# Patient Record
Sex: FEMALE | Race: WHITE | Employment: OTHER | URBAN - METROPOLITAN AREA
[De-identification: names, ages, dates, MRNs, and addresses within clinical notes are randomized per-mention and may not be internally consistent; named-entity substitution may affect disease eponyms.]

---

## 2018-01-31 ENCOUNTER — HOSPITAL ENCOUNTER (INPATIENT)
Age: 68
LOS: 2 days | Discharge: HOME | DRG: 603 | End: 2018-02-02
Attending: EMERGENCY MEDICINE | Admitting: INTERNAL MEDICINE
Payer: MEDICARE

## 2018-01-31 DIAGNOSIS — R03.0 ELEVATED BLOOD PRESSURE READING: ICD-10-CM
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
## 2018-01-31 PROBLEM — L03.90 CELLULITIS: Status: ACTIVE | Noted: 2018-01-31

## 2018-01-31 LAB
ABSOLUTE EOS #: 0 K/UL (ref 0–0.4)
ABSOLUTE IMMATURE GRANULOCYTE: ABNORMAL K/UL (ref 0–0.3)
ABSOLUTE LYMPH #: 1.01 K/UL (ref 1–4.8)
ABSOLUTE MONO #: 0.84 K/UL (ref 0.2–0.8)
ANION GAP SERPL CALCULATED.3IONS-SCNC: 9 MMOL/L (ref 9–17)
BASOPHILS # BLD: 0 %
BASOPHILS ABSOLUTE: 0 K/UL (ref 0–0.2)
BUN BLDV-MCNC: 14 MG/DL (ref 8–23)
BUN/CREAT BLD: 20 (ref 9–20)
CALCIUM SERPL-MCNC: 8.6 MG/DL (ref 8.6–10.4)
CHLORIDE BLD-SCNC: 101 MMOL/L (ref 98–107)
CO2: 23 MMOL/L (ref 20–31)
CREAT SERPL-MCNC: 0.71 MG/DL (ref 0.5–0.9)
DIFFERENTIAL TYPE: ABNORMAL
EOSINOPHILS RELATIVE PERCENT: 0 % (ref 1–4)
GFR AFRICAN AMERICAN: >60 ML/MIN
GFR NON-AFRICAN AMERICAN: >60 ML/MIN
GFR SERPL CREATININE-BSD FRML MDRD: ABNORMAL ML/MIN/{1.73_M2}
GFR SERPL CREATININE-BSD FRML MDRD: ABNORMAL ML/MIN/{1.73_M2}
GLUCOSE BLD-MCNC: 100 MG/DL (ref 70–99)
HCT VFR BLD CALC: 35.8 % (ref 36–46)
HEMOGLOBIN: 12 G/DL (ref 12–16)
IMMATURE GRANULOCYTES: ABNORMAL %
LACTIC ACID: 1.8 MMOL/L (ref 0.5–2.2)
LYMPHOCYTES # BLD: 6 % (ref 24–44)
MCH RBC QN AUTO: 31.8 PG (ref 26–34)
MCHC RBC AUTO-ENTMCNC: 33.5 G/DL (ref 31–37)
MCV RBC AUTO: 95 FL (ref 80–100)
MONOCYTES # BLD: 5 % (ref 1–7)
NRBC AUTOMATED: ABNORMAL PER 100 WBC
PDW BLD-RTO: 13.8 % (ref 11.5–14.5)
PLATELET # BLD: 77 K/UL (ref 130–400)
PLATELET ESTIMATE: ABNORMAL
PMV BLD AUTO: ABNORMAL FL (ref 6–12)
POTASSIUM SERPL-SCNC: 4.3 MMOL/L (ref 3.7–5.3)
RBC # BLD: 3.77 M/UL (ref 4–5.2)
RBC # BLD: ABNORMAL 10*6/UL
SEG NEUTROPHILS: 89 % (ref 36–66)
SEGMENTED NEUTROPHILS ABSOLUTE COUNT: 14.95 K/UL (ref 1.8–7.7)
SODIUM BLD-SCNC: 133 MMOL/L (ref 135–144)
WBC # BLD: 16.8 K/UL (ref 3.5–11)
WBC # BLD: ABNORMAL 10*3/UL

## 2018-01-31 PROCEDURE — 83605 ASSAY OF LACTIC ACID: CPT

## 2018-01-31 PROCEDURE — 1200000000 HC SEMI PRIVATE

## 2018-01-31 PROCEDURE — 85025 COMPLETE CBC W/AUTO DIFF WBC: CPT

## 2018-01-31 PROCEDURE — 6360000002 HC RX W HCPCS: Performed by: INTERNAL MEDICINE

## 2018-01-31 PROCEDURE — 2580000003 HC RX 258: Performed by: INTERNAL MEDICINE

## 2018-01-31 PROCEDURE — 96365 THER/PROPH/DIAG IV INF INIT: CPT

## 2018-01-31 PROCEDURE — 6370000000 HC RX 637 (ALT 250 FOR IP): Performed by: INTERNAL MEDICINE

## 2018-01-31 PROCEDURE — 6360000002 HC RX W HCPCS: Performed by: PHYSICIAN ASSISTANT

## 2018-01-31 PROCEDURE — 87040 BLOOD CULTURE FOR BACTERIA: CPT

## 2018-01-31 PROCEDURE — 80048 BASIC METABOLIC PNL TOTAL CA: CPT

## 2018-01-31 PROCEDURE — 2580000003 HC RX 258: Performed by: PHYSICIAN ASSISTANT

## 2018-01-31 PROCEDURE — 96375 TX/PRO/DX INJ NEW DRUG ADDON: CPT

## 2018-01-31 PROCEDURE — 99284 EMERGENCY DEPT VISIT MOD MDM: CPT

## 2018-01-31 RX ORDER — 0.9 % SODIUM CHLORIDE 0.9 %
1000 INTRAVENOUS SOLUTION INTRAVENOUS ONCE
Status: COMPLETED | OUTPATIENT
Start: 2018-01-31 | End: 2018-01-31

## 2018-01-31 RX ORDER — POTASSIUM CHLORIDE 7.45 MG/ML
10 INJECTION INTRAVENOUS PRN
Status: DISCONTINUED | OUTPATIENT
Start: 2018-01-31 | End: 2018-02-02 | Stop reason: HOSPADM

## 2018-01-31 RX ORDER — MAGNESIUM SULFATE 1 G/100ML
1 INJECTION INTRAVENOUS PRN
Status: DISCONTINUED | OUTPATIENT
Start: 2018-01-31 | End: 2018-02-02 | Stop reason: HOSPADM

## 2018-01-31 RX ORDER — NICOTINE 21 MG/24HR
1 PATCH, TRANSDERMAL 24 HOURS TRANSDERMAL DAILY PRN
Status: DISCONTINUED | OUTPATIENT
Start: 2018-01-31 | End: 2018-02-02 | Stop reason: HOSPADM

## 2018-01-31 RX ORDER — ACETAMINOPHEN 325 MG/1
650 TABLET ORAL EVERY 4 HOURS PRN
Status: DISCONTINUED | OUTPATIENT
Start: 2018-01-31 | End: 2018-02-02 | Stop reason: HOSPADM

## 2018-01-31 RX ORDER — POTASSIUM CHLORIDE 20MEQ/15ML
40 LIQUID (ML) ORAL PRN
Status: DISCONTINUED | OUTPATIENT
Start: 2018-01-31 | End: 2018-02-02 | Stop reason: HOSPADM

## 2018-01-31 RX ORDER — SODIUM CHLORIDE 0.9 % (FLUSH) 0.9 %
10 SYRINGE (ML) INJECTION PRN
Status: DISCONTINUED | OUTPATIENT
Start: 2018-01-31 | End: 2018-02-02 | Stop reason: HOSPADM

## 2018-01-31 RX ORDER — ONDANSETRON 2 MG/ML
4 INJECTION INTRAMUSCULAR; INTRAVENOUS EVERY 6 HOURS PRN
Status: DISCONTINUED | OUTPATIENT
Start: 2018-01-31 | End: 2018-02-01

## 2018-01-31 RX ORDER — HYDROCODONE BITARTRATE AND ACETAMINOPHEN 5; 325 MG/1; MG/1
1 TABLET ORAL EVERY 4 HOURS PRN
Status: DISCONTINUED | OUTPATIENT
Start: 2018-01-31 | End: 2018-02-02 | Stop reason: HOSPADM

## 2018-01-31 RX ORDER — KETOROLAC TROMETHAMINE 30 MG/ML
30 INJECTION, SOLUTION INTRAMUSCULAR; INTRAVENOUS ONCE
Status: COMPLETED | OUTPATIENT
Start: 2018-01-31 | End: 2018-01-31

## 2018-01-31 RX ORDER — POTASSIUM CHLORIDE 20 MEQ/1
40 TABLET, EXTENDED RELEASE ORAL PRN
Status: DISCONTINUED | OUTPATIENT
Start: 2018-01-31 | End: 2018-02-02 | Stop reason: HOSPADM

## 2018-01-31 RX ORDER — BISACODYL 10 MG
10 SUPPOSITORY, RECTAL RECTAL DAILY PRN
Status: DISCONTINUED | OUTPATIENT
Start: 2018-01-31 | End: 2018-02-02 | Stop reason: HOSPADM

## 2018-01-31 RX ORDER — SODIUM CHLORIDE 9 MG/ML
INJECTION, SOLUTION INTRAVENOUS CONTINUOUS
Status: DISCONTINUED | OUTPATIENT
Start: 2018-01-31 | End: 2018-02-02

## 2018-01-31 RX ORDER — MORPHINE SULFATE 2 MG/ML
2 INJECTION, SOLUTION INTRAMUSCULAR; INTRAVENOUS
Status: DISCONTINUED | OUTPATIENT
Start: 2018-01-31 | End: 2018-02-02 | Stop reason: HOSPADM

## 2018-01-31 RX ORDER — SODIUM CHLORIDE 0.9 % (FLUSH) 0.9 %
10 SYRINGE (ML) INJECTION EVERY 12 HOURS SCHEDULED
Status: DISCONTINUED | OUTPATIENT
Start: 2018-01-31 | End: 2018-02-02 | Stop reason: HOSPADM

## 2018-01-31 RX ORDER — HYDROCODONE BITARTRATE AND ACETAMINOPHEN 5; 325 MG/1; MG/1
2 TABLET ORAL EVERY 4 HOURS PRN
Status: DISCONTINUED | OUTPATIENT
Start: 2018-01-31 | End: 2018-02-02 | Stop reason: HOSPADM

## 2018-01-31 RX ORDER — MORPHINE SULFATE 4 MG/ML
4 INJECTION, SOLUTION INTRAMUSCULAR; INTRAVENOUS
Status: DISCONTINUED | OUTPATIENT
Start: 2018-01-31 | End: 2018-02-02 | Stop reason: HOSPADM

## 2018-01-31 RX ADMIN — SODIUM CHLORIDE: 9 INJECTION, SOLUTION INTRAVENOUS at 20:16

## 2018-01-31 RX ADMIN — SODIUM CHLORIDE 3 G: 9 INJECTION, SOLUTION INTRAVENOUS at 21:10

## 2018-01-31 RX ADMIN — SODIUM CHLORIDE 1000 ML: 9 INJECTION, SOLUTION INTRAVENOUS at 15:20

## 2018-01-31 RX ADMIN — ONDANSETRON 4 MG: 2 INJECTION INTRAMUSCULAR; INTRAVENOUS at 20:18

## 2018-01-31 RX ADMIN — SODIUM CHLORIDE 3 G: 9 INJECTION, SOLUTION INTRAVENOUS at 15:21

## 2018-01-31 RX ADMIN — ACETAMINOPHEN 650 MG: 325 TABLET ORAL at 23:04

## 2018-01-31 RX ADMIN — KETOROLAC TROMETHAMINE 30 MG: 30 INJECTION, SOLUTION INTRAMUSCULAR at 15:21

## 2018-01-31 RX ADMIN — MORPHINE SULFATE 4 MG: 4 INJECTION INTRAVENOUS at 20:19

## 2018-01-31 ASSESSMENT — PAIN SCALES - GENERAL
PAINLEVEL_OUTOF10: 7
PAINLEVEL_OUTOF10: 5
PAINLEVEL_OUTOF10: 3
PAINLEVEL_OUTOF10: 0
PAINLEVEL_OUTOF10: 5
PAINLEVEL_OUTOF10: 4
PAINLEVEL_OUTOF10: 3

## 2018-01-31 ASSESSMENT — PAIN DESCRIPTION - ORIENTATION: ORIENTATION: RIGHT

## 2018-01-31 ASSESSMENT — PAIN DESCRIPTION - LOCATION: LOCATION: ARM;HAND

## 2018-01-31 ASSESSMENT — PAIN DESCRIPTION - DESCRIPTORS: DESCRIPTORS: BURNING;TENDER

## 2018-01-31 ASSESSMENT — PAIN DESCRIPTION - FREQUENCY: FREQUENCY: INTERMITTENT

## 2018-01-31 NOTE — ED PROVIDER NOTES
[address] [place] [address] ED  eMERGENCY dEPARTMENT eNCOUnter      Pt Name: Dustin Santos  MRN: [id number]  [place] 1950  Date of evaluation: 1/31/2018  Provider: Catherine Matias Dr       Chief Complaint   Patient presents with    Animal Bite     right hand onset last fri    Swelling     right hand, redness         HISTORY OF PRESENT ILLNESS  (Location/Symptom, Timing/Onset, Context/Setting, Quality, Duration, Modifying Factors, Severity.)   Dustin Santos is a 78 y.o. female who presents to the emergency department with Bite to the right hand that occurred on Friday. Patient states she takes in a straight cath. This cat was stray. She has since taken the cat to the vet in front of that everything is okay from a health perspective from the cut. Since being bitten she reports increased pain and redness to the hand that is streaking up the forearm above the elbow. Pain described as mild, constant, sore. Worse with palpation and certain movements. No definite alleviating factors. Nursing Notes were reviewed. ALLERGIES     Review of patient's allergies indicates no known allergies. CURRENT MEDICATIONS       Previous Medications    No medications on file       PAST MEDICAL HISTORY         Diagnosis Date    Back pain     Fibromyalgia     Hepatitis     Liver disease     stage 4       SURGICAL HISTORY           Procedure Laterality Date    BREAST SURGERY      benign lumpectomy    CERVICAL DISC SURGERY      CHOLECYSTECTOMY      HYSTERECTOMY           FAMILY HISTORY     History reviewed. No pertinent family history. No family status information on file. SOCIAL HISTORY      reports that she has been smoking Cigarettes. She has a 50.00 pack-year smoking history. She has never used smokeless tobacco. She reports that she uses drugs, including Cocaine. She reports that she does not drink alcohol.     REVIEW OF SYSTEMS    (2-9 systems for level 4, 10 or more for level 5) Review of Systems    Except as noted above the remainder of the review of systems was reviewed and negative. PHYSICAL EXAM    (up to 7 for level 4, 8 or more for level 5)     ED Triage Vitals [01/31/18 1414]   BP Temp Temp Source Pulse Resp SpO2 Height Weight   (!) 171/74 97.5 °F (36.4 °C) Oral 89 18 95 % 5' 3\" (1.6 m) 157 lb 6.4 oz (71.4 kg)     Physical Exam   Constitutional: She is oriented to person, place, and time. She appears well-developed. HENT:   Head: Normocephalic and atraumatic. Neck: Normal range of motion. Neck supple. Cardiovascular: Normal rate and regular rhythm. Pulmonary/Chest: Effort normal and breath sounds normal.   Abdominal: Soft. Musculoskeletal: Normal range of motion. Arms:  Neurological: She is alert and oriented to person, place, and time. Skin: Skin is warm. No rash noted. Psychiatric: She has a normal mood and affect.  Her behavior is normal.               DIAGNOSTIC RESULTS     EKG: All EKG's are interpreted by the Emergency Department Physician who either signs or Co-signs this chart in the absence of a cardiologist.        RADIOLOGY:   Non-plain film images such as CT, Ultrasound and MRI are read by the radiologist. Plain radiographic images are visualized and preliminarily interpreted by the emergency physician with the below findings:        Interpretation per the Radiologist below, if available at the time of this note:          ED BEDSIDE ULTRASOUND:   Performed by ED Physician - none    LABS:  Labs Reviewed   BASIC METABOLIC PANEL - Abnormal; Notable for the following:        Result Value    Glucose 100 (*)     Sodium 133 (*)     All other components within normal limits   CBC WITH AUTO DIFFERENTIAL - Abnormal; Notable for the following:     WBC 16.8 (*)     RBC 3.77 (*)     Hematocrit 35.8 (*)     Platelets 77 (*)     Seg Neutrophils 89 (*)     Lymphocytes 6 (*)     Eosinophils % 0 (*)     Segs Absolute 14.95 (*)     Absolute Mono # 0.84 (*)     All

## 2018-01-31 NOTE — ED NOTES
Pt was bit on her Rt arm several days ago by a stray cat. Pt now has swelling and pain with redness to the arm. Pt arm is warm but she still has full movent.       Sandhya Gomez  01/31/18 5254

## 2018-02-01 PROBLEM — S41.151A: Status: ACTIVE | Noted: 2018-02-01

## 2018-02-01 PROBLEM — L03.113 CELLULITIS OF RIGHT ARM: Status: ACTIVE | Noted: 2018-02-01

## 2018-02-01 PROBLEM — K74.60 CL (CIRRHOSIS OF LIVER) (HCC): Status: ACTIVE | Noted: 2018-02-01
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
## 2018-02-01 PROBLEM — B18.2 CHRONIC HEPATITIS C WITHOUT HEPATIC COMA (HCC): Status: ACTIVE | Noted: 2018-02-01

## 2018-02-01 LAB
ANION GAP SERPL CALCULATED.3IONS-SCNC: 10 MMOL/L (ref 9–17)
BUN BLDV-MCNC: 11 MG/DL (ref 8–23)
BUN/CREAT BLD: 15 (ref 9–20)
CALCIUM SERPL-MCNC: 7.8 MG/DL (ref 8.6–10.4)
CHLORIDE BLD-SCNC: 105 MMOL/L (ref 98–107)
CO2: 22 MMOL/L (ref 20–31)
CREAT SERPL-MCNC: 0.71 MG/DL (ref 0.5–0.9)
GFR AFRICAN AMERICAN: >60 ML/MIN
GFR NON-AFRICAN AMERICAN: >60 ML/MIN
GFR SERPL CREATININE-BSD FRML MDRD: ABNORMAL ML/MIN/{1.73_M2}
GFR SERPL CREATININE-BSD FRML MDRD: ABNORMAL ML/MIN/{1.73_M2}
GLUCOSE BLD-MCNC: 101 MG/DL (ref 70–99)
HCT VFR BLD CALC: 34.9 % (ref 36–46)
HEMOGLOBIN: 12 G/DL (ref 12–16)
MCH RBC QN AUTO: 32.4 PG (ref 26–34)
MCHC RBC AUTO-ENTMCNC: 34.2 G/DL (ref 31–37)
MCV RBC AUTO: 94.6 FL (ref 80–100)
NRBC AUTOMATED: ABNORMAL PER 100 WBC
PDW BLD-RTO: 13.4 % (ref 11.5–14.5)
PLATELET # BLD: 37 K/UL (ref 130–400)
PMV BLD AUTO: ABNORMAL FL (ref 6–12)
POTASSIUM SERPL-SCNC: 3.6 MMOL/L (ref 3.7–5.3)
RBC # BLD: 3.69 M/UL (ref 4–5.2)
SODIUM BLD-SCNC: 137 MMOL/L (ref 135–144)
WBC # BLD: 14.4 K/UL (ref 3.5–11)

## 2018-02-01 PROCEDURE — 6360000002 HC RX W HCPCS: Performed by: INTERNAL MEDICINE

## 2018-02-01 PROCEDURE — 6370000000 HC RX 637 (ALT 250 FOR IP): Performed by: INTERNAL MEDICINE

## 2018-02-01 PROCEDURE — 2580000003 HC RX 258: Performed by: INTERNAL MEDICINE

## 2018-02-01 PROCEDURE — G0008 ADMIN INFLUENZA VIRUS VAC: HCPCS | Performed by: INTERNAL MEDICINE

## 2018-02-01 PROCEDURE — 80048 BASIC METABOLIC PNL TOTAL CA: CPT

## 2018-02-01 PROCEDURE — 36415 COLL VENOUS BLD VENIPUNCTURE: CPT

## 2018-02-01 PROCEDURE — 85027 COMPLETE CBC AUTOMATED: CPT

## 2018-02-01 PROCEDURE — 90686 IIV4 VACC NO PRSV 0.5 ML IM: CPT | Performed by: INTERNAL MEDICINE

## 2018-02-01 PROCEDURE — 99222 1ST HOSP IP/OBS MODERATE 55: CPT | Performed by: INTERNAL MEDICINE

## 2018-02-01 PROCEDURE — 1200000000 HC SEMI PRIVATE

## 2018-02-01 RX ORDER — ONDANSETRON 2 MG/ML
4 INJECTION INTRAMUSCULAR; INTRAVENOUS EVERY 4 HOURS PRN
Status: DISCONTINUED | OUTPATIENT
Start: 2018-02-01 | End: 2018-02-02 | Stop reason: HOSPADM

## 2018-02-01 RX ORDER — PROMETHAZINE HYDROCHLORIDE 25 MG/ML
6.25 INJECTION, SOLUTION INTRAMUSCULAR; INTRAVENOUS EVERY 4 HOURS PRN
Status: DISCONTINUED | OUTPATIENT
Start: 2018-02-01 | End: 2018-02-02 | Stop reason: HOSPADM

## 2018-02-01 RX ADMIN — MORPHINE SULFATE 4 MG: 4 INJECTION INTRAVENOUS at 01:00

## 2018-02-01 RX ADMIN — SODIUM CHLORIDE 3 G: 9 INJECTION, SOLUTION INTRAVENOUS at 21:00

## 2018-02-01 RX ADMIN — Medication 10 ML: at 09:00

## 2018-02-01 RX ADMIN — MORPHINE SULFATE 2 MG: 2 INJECTION, SOLUTION INTRAMUSCULAR; INTRAVENOUS at 08:59

## 2018-02-01 RX ADMIN — SODIUM CHLORIDE 3 G: 9 INJECTION, SOLUTION INTRAVENOUS at 03:27

## 2018-02-01 RX ADMIN — INFLUENZA A VIRUS A/SINGAPORE/GP1908/2015 IVR-180A (H1N1) ANTIGEN (PROPIOLACTONE INACTIVATED), INFLUENZA A VIRUS A/HONG KONG/4801/2014 X-263B (H3N2) ANTIGEN (PROPIOLACTONE INACTIVATED), INFLUENZA B VIRUS B/BRISBANE/46/2015 ANTIGEN (PROPIOLACTONE INACTIVATED), AND INFLUENZA B VIRUS B/PHUKET/3073/2013 BVR-1B ANTIGEN (PROPIOLACTONE INACTIVATED) 0.5 ML: 15; 15; 15; 15 INJECTION, SUSPENSION INTRAMUSCULAR at 16:17

## 2018-02-01 RX ADMIN — PROMETHAZINE HYDROCHLORIDE 6.25 MG: 25 INJECTION INTRAMUSCULAR; INTRAVENOUS at 16:16

## 2018-02-01 RX ADMIN — SODIUM CHLORIDE 3 G: 9 INJECTION, SOLUTION INTRAVENOUS at 09:13

## 2018-02-01 RX ADMIN — ONDANSETRON 4 MG: 2 INJECTION INTRAMUSCULAR; INTRAVENOUS at 08:59

## 2018-02-01 RX ADMIN — SODIUM CHLORIDE 3 G: 9 INJECTION, SOLUTION INTRAVENOUS at 16:15

## 2018-02-01 RX ADMIN — HYDROCODONE BITARTRATE AND ACETAMINOPHEN 1 TABLET: 5; 325 TABLET ORAL at 17:25

## 2018-02-01 ASSESSMENT — PAIN SCALES - GENERAL
PAINLEVEL_OUTOF10: 7
PAINLEVEL_OUTOF10: 2
PAINLEVEL_OUTOF10: 7
PAINLEVEL_OUTOF10: 4

## 2018-02-01 NOTE — PLAN OF CARE
Problem: SAFETY  Goal: Free from accidental physical injury  Outcome: Ongoing    Intervention: ASSESS FOR FALL RISK  See lili fall risk assessment. Intervention: KEEP BED IN LOW POSITION  Bed locked and in low position. Intervention: KEEP BED WHEELS LOCKED  Bed wheels locked. Intervention: KEEP CALL LIGHT WITHIN REACH  Call light and personal belongings with in reach. Intervention: ENSURE ID BAND IS CORRECT AND IN PLACE  Id band checked and compared with patient name and date of birth with each med administration.   Intervention: INSTRUCT ABOUT SAFETY DEVICES  Patient instructed on use of call light, non-skid foot wear, and bed rails

## 2018-02-02 VITALS
SYSTOLIC BLOOD PRESSURE: 129 MMHG | RESPIRATION RATE: 16 BRPM | HEIGHT: 63 IN | OXYGEN SATURATION: 96 % | WEIGHT: 158.1 LBS | TEMPERATURE: 98.2 F | BODY MASS INDEX: 28.01 KG/M2 | DIASTOLIC BLOOD PRESSURE: 58 MMHG | HEART RATE: 79 BPM

## 2018-02-02 PROBLEM — D69.6 THROMBOCYTOPENIA (HCC): Status: ACTIVE | Noted: 2018-02-02

## 2018-02-02 LAB
ABSOLUTE EOS #: 0.2 K/UL (ref 0–0.4)
ABSOLUTE IMMATURE GRANULOCYTE: ABNORMAL K/UL (ref 0–0.3)
ABSOLUTE LYMPH #: 1.7 K/UL (ref 1–4.8)
ABSOLUTE MONO #: 0.8 K/UL (ref 0.2–0.8)
BASOPHILS # BLD: 0 % (ref 0–2)
BASOPHILS ABSOLUTE: 0 K/UL (ref 0–0.2)
DIFFERENTIAL TYPE: ABNORMAL
EOSINOPHILS RELATIVE PERCENT: 2 % (ref 1–4)
HCT VFR BLD CALC: 32.4 % (ref 36–46)
HEMOGLOBIN: 10.9 G/DL (ref 12–16)
IMMATURE GRANULOCYTES: ABNORMAL %
LYMPHOCYTES # BLD: 18 % (ref 24–44)
MCH RBC QN AUTO: 31.8 PG (ref 26–34)
MCHC RBC AUTO-ENTMCNC: 33.5 G/DL (ref 31–37)
MCV RBC AUTO: 94.9 FL (ref 80–100)
MONOCYTES # BLD: 8 % (ref 1–7)
NRBC AUTOMATED: ABNORMAL PER 100 WBC
PDW BLD-RTO: 14.1 % (ref 11.5–14.5)
PLATELET # BLD: 41 K/UL (ref 130–400)
PLATELET ESTIMATE: ABNORMAL
PMV BLD AUTO: 7.6 FL (ref 6–12)
RBC # BLD: 3.42 M/UL (ref 4–5.2)
RBC # BLD: ABNORMAL 10*6/UL
SEG NEUTROPHILS: 72 % (ref 36–66)
SEGMENTED NEUTROPHILS ABSOLUTE COUNT: 7.1 K/UL (ref 1.8–7.7)
WBC # BLD: 9.9 K/UL (ref 3.5–11)
WBC # BLD: ABNORMAL 10*3/UL

## 2018-02-02 PROCEDURE — 6360000002 HC RX W HCPCS: Performed by: INTERNAL MEDICINE

## 2018-02-02 PROCEDURE — 99232 SBSQ HOSP IP/OBS MODERATE 35: CPT | Performed by: INTERNAL MEDICINE

## 2018-02-02 PROCEDURE — 6370000000 HC RX 637 (ALT 250 FOR IP): Performed by: INTERNAL MEDICINE

## 2018-02-02 PROCEDURE — 2580000003 HC RX 258: Performed by: INTERNAL MEDICINE

## 2018-02-02 PROCEDURE — 36415 COLL VENOUS BLD VENIPUNCTURE: CPT

## 2018-02-02 PROCEDURE — 85025 COMPLETE CBC W/AUTO DIFF WBC: CPT

## 2018-02-02 RX ORDER — ACETAMINOPHEN 325 MG/1
650 TABLET ORAL EVERY 4 HOURS PRN
Qty: 120 TABLET | Refills: 3 | COMMUNITY
Start: 2018-02-02 | End: 2019-03-14 | Stop reason: ALTCHOICE

## 2018-02-02 RX ORDER — AMOXICILLIN AND CLAVULANATE POTASSIUM 875; 125 MG/1; MG/1
1 TABLET, FILM COATED ORAL 2 TIMES DAILY
Qty: 14 TABLET | Refills: 0 | Status: SHIPPED | OUTPATIENT
Start: 2018-02-02 | End: 2018-02-09

## 2018-02-02 RX ADMIN — SODIUM CHLORIDE 3 G: 9 INJECTION, SOLUTION INTRAVENOUS at 07:41

## 2018-02-02 RX ADMIN — SODIUM CHLORIDE 3 G: 9 INJECTION, SOLUTION INTRAVENOUS at 03:51

## 2018-02-02 RX ADMIN — HYDROCODONE BITARTRATE AND ACETAMINOPHEN 1 TABLET: 5; 325 TABLET ORAL at 07:41

## 2018-02-02 RX ADMIN — HYDROCODONE BITARTRATE AND ACETAMINOPHEN 2 TABLET: 5; 325 TABLET ORAL at 00:51

## 2018-02-02 ASSESSMENT — PAIN DESCRIPTION - PAIN TYPE
TYPE: ACUTE PAIN

## 2018-02-02 ASSESSMENT — PAIN DESCRIPTION - LOCATION
LOCATION: HAND

## 2018-02-02 ASSESSMENT — PAIN SCALES - GENERAL
PAINLEVEL_OUTOF10: 3
PAINLEVEL_OUTOF10: 8
PAINLEVEL_OUTOF10: 3
PAINLEVEL_OUTOF10: 1

## 2018-02-02 NOTE — DISCHARGE SUMMARY
[facility]    Discharge Summary     Patient ID: Jigna Brizuela  :  1950   MRN: [id number]     ACCOUNT:  [de-identified]   Patient's PCP: No primary care provider on file. Admit Date: 2018   Discharge Date: 2018     Length of Stay: 2  Code Status:  Full Code  Admitting Physician: Roly Barahona, DO  Discharge Physician: Roly Barahona, DO     Active Discharge Diagnoses:     Primary Problem  Cellulitis of right arm      [place] Problems    Diagnosis Date Noted    Cellulitis of right arm [L03.113] 2018    Cat bite of right upper arm [S41.151A, W55.01XA] 2018    CL (cirrhosis of liver) ([facility] [K74.60] 2018    Chronic [facility] C without hepatic coma [facility] [B18.2] 2018       Admission Condition:  fair     Discharged Condition: good    Hospital Stay:     Hospital Course:  Jigna Brizuela is a 78 y.o. female who was admitted for the management of   Cellulitis of right arm , presented to ER with Animal Bite (right hand onset last fri) and Swelling (right hand, redness)    Pt had been bit by cat and developed cellulitis. Placed on unasyn, with good improvement.   Switched to po augmentin and sent home      Significant therapeutic interventions: iv abx    Significant Diagnostic Studies:   Labs / Micro:  CBC:   Lab Results   Component Value Date    WBC 9.9 2018    RBC 3.42 2018    HGB 10.9 2018    HCT 32.4 2018    MCV 94.9 2018    MCH 31.8 2018    MCHC 33.5 2018    RDW 14.1 2018    PLT 41 2018     CMP:    Lab Results   Component Value Date    GLUCOSE 101 2018     2018    K 3.6 2018     2018    CO2 22 2018    BUN 11 2018    CREATININE 0.71 2018    ANIONGAP 10 2018    ALKPHOS 75 2013    ALT 64 2013    AST 73 2013    BILITOT 0.40 2013    LABALBU 4.2 2013 this patient's care.

## 2018-02-02 NOTE — PROGRESS NOTES
All patient belongings collected. IV and telemetry removed. All discharge paperwork given and explained to patient. PCP list given to patient to follow up with. See discharge event for further details.

## 2018-02-06 LAB
CULTURE: NORMAL
Lab: NORMAL
Lab: NORMAL
SPECIMEN DESCRIPTION: NORMAL
STATUS: NORMAL
STATUS: NORMAL

## 2019-01-01 ENCOUNTER — HOSPITAL ENCOUNTER (OUTPATIENT)
Dept: INFUSION THERAPY | Facility: MEDICAL CENTER | Age: 69
Discharge: HOME | End: 2019-12-16
Payer: MEDICARE

## 2019-01-01 ENCOUNTER — OFFICE VISIT (OUTPATIENT)
Dept: ONCOLOGY | Age: 69
End: 2019-01-01
Payer: MEDICARE

## 2019-01-01 ENCOUNTER — HOSPITAL ENCOUNTER (OUTPATIENT)
Facility: MEDICAL CENTER | Age: 69
End: 2019-01-01
Payer: MEDICARE

## 2019-01-01 ENCOUNTER — TELEPHONE (OUTPATIENT)
Dept: ONCOLOGY | Age: 69
End: 2019-01-01

## 2019-01-01 ENCOUNTER — HOSPITAL ENCOUNTER (OUTPATIENT)
Dept: INFUSION THERAPY | Facility: MEDICAL CENTER | Age: 69
Discharge: HOME | End: 2019-12-02
Payer: MEDICARE

## 2019-01-01 ENCOUNTER — HOSPITAL ENCOUNTER (OUTPATIENT)
Dept: INFUSION THERAPY | Facility: MEDICAL CENTER | Age: 69
Discharge: HOME | End: 2019-11-18
Payer: MEDICARE

## 2019-01-01 ENCOUNTER — OFFICE VISIT (OUTPATIENT)
Dept: PRIMARY CARE CLINIC | Age: 69
End: 2019-01-01
Payer: MEDICARE

## 2019-01-01 ENCOUNTER — HOSPITAL ENCOUNTER (OUTPATIENT)
Dept: INFUSION THERAPY | Facility: MEDICAL CENTER | Age: 69
Discharge: HOME | End: 2019-10-21
Payer: MEDICARE

## 2019-01-01 ENCOUNTER — TELEPHONE (OUTPATIENT)
Dept: RADIATION ONCOLOGY | Facility: MEDICAL CENTER | Age: 69
End: 2019-01-01

## 2019-01-01 ENCOUNTER — HOSPITAL ENCOUNTER (OUTPATIENT)
Dept: INFUSION THERAPY | Facility: MEDICAL CENTER | Age: 69
End: 2019-01-01
Payer: MEDICARE

## 2019-01-01 ENCOUNTER — TELEPHONE (OUTPATIENT)
Dept: PRIMARY CARE CLINIC | Age: 69
End: 2019-01-01

## 2019-01-01 ENCOUNTER — HOSPITAL ENCOUNTER (OUTPATIENT)
Dept: INFUSION THERAPY | Facility: MEDICAL CENTER | Age: 69
Discharge: HOME | End: 2019-11-04
Payer: MEDICARE

## 2019-01-01 ENCOUNTER — HOSPITAL ENCOUNTER (OUTPATIENT)
Dept: MRI IMAGING | Age: 69
Discharge: HOME | End: 2019-12-05
Payer: MEDICARE

## 2019-01-01 ENCOUNTER — OFFICE VISIT (OUTPATIENT)
Dept: PULMONOLOGY | Age: 69
End: 2019-01-01
Payer: MEDICARE

## 2019-01-01 VITALS
SYSTOLIC BLOOD PRESSURE: 134 MMHG | HEART RATE: 98 BPM | WEIGHT: 124.6 LBS | TEMPERATURE: 97.7 F | RESPIRATION RATE: 20 BRPM | DIASTOLIC BLOOD PRESSURE: 71 MMHG | BODY MASS INDEX: 22.07 KG/M2

## 2019-01-01 VITALS
WEIGHT: 124.6 LBS | BODY MASS INDEX: 22.07 KG/M2 | SYSTOLIC BLOOD PRESSURE: 134 MMHG | TEMPERATURE: 97.7 F | HEART RATE: 98 BPM | RESPIRATION RATE: 20 BRPM | DIASTOLIC BLOOD PRESSURE: 71 MMHG

## 2019-01-01 VITALS
SYSTOLIC BLOOD PRESSURE: 138 MMHG | TEMPERATURE: 98.2 F | RESPIRATION RATE: 20 BRPM | HEART RATE: 96 BPM | DIASTOLIC BLOOD PRESSURE: 50 MMHG

## 2019-01-01 VITALS
HEART RATE: 98 BPM | HEIGHT: 63 IN | RESPIRATION RATE: 16 BRPM | WEIGHT: 118.6 LBS | SYSTOLIC BLOOD PRESSURE: 115 MMHG | BODY MASS INDEX: 21.02 KG/M2 | DIASTOLIC BLOOD PRESSURE: 70 MMHG | OXYGEN SATURATION: 88 %

## 2019-01-01 VITALS
RESPIRATION RATE: 18 BRPM | BODY MASS INDEX: 21.31 KG/M2 | WEIGHT: 120.3 LBS | HEART RATE: 79 BPM | SYSTOLIC BLOOD PRESSURE: 113 MMHG | DIASTOLIC BLOOD PRESSURE: 64 MMHG

## 2019-01-01 VITALS
BODY MASS INDEX: 21.86 KG/M2 | TEMPERATURE: 98.5 F | DIASTOLIC BLOOD PRESSURE: 66 MMHG | WEIGHT: 123.4 LBS | HEART RATE: 66 BPM | SYSTOLIC BLOOD PRESSURE: 118 MMHG

## 2019-01-01 VITALS
WEIGHT: 126 LBS | RESPIRATION RATE: 20 BRPM | TEMPERATURE: 98.2 F | SYSTOLIC BLOOD PRESSURE: 138 MMHG | BODY MASS INDEX: 22.32 KG/M2 | DIASTOLIC BLOOD PRESSURE: 50 MMHG | HEART RATE: 96 BPM

## 2019-01-01 VITALS
WEIGHT: 126 LBS | SYSTOLIC BLOOD PRESSURE: 110 MMHG | HEART RATE: 111 BPM | TEMPERATURE: 98.2 F | BODY MASS INDEX: 22.32 KG/M2 | DIASTOLIC BLOOD PRESSURE: 63 MMHG | RESPIRATION RATE: 18 BRPM

## 2019-01-01 VITALS
SYSTOLIC BLOOD PRESSURE: 140 MMHG | DIASTOLIC BLOOD PRESSURE: 86 MMHG | BODY MASS INDEX: 22.15 KG/M2 | WEIGHT: 125 LBS | HEART RATE: 108 BPM | HEIGHT: 63 IN | TEMPERATURE: 98.8 F

## 2019-01-01 VITALS
DIASTOLIC BLOOD PRESSURE: 64 MMHG | TEMPERATURE: 97.7 F | SYSTOLIC BLOOD PRESSURE: 113 MMHG | RESPIRATION RATE: 18 BRPM | HEART RATE: 79 BPM

## 2019-01-01 DIAGNOSIS — Z23 NEEDS FLU SHOT: ICD-10-CM

## 2019-01-01 DIAGNOSIS — R53.83 FATIGUE, UNSPECIFIED TYPE: ICD-10-CM

## 2019-01-01 DIAGNOSIS — F41.9 ANXIETY: Primary | ICD-10-CM

## 2019-01-01 DIAGNOSIS — C34.11 MALIGNANT NEOPLASM OF UPPER LOBE OF RIGHT LUNG (HCC): Primary | ICD-10-CM

## 2019-01-01 DIAGNOSIS — G47.00 INSOMNIA, UNSPECIFIED TYPE: ICD-10-CM

## 2019-01-01 DIAGNOSIS — C34.11 MALIGNANT NEOPLASM OF UPPER LOBE OF RIGHT LUNG (HCC): ICD-10-CM

## 2019-01-01 DIAGNOSIS — B18.2 CHRONIC HEPATITIS C WITHOUT HEPATIC COMA (HCC): ICD-10-CM

## 2019-01-01 DIAGNOSIS — Z76.0 MEDICATION REFILL: ICD-10-CM

## 2019-01-01 DIAGNOSIS — R73.9 HYPERGLYCEMIA: ICD-10-CM

## 2019-01-01 DIAGNOSIS — C34.01 MALIGNANT NEOPLASM OF HILUS OF RIGHT LUNG (HCC): ICD-10-CM

## 2019-01-01 DIAGNOSIS — J96.11 CHRONIC RESPIRATORY FAILURE WITH HYPOXIA (HCC): ICD-10-CM

## 2019-01-01 DIAGNOSIS — J44.9 CHRONIC OBSTRUCTIVE PULMONARY DISEASE, UNSPECIFIED COPD TYPE (HCC): Primary | ICD-10-CM

## 2019-01-01 DIAGNOSIS — Z00.00 ROUTINE GENERAL MEDICAL EXAMINATION AT A HEALTH CARE FACILITY: Primary | ICD-10-CM

## 2019-01-01 DIAGNOSIS — J90 PLEURAL EFFUSION ON RIGHT: ICD-10-CM

## 2019-01-01 DIAGNOSIS — F17.200 TOBACCO DEPENDENCE: ICD-10-CM

## 2019-01-01 DIAGNOSIS — J44.9 CHRONIC OBSTRUCTIVE PULMONARY DISEASE, UNSPECIFIED COPD TYPE (HCC): ICD-10-CM

## 2019-01-01 LAB
ABSOLUTE EOS #: 0.13 K/UL (ref 0–0.44)
ABSOLUTE EOS #: 0.14 K/UL (ref 0–0.44)
ABSOLUTE EOS #: 0.18 K/UL (ref 0–0.4)
ABSOLUTE EOS #: 0.18 K/UL (ref 0–0.44)
ABSOLUTE EOS #: 0.31 K/UL (ref 0–0.44)
ABSOLUTE IMMATURE GRANULOCYTE: 0 K/UL (ref 0–0.3)
ABSOLUTE IMMATURE GRANULOCYTE: 0 K/UL (ref 0–0.3)
ABSOLUTE IMMATURE GRANULOCYTE: 0.04 K/UL (ref 0–0.3)
ABSOLUTE IMMATURE GRANULOCYTE: 0.04 K/UL (ref 0–0.3)
ABSOLUTE IMMATURE GRANULOCYTE: 0.05 K/UL (ref 0–0.3)
ABSOLUTE LYMPH #: 0.58 K/UL (ref 1.1–3.7)
ABSOLUTE LYMPH #: 0.63 K/UL (ref 1–4.8)
ABSOLUTE LYMPH #: 0.68 K/UL (ref 1.1–3.7)
ABSOLUTE LYMPH #: 0.72 K/UL (ref 1.1–3.7)
ABSOLUTE LYMPH #: 0.75 K/UL (ref 1.1–3.7)
ABSOLUTE MONO #: 0.47 K/UL (ref 0.1–1.2)
ABSOLUTE MONO #: 0.5 K/UL (ref 0.2–0.8)
ABSOLUTE MONO #: 0.53 K/UL (ref 0.1–1.2)
ABSOLUTE MONO #: 0.63 K/UL (ref 0.1–1.2)
ABSOLUTE MONO #: 0.65 K/UL (ref 0.1–1.2)
ALBUMIN SERPL-MCNC: 3.1 G/DL (ref 3.5–5.2)
ALBUMIN SERPL-MCNC: 3.2 G/DL (ref 3.5–5.2)
ALBUMIN SERPL-MCNC: 3.2 G/DL (ref 3.5–5.2)
ALBUMIN SERPL-MCNC: 3.3 G/DL (ref 3.5–5.2)
ALBUMIN SERPL-MCNC: 3.5 G/DL (ref 3.5–5.2)
ALBUMIN/GLOBULIN RATIO: ABNORMAL (ref 1–2.5)
ALP BLD-CCNC: 120 U/L (ref 35–104)
ALP BLD-CCNC: 121 U/L (ref 35–104)
ALP BLD-CCNC: 122 U/L (ref 35–104)
ALP BLD-CCNC: 136 U/L (ref 35–104)
ALP BLD-CCNC: 150 U/L (ref 35–104)
ALT SERPL-CCNC: 18 U/L (ref 5–33)
ALT SERPL-CCNC: 30 U/L (ref 5–33)
ALT SERPL-CCNC: 34 U/L (ref 5–33)
ALT SERPL-CCNC: 41 U/L (ref 5–33)
ALT SERPL-CCNC: 49 U/L (ref 5–33)
AMYLASE: 106 U/L (ref 28–100)
AMYLASE: 137 U/L (ref 28–100)
AMYLASE: 42 U/L (ref 28–100)
AMYLASE: 66 U/L (ref 28–100)
ANION GAP SERPL CALCULATED.3IONS-SCNC: 10 MMOL/L (ref 9–17)
ANION GAP SERPL CALCULATED.3IONS-SCNC: 10 MMOL/L (ref 9–17)
ANION GAP SERPL CALCULATED.3IONS-SCNC: 11 MMOL/L (ref 9–17)
ANION GAP SERPL CALCULATED.3IONS-SCNC: 11 MMOL/L (ref 9–17)
ANION GAP SERPL CALCULATED.3IONS-SCNC: 9 MMOL/L (ref 9–17)
AST SERPL-CCNC: 34 U/L
AST SERPL-CCNC: 55 U/L
AST SERPL-CCNC: 57 U/L
AST SERPL-CCNC: 87 U/L
AST SERPL-CCNC: 93 U/L
BASOPHILS # BLD: 1 %
BASOPHILS # BLD: 1 % (ref 0–2)
BASOPHILS ABSOLUTE: 0.05 K/UL (ref 0–0.2)
BASOPHILS ABSOLUTE: 0.06 K/UL (ref 0–0.2)
BILIRUB SERPL-MCNC: 0.48 MG/DL (ref 0.3–1.2)
BILIRUB SERPL-MCNC: 0.56 MG/DL (ref 0.3–1.2)
BILIRUB SERPL-MCNC: 0.63 MG/DL (ref 0.3–1.2)
BILIRUB SERPL-MCNC: 0.82 MG/DL (ref 0.3–1.2)
BILIRUB SERPL-MCNC: 1.01 MG/DL (ref 0.3–1.2)
BUN BLDV-MCNC: 12 MG/DL (ref 8–23)
BUN BLDV-MCNC: 12 MG/DL (ref 8–23)
BUN BLDV-MCNC: 16 MG/DL (ref 8–23)
BUN BLDV-MCNC: 16 MG/DL (ref 8–23)
BUN BLDV-MCNC: 9 MG/DL (ref 8–23)
BUN BLDV-MCNC: 9 MG/DL (ref 8–23)
BUN/CREAT BLD: 17 (ref 9–20)
BUN/CREAT BLD: 18 (ref 9–20)
BUN/CREAT BLD: 21 (ref 9–20)
BUN/CREAT BLD: 22 (ref 9–20)
BUN/CREAT BLD: 25 (ref 9–20)
CALCIUM SERPL-MCNC: 8.6 MG/DL (ref 8.6–10.4)
CALCIUM SERPL-MCNC: 8.6 MG/DL (ref 8.6–10.4)
CALCIUM SERPL-MCNC: 8.8 MG/DL (ref 8.6–10.4)
CALCIUM SERPL-MCNC: 8.9 MG/DL (ref 8.6–10.4)
CALCIUM SERPL-MCNC: 8.9 MG/DL (ref 8.6–10.4)
CHLORIDE BLD-SCNC: 100 MMOL/L (ref 98–107)
CHLORIDE BLD-SCNC: 103 MMOL/L (ref 98–107)
CHLORIDE BLD-SCNC: 103 MMOL/L (ref 98–107)
CO2: 22 MMOL/L (ref 20–31)
CO2: 23 MMOL/L (ref 20–31)
CO2: 23 MMOL/L (ref 20–31)
CO2: 24 MMOL/L (ref 20–31)
CO2: 26 MMOL/L (ref 20–31)
CORTISOL COLLECTION INFO: NORMAL
CORTISOL: 4.1 UG/DL (ref 2.7–18.4)
CORTISOL: 4.5 UG/DL (ref 2.7–18.4)
CORTISOL: 4.7 UG/DL (ref 2.7–18.4)
CORTISOL: 7.9 UG/DL (ref 2.7–18.4)
CORTISOL: 8.3 UG/DL (ref 2.7–18.4)
CREAT SERPL-MCNC: 0.51 MG/DL (ref 0.5–0.9)
CREAT SERPL-MCNC: 0.54 MG/DL (ref 0.5–0.9)
CREAT SERPL-MCNC: 0.57 MG/DL (ref 0.5–0.9)
CREAT SERPL-MCNC: 0.57 MG/DL (ref 0.5–0.9)
CREAT SERPL-MCNC: 0.64 MG/DL (ref 0.5–0.9)
CREAT SERPL-MCNC: 0.74 MG/DL (ref 0.5–0.9)
DIFFERENTIAL TYPE: ABNORMAL
EOSINOPHILS RELATIVE PERCENT: 2 % (ref 1–4)
EOSINOPHILS RELATIVE PERCENT: 3 % (ref 1–4)
EOSINOPHILS RELATIVE PERCENT: 3 % (ref 1–4)
EOSINOPHILS RELATIVE PERCENT: 4 % (ref 1–4)
EOSINOPHILS RELATIVE PERCENT: 6 % (ref 1–4)
ESTIMATED AVERAGE GLUCOSE: 103 MG/DL
ESTIMATED AVERAGE GLUCOSE: 114 MG/DL
GFR AFRICAN AMERICAN: >60 ML/MIN
GFR NON-AFRICAN AMERICAN: >60 ML/MIN
GFR SERPL CREATININE-BSD FRML MDRD: ABNORMAL ML/MIN/{1.73_M2}
GFR SERPL CREATININE-BSD FRML MDRD: NORMAL ML/MIN/{1.73_M2}
GFR SERPL CREATININE-BSD FRML MDRD: NORMAL ML/MIN/{1.73_M2}
GLUCOSE BLD-MCNC: 112 MG/DL (ref 70–99)
GLUCOSE BLD-MCNC: 162 MG/DL (ref 70–99)
GLUCOSE BLD-MCNC: 203 MG/DL (ref 70–99)
GLUCOSE BLD-MCNC: 219 MG/DL (ref 70–99)
GLUCOSE BLD-MCNC: 95 MG/DL (ref 70–99)
HBA1C MFR BLD: 5.2 % (ref 4–6)
HBA1C MFR BLD: 5.6 % (ref 4–6)
HCT VFR BLD CALC: 34.4 % (ref 36.3–47.1)
HCT VFR BLD CALC: 35.5 % (ref 36.3–47.1)
HCT VFR BLD CALC: 35.9 % (ref 36.3–47.1)
HCT VFR BLD CALC: 36.3 % (ref 36.3–47.1)
HCT VFR BLD CALC: 37.2 % (ref 36.3–47.1)
HEMOGLOBIN: 11.1 G/DL (ref 11.9–15.1)
HEMOGLOBIN: 11.2 G/DL (ref 11.9–15.1)
HEMOGLOBIN: 11.3 G/DL (ref 11.9–15.1)
HEMOGLOBIN: 11.4 G/DL (ref 11.9–15.1)
HEMOGLOBIN: 12 G/DL (ref 11.9–15.1)
IMMATURE GRANULOCYTES: 0 %
IMMATURE GRANULOCYTES: 0 %
IMMATURE GRANULOCYTES: 1 %
LIPASE: 16 U/L (ref 13–60)
LIPASE: 17 U/L (ref 13–60)
LIPASE: 18 U/L (ref 13–60)
LIPASE: 19 U/L (ref 13–60)
LIPASE: 21 U/L (ref 13–60)
LYMPHOCYTES # BLD: 12 % (ref 24–43)
LYMPHOCYTES # BLD: 13 % (ref 24–43)
LYMPHOCYTES # BLD: 14 % (ref 24–44)
MCH RBC QN AUTO: 28 PG (ref 25.2–33.5)
MCH RBC QN AUTO: 28.1 PG (ref 25.2–33.5)
MCH RBC QN AUTO: 28.2 PG (ref 25.2–33.5)
MCH RBC QN AUTO: 28.2 PG (ref 25.2–33.5)
MCH RBC QN AUTO: 28.4 PG (ref 25.2–33.5)
MCHC RBC AUTO-ENTMCNC: 31.1 G/DL (ref 28.4–34.8)
MCHC RBC AUTO-ENTMCNC: 31.2 G/DL (ref 28.4–34.8)
MCHC RBC AUTO-ENTMCNC: 32.1 G/DL (ref 28.4–34.8)
MCHC RBC AUTO-ENTMCNC: 32.3 G/DL (ref 28.4–34.8)
MCHC RBC AUTO-ENTMCNC: 32.3 G/DL (ref 28.4–34.8)
MCV RBC AUTO: 87.1 FL (ref 82.6–102.9)
MCV RBC AUTO: 87.9 FL (ref 82.6–102.9)
MCV RBC AUTO: 88 FL (ref 82.6–102.9)
MCV RBC AUTO: 89.9 FL (ref 82.6–102.9)
MCV RBC AUTO: 90.4 FL (ref 82.6–102.9)
MONOCYTES # BLD: 10 % (ref 3–12)
MONOCYTES # BLD: 11 % (ref 1–7)
MONOCYTES # BLD: 11 % (ref 3–12)
MONOCYTES # BLD: 11 % (ref 3–12)
MONOCYTES # BLD: 9 % (ref 3–12)
NRBC AUTOMATED: 0 PER 100 WBC
PDW BLD-RTO: 14.3 % (ref 11.8–14.4)
PDW BLD-RTO: 14.3 % (ref 11.8–14.4)
PDW BLD-RTO: 14.6 % (ref 11.8–14.4)
PDW BLD-RTO: 14.6 % (ref 11.8–14.4)
PDW BLD-RTO: 15.3 % (ref 11.8–14.4)
PLATELET # BLD: 66 K/UL (ref 138–453)
PLATELET # BLD: 74 K/UL (ref 138–453)
PLATELET # BLD: 79 K/UL (ref 138–453)
PLATELET # BLD: 80 K/UL (ref 138–453)
PLATELET # BLD: 82 K/UL (ref 138–453)
PLATELET ESTIMATE: ABNORMAL
PMV BLD AUTO: 8.9 FL (ref 8.1–13.5)
PMV BLD AUTO: 9.1 FL (ref 8.1–13.5)
PMV BLD AUTO: 9.4 FL (ref 8.1–13.5)
PMV BLD AUTO: 9.4 FL (ref 8.1–13.5)
PMV BLD AUTO: 9.6 FL (ref 8.1–13.5)
POTASSIUM SERPL-SCNC: 3.8 MMOL/L (ref 3.7–5.3)
POTASSIUM SERPL-SCNC: 4 MMOL/L (ref 3.7–5.3)
POTASSIUM SERPL-SCNC: 4 MMOL/L (ref 3.7–5.3)
POTASSIUM SERPL-SCNC: 4.5 MMOL/L (ref 3.7–5.3)
POTASSIUM SERPL-SCNC: 4.5 MMOL/L (ref 3.7–5.3)
RBC # BLD: 3.91 M/UL (ref 3.95–5.11)
RBC # BLD: 3.97 M/UL (ref 3.95–5.11)
RBC # BLD: 4.04 M/UL (ref 3.95–5.11)
RBC # BLD: 4.04 M/UL (ref 3.95–5.11)
RBC # BLD: 4.27 M/UL (ref 3.95–5.11)
RBC # BLD: ABNORMAL 10*6/UL
SEG NEUTROPHILS: 70 % (ref 36–66)
SEG NEUTROPHILS: 71 % (ref 36–65)
SEG NEUTROPHILS: 72 % (ref 36–65)
SEG NEUTROPHILS: 73 % (ref 36–65)
SEG NEUTROPHILS: 75 % (ref 36–65)
SEGMENTED NEUTROPHILS ABSOLUTE COUNT: 3.14 K/UL (ref 1.8–7.7)
SEGMENTED NEUTROPHILS ABSOLUTE COUNT: 3.45 K/UL (ref 1.5–8.1)
SEGMENTED NEUTROPHILS ABSOLUTE COUNT: 3.69 K/UL (ref 1.5–8.1)
SEGMENTED NEUTROPHILS ABSOLUTE COUNT: 4.3 K/UL (ref 1.5–8.1)
SEGMENTED NEUTROPHILS ABSOLUTE COUNT: 4.85 K/UL (ref 1.5–8.1)
SODIUM BLD-SCNC: 133 MMOL/L (ref 135–144)
SODIUM BLD-SCNC: 133 MMOL/L (ref 135–144)
SODIUM BLD-SCNC: 135 MMOL/L (ref 135–144)
SODIUM BLD-SCNC: 136 MMOL/L (ref 135–144)
SODIUM BLD-SCNC: 138 MMOL/L (ref 135–144)
TOTAL PROTEIN: 7.5 G/DL (ref 6.4–8.3)
TOTAL PROTEIN: 7.5 G/DL (ref 6.4–8.3)
TOTAL PROTEIN: 7.6 G/DL (ref 6.4–8.3)
TOTAL PROTEIN: 8 G/DL (ref 6.4–8.3)
TOTAL PROTEIN: 8 G/DL (ref 6.4–8.3)
TSH SERPL DL<=0.05 MIU/L-ACNC: 1.47 MIU/L (ref 0.3–5)
TSH SERPL DL<=0.05 MIU/L-ACNC: 1.67 MIU/L (ref 0.3–5)
TSH SERPL DL<=0.05 MIU/L-ACNC: 2.48 MIU/L (ref 0.3–5)
TSH SERPL DL<=0.05 MIU/L-ACNC: 3.47 MIU/L (ref 0.3–5)
TSH SERPL DL<=0.05 MIU/L-ACNC: 3.89 MIU/L (ref 0.3–5)
WBC # BLD: 4.5 K/UL (ref 3.5–11.3)
WBC # BLD: 4.8 K/UL (ref 3.5–11.3)
WBC # BLD: 5.2 K/UL (ref 3.5–11.3)
WBC # BLD: 5.9 K/UL (ref 3.5–11.3)
WBC # BLD: 6.5 K/UL (ref 3.5–11.3)
WBC # BLD: ABNORMAL 10*3/UL

## 2019-01-01 PROCEDURE — 84443 ASSAY THYROID STIM HORMONE: CPT

## 2019-01-01 PROCEDURE — 99214 OFFICE O/P EST MOD 30 MIN: CPT | Performed by: INTERNAL MEDICINE

## 2019-01-01 PROCEDURE — G8484 FLU IMMUNIZE NO ADMIN: HCPCS | Performed by: INTERNAL MEDICINE

## 2019-01-01 PROCEDURE — 83690 ASSAY OF LIPASE: CPT

## 2019-01-01 PROCEDURE — 36591 DRAW BLOOD OFF VENOUS DEVICE: CPT

## 2019-01-01 PROCEDURE — 85025 COMPLETE CBC W/AUTO DIFF WBC: CPT

## 2019-01-01 PROCEDURE — 80053 COMPREHEN METABOLIC PANEL: CPT

## 2019-01-01 PROCEDURE — G0438 PPPS, INITIAL VISIT: HCPCS | Performed by: PHYSICIAN ASSISTANT

## 2019-01-01 PROCEDURE — G8427 DOCREV CUR MEDS BY ELIG CLIN: HCPCS | Performed by: INTERNAL MEDICINE

## 2019-01-01 PROCEDURE — 3017F COLORECTAL CA SCREEN DOC REV: CPT | Performed by: INTERNAL MEDICINE

## 2019-01-01 PROCEDURE — G8400 PT W/DXA NO RESULTS DOC: HCPCS | Performed by: INTERNAL MEDICINE

## 2019-01-01 PROCEDURE — 82150 ASSAY OF AMYLASE: CPT

## 2019-01-01 PROCEDURE — 1090F PRES/ABSN URINE INCON ASSESS: CPT | Performed by: INTERNAL MEDICINE

## 2019-01-01 PROCEDURE — 83036 HEMOGLOBIN GLYCOSYLATED A1C: CPT

## 2019-01-01 PROCEDURE — 1123F ACP DISCUSS/DSCN MKR DOCD: CPT | Performed by: INTERNAL MEDICINE

## 2019-01-01 PROCEDURE — 6360000002 HC RX W HCPCS: Performed by: INTERNAL MEDICINE

## 2019-01-01 PROCEDURE — 6360000004 HC RX CONTRAST MEDICATION: Performed by: INTERNAL MEDICINE

## 2019-01-01 PROCEDURE — 36415 COLL VENOUS BLD VENIPUNCTURE: CPT

## 2019-01-01 PROCEDURE — 4004F PT TOBACCO SCREEN RCVD TLK: CPT | Performed by: INTERNAL MEDICINE

## 2019-01-01 PROCEDURE — 4040F PNEUMOC VAC/ADMIN/RCVD: CPT | Performed by: INTERNAL MEDICINE

## 2019-01-01 PROCEDURE — 99211 OFF/OP EST MAY X REQ PHY/QHP: CPT

## 2019-01-01 PROCEDURE — G8420 CALC BMI NORM PARAMETERS: HCPCS | Performed by: INTERNAL MEDICINE

## 2019-01-01 PROCEDURE — 99215 OFFICE O/P EST HI 40 MIN: CPT | Performed by: INTERNAL MEDICINE

## 2019-01-01 PROCEDURE — 70553 MRI BRAIN STEM W/O & W/DYE: CPT

## 2019-01-01 PROCEDURE — 82533 TOTAL CORTISOL: CPT

## 2019-01-01 PROCEDURE — 99212 OFFICE O/P EST SF 10 MIN: CPT

## 2019-01-01 PROCEDURE — 2580000003 HC RX 258: Performed by: INTERNAL MEDICINE

## 2019-01-01 PROCEDURE — G0008 ADMIN INFLUENZA VIRUS VAC: HCPCS | Performed by: PHYSICIAN ASSISTANT

## 2019-01-01 PROCEDURE — 3017F COLORECTAL CA SCREEN DOC REV: CPT | Performed by: PHYSICIAN ASSISTANT

## 2019-01-01 PROCEDURE — G8428 CUR MEDS NOT DOCUMENT: HCPCS | Performed by: INTERNAL MEDICINE

## 2019-01-01 PROCEDURE — 82565 ASSAY OF CREATININE: CPT

## 2019-01-01 PROCEDURE — 96413 CHEMO IV INFUSION 1 HR: CPT

## 2019-01-01 PROCEDURE — G8926 SPIRO NO PERF OR DOC: HCPCS | Performed by: INTERNAL MEDICINE

## 2019-01-01 PROCEDURE — G8482 FLU IMMUNIZE ORDER/ADMIN: HCPCS | Performed by: INTERNAL MEDICINE

## 2019-01-01 PROCEDURE — 1123F ACP DISCUSS/DSCN MKR DOCD: CPT | Performed by: PHYSICIAN ASSISTANT

## 2019-01-01 PROCEDURE — 4040F PNEUMOC VAC/ADMIN/RCVD: CPT | Performed by: PHYSICIAN ASSISTANT

## 2019-01-01 PROCEDURE — G8482 FLU IMMUNIZE ORDER/ADMIN: HCPCS | Performed by: PHYSICIAN ASSISTANT

## 2019-01-01 PROCEDURE — A9579 GAD-BASE MR CONTRAST NOS,1ML: HCPCS | Performed by: INTERNAL MEDICINE

## 2019-01-01 PROCEDURE — 3023F SPIROM DOC REV: CPT | Performed by: INTERNAL MEDICINE

## 2019-01-01 PROCEDURE — 90653 IIV ADJUVANT VACCINE IM: CPT | Performed by: PHYSICIAN ASSISTANT

## 2019-01-01 PROCEDURE — 84520 ASSAY OF UREA NITROGEN: CPT

## 2019-01-01 RX ORDER — 0.9 % SODIUM CHLORIDE 0.9 %
10 VIAL (ML) INJECTION ONCE
Status: CANCELLED | OUTPATIENT
Start: 2019-01-01

## 2019-01-01 RX ORDER — HEPARIN SODIUM (PORCINE) LOCK FLUSH IV SOLN 100 UNIT/ML 100 UNIT/ML
500 SOLUTION INTRAVENOUS PRN
Status: CANCELLED | OUTPATIENT
Start: 2019-01-01

## 2019-01-01 RX ORDER — SODIUM CHLORIDE 0.9 % (FLUSH) 0.9 %
10 SYRINGE (ML) INJECTION PRN
Status: DISCONTINUED | OUTPATIENT
Start: 2019-01-01 | End: 2019-01-01 | Stop reason: HOSPADM

## 2019-01-01 RX ORDER — EPINEPHRINE 1 MG/ML
0.3 INJECTION, SOLUTION, CONCENTRATE INTRAVENOUS PRN
Status: CANCELLED | OUTPATIENT
Start: 2019-01-01

## 2019-01-01 RX ORDER — DIPHENHYDRAMINE HYDROCHLORIDE 50 MG/ML
50 INJECTION INTRAMUSCULAR; INTRAVENOUS ONCE
Status: CANCELLED | OUTPATIENT
Start: 2020-01-01

## 2019-01-01 RX ORDER — SODIUM CHLORIDE 0.9 % (FLUSH) 0.9 %
5 SYRINGE (ML) INJECTION PRN
Status: CANCELLED | OUTPATIENT
Start: 2019-01-01

## 2019-01-01 RX ORDER — 0.9 % SODIUM CHLORIDE 0.9 %
10 VIAL (ML) INJECTION ONCE
Status: CANCELLED | OUTPATIENT
Start: 2020-01-01

## 2019-01-01 RX ORDER — TRAZODONE HYDROCHLORIDE 300 MG/1
300 TABLET ORAL NIGHTLY
Qty: 30 TABLET | Refills: 0 | Status: SHIPPED | OUTPATIENT
Start: 2019-01-01 | End: 2020-01-01 | Stop reason: SDUPTHER

## 2019-01-01 RX ORDER — SODIUM CHLORIDE 0.9 % (FLUSH) 0.9 %
10 SYRINGE (ML) INJECTION PRN
Status: CANCELLED | OUTPATIENT
Start: 2019-01-01

## 2019-01-01 RX ORDER — SODIUM CHLORIDE 9 MG/ML
INJECTION, SOLUTION INTRAVENOUS CONTINUOUS
Status: CANCELLED | OUTPATIENT
Start: 2019-01-01

## 2019-01-01 RX ORDER — SODIUM CHLORIDE 0.9 % (FLUSH) 0.9 %
5 SYRINGE (ML) INJECTION PRN
Status: CANCELLED | OUTPATIENT
Start: 2020-01-01

## 2019-01-01 RX ORDER — SODIUM CHLORIDE 9 MG/ML
20 INJECTION, SOLUTION INTRAVENOUS ONCE
Status: DISCONTINUED | OUTPATIENT
Start: 2019-01-01 | End: 2019-01-01 | Stop reason: HOSPADM

## 2019-01-01 RX ORDER — OXYCODONE HYDROCHLORIDE 5 MG/1
10 TABLET ORAL EVERY 6 HOURS PRN
Qty: 90 TABLET | Refills: 0 | Status: SHIPPED | OUTPATIENT
Start: 2019-01-01 | End: 2019-01-01 | Stop reason: SDUPTHER

## 2019-01-01 RX ORDER — METHYLPREDNISOLONE SODIUM SUCCINATE 125 MG/2ML
125 INJECTION, POWDER, LYOPHILIZED, FOR SOLUTION INTRAMUSCULAR; INTRAVENOUS ONCE
Status: CANCELLED | OUTPATIENT
Start: 2019-01-01

## 2019-01-01 RX ORDER — SODIUM CHLORIDE 0.9 % (FLUSH) 0.9 %
10 SYRINGE (ML) INJECTION PRN
Status: CANCELLED | OUTPATIENT
Start: 2020-01-01

## 2019-01-01 RX ORDER — DIPHENHYDRAMINE HYDROCHLORIDE 50 MG/ML
50 INJECTION INTRAMUSCULAR; INTRAVENOUS ONCE
Status: CANCELLED | OUTPATIENT
Start: 2019-01-01

## 2019-01-01 RX ORDER — METHYLPREDNISOLONE SODIUM SUCCINATE 125 MG/2ML
125 INJECTION, POWDER, LYOPHILIZED, FOR SOLUTION INTRAMUSCULAR; INTRAVENOUS ONCE
Status: CANCELLED | OUTPATIENT
Start: 2020-01-01

## 2019-01-01 RX ORDER — SODIUM CHLORIDE 9 MG/ML
INJECTION, SOLUTION INTRAVENOUS CONTINUOUS
Status: CANCELLED | OUTPATIENT
Start: 2020-01-01

## 2019-01-01 RX ORDER — SODIUM CHLORIDE 9 MG/ML
20 INJECTION, SOLUTION INTRAVENOUS ONCE
Status: COMPLETED | OUTPATIENT
Start: 2019-01-01 | End: 2019-01-01

## 2019-01-01 RX ORDER — SODIUM CHLORIDE 9 MG/ML
20 INJECTION, SOLUTION INTRAVENOUS ONCE
Status: CANCELLED | OUTPATIENT
Start: 2020-01-01

## 2019-01-01 RX ORDER — HEPARIN SODIUM (PORCINE) LOCK FLUSH IV SOLN 100 UNIT/ML 100 UNIT/ML
500 SOLUTION INTRAVENOUS PRN
Status: DISCONTINUED | OUTPATIENT
Start: 2019-01-01 | End: 2019-01-01 | Stop reason: HOSPADM

## 2019-01-01 RX ORDER — EPINEPHRINE 1 MG/ML
0.3 INJECTION, SOLUTION, CONCENTRATE INTRAVENOUS PRN
Status: CANCELLED | OUTPATIENT
Start: 2020-01-01

## 2019-01-01 RX ORDER — HYDROXYZINE 50 MG/1
50 TABLET, FILM COATED ORAL 2 TIMES DAILY
Qty: 60 TABLET | Refills: 3 | Status: SHIPPED | OUTPATIENT
Start: 2019-01-01 | End: 2020-01-01 | Stop reason: SDUPTHER

## 2019-01-01 RX ORDER — OXYCODONE HYDROCHLORIDE 5 MG/1
10 TABLET ORAL EVERY 6 HOURS PRN
Qty: 90 TABLET | Refills: 0 | Status: SHIPPED | OUTPATIENT
Start: 2019-01-01 | End: 2020-01-01 | Stop reason: SDUPTHER

## 2019-01-01 RX ORDER — HEPARIN SODIUM (PORCINE) LOCK FLUSH IV SOLN 100 UNIT/ML 100 UNIT/ML
500 SOLUTION INTRAVENOUS PRN
Status: CANCELLED | OUTPATIENT
Start: 2020-01-01

## 2019-01-01 RX ORDER — SODIUM CHLORIDE 9 MG/ML
20 INJECTION, SOLUTION INTRAVENOUS ONCE
Status: CANCELLED | OUTPATIENT
Start: 2019-01-01

## 2019-01-01 RX ORDER — VARENICLINE TARTRATE 1 MG/1
1 TABLET, FILM COATED ORAL 2 TIMES DAILY
Qty: 60 TABLET | Refills: 3 | Status: SHIPPED | OUTPATIENT
Start: 2019-01-01 | End: 2020-01-01 | Stop reason: ALTCHOICE

## 2019-01-01 RX ORDER — NICOTINE 21 MG/24HR
1 PATCH, TRANSDERMAL 24 HOURS TRANSDERMAL DAILY
Qty: 42 PATCH | Refills: 0 | Status: SHIPPED | OUTPATIENT
Start: 2019-01-01 | End: 2020-01-01 | Stop reason: ALTCHOICE

## 2019-01-01 RX ORDER — CITALOPRAM 10 MG/1
10 TABLET ORAL NIGHTLY
Qty: 30 TABLET | Refills: 0 | Status: SHIPPED | OUTPATIENT
Start: 2019-01-01 | End: 2020-01-01 | Stop reason: SDUPTHER

## 2019-01-01 RX ADMIN — SODIUM CHLORIDE 620 MG: 9 INJECTION, SOLUTION INTRAVENOUS at 13:01

## 2019-01-01 RX ADMIN — HEPARIN 500 UNITS: 100 SYRINGE at 14:31

## 2019-01-01 RX ADMIN — Medication 10 ML: at 14:55

## 2019-01-01 RX ADMIN — HEPARIN 500 UNITS: 100 SYRINGE at 16:39

## 2019-01-01 RX ADMIN — Medication 10 ML: at 11:20

## 2019-01-01 RX ADMIN — HEPARIN 500 UNITS: 100 SYRINGE at 14:48

## 2019-01-01 RX ADMIN — SODIUM CHLORIDE 620 MG: 9 INJECTION, SOLUTION INTRAVENOUS at 12:37

## 2019-01-01 RX ADMIN — SODIUM CHLORIDE 500 MG: 9 INJECTION, SOLUTION INTRAVENOUS at 15:27

## 2019-01-01 RX ADMIN — Medication 10 ML: at 14:48

## 2019-01-01 RX ADMIN — Medication 10 ML: at 16:39

## 2019-01-01 RX ADMIN — Medication 500 UNITS: at 14:55

## 2019-01-01 RX ADMIN — SODIUM CHLORIDE 620 MG: 9 INJECTION, SOLUTION INTRAVENOUS at 13:36

## 2019-01-01 RX ADMIN — GADOTERIDOL 12 ML: 279.3 INJECTION, SOLUTION INTRAVENOUS at 18:19

## 2019-01-01 RX ADMIN — Medication 10 ML: at 18:19

## 2019-01-01 RX ADMIN — Medication 10 ML: at 13:30

## 2019-01-01 RX ADMIN — SODIUM CHLORIDE 620 MG: 9 INJECTION, SOLUTION INTRAVENOUS at 13:47

## 2019-01-01 RX ADMIN — SODIUM CHLORIDE 20 ML/HR: 9 INJECTION, SOLUTION INTRAVENOUS at 12:00

## 2019-01-01 RX ADMIN — SODIUM CHLORIDE 20 ML/HR: 9 INJECTION, SOLUTION INTRAVENOUS at 13:30

## 2019-01-01 RX ADMIN — SODIUM CHLORIDE 20 ML/HR: 9 INJECTION, SOLUTION INTRAVENOUS at 12:39

## 2019-01-01 RX ADMIN — Medication 20 ML: at 14:48

## 2019-01-01 RX ADMIN — Medication 10 ML: at 14:31

## 2019-01-01 RX ADMIN — SODIUM CHLORIDE 20 ML/HR: 9 INJECTION, SOLUTION INTRAVENOUS at 13:00

## 2019-01-01 ASSESSMENT — PAIN DESCRIPTION - PAIN TYPE
TYPE: CHRONIC PAIN
TYPE: CHRONIC PAIN

## 2019-01-01 ASSESSMENT — PAIN DESCRIPTION - DESCRIPTORS: DESCRIPTORS: SORE

## 2019-01-01 ASSESSMENT — PAIN SCALES - GENERAL
PAINLEVEL_OUTOF10: 5
PAINLEVEL_OUTOF10: 8

## 2019-01-01 ASSESSMENT — LIFESTYLE VARIABLES: HOW OFTEN DO YOU HAVE A DRINK CONTAINING ALCOHOL: 0

## 2019-01-01 ASSESSMENT — PATIENT HEALTH QUESTIONNAIRE - PHQ9: SUM OF ALL RESPONSES TO PHQ QUESTIONS 1-9: 12

## 2019-01-01 ASSESSMENT — PAIN DESCRIPTION - FREQUENCY: FREQUENCY: CONTINUOUS

## 2019-01-01 ASSESSMENT — PAIN DESCRIPTION - LOCATION
LOCATION: BACK;SHOULDER
LOCATION: CHEST

## 2019-01-01 ASSESSMENT — PAIN DESCRIPTION - ORIENTATION
ORIENTATION: RIGHT
ORIENTATION: RIGHT;LEFT

## 2019-01-01 ASSESSMENT — PAIN DESCRIPTION - ONSET: ONSET: ON-GOING

## 2019-03-14 ENCOUNTER — HOSPITAL ENCOUNTER (OUTPATIENT)
Dept: GENERAL RADIOLOGY | Age: 69
Discharge: HOME | End: 2019-03-16
Payer: MEDICARE

## 2019-03-14 ENCOUNTER — HOSPITAL ENCOUNTER (OUTPATIENT)
Age: 69
Discharge: HOME | End: 2019-03-16
Payer: MEDICARE

## 2019-03-14 ENCOUNTER — OFFICE VISIT (OUTPATIENT)
Dept: PRIMARY CARE CLINIC | Age: 69
End: 2019-03-14
Payer: MEDICARE

## 2019-03-14 VITALS
DIASTOLIC BLOOD PRESSURE: 78 MMHG | HEIGHT: 63 IN | OXYGEN SATURATION: 94 % | TEMPERATURE: 97.2 F | BODY MASS INDEX: 22.15 KG/M2 | WEIGHT: 125 LBS | HEART RATE: 77 BPM | RESPIRATION RATE: 22 BRPM | SYSTOLIC BLOOD PRESSURE: 142 MMHG

## 2019-03-14 DIAGNOSIS — J44.9 CHRONIC OBSTRUCTIVE PULMONARY DISEASE, UNSPECIFIED COPD TYPE (HCC): Primary | ICD-10-CM

## 2019-03-14 DIAGNOSIS — J40 BRONCHITIS: ICD-10-CM

## 2019-03-14 DIAGNOSIS — J44.9 CHRONIC OBSTRUCTIVE PULMONARY DISEASE, UNSPECIFIED COPD TYPE (HCC): ICD-10-CM

## 2019-03-14 DIAGNOSIS — R91.8 MASS OF UPPER LOBE OF RIGHT LUNG: Primary | ICD-10-CM

## 2019-03-14 PROCEDURE — 1101F PT FALLS ASSESS-DOCD LE1/YR: CPT | Performed by: INTERNAL MEDICINE

## 2019-03-14 PROCEDURE — G8420 CALC BMI NORM PARAMETERS: HCPCS | Performed by: INTERNAL MEDICINE

## 2019-03-14 PROCEDURE — 71046 X-RAY EXAM CHEST 2 VIEWS: CPT

## 2019-03-14 PROCEDURE — 1123F ACP DISCUSS/DSCN MKR DOCD: CPT | Performed by: INTERNAL MEDICINE

## 2019-03-14 PROCEDURE — 4040F PNEUMOC VAC/ADMIN/RCVD: CPT | Performed by: INTERNAL MEDICINE

## 2019-03-14 PROCEDURE — 3017F COLORECTAL CA SCREEN DOC REV: CPT | Performed by: INTERNAL MEDICINE

## 2019-03-14 PROCEDURE — G8926 SPIRO NO PERF OR DOC: HCPCS | Performed by: INTERNAL MEDICINE

## 2019-03-14 PROCEDURE — 4004F PT TOBACCO SCREEN RCVD TLK: CPT | Performed by: INTERNAL MEDICINE

## 2019-03-14 PROCEDURE — 3023F SPIROM DOC REV: CPT | Performed by: INTERNAL MEDICINE

## 2019-03-14 PROCEDURE — G8400 PT W/DXA NO RESULTS DOC: HCPCS | Performed by: INTERNAL MEDICINE

## 2019-03-14 PROCEDURE — 99203 OFFICE O/P NEW LOW 30 MIN: CPT | Performed by: INTERNAL MEDICINE

## 2019-03-14 PROCEDURE — G8484 FLU IMMUNIZE NO ADMIN: HCPCS | Performed by: INTERNAL MEDICINE

## 2019-03-14 PROCEDURE — G8427 DOCREV CUR MEDS BY ELIG CLIN: HCPCS | Performed by: INTERNAL MEDICINE

## 2019-03-14 PROCEDURE — 1090F PRES/ABSN URINE INCON ASSESS: CPT | Performed by: INTERNAL MEDICINE

## 2019-03-14 RX ORDER — METHYLPREDNISOLONE 4 MG/1
TABLET ORAL
Qty: 1 KIT | Refills: 0 | Status: SHIPPED | OUTPATIENT
Start: 2019-03-14 | End: 2019-03-20

## 2019-03-14 RX ORDER — AZITHROMYCIN 250 MG/1
TABLET, FILM COATED ORAL
Qty: 1 PACKET | Refills: 0 | Status: SHIPPED | OUTPATIENT
Start: 2019-03-14 | End: 2019-03-21 | Stop reason: ALTCHOICE

## 2019-03-14 RX ORDER — ALBUTEROL SULFATE 90 UG/1
2 AEROSOL, METERED RESPIRATORY (INHALATION) EVERY 6 HOURS PRN
Qty: 1 INHALER | Refills: 3 | Status: SHIPPED | OUTPATIENT
Start: 2019-03-14 | End: 2019-05-02 | Stop reason: SDUPTHER

## 2019-03-14 ASSESSMENT — ENCOUNTER SYMPTOMS
COUGH: 1
ALLERGIC/IMMUNOLOGIC NEGATIVE: 1
WHEEZING: 1
SHORTNESS OF BREATH: 1
SINUS PAIN: 1
EYES NEGATIVE: 1
GASTROINTESTINAL NEGATIVE: 1

## 2019-03-14 ASSESSMENT — PATIENT HEALTH QUESTIONNAIRE - PHQ9
2. FEELING DOWN, DEPRESSED OR HOPELESS: 1
SUM OF ALL RESPONSES TO PHQ9 QUESTIONS 1 & 2: 1
1. LITTLE INTEREST OR PLEASURE IN DOING THINGS: 0
SUM OF ALL RESPONSES TO PHQ QUESTIONS 1-9: 1
SUM OF ALL RESPONSES TO PHQ QUESTIONS 1-9: 1

## 2019-03-18 ENCOUNTER — HOSPITAL ENCOUNTER (OUTPATIENT)
Age: 69
Discharge: HOME | End: 2019-03-18
Payer: MEDICARE

## 2019-03-18 ENCOUNTER — HOSPITAL ENCOUNTER (OUTPATIENT)
Dept: CT IMAGING | Age: 69
Discharge: HOME | End: 2019-03-20
Payer: MEDICARE

## 2019-03-18 DIAGNOSIS — R91.8 MASS OF UPPER LOBE OF RIGHT LUNG: Primary | ICD-10-CM

## 2019-03-18 DIAGNOSIS — R91.8 MASS OF UPPER LOBE OF RIGHT LUNG: ICD-10-CM

## 2019-03-18 LAB
ANION GAP SERPL CALCULATED.3IONS-SCNC: 10 MMOL/L (ref 9–17)
BUN BLDV-MCNC: 12 MG/DL (ref 8–23)
BUN/CREAT BLD: ABNORMAL (ref 9–20)
CALCIUM SERPL-MCNC: 8.7 MG/DL (ref 8.6–10.4)
CHLORIDE BLD-SCNC: 104 MMOL/L (ref 98–107)
CO2: 24 MMOL/L (ref 20–31)
CREAT SERPL-MCNC: 0.54 MG/DL (ref 0.5–0.9)
GFR AFRICAN AMERICAN: >60 ML/MIN
GFR NON-AFRICAN AMERICAN: >60 ML/MIN
GFR SERPL CREATININE-BSD FRML MDRD: ABNORMAL ML/MIN/{1.73_M2}
GFR SERPL CREATININE-BSD FRML MDRD: ABNORMAL ML/MIN/{1.73_M2}
GLUCOSE BLD-MCNC: 135 MG/DL (ref 70–99)
POTASSIUM SERPL-SCNC: 3.8 MMOL/L (ref 3.7–5.3)
SODIUM BLD-SCNC: 138 MMOL/L (ref 135–144)

## 2019-03-18 PROCEDURE — 71260 CT THORAX DX C+: CPT

## 2019-03-18 PROCEDURE — 80048 BASIC METABOLIC PNL TOTAL CA: CPT

## 2019-03-18 PROCEDURE — 6360000004 HC RX CONTRAST MEDICATION: Performed by: NURSE PRACTITIONER

## 2019-03-18 RX ADMIN — IOVERSOL 75 ML: 741 INJECTION INTRA-ARTERIAL; INTRAVENOUS at 09:08

## 2019-03-21 ENCOUNTER — OFFICE VISIT (OUTPATIENT)
Dept: PRIMARY CARE CLINIC | Age: 69
End: 2019-03-21
Payer: MEDICARE

## 2019-03-21 VITALS
HEIGHT: 63 IN | TEMPERATURE: 97.2 F | HEART RATE: 79 BPM | OXYGEN SATURATION: 96 % | BODY MASS INDEX: 22.11 KG/M2 | DIASTOLIC BLOOD PRESSURE: 72 MMHG | RESPIRATION RATE: 22 BRPM | SYSTOLIC BLOOD PRESSURE: 125 MMHG | WEIGHT: 124.8 LBS

## 2019-03-21 DIAGNOSIS — Z23 NEED FOR PROPHYLACTIC VACCINATION AGAINST STREPTOCOCCUS PNEUMONIAE (PNEUMOCOCCUS): ICD-10-CM

## 2019-03-21 DIAGNOSIS — F41.9 ANXIETY: ICD-10-CM

## 2019-03-21 DIAGNOSIS — Z23 NEED FOR INFLUENZA VACCINATION: ICD-10-CM

## 2019-03-21 DIAGNOSIS — Z87.19 HISTORY OF LIVER DISEASE: ICD-10-CM

## 2019-03-21 DIAGNOSIS — Z13.220 SCREENING FOR HYPERLIPIDEMIA: ICD-10-CM

## 2019-03-21 DIAGNOSIS — R91.8 MASS OF RIGHT LUNG: Primary | ICD-10-CM

## 2019-03-21 DIAGNOSIS — J44.9 CHRONIC OBSTRUCTIVE PULMONARY DISEASE, UNSPECIFIED COPD TYPE (HCC): ICD-10-CM

## 2019-03-21 DIAGNOSIS — Z76.89 ENCOUNTER TO ESTABLISH CARE: ICD-10-CM

## 2019-03-21 DIAGNOSIS — F14.10 COCAINE ABUSE (HCC): ICD-10-CM

## 2019-03-21 PROCEDURE — G8926 SPIRO NO PERF OR DOC: HCPCS | Performed by: PHYSICIAN ASSISTANT

## 2019-03-21 PROCEDURE — G8420 CALC BMI NORM PARAMETERS: HCPCS | Performed by: PHYSICIAN ASSISTANT

## 2019-03-21 PROCEDURE — 90732 PPSV23 VACC 2 YRS+ SUBQ/IM: CPT | Performed by: PHYSICIAN ASSISTANT

## 2019-03-21 PROCEDURE — G8482 FLU IMMUNIZE ORDER/ADMIN: HCPCS | Performed by: PHYSICIAN ASSISTANT

## 2019-03-21 PROCEDURE — G8400 PT W/DXA NO RESULTS DOC: HCPCS | Performed by: PHYSICIAN ASSISTANT

## 2019-03-21 PROCEDURE — 90688 IIV4 VACCINE SPLT 0.5 ML IM: CPT | Performed by: PHYSICIAN ASSISTANT

## 2019-03-21 PROCEDURE — G0008 ADMIN INFLUENZA VIRUS VAC: HCPCS | Performed by: PHYSICIAN ASSISTANT

## 2019-03-21 PROCEDURE — 4040F PNEUMOC VAC/ADMIN/RCVD: CPT | Performed by: PHYSICIAN ASSISTANT

## 2019-03-21 PROCEDURE — 99214 OFFICE O/P EST MOD 30 MIN: CPT | Performed by: PHYSICIAN ASSISTANT

## 2019-03-21 PROCEDURE — 4004F PT TOBACCO SCREEN RCVD TLK: CPT | Performed by: PHYSICIAN ASSISTANT

## 2019-03-21 PROCEDURE — G8427 DOCREV CUR MEDS BY ELIG CLIN: HCPCS | Performed by: PHYSICIAN ASSISTANT

## 2019-03-21 PROCEDURE — G0009 ADMIN PNEUMOCOCCAL VACCINE: HCPCS | Performed by: PHYSICIAN ASSISTANT

## 2019-03-21 PROCEDURE — 1123F ACP DISCUSS/DSCN MKR DOCD: CPT | Performed by: PHYSICIAN ASSISTANT

## 2019-03-21 PROCEDURE — 3017F COLORECTAL CA SCREEN DOC REV: CPT | Performed by: PHYSICIAN ASSISTANT

## 2019-03-21 PROCEDURE — 3023F SPIROM DOC REV: CPT | Performed by: PHYSICIAN ASSISTANT

## 2019-03-21 PROCEDURE — 1090F PRES/ABSN URINE INCON ASSESS: CPT | Performed by: PHYSICIAN ASSISTANT

## 2019-03-21 RX ORDER — BUDESONIDE AND FORMOTEROL FUMARATE DIHYDRATE 160; 4.5 UG/1; UG/1
2 AEROSOL RESPIRATORY (INHALATION) 2 TIMES DAILY
Qty: 1 INHALER | Refills: 3 | Status: SHIPPED | OUTPATIENT
Start: 2019-03-21 | End: 2019-05-02 | Stop reason: SDUPTHER

## 2019-03-21 RX ORDER — HYDROXYZINE HYDROCHLORIDE 25 MG/1
25 TABLET, FILM COATED ORAL NIGHTLY
Qty: 7 TABLET | Refills: 0 | Status: SHIPPED | OUTPATIENT
Start: 2019-03-21 | End: 2019-03-27 | Stop reason: SDUPTHER

## 2019-03-21 RX ORDER — BUDESONIDE AND FORMOTEROL FUMARATE DIHYDRATE 160; 4.5 UG/1; UG/1
2 AEROSOL RESPIRATORY (INHALATION) 2 TIMES DAILY
Qty: 1 INHALER | Refills: 0 | COMMUNITY
Start: 2019-03-21 | End: 2019-04-26

## 2019-03-21 ASSESSMENT — ENCOUNTER SYMPTOMS
COUGH: 0
SHORTNESS OF BREATH: 1
WHEEZING: 1

## 2019-03-27 ENCOUNTER — TELEPHONE (OUTPATIENT)
Dept: PRIMARY CARE CLINIC | Age: 69
End: 2019-03-27

## 2019-03-27 DIAGNOSIS — F41.9 ANXIETY: ICD-10-CM

## 2019-03-27 RX ORDER — HYDROXYZINE HYDROCHLORIDE 25 MG/1
25 TABLET, FILM COATED ORAL 2 TIMES DAILY
Qty: 60 TABLET | Refills: 1 | Status: SHIPPED | OUTPATIENT
Start: 2019-03-27 | End: 2019-05-02 | Stop reason: SDUPTHER

## 2019-03-30 PROBLEM — L03.90 CELLULITIS: Status: RESOLVED | Noted: 2018-01-31 | Resolved: 2019-03-30

## 2019-03-30 PROBLEM — J44.9 CHRONIC OBSTRUCTIVE PULMONARY DISEASE (HCC): Status: ACTIVE | Noted: 2019-03-30

## 2019-03-30 PROBLEM — F41.9 ANXIETY: Status: ACTIVE | Noted: 2019-03-30

## 2019-03-30 ASSESSMENT — ENCOUNTER SYMPTOMS
SORE THROAT: 0
RHINORRHEA: 0
DIARRHEA: 0
VOMITING: 0
ABDOMINAL PAIN: 0
CONSTIPATION: 0
NAUSEA: 0
BACK PAIN: 0

## 2019-04-02 ENCOUNTER — TELEPHONE (OUTPATIENT)
Dept: PRIMARY CARE CLINIC | Age: 69
End: 2019-04-02

## 2019-04-02 ENCOUNTER — OFFICE VISIT (OUTPATIENT)
Dept: PULMONOLOGY | Age: 69
End: 2019-04-02
Payer: MEDICARE

## 2019-04-02 VITALS — BODY MASS INDEX: 21.97 KG/M2 | HEIGHT: 63 IN | OXYGEN SATURATION: 97 % | WEIGHT: 124 LBS | HEART RATE: 84 BPM

## 2019-04-02 VITALS
DIASTOLIC BLOOD PRESSURE: 65 MMHG | HEIGHT: 63 IN | RESPIRATION RATE: 12 BRPM | WEIGHT: 124 LBS | HEART RATE: 84 BPM | OXYGEN SATURATION: 97 % | SYSTOLIC BLOOD PRESSURE: 118 MMHG | BODY MASS INDEX: 21.97 KG/M2

## 2019-04-02 DIAGNOSIS — J44.9 CHRONIC OBSTRUCTIVE PULMONARY DISEASE, UNSPECIFIED COPD TYPE (HCC): Primary | ICD-10-CM

## 2019-04-02 DIAGNOSIS — R05.3 CHRONIC COUGH: ICD-10-CM

## 2019-04-02 DIAGNOSIS — R91.8 LUNG MASS: Primary | ICD-10-CM

## 2019-04-02 DIAGNOSIS — J98.11 ATELECTASIS: ICD-10-CM

## 2019-04-02 DIAGNOSIS — R06.02 SHORTNESS OF BREATH: ICD-10-CM

## 2019-04-02 DIAGNOSIS — J43.2 CENTRILOBULAR EMPHYSEMA (HCC): ICD-10-CM

## 2019-04-02 PROCEDURE — 99205 OFFICE O/P NEW HI 60 MIN: CPT | Performed by: INTERNAL MEDICINE

## 2019-04-02 PROCEDURE — G8427 DOCREV CUR MEDS BY ELIG CLIN: HCPCS | Performed by: INTERNAL MEDICINE

## 2019-04-02 PROCEDURE — 4004F PT TOBACCO SCREEN RCVD TLK: CPT | Performed by: INTERNAL MEDICINE

## 2019-04-02 PROCEDURE — G8400 PT W/DXA NO RESULTS DOC: HCPCS | Performed by: INTERNAL MEDICINE

## 2019-04-02 PROCEDURE — 94726 PLETHYSMOGRAPHY LUNG VOLUMES: CPT | Performed by: INTERNAL MEDICINE

## 2019-04-02 PROCEDURE — 94729 DIFFUSING CAPACITY: CPT | Performed by: INTERNAL MEDICINE

## 2019-04-02 PROCEDURE — 1123F ACP DISCUSS/DSCN MKR DOCD: CPT | Performed by: INTERNAL MEDICINE

## 2019-04-02 PROCEDURE — 3023F SPIROM DOC REV: CPT | Performed by: INTERNAL MEDICINE

## 2019-04-02 PROCEDURE — 4040F PNEUMOC VAC/ADMIN/RCVD: CPT | Performed by: INTERNAL MEDICINE

## 2019-04-02 PROCEDURE — 3017F COLORECTAL CA SCREEN DOC REV: CPT | Performed by: INTERNAL MEDICINE

## 2019-04-02 PROCEDURE — 94375 RESPIRATORY FLOW VOLUME LOOP: CPT | Performed by: INTERNAL MEDICINE

## 2019-04-02 PROCEDURE — G8420 CALC BMI NORM PARAMETERS: HCPCS | Performed by: INTERNAL MEDICINE

## 2019-04-02 PROCEDURE — 1090F PRES/ABSN URINE INCON ASSESS: CPT | Performed by: INTERNAL MEDICINE

## 2019-04-02 PROCEDURE — G8926 SPIRO NO PERF OR DOC: HCPCS | Performed by: INTERNAL MEDICINE

## 2019-04-02 NOTE — PROGRESS NOTES
Pulmonary function test  04/02/2019    Spirometry shows FVC is 1.45 53% predicted, FEV1 is 1.00 46% predicted consistent with severe obstructive ventilatory impairment, FEV1 FVC is 87% predicted consistent with obstructive disease. Flow volume loop is suggestive of obstructive ventilatory impairment  Lung volume shows RV is 2.44 188% predicted, TLC is 5.09 110% predicted. Diffusion capacity is 6.43 36% predicted consistent with severe reduction in diffusion capacity which could be secondary to emphysema, pulmonary vascular disease and/or anemia and clinical correlation is recommended. Impression: This pulmonary function test is consistent with severe obstructive ventilatory impairment. Lung volume shows increase in his volume consistent with hyperinflation and airway trapping. There is severe reduction in diffusion capacity which is likely secondary to emphysema and concomitant pulmonary vascular disease. Clinical correlation is recommended.

## 2019-04-02 NOTE — PROGRESS NOTES
[facility]  [facility].  [address] [place] [id number]    Oximetry Report  Testing Date: 04/02/19      Name: Wilda Mishra    Age: 77 y.o. Gender: female   Diagnosis:   1. Chronic obstructive pulmonary disease, unspecified COPD type ([address]    2. Shortness of breath                                            Referring Physician: Brenda Chery  Weight: 124 lb. Height: 63 in. Smoke HX:  reports that she has been smoking cigarettes. She started smoking about 52 years ago. She has a 52.00 pack-year smoking history. She has never used smokeless tobacco.                                                            Max - Target  Heart Rate 183 / 146  Inspired O2 SP02% Max Heart Rate Assist Device Activity Pace Distance Walked (ft) Time (min.)   R/A    97%       83      Rest      R/A    87%       97      Walk    Slow    500 ft.    3 min. N/C-2    95%       99      Walk    Slow    500 ft.    3 min. N/C-3          N/C          R/A= Roomed Air, NC = Oxygen by Nasal Cannula    Distance Walk Predicted Distance LLN** Predicted % Duration (min) Duration of Stops Sec Marquez Dyspnea Marquez Fatigue      1000 ft.   1591 ft.   1234 ft.      63%     6 min. 4        3   **LLN= Lower limits of normal **LLN= Lower limits of normal  (from [employer] [facility] [employer] [address] [place] [address] of Respiratory and Critical Care Medicine;[id number])       Comments: The patient walked for 6 minutes at a slow normal pace. She walked for 500 ft. on room air and her oxygen saturation 87%. She was started an oxygen at 2 l/m pulse dose. She was titrated to 2 l/m of oxygen to keep her saturation above 90%. She  did exhibit signs of dyspnea.

## 2019-04-02 NOTE — TELEPHONE ENCOUNTER
Wants to have a prescription of Symbicort to [facility] on [facility]

## 2019-04-02 NOTE — PROGRESS NOTES
OUTPATIENT PULMONARY CONSULT NOTE      Patient:  Karyna Hutchison  MRN: [id number]    Consulting Physician: Mary Azul PA-C  Reason for Consult: Lung mass  Primacy Care Physician: Mary Azul PA-C    HISTORY OF PRESENT ILLNESS:   The patient is a 77 y.o. female   She is referred here for evaluation of her right hilar mass with likely obstructive atelectasis. Her symptoms started about 5-6 months ago according to patient in October last year she started having increasing cough associated with worsening shortness of breath cough is mostly dry she is not able to bring the secretions up, she is also having wheezing intermittently, her cough and shortness of breath has been getting worse and she was seen by Dr. Edwina Ruby when she had a chest x-ray done which shows right suprahilar upper lobe mass she was scheduled to have a CT scan of the chest which again shows and confirm those findings with right upper lobe postobstructive atelectasis. She does complain of shortness of breath on walking small distances, she claims she was diagnosed with COPD about a month ago and she is currently on Symbicort and she take albuterol 3 times a day. Since October she has been losing weight and she claimed that her legs and arms are smaller so she no that she had lost weight and she think that she had lost 30 pounds in last 5-6 months, she does not have good appetite and she is small meals and feels full. She does have pain around the right shoulder and scapular region also upper back on the right side and radiated to the left side. She denies hemoptysis, she denies fever or night sweats. She denies bleeding per rectum or melena stool abdominal pain and denies urinary complaints and hematuria.     She has no primary function test done in the past and she had a pulmonary function test done in the office today which shows severe obstructive ventilatory impairment along with severe reduction in diffusion capacity and severe hyperinflation and air trapping. She's never been on home oxygen. She does have history of liver cirrhosis and she was told that she has end-stage liver disease which is related to her long history of drinking in the past which she stopped many years ago and also had history of hepatitis C. She smoke 1 pack per day for 52 years and now smoking less than half pack per day but continued to smoke and difficult to stop smoking. Past Medical History:        Diagnosis Date    Back pain     Bronchitis     Chronic obstructive pulmonary disease (COPD) (HCC)     Cough     Current every day smoker     1 pack a day for the last 50 years    Fibromyalgia     Hepatitis     Liver disease     stage 4    Wheezing        Past Surgical History:        Procedure Laterality Date    BREAST SURGERY Left     benign lumpectomy, multiple    CARPAL TUNNEL RELEASE Bilateral     CERVICAL DISC SURGERY      X 2    CHOLECYSTECTOMY      HYSTERECTOMY      complete       Allergies:    No Known Allergies      Home Meds:   Outpatient Encounter Medications as of 4/2/2019   Medication Sig Dispense Refill    hydrOXYzine (ATARAX) 25 MG tablet Take 1 tablet by mouth 2 times daily 60 tablet 1    budesonide-formoterol (SYMBICORT) 160-4.5 MCG/ACT AERO Inhale 2 puffs into the lungs 2 times daily 1 Inhaler 0    budesonide-formoterol (SYMBICORT) 160-4.5 MCG/ACT AERO Inhale 2 puffs into the lungs 2 times daily 1 Inhaler 3    albuterol sulfate HFA (PROVENTIL HFA) 108 (90 Base) MCG/ACT inhaler Inhale 2 puffs into the lungs every 6 hours as needed for Wheezing 1 Inhaler 3    [DISCONTINUED] ibuprofen (ADVIL;MOTRIN) 600 MG tablet Take 1 tablet by mouth every 6 hours as needed for Pain 30 tablet 0     No facility-administered encounter medications on file as of 4/2/2019. Social History:   TOBACCO:   reports that she has been smoking cigarettes. She started smoking about 52 years ago. She has a 52.00 pack-year smoking history.  She has never used smokeless tobacco.  ETOH:   reports that she does not drink alcohol.   OCCUPATION:      Family History:       Problem Relation Age of Onset    Heart Attack Father     Cancer Paternal Grandmother     Lung Cancer Paternal Grandfather     Emphysema Mother        Immunizations:    Immunization History   Administered Date(s) Administered    Influenza, Nick Roblesst, 3 Years and older, IM (Fluzone 3 yrs and older or Afluria 5 yrs and older) 03/21/2019    Influenza, Nick Angst, 3 yrs and older, IM, PF (Fluzone 3 yrs and older or Afluria 5 yrs and older) 02/01/2018    Pneumococcal Polysaccharide ([id number]) 03/21/2019         REVIEW OF SYSTEMS:  CONSTITUTIONAL:  positive for  fatigue, malaise, anorexia and weight loss  negative for  fevers, chills and sweats  EYES:  negative for  double vision, blurred vision, dry eyes, eye discharge, visual disturbance, irritation, redness and icterus  HEENT:  negative for  hearing loss, ear drainage, nasal congestion, epistaxis, sore mouth, sore throat, hoarseness and voice change  RESPIRATORY:  positive for  cough with sputum, dyspnea, wheezing and chest pain  negative for  hemoptysis, pleuritic pain and cyanosis  CARDIOVASCULAR:  negative for  palpitations, orthopnea, PND, early saiety, edema, syncope  GASTROINTESTINAL:  negative for nausea, vomiting, change in bowel habits, constipation, abdominal pain, abdominal mass, abdominal distention, jaundice, dysphagia, reflux, regurgitation, odynophagia, hematemesis and hemtochezia  GENITOURINARY:  negative for frequency, dysuria, urinary incontinence, hesitancy, decreased stream and hematuria  HEMATOLOGIC/LYMPHATIC:  negative for easy bruising, bleeding, lymphadenopathy and petechiae  ALLERGIC/IMMUNOLOGIC:  negative for recurrent infections, urticaria, hay fever, angioedema, anaphylaxis and drug reactions  ENDOCRINE:  negative for heat intolerance, cold intolerance, weight changes and change in bowel habits  MUSCULOSKELETAL: Alkaline Phosphatase   Date Value Ref Range Status   09/22/2013 75 25 - 100 U/L Final     Amylase:   Amylase   Date Value Ref Range Status   09/22/2013 54 20 - 104 U/L Final     Comment:     Performed at [facility] [address] [place], [address] [facility] [phone]     Lipase:   Lipase   Date Value Ref Range Status   09/22/2013 31 6 - 51 U/L Final     Comment:     Performed at [facility] [address] [place] [facility] [phone]     CARDIAC ENZYMES:   Total CK   Date Value Ref Range Status   09/22/2013 32 26 - 140 U/L Final     CK-MB   Date Value Ref Range Status   09/22/2013 0.2 0 - 5 ng/mL Final     Troponin I   Date Value Ref Range Status   09/22/2013 0.01 0.00 - 0.04 ng/mL Final     BNP:   BNP   Date Value Ref Range Status   09/22/2013 15 <100 pg/mL Final     Comment:           100 pg/mL is a suggested decision/cutoff point for aid in the diagnosis of   congestive heart failure. Gender and age differences with BNP may exist.  Performed at [facility] [address], [address] [facility] [phone]     Lipids: No results found for: CHOL, HDL    INR:   INR   Date Value Ref Range Status   09/22/2013 1.1  Final     Comment:           Therapeutic Range: Moderate Anticoagulant Intensity:     INR = 2.0-3.0   High Anticoagulant Intensity:     INR = 2.5-3.5        High anticoagulant intensity for patients with a mechanical prosthetic heart   valve, thrombosis and antiphospholipid syndrome, or myocardial infarction.   Performed at [facility] [address], [address] [facility] [phone]     Thyroid:   TSH   Date Value Ref Range Status   09/22/2013 0.82 0.35 - 5.50 mIU/L Final     Comment:     Performed at [facility] [address], [address] [facility] [phone]     Urinalysis:   Color, UA   Date Value Ref Range Status   09/22/2013 YELLOW YEL Final     pH, UA   Date Value Ref Range Status   09/22/2013 6.0 5.0 - 8.0 Final     Protein, UA   Date Value Ref Range Status   09/22/2013 NEGATIVE NEG Final     Specific Gravity, UA   Date Value Ref Range Status   09/22/2013 1.010 1.005 - 1.030 Final     Bilirubin Urine   Date Value Ref Range Status   09/22/2013 NEGATIVE NEG Final     Nitrite, Urine   Date Value Ref Range Status   09/22/2013 NEGATIVE NEG Final     Leukocyte Esterase, Urine   Date Value Ref Range Status   09/22/2013 NEGATIVE NEG Final     Glucose, Ur   Date Value Ref Range Status   09/22/2013 NEGATIVE NEG Final     Cultures:-  -----------------------------------------------------------------    ABGs: No results found for: PHART, PO2ART, [id number]    Pulmonary Functions Testing Results:    04/02/2019  Spirometry shows FVC is 1.45 53% predicted, FEV1 is 1.00 46% predicted consistent with severe obstructive ventilatory impairment, FEV1 FVC is 87% predicted consistent with obstructive disease. Flow volume loop is suggestive of obstructive ventilatory impairment  Lung volume shows RV is 2.44 188% predicted, TLC is 5.09 110% predicted. Diffusion capacity is 6.43 36% predicted consistent with severe reduction in diffusion capacity which could be secondary to emphysema, pulmonary vascular disease and/or anemia and clinical correlation is recommended. Impression: This pulmonary function test is consistent with severe obstructive ventilatory impairment. Lung volume shows increase in his volume consistent with hyperinflation and airway trapping. There is severe reduction in diffusion capacity which is likely secondary to emphysema and concomitant pulmonary vascular disease. Clinical correlation is recommended. No results found for: FEV1, FVC, NXQ2NYO, TLC, DLCO    CXR  03/14/2019  Right upper lobe/suprahilar mass present.     CT Scans  03/18/2019  Mass lesion within the right hilum either intervening or originating from the   right mainstem bronchus.  There is encasement and narrowing of multiple   segmental pulmonary O2 and his Spiriva and he stabilized her on May status is better before consideration for bronchoscopy, she may need admission with transfusion of platelets and fresh frozen plasma before bronchoscopy depending upon her platelet count and INR   Complete smoking cessation discussed. Vaccinations recommended   Up to date with vaccinations from pulm perspective. PFT's reviewed   CXR reviewed   CT chest reviewed    RTC 1 week      Questions answered to pt's satisfaction    It was my pleasure to evaluate Alva Colby today. Please call with questions. Nadeem Snyder MD             4/2/2019, 9:24 AM    Please note that this chart was generated using voice recognition Dragon dictation software. Although every effort was made to ensure the accuracy of this automated transcription, some errors in transcription may have occurred.

## 2019-04-04 ENCOUNTER — HOSPITAL ENCOUNTER (OUTPATIENT)
Age: 69
Discharge: HOME | End: 2019-04-04
Payer: MEDICARE

## 2019-04-04 DIAGNOSIS — R91.8 LUNG MASS: ICD-10-CM

## 2019-04-04 DIAGNOSIS — Z87.19 HISTORY OF LIVER DISEASE: ICD-10-CM

## 2019-04-04 DIAGNOSIS — Z13.220 SCREENING FOR HYPERLIPIDEMIA: ICD-10-CM

## 2019-04-04 LAB
ABSOLUTE EOS #: 0.64 K/UL (ref 0–0.44)
ABSOLUTE IMMATURE GRANULOCYTE: <0.03 K/UL (ref 0–0.3)
ABSOLUTE LYMPH #: 1.75 K/UL (ref 1.1–3.7)
ABSOLUTE MONO #: 0.58 K/UL (ref 0.1–1.2)
ALBUMIN SERPL-MCNC: 3.1 G/DL (ref 3.5–5.2)
ALBUMIN/GLOBULIN RATIO: 0.6 (ref 1–2.5)
ALP BLD-CCNC: 96 U/L (ref 35–104)
ALT SERPL-CCNC: 21 U/L (ref 5–33)
AST SERPL-CCNC: 35 U/L
BASOPHILS # BLD: 1 % (ref 0–2)
BASOPHILS ABSOLUTE: 0.11 K/UL (ref 0–0.2)
BILIRUB SERPL-MCNC: 1.41 MG/DL (ref 0.3–1.2)
BILIRUBIN DIRECT: 0.44 MG/DL
BILIRUBIN, INDIRECT: 0.97 MG/DL (ref 0–1)
CHOLESTEROL/HDL RATIO: 3.4
CHOLESTEROL: 115 MG/DL
DIFFERENTIAL TYPE: ABNORMAL
EOSINOPHILS RELATIVE PERCENT: 7 % (ref 1–4)
GLOBULIN: ABNORMAL G/DL (ref 1.5–3.8)
HCT VFR BLD CALC: 36.9 % (ref 36.3–47.1)
HDLC SERPL-MCNC: 34 MG/DL
HEMOGLOBIN: 12.1 G/DL (ref 11.9–15.1)
IMMATURE GRANULOCYTES: 0 %
INR BLD: 1.2
LDL CHOLESTEROL: 69 MG/DL (ref 0–130)
LYMPHOCYTES # BLD: 20 % (ref 24–43)
MCH RBC QN AUTO: 31 PG (ref 25.2–33.5)
MCHC RBC AUTO-ENTMCNC: 32.8 G/DL (ref 28.4–34.8)
MCV RBC AUTO: 94.6 FL (ref 82.6–102.9)
MONOCYTES # BLD: 7 % (ref 3–12)
NRBC AUTOMATED: 0 PER 100 WBC
PDW BLD-RTO: 13.8 % (ref 11.8–14.4)
PLATELET # BLD: ABNORMAL K/UL (ref 138–453)
PLATELET ESTIMATE: ABNORMAL
PLATELET, FLUORESCENCE: NORMAL K/UL (ref 138–453)
PMV BLD AUTO: ABNORMAL FL (ref 8.1–13.5)
PROTHROMBIN TIME: 12.7 SEC (ref 9–12)
RBC # BLD: 3.9 M/UL (ref 3.95–5.11)
RBC # BLD: ABNORMAL 10*6/UL
SEG NEUTROPHILS: 65 % (ref 36–65)
SEGMENTED NEUTROPHILS ABSOLUTE COUNT: 5.73 K/UL (ref 1.5–8.1)
TOTAL PROTEIN: 8.1 G/DL (ref 6.4–8.3)
TRIGL SERPL-MCNC: 58 MG/DL
VLDLC SERPL CALC-MCNC: ABNORMAL MG/DL (ref 1–30)
WBC # BLD: 8.8 K/UL (ref 3.5–11.3)
WBC # BLD: ABNORMAL 10*3/UL

## 2019-04-04 PROCEDURE — 80076 HEPATIC FUNCTION PANEL: CPT

## 2019-04-04 PROCEDURE — 80061 LIPID PANEL: CPT

## 2019-04-04 PROCEDURE — 85055 RETICULATED PLATELET ASSAY: CPT

## 2019-04-04 PROCEDURE — 85025 COMPLETE CBC W/AUTO DIFF WBC: CPT

## 2019-04-04 PROCEDURE — 36415 COLL VENOUS BLD VENIPUNCTURE: CPT

## 2019-04-04 PROCEDURE — 85610 PROTHROMBIN TIME: CPT

## 2019-04-09 ENCOUNTER — HOSPITAL ENCOUNTER (OUTPATIENT)
Age: 69
Discharge: HOME | End: 2019-04-09
Payer: MEDICARE

## 2019-04-09 ENCOUNTER — OFFICE VISIT (OUTPATIENT)
Dept: PULMONOLOGY | Age: 69
End: 2019-04-09
Payer: MEDICARE

## 2019-04-09 ENCOUNTER — TELEPHONE (OUTPATIENT)
Dept: PULMONOLOGY | Age: 69
End: 2019-04-09

## 2019-04-09 VITALS
RESPIRATION RATE: 16 BRPM | HEIGHT: 63 IN | WEIGHT: 115 LBS | OXYGEN SATURATION: 98 % | BODY MASS INDEX: 20.38 KG/M2 | DIASTOLIC BLOOD PRESSURE: 76 MMHG | HEART RATE: 88 BPM | SYSTOLIC BLOOD PRESSURE: 127 MMHG

## 2019-04-09 DIAGNOSIS — K70.30 ALCOHOLIC CIRRHOSIS OF LIVER WITHOUT ASCITES (HCC): ICD-10-CM

## 2019-04-09 DIAGNOSIS — J44.9 COPD, SEVERE (HCC): Primary | ICD-10-CM

## 2019-04-09 DIAGNOSIS — R91.8 LUNG MASS: ICD-10-CM

## 2019-04-09 DIAGNOSIS — J20.9 ACUTE BRONCHITIS, UNSPECIFIED ORGANISM: ICD-10-CM

## 2019-04-09 DIAGNOSIS — J96.11 CHRONIC RESPIRATORY FAILURE WITH HYPOXIA (HCC): ICD-10-CM

## 2019-04-09 LAB
COLLAGEN ADENOSINE-5'-DIPHOSPHATE (ADP) TIME: 82 SEC (ref 67–112)
COLLAGEN EPINEPHRINE TIME: 106 SEC (ref 85–172)
PLATELET # BLD: NORMAL K/UL (ref 138–453)
PLATELET FUNCTION INTERP: NORMAL
PLATELET, FLUORESCENCE: NORMAL K/UL (ref 138–453)
PLATELET, IMMATURE FRACTION: NORMAL % (ref 1.1–10.3)

## 2019-04-09 PROCEDURE — G8926 SPIRO NO PERF OR DOC: HCPCS | Performed by: INTERNAL MEDICINE

## 2019-04-09 PROCEDURE — 1123F ACP DISCUSS/DSCN MKR DOCD: CPT | Performed by: INTERNAL MEDICINE

## 2019-04-09 PROCEDURE — G8400 PT W/DXA NO RESULTS DOC: HCPCS | Performed by: INTERNAL MEDICINE

## 2019-04-09 PROCEDURE — 85576 BLOOD PLATELET AGGREGATION: CPT

## 2019-04-09 PROCEDURE — 99214 OFFICE O/P EST MOD 30 MIN: CPT | Performed by: INTERNAL MEDICINE

## 2019-04-09 PROCEDURE — 36415 COLL VENOUS BLD VENIPUNCTURE: CPT

## 2019-04-09 PROCEDURE — G8420 CALC BMI NORM PARAMETERS: HCPCS | Performed by: INTERNAL MEDICINE

## 2019-04-09 PROCEDURE — 1090F PRES/ABSN URINE INCON ASSESS: CPT | Performed by: INTERNAL MEDICINE

## 2019-04-09 PROCEDURE — G8427 DOCREV CUR MEDS BY ELIG CLIN: HCPCS | Performed by: INTERNAL MEDICINE

## 2019-04-09 PROCEDURE — 4004F PT TOBACCO SCREEN RCVD TLK: CPT | Performed by: INTERNAL MEDICINE

## 2019-04-09 PROCEDURE — 3017F COLORECTAL CA SCREEN DOC REV: CPT | Performed by: INTERNAL MEDICINE

## 2019-04-09 PROCEDURE — 3023F SPIROM DOC REV: CPT | Performed by: INTERNAL MEDICINE

## 2019-04-09 PROCEDURE — 85055 RETICULATED PLATELET ASSAY: CPT

## 2019-04-09 PROCEDURE — 4040F PNEUMOC VAC/ADMIN/RCVD: CPT | Performed by: INTERNAL MEDICINE

## 2019-04-09 PROCEDURE — 85049 AUTOMATED PLATELET COUNT: CPT

## 2019-04-09 RX ORDER — OSELTAMIVIR PHOSPHATE 75 MG/1
75 CAPSULE ORAL 2 TIMES DAILY
Qty: 10 CAPSULE | Refills: 0 | Status: SHIPPED | OUTPATIENT
Start: 2019-04-09 | End: 2019-04-14

## 2019-04-09 NOTE — PROGRESS NOTES
OUTPATIENT PULMONARY PROGRESS NOTE      Patient:  Allison Schmitt  MRN: [id number]    Consulting Physician: Maryann Timmons PA-C  Reason for Consult: Lung mass  Primacy Care Physician: Maryann Timmons PA-C    HISTORY OF PRESENT ILLNESS:   The patient is a 77 y.o. female   She is referred here for evaluation of her right hilar mass with likely obstructive atelectasis and was seen last week for the first time in the office. She is going to need bronchoscopy but she has history of liver cirrhosis/end-stage liver disease thrombocytopenia and there were no labs available so scheduled for CBC PT and INR and platelet count. Her CBC showed platelets were clumped and counts were not available although appears adequate, INR is 1.2  Which she was seen last week she was also scheduled to have a 6 minute walk test as she was pretty short of breath 6 minute walk test shows that she does have exercise-induced hypoxemia and was given a prescription on home oxygen 2 L,, since she was seen last time she had received her oxygen now and she is using oxygen most of the time. She claimed that yesterday she started having flulike symptoms with nasal congestion and sore throat achy all over denies diarrhea vomiting she does have nasal congestion she denies fever or chills she denies purulent sputum production denies hemoptysis. She does have chronic pain in her right scapular shoulder area which is the same when she was seen last time without much change.       Her symptoms started about 5-6 months ago according to patient in October last year she started having increasing cough associated with worsening shortness of breath cough is mostly dry she is not able to bring the secretions up, she is also having wheezing intermittently, her cough and shortness of breath has been getting worse and she was seen by Dr. Meseret Snyder when she had a chest x-ray done which shows right suprahilar upper lobe mass she was scheduled to have a CT scan of the chest which again shows and confirm those findings with right upper lobe postobstructive atelectasis. She does complain of shortness of breath on walking small distances, she claims she was diagnosed with COPD about a month ago and she is currently on Symbicort and she take albuterol 3 times a day. Since October she has been losing weight and she claimed that her legs and arms are smaller so she no that she had lost weight and she think that she had lost 30 pounds in last 5-6 months, she does not have good appetite and she is small meals and feels full. She does have pain around the right shoulder and scapular region also upper back on the right side and radiated to the left side. She denies hemoptysis, she denies fever or night sweats. She denies bleeding per rectum or melena stool abdominal pain and denies urinary complaints and hematuria. She had no pulmonary function test done in the past and she had a pulmonary function test done in the office 04/02/19 (last week) which shows severe obstructive ventilatory impairment along with severe reduction in diffusion capacity and severe hyperinflation and air trapping. She's never been on home oxygen. She does have history of liver cirrhosis and she was told that she has end-stage liver disease which is related to her long history of drinking in the past which she stopped many years ago and also had history of hepatitis C. She smoke 1 pack per day for 52 years and now smoking less than half pack per day but continued to smoke and difficult to stop smoking.         Past Medical History:        Diagnosis Date    Back pain     Bronchitis     Chronic obstructive pulmonary disease (COPD) (HCC)     Cough     Current every day smoker     1 pack a day for the last 50 years    Fibromyalgia     Hepatitis     Liver disease     stage 4    Wheezing        Past Surgical History:        Procedure Laterality Date    BREAST SURGERY Left     benign lumpectomy, multiple    CARPAL TUNNEL RELEASE Bilateral     CERVICAL DISC SURGERY      X 2    CHOLECYSTECTOMY      HYSTERECTOMY      complete       Allergies:    No Known Allergies      Home Meds:   Outpatient Encounter Medications as of 4/9/2019   Medication Sig Dispense Refill    tiotropium (SPIRIVA RESPIMAT) 2.5 MCG/ACT AERS inhaler Inhale 2 puffs into the lungs daily 1 Inhaler 5    hydrOXYzine (ATARAX) 25 MG tablet Take 1 tablet by mouth 2 times daily 60 tablet 1    budesonide-formoterol (SYMBICORT) 160-4.5 MCG/ACT AERO Inhale 2 puffs into the lungs 2 times daily 1 Inhaler 0    budesonide-formoterol (SYMBICORT) 160-4.5 MCG/ACT AERO Inhale 2 puffs into the lungs 2 times daily 1 Inhaler 3    albuterol sulfate HFA (PROVENTIL HFA) 108 (90 Base) MCG/ACT inhaler Inhale 2 puffs into the lungs every 6 hours as needed for Wheezing 1 Inhaler 3    [DISCONTINUED] ibuprofen (ADVIL;MOTRIN) 600 MG tablet Take 1 tablet by mouth every 6 hours as needed for Pain 30 tablet 0     No facility-administered encounter medications on file as of 4/9/2019. Social History:   TOBACCO:   reports that she has been smoking cigarettes. She started smoking about 52 years ago. She has a 52.00 pack-year smoking history. She has never used smokeless tobacco.  ETOH:   reports that she does not drink alcohol.   OCCUPATION:      Family History:       Problem Relation Age of Onset    Heart Attack Father     Cancer Paternal Grandmother     Lung Cancer Paternal Grandfather     Emphysema Mother        Immunizations:    Immunization History   Administered Date(s) Administered    Influenza, Raquel Francisco, 3 Years and older, IM (Fluzone 3 yrs and older or Afluria 5 yrs and older) 03/21/2019    Influenza, Raquel Francisco, 3 yrs and older, IM, PF (Fluzone 3 yrs and older or Afluria 5 yrs and older) 02/01/2018    Pneumococcal Polysaccharide ([id number]) 03/21/2019         REVIEW OF SYSTEMS:  CONSTITUTIONAL:  positive for  fatigue, malaise, anorexia and weight loss, negative for  fevers, chills and sweats  EYES:  negative for  double vision, blurred vision, dry eyes, eye discharge, visual disturbance, irritation, redness and icterus  HEENT:  Positive for sore throat, nasal congestion, rhinorrhea, negative for  hearing loss, ear drainage, hoarseness and voice change  RESPIRATORY:  positive for  cough with sputum, dyspnea, wheezing and chest pain  negative for  hemoptysis, pleuritic pain and cyanosis  CARDIOVASCULAR:  negative for  palpitations, orthopnea, PND, early saiety, edema, syncope  GASTROINTESTINAL:  negative for nausea, vomiting, change in bowel habits, constipation, abdominal pain, abdominal mass, abdominal distention, jaundice, dysphagia, reflux, regurgitation, odynophagia, hematemesis and hemtochezia  GENITOURINARY:  negative for frequency, dysuria, urinary incontinence, hesitancy, decreased stream and hematuria  HEMATOLOGIC/LYMPHATIC:  negative for easy bruising, bleeding, lymphadenopathy and petechiae  ALLERGIC/IMMUNOLOGIC:  negative for recurrent infections, urticaria, hay fever, angioedema, anaphylaxis and drug reactions  ENDOCRINE:  negative for heat intolerance, cold intolerance, weight changes and change in bowel habits  MUSCULOSKELETAL:  Positive for  myalgias, negative for arthralgias, joint swelling, stiff joints and decreased range of motion  NEUROLOGICAL:  negative for headaches, dizziness, seizures, memory problems, speech problems, visual disturbance, gait problems, dysphagia, weakness, numbness, syncope and tingling  BEHAVIOR/PSYCH:  negative          Physical Exam:    Vitals: /76 (Site: Right Upper Arm)   Pulse 88   Resp 16   Ht 5' 3\" (1.6 m)   Wt 115 lb (52.2 kg)   SpO2 98% Comment: Room air at rest  98 % oxygen set at 2 cont. at 94%  BMI 20.37 kg/m²   Last 3 weights: Wt Readings from Last 3 Encounters:   04/09/19 115 lb (52.2 kg)   04/02/19 124 lb (56.2 kg)   04/02/19 124 lb (56.2 kg)     Body mass index is 20.37 kg/m².     Physical Examination:   General appearance - oriented to person, place, and time, acyanotic, in no respiratory distress and chronically ill appearing  Mental status - alert, oriented to person, place, and time  Eyes - pupils equal and reactive, extraocular eye movements intact, sclera anicteric  Ears - right ear normal, left ear normal  Nose - positive mild nasal congestion present without purulent discharge but clear rhinorrhea present, no erythema, discharge or polyps  Mouth - mucous membranes moist, pharynx normal without lesions  Neck - supple, no significant adenopathy  Chest - she is mildly tachypneic especially with walking she has increase resonance on percussion bilaterally, air entry slightly diminished on the right side as compared to the left side, there is prolonged expiration, there is no wheezing and no crackles heard   Heart - normal rate, regular rhythm, normal S1, S2, no murmurs, rubs, clicks or gallops  Abdomen - soft, nontender, nondistended, no masses or organomegaly  Neurological - alert, oriented, normal speech, no focal findings or movement disorder noted  Extremities - peripheral pulses normal, no pedal edema, no clubbing or cyanosis  Skin - normal coloration and turgor, no rashes, no suspicious skin lesions noted       LABS:    CBC:   WBC   Date Value Ref Range Status   04/04/2019 8.8 3.5 - 11.3 k/uL Final   02/02/2018 9.9 3.5 - 11.0 k/uL Final   02/01/2018 14.4 (H) 3.5 - 11.0 k/uL Final     Hemoglobin   Date Value Ref Range Status   04/04/2019 12.1 11.9 - 15.1 g/dL Final   02/02/2018 10.9 (L) 12.0 - 16.0 g/dL Final   02/01/2018 12.0 12.0 - 16.0 g/dL Final     Platelets   Date Value Ref Range Status   04/04/2019 See Reflexed IPF Result 138 - 453 k/uL Final   02/02/2018 41 (L) 130 - 400 k/uL Final   02/01/2018 37 (L) 130 - 400 k/uL Final     Comment:     Performed at [address] [address] [phone]       BMP:   Sodium   Date Value Ref Range Status 03/18/2019 138 135 - 144 mmol/L Final   02/01/2018 137 135 - 144 mmol/L Final   01/31/2018 133 (L) 135 - 144 mmol/L Final     Potassium   Date Value Ref Range Status   03/18/2019 3.8 3.7 - 5.3 mmol/L Final   02/01/2018 3.6 (L) 3.7 - 5.3 mmol/L Final   01/31/2018 4.3 3.7 - 5.3 mmol/L Final     Chloride   Date Value Ref Range Status   03/18/2019 104 98 - 107 mmol/L Final   02/01/2018 105 98 - 107 mmol/L Final   01/31/2018 101 98 - 107 mmol/L Final     CO2   Date Value Ref Range Status   03/18/2019 24 20 - 31 mmol/L Final   02/01/2018 22 20 - 31 mmol/L Final   01/31/2018 23 20 - 31 mmol/L Final     BUN   Date Value Ref Range Status   03/18/2019 12 8 - 23 mg/dL Final   02/01/2018 11 8 - 23 mg/dL Final   01/31/2018 14 8 - 23 mg/dL Final     CREATININE   Date Value Ref Range Status   03/18/2019 0.54 0.50 - 0.90 mg/dL Final   02/01/2018 0.71 0.50 - 0.90 mg/dL Final   01/31/2018 0.71 0.50 - 0.90 mg/dL Final     Glucose   Date Value Ref Range Status   03/18/2019 135 (H) 70 - 99 mg/dL Final   02/01/2018 101 (H) 70 - 99 mg/dL Final   01/31/2018 100 (H) 70 - 99 mg/dL Final     Hepatic:   AST   Date Value Ref Range Status   04/04/2019 35 (H) <32 U/L Final   09/22/2013 73 (H) 8 - 36 U/L Final     ALT   Date Value Ref Range Status   04/04/2019 21 5 - 33 U/L Final   09/22/2013 64 (H) 4 - 40 U/L Final     Total Bilirubin   Date Value Ref Range Status   04/04/2019 1.41 (H) 0.3 - 1.2 mg/dL Final   09/22/2013 0.40 0.30 - 1.20 mg/dL Final     Alkaline Phosphatase   Date Value Ref Range Status   04/04/2019 96 35 - 104 U/L Final   09/22/2013 75 25 - 100 U/L Final     Amylase:   Amylase   Date Value Ref Range Status   09/22/2013 54 20 - 104 U/L Final     Comment:     Performed at [facility] [address] [facility] [phone]     Lipase:   Lipase   Date Value Ref Range Status   09/22/2013 31 6 - 51 U/L Final     Comment:     Performed at [facility] [address] [facility] [address] [facility] [phone]     CARDIAC ENZYMES:   Total CK   Date Value Ref Range Status   09/22/2013 32 26 - 140 U/L Final     CK-MB   Date Value Ref Range Status   09/22/2013 0.2 0 - 5 ng/mL Final     Troponin I   Date Value Ref Range Status   09/22/2013 0.01 0.00 - 0.04 ng/mL Final     BNP:   BNP   Date Value Ref Range Status   09/22/2013 15 <100 pg/mL Final     Comment:           100 pg/mL is a suggested decision/cutoff point for aid in the diagnosis of   congestive heart failure. Gender and age differences with BNP may exist.  Performed at [facility] [address] [facility] [address] [facility] [phone]     Lipids:   Cholesterol   Date Value Ref Range Status   04/04/2019 115 <200 mg/dL Final     Comment:        Cholesterol Guidelines:      <200  Desirable   200-240  Borderline      >240  Undesirable          HDL   Date Value Ref Range Status   04/04/2019 34 (L) >40 mg/dL Final     Comment:        HDL Guidelines:    <40     Undesirable   40-59    Borderline    >59     Desirable            INR:   INR   Date Value Ref Range Status   04/04/2019 1.2  Final     Comment:           Therapeutic Range: Moderate Anticoagulant Intensity:     INR = 2.0-3.0   High Anticoagulant Intensity:     INR = 2.5-3.5           09/22/2013 1.1  Final     Comment:           Therapeutic Range: Moderate Anticoagulant Intensity:     INR = 2.0-3.0   High Anticoagulant Intensity:     INR = 2.5-3.5        High anticoagulant intensity for patients with a mechanical prosthetic heart   valve, thrombosis and antiphospholipid syndrome, or myocardial infarction.   Performed at [facility] [address] [facility] [phone]     Thyroid:   TSH   Date Value Ref Range Status   09/22/2013 0.82 0.35 - 5.50 mIU/L Final     Comment:     Performed at [facility] [address] [facility] [address], [address] [facility] [phone]     Urinalysis:   Color, UA   Date Value Ref Range Status   09/22/2013 YELLOW YEL Final     pH, UA Date Value Ref Range Status   09/22/2013 6.0 5.0 - 8.0 Final     Protein, UA   Date Value Ref Range Status   09/22/2013 NEGATIVE NEG Final     Specific Gravity, UA   Date Value Ref Range Status   09/22/2013 1.010 1.005 - 1.030 Final     Bilirubin Urine   Date Value Ref Range Status   09/22/2013 NEGATIVE NEG Final     Nitrite, Urine   Date Value Ref Range Status   09/22/2013 NEGATIVE NEG Final     Leukocyte Esterase, Urine   Date Value Ref Range Status   09/22/2013 NEGATIVE NEG Final     Glucose, Ur   Date Value Ref Range Status   09/22/2013 NEGATIVE NEG Final     Cultures:-  -----------------------------------------------------------------    ABGs: No results found for: PHART, PO2ART, THU4PQS    Pulmonary Functions Testing Results:    04/02/2019  Spirometry shows FVC is 1.45 53% predicted, FEV1 is 1.00 46% predicted consistent with severe obstructive ventilatory impairment, FEV1 FVC is 87% predicted consistent with obstructive disease. Flow volume loop is suggestive of obstructive ventilatory impairment  Lung volume shows RV is 2.44 188% predicted, TLC is 5.09 110% predicted. Diffusion capacity is 6.43 36% predicted consistent with severe reduction in diffusion capacity which could be secondary to emphysema, pulmonary vascular disease and/or anemia and clinical correlation is recommended. Impression: This pulmonary function test is consistent with severe obstructive ventilatory impairment. Lung volume shows increase in his volume consistent with hyperinflation and airway trapping. There is severe reduction in diffusion capacity which is likely secondary to emphysema and concomitant pulmonary vascular disease. Clinical correlation is recommended. No results found for: FEV1, FVC, PPE0SJX, TLC, DLCO    CXR  03/14/2019  Right upper lobe/suprahilar mass present.     CT Scans  03/18/2019  Mass lesion within the right hilum either intervening or originating from the   right mainstem bronchus. Sergio Cam is encasement and narrowing of multiple   segmental pulmonary arterial branches to the right upper lobe. Postobstructive atelectasis of the medial right middle lobe.  Direct   visualization with bronchoscopy and biopsy suggested. Assessment and Plan       ICD-10-CM    1. COPD, severe ([address] J44.9    2. Chronic respiratory failure with hypoxia (HCC) J96.11    3. Lung mass R91.8    4. Acute bronchitis, unspecified organism J20.9         CL (cirrhosis of liver) (HCC)    Chronic hepatitis C without hepatic coma (HCC)    Thrombocytopenia (HCC)    Anxiety     Assessment  I have reviewed the chest x-rays, I have also seen the CT scan of the chest and agree that CT scan of the chest cause right hilar mass either causing compression of the right upper lobe bronchus or originating from right main bronchus and bronchus intermedius and causing atelectasis which is secondary to postobstructive atelectasis. She has no recent CBC and INR she has history of liver cirrhosis/end-stage liver disease according to her and her last platelet count was 41 in February 2018, have discussed with her about the risk of bleeding especially liver cirrhosis and thrombocytopenia with bronchoscopy although to get the diagnosis she would need bronchoscopy and biopsy, we will request a CBC with differential and also PT and INR and will determine after those studies and see her back in one week and will discuss with her more about the best way of getting the bronchoscopy done as she may need admission and transfusion of plasma and platelets depending upon her INR and platelet count before bronchoscopy. Pulmonary function test shows severe COPD also and she short of breath on walking small distances, in the office I have done a 6 minute walk test and she'll need home oxygen and will arrange home oxygen.     Plan     I discussed with the patient about the risk for bronchoscopy and the need for the procedure, risk of respiratory decompensation because of her severe COPD and right upper lobe atelectasis with hypoxia causing increased risk of bleeding because of her liver cirrhosis thrombocytopenia all discussed with the patient and she understands and agree for bronchoscopy as procedure disease needed to get a diagnosis. We will schedule bronchoscopy in OR with anesthesia possibly with intubation. Will check platelet count and platelet function. We will get type and match for platelet count before procedure    Given her prescription for Tamiflu as she is having flulike syndrome. Advised patient if she started having increased cough with purulent sputum or fever she need to go to emergency room. Continue home O2 at 2 L   Continue symbicort  Albuterol as needed  Continue spiriva Respimat   Complete smoking cessation discussed. Vaccinations recommended   Up to date with vaccinations from pulm perspective. PFT's reviewed   CXR reviewed   CT chest reviewed    RTC 2 week      Questions answered to pt's satisfaction    It was my pleasure to evaluate Ayleenn Book today. Please call with questions. Sanjay Schwarz MD             4/9/2019, 11:39 AM    Please note that this chart was generated using voice recognition Dragon dictation software. Although every effort was made to ensure the accuracy of this automated transcription, some errors in transcription may have occurred.

## 2019-04-15 ENCOUNTER — ANESTHESIA (OUTPATIENT)
Dept: OPERATING ROOM | Age: 69
End: 2019-04-15
Payer: MEDICARE

## 2019-04-15 ENCOUNTER — HOSPITAL ENCOUNTER (OUTPATIENT)
Age: 69
Setting detail: OUTPATIENT SURGERY
Discharge: HOME | End: 2019-04-15
Attending: INTERNAL MEDICINE | Admitting: INTERNAL MEDICINE
Payer: MEDICARE

## 2019-04-15 ENCOUNTER — ANESTHESIA EVENT (OUTPATIENT)
Dept: OPERATING ROOM | Age: 69
End: 2019-04-15
Payer: MEDICARE

## 2019-04-15 VITALS — OXYGEN SATURATION: 96 % | DIASTOLIC BLOOD PRESSURE: 64 MMHG | TEMPERATURE: 96.1 F | SYSTOLIC BLOOD PRESSURE: 182 MMHG

## 2019-04-15 VITALS
WEIGHT: 115 LBS | RESPIRATION RATE: 17 BRPM | DIASTOLIC BLOOD PRESSURE: 70 MMHG | HEART RATE: 87 BPM | SYSTOLIC BLOOD PRESSURE: 148 MMHG | TEMPERATURE: 97.9 F | BODY MASS INDEX: 20.38 KG/M2 | HEIGHT: 63 IN | OXYGEN SATURATION: 93 %

## 2019-04-15 VITALS — SYSTOLIC BLOOD PRESSURE: 142 MMHG | DIASTOLIC BLOOD PRESSURE: 73 MMHG | OXYGEN SATURATION: 95 %

## 2019-04-15 LAB
ABO/RH: NORMAL
ANTIBODY SCREEN: NEGATIVE
ARM BAND NUMBER: NORMAL
CASE NUMBER:: NORMAL
EXPIRATION DATE: NORMAL
SPECIMEN DESCRIPTION: NORMAL

## 2019-04-15 PROCEDURE — 31625 BRONCHOSCOPY W/BIOPSY(S): CPT | Performed by: INTERNAL MEDICINE

## 2019-04-15 PROCEDURE — 86900 BLOOD TYPING SEROLOGIC ABO: CPT

## 2019-04-15 PROCEDURE — 2709999900 HC NON-CHARGEABLE SUPPLY: Performed by: INTERNAL MEDICINE

## 2019-04-15 PROCEDURE — 3609011300 HC BRONCHOSCOPY BRONCHIAL/ENDOBRNCL BX 1+ SITES: Performed by: INTERNAL MEDICINE

## 2019-04-15 PROCEDURE — 31623 DX BRONCHOSCOPE/BRUSH: CPT | Performed by: INTERNAL MEDICINE

## 2019-04-15 PROCEDURE — 2580000003 HC RX 258: Performed by: ANESTHESIOLOGY

## 2019-04-15 PROCEDURE — 7100000001 HC PACU RECOVERY - ADDTL 15 MIN: Performed by: INTERNAL MEDICINE

## 2019-04-15 PROCEDURE — 88112 CYTOPATH CELL ENHANCE TECH: CPT

## 2019-04-15 PROCEDURE — 87015 SPECIMEN INFECT AGNT CONCNTJ: CPT

## 2019-04-15 PROCEDURE — 2500000003 HC RX 250 WO HCPCS: Performed by: NURSE ANESTHETIST, CERTIFIED REGISTERED

## 2019-04-15 PROCEDURE — 87205 SMEAR GRAM STAIN: CPT

## 2019-04-15 PROCEDURE — 3609011100 HC BRONCHOSCOPY BRUSHINGS: Performed by: INTERNAL MEDICINE

## 2019-04-15 PROCEDURE — 36430 TRANSFUSION BLD/BLD COMPNT: CPT

## 2019-04-15 PROCEDURE — 86850 RBC ANTIBODY SCREEN: CPT

## 2019-04-15 PROCEDURE — 6360000002 HC RX W HCPCS: Performed by: NURSE ANESTHETIST, CERTIFIED REGISTERED

## 2019-04-15 PROCEDURE — 2720000010 HC SURG SUPPLY STERILE: Performed by: INTERNAL MEDICINE

## 2019-04-15 PROCEDURE — 6370000000 HC RX 637 (ALT 250 FOR IP): Performed by: ANESTHESIOLOGY

## 2019-04-15 PROCEDURE — 6360000002 HC RX W HCPCS: Performed by: INTERNAL MEDICINE

## 2019-04-15 PROCEDURE — 3700000001 HC ADD 15 MINUTES (ANESTHESIA): Performed by: INTERNAL MEDICINE

## 2019-04-15 PROCEDURE — 3700000000 HC ANESTHESIA ATTENDED CARE: Performed by: INTERNAL MEDICINE

## 2019-04-15 PROCEDURE — 88305 TISSUE EXAM BY PATHOLOGIST: CPT

## 2019-04-15 PROCEDURE — P9037 PLATE PHERES LEUKOREDU IRRAD: HCPCS

## 2019-04-15 PROCEDURE — 87206 SMEAR FLUORESCENT/ACID STAI: CPT

## 2019-04-15 PROCEDURE — 87070 CULTURE OTHR SPECIMN AEROBIC: CPT

## 2019-04-15 PROCEDURE — 3609011500 HC BRONCHOSCOPY DIAGNOSTIC OR CELL WASH ONLY: Performed by: INTERNAL MEDICINE

## 2019-04-15 PROCEDURE — 86901 BLOOD TYPING SEROLOGIC RH(D): CPT

## 2019-04-15 PROCEDURE — 93005 ELECTROCARDIOGRAM TRACING: CPT

## 2019-04-15 PROCEDURE — 87116 MYCOBACTERIA CULTURE: CPT

## 2019-04-15 PROCEDURE — 6370000000 HC RX 637 (ALT 250 FOR IP): Performed by: INTERNAL MEDICINE

## 2019-04-15 PROCEDURE — 7100000010 HC PHASE II RECOVERY - FIRST 15 MIN: Performed by: INTERNAL MEDICINE

## 2019-04-15 PROCEDURE — 7100000000 HC PACU RECOVERY - FIRST 15 MIN: Performed by: INTERNAL MEDICINE

## 2019-04-15 RX ORDER — PROPOFOL 10 MG/ML
INJECTION, EMULSION INTRAVENOUS PRN
Status: DISCONTINUED | OUTPATIENT
Start: 2019-04-15 | End: 2019-04-15 | Stop reason: SDUPTHER

## 2019-04-15 RX ORDER — IPRATROPIUM BROMIDE AND ALBUTEROL SULFATE 2.5; .5 MG/3ML; MG/3ML
1 SOLUTION RESPIRATORY (INHALATION) ONCE
Status: COMPLETED | OUTPATIENT
Start: 2019-04-15 | End: 2019-04-15

## 2019-04-15 RX ORDER — ROCURONIUM BROMIDE 10 MG/ML
INJECTION, SOLUTION INTRAVENOUS PRN
Status: DISCONTINUED | OUTPATIENT
Start: 2019-04-15 | End: 2019-04-15 | Stop reason: SDUPTHER

## 2019-04-15 RX ORDER — ONDANSETRON 2 MG/ML
INJECTION INTRAMUSCULAR; INTRAVENOUS PRN
Status: DISCONTINUED | OUTPATIENT
Start: 2019-04-15 | End: 2019-04-15 | Stop reason: SDUPTHER

## 2019-04-15 RX ORDER — LIDOCAINE HYDROCHLORIDE 10 MG/ML
INJECTION, SOLUTION EPIDURAL; INFILTRATION; INTRACAUDAL; PERINEURAL PRN
Status: DISCONTINUED | OUTPATIENT
Start: 2019-04-15 | End: 2019-04-15 | Stop reason: SDUPTHER

## 2019-04-15 RX ORDER — SODIUM CHLORIDE, SODIUM LACTATE, POTASSIUM CHLORIDE, CALCIUM CHLORIDE 600; 310; 30; 20 MG/100ML; MG/100ML; MG/100ML; MG/100ML
INJECTION, SOLUTION INTRAVENOUS CONTINUOUS
Status: DISCONTINUED | OUTPATIENT
Start: 2019-04-15 | End: 2019-04-15 | Stop reason: HOSPADM

## 2019-04-15 RX ORDER — FENTANYL CITRATE 50 UG/ML
INJECTION, SOLUTION INTRAMUSCULAR; INTRAVENOUS PRN
Status: DISCONTINUED | OUTPATIENT
Start: 2019-04-15 | End: 2019-04-15 | Stop reason: SDUPTHER

## 2019-04-15 RX ORDER — EPINEPHRINE 1 MG/ML
INJECTION, SOLUTION, CONCENTRATE INTRAVENOUS PRN
Status: DISCONTINUED | OUTPATIENT
Start: 2019-04-15 | End: 2019-04-15 | Stop reason: ALTCHOICE

## 2019-04-15 RX ADMIN — FENTANYL CITRATE 50 MCG: 50 INJECTION INTRAMUSCULAR; INTRAVENOUS at 13:42

## 2019-04-15 RX ADMIN — FENTANYL CITRATE 50 MCG: 50 INJECTION INTRAMUSCULAR; INTRAVENOUS at 13:39

## 2019-04-15 RX ADMIN — SUGAMMADEX 200 MG: 100 INJECTION, SOLUTION INTRAVENOUS at 13:32

## 2019-04-15 RX ADMIN — SODIUM CHLORIDE, POTASSIUM CHLORIDE, SODIUM LACTATE AND CALCIUM CHLORIDE: 600; 310; 30; 20 INJECTION, SOLUTION INTRAVENOUS at 10:25

## 2019-04-15 RX ADMIN — ONDANSETRON 4 MG: 2 INJECTION, SOLUTION INTRAMUSCULAR; INTRAVENOUS at 13:15

## 2019-04-15 RX ADMIN — ROCURONIUM BROMIDE 50 MG: 10 INJECTION INTRAVENOUS at 12:48

## 2019-04-15 RX ADMIN — PROPOFOL 200 MG: 10 INJECTION, EMULSION INTRAVENOUS at 12:48

## 2019-04-15 RX ADMIN — FENTANYL CITRATE 100 MCG: 50 INJECTION INTRAMUSCULAR; INTRAVENOUS at 12:45

## 2019-04-15 RX ADMIN — IPRATROPIUM BROMIDE AND ALBUTEROL SULFATE 1 AMPULE: .5; 3 SOLUTION RESPIRATORY (INHALATION) at 14:09

## 2019-04-15 RX ADMIN — FENTANYL CITRATE 25 MCG: 50 INJECTION INTRAMUSCULAR; INTRAVENOUS at 11:44

## 2019-04-15 RX ADMIN — IPRATROPIUM BROMIDE AND ALBUTEROL SULFATE 1 AMPULE: .5; 3 SOLUTION RESPIRATORY (INHALATION) at 11:28

## 2019-04-15 RX ADMIN — LIDOCAINE HYDROCHLORIDE 100 MG: 10 INJECTION, SOLUTION EPIDURAL; INFILTRATION; INTRACAUDAL; PERINEURAL at 13:39

## 2019-04-15 RX ADMIN — SODIUM CHLORIDE, POTASSIUM CHLORIDE, SODIUM LACTATE AND CALCIUM CHLORIDE: 600; 310; 30; 20 INJECTION, SOLUTION INTRAVENOUS at 13:24

## 2019-04-15 RX ADMIN — LIDOCAINE HYDROCHLORIDE 50 MG: 10 INJECTION, SOLUTION EPIDURAL; INFILTRATION; INTRACAUDAL; PERINEURAL at 12:48

## 2019-04-15 RX ADMIN — FENTANYL CITRATE 50 MCG: 50 INJECTION INTRAMUSCULAR; INTRAVENOUS at 11:39

## 2019-04-15 RX ADMIN — FENTANYL CITRATE 25 MCG: 50 INJECTION INTRAMUSCULAR; INTRAVENOUS at 11:48

## 2019-04-15 ASSESSMENT — PULMONARY FUNCTION TESTS
PIF_VALUE: 22
PIF_VALUE: 25
PIF_VALUE: 24
PIF_VALUE: 0
PIF_VALUE: 9
PIF_VALUE: 22
PIF_VALUE: 0
PIF_VALUE: 24
PIF_VALUE: 0
PIF_VALUE: 28
PIF_VALUE: 21
PIF_VALUE: 0
PIF_VALUE: 0
PIF_VALUE: 1
PIF_VALUE: 1
PIF_VALUE: 0
PIF_VALUE: 3
PIF_VALUE: 0
PIF_VALUE: 7
PIF_VALUE: 1
PIF_VALUE: 0
PIF_VALUE: 0
PIF_VALUE: 22
PIF_VALUE: 34
PIF_VALUE: 0
PIF_VALUE: 0
PIF_VALUE: 7
PIF_VALUE: 24
PIF_VALUE: 0
PIF_VALUE: 0
PIF_VALUE: 22
PIF_VALUE: 18
PIF_VALUE: 0
PIF_VALUE: 0
PIF_VALUE: 25
PIF_VALUE: 2
PIF_VALUE: 20
PIF_VALUE: 21
PIF_VALUE: 0
PIF_VALUE: 1
PIF_VALUE: 25
PIF_VALUE: 23
PIF_VALUE: 0
PIF_VALUE: 22
PIF_VALUE: 0
PIF_VALUE: 1
PIF_VALUE: 0
PIF_VALUE: 21
PIF_VALUE: 0
PIF_VALUE: 32
PIF_VALUE: 0
PIF_VALUE: 3
PIF_VALUE: 1
PIF_VALUE: 32
PIF_VALUE: 21
PIF_VALUE: 0
PIF_VALUE: 26

## 2019-04-15 ASSESSMENT — COPD QUESTIONNAIRES
CAT_SEVERITY: SEVERE
CAT_SEVERITY: SEVERE

## 2019-04-15 ASSESSMENT — PAIN SCALES - GENERAL
PAINLEVEL_OUTOF10: 0

## 2019-04-15 ASSESSMENT — PAIN - FUNCTIONAL ASSESSMENT: PAIN_FUNCTIONAL_ASSESSMENT: 0-10

## 2019-04-15 ASSESSMENT — LIFESTYLE VARIABLES
SMOKING_STATUS: 1
SMOKING_STATUS: 1

## 2019-04-15 NOTE — PROGRESS NOTES
Pt brought to OR, lab was in the process of sending platelets to the OR. Once pt brought into the OR, lab called in to the room to say that platelets are not available [facility] Dr Vianey Osorio and Dr Haritha Mejia both want to hold on case for now. Pt taken back to preop spot 47 to care of Davie Hwang RN until further notice.

## 2019-04-15 NOTE — ANESTHESIA POSTPROCEDURE EVALUATION
Department of Anesthesiology  Postprocedure Note    Patient: Britney Lopez  MRN: [id number]  [place]: 1950  Date of evaluation: 4/15/2019  Time:  4:09 PM     Procedure Summary     Date:  04/15/19 Room / Location:  [facility] OR [address] [place] [address] OR    Anesthesia Start:  1137 Anesthesia Stop:  3039    Procedure:  BRONCHOSCOPY - GI UNIT SCHEDULED. (N/A ) Diagnosis:  (RIGHT LUNG MASS)    Surgeon:  Mikki Ramírez MD Responsible Provider:  Rachel Rodney MD    Anesthesia Type:  general ASA Status:  3          Anesthesia Type: general    Latosha Phase I: Latosha Score: 9    Latosha Phase II: Latosha Score: 10    Last vitals: Reviewed and per EMR flowsheets.        Anesthesia Post Evaluation    Patient location during evaluation: PACU  Patient participation: complete - patient participated  Level of consciousness: awake and alert  Airway patency: patent  Nausea & Vomiting: no nausea and no vomiting  Complications: no  Cardiovascular status: hemodynamically stable  Respiratory status: room air  Hydration status: euvolemic

## 2019-04-15 NOTE — ANESTHESIA PRE PROCEDURE
Department of Anesthesiology  Preprocedure Note       Name:  Abbey Russell   Age:  77 y.o.  :  1950                                          MRN:  [id number]         Date:  4/15/2019      Surgeon: Connie Rivas):  Sruthi Pearson MD    Procedure: BRONCHOSCOPY - GI UNIT SCHEDULED. (N/A )    Medications prior to admission:   Prior to Admission medications    Medication Sig Start Date End Date Taking? Authorizing Provider   tiotropium (SPIRIVA RESPIMAT) 2.5 MCG/ACT AERS inhaler Inhale 2 puffs into the lungs daily 19  Yes Sruthi Pearson MD   hydrOXYzine (ATARAX) 25 MG tablet Take 1 tablet by mouth 2 times daily 3/27/19  Yes Malinda Brock PA-C   budesonide-formoterol [facility]) 160-4.5 MCG/ACT AERO Inhale 2 puffs into the lungs 2 times daily 3/21/19 4/20/19 Yes Malinda Brock PA-C   budesonide-formoterol [facility]) 160-4.5 MCG/ACT AERO Inhale 2 puffs into the lungs 2 times daily 3/21/19  Yes Malinda Brock PA-C   albuterol sulfate HFA (PROVENTIL HFA) 108 (90 Base) MCG/ACT inhaler Inhale 2 puffs into the lungs every 6 hours as needed for Wheezing 3/14/19  Yes Tristin Manriquez MD   ibuprofen (ADVIL;MOTRIN) 600 MG tablet Take 1 tablet by mouth every 6 hours as needed for Pain 16  Dev De Dios MD       Current medications:    No current facility-administered medications for this encounter. Allergies:  No Known Allergies    Problem List:    Patient Active Problem List   Diagnosis Code    Cellulitis of right arm L03. 113    Cat bite of right upper arm S41.151A, W55. 01XA    CL (cirrhosis of liver) ([facility] [address] K74.60    Chronic hepatitis C without hepatic coma (HCC) B18.2    Thrombocytopenia (HCC) D69.6    Chronic obstructive pulmonary disease (HCC) J44.9    Anxiety F41.9       Past Medical History:        Diagnosis Date    Back pain     Bronchitis     Chronic obstructive pulmonary disease (COPD) (HCC)     Cough     Current every day smoker     1 pack a day for the last 50 years    Fibromyalgia     Hepatitis     Liver disease     stage 4    Oxygen dependent     Wheezing        Past Surgical History:        Procedure Laterality Date    BREAST SURGERY Left     benign lumpectomy, multiple    CARPAL TUNNEL RELEASE Bilateral     CERVICAL DISC SURGERY      X 2    CHOLECYSTECTOMY      HYSTERECTOMY      complete       Social History:    Social History     Tobacco Use    Smoking status: Current Every Day Smoker     Packs/day: 0.25     Years: 52.00     Pack years: 13.00     Types: Cigarettes     Start date: 1/1/1967    Smokeless tobacco: Never Used    Tobacco comment: 3-4 cigarettes/day   Substance Use Topics    Alcohol use: No                                Ready to quit: Not Answered  Counseling given: Not Answered  Comment: 3-4 cigarettes/day      Vital Signs (Current):   Vitals:    04/12/19 1330   Weight: 115 lb (52.2 kg)   Height: 5' 3\" (1.6 m)                                              BP Readings from Last 3 Encounters:   04/09/19 127/76   04/02/19 118/65   03/21/19 125/72       NPO Status: Time of last liquid consumption: 0300                        Time of last solid consumption: 2100                        Date of last liquid consumption: 04/15/19                        Date of last solid food consumption: 04/14/19    BMI:   Wt Readings from Last 3 Encounters:   04/12/19 115 lb (52.2 kg)   04/09/19 115 lb (52.2 kg)   04/02/19 124 lb (56.2 kg)     Body mass index is 20.37 kg/m².     CBC:   Lab Results   Component Value Date    WBC 8.8 04/04/2019    RBC 3.90 04/04/2019    HGB 12.1 04/04/2019    HCT 36.9 04/04/2019    MCV 94.6 04/04/2019    RDW 13.8 04/04/2019    PLT See Reflexed IPF Result 04/09/2019       CMP:   Lab Results   Component Value Date     03/18/2019    K 3.8 03/18/2019     03/18/2019    CO2 24 03/18/2019    BUN 12 03/18/2019    CREATININE 0.54 03/18/2019    GFRAA >60 03/18/2019    LABGLOM >60 03/18/2019    GLUCOSE 135 03/18/2019    PROT 8.1 04/04/2019 CALCIUM 8.7 03/18/2019    BILITOT 1.41 04/04/2019    ALKPHOS 96 04/04/2019    AST 35 04/04/2019    ALT 21 04/04/2019       POC Tests: No results for input(s): POCGLU, POCNA, POCK, POCCL, POCBUN, POCHEMO, POCHCT in the last 72 hours. Coags:   Lab Results   Component Value Date    PROTIME 12.7 04/04/2019    INR 1.2 04/04/2019    APTT 25.9 09/22/2013       HCG (If Applicable): No results found for: PREGTESTUR, PREGSERUM, HCG, HCGQUANT     ABGs: No results found for: PHART, PO2ART, VVU2KDO, HCN2FBI, BEART, U5HHQCAL     Type & Screen (If Applicable):  No results found for: [facility]    Anesthesia Evaluation  Patient summary reviewed and Nursing notes reviewed no history of anesthetic complications:   Airway: Mallampati: II  TM distance: >3 FB   Neck ROM: full  Mouth opening: > = 3 FB Dental:    (+) edentulous      Pulmonary:normal exam    (+) COPD: severe,  current smoker                           Cardiovascular:  Exercise tolerance: good (>4 METS),       (-) past MI, CAD, CABG/stent, dysrhythmias and  angina    ECG reviewed               Beta Blocker:  Not on Beta Blocker         Neuro/Psych:   (+) neuromuscular disease:,             GI/Hepatic/Renal:   (+) hepatitis: C, liver disease:,           Endo/Other: Negative Endo/Other ROS   (+) malignancy/cancer. Abdominal:           Vascular:                                      Anesthesia Plan      general     ASA 3     (GETA  )  Induction: intravenous. MIPS: Postoperative opioids intended and Prophylactic antiemetics administered. Anesthetic plan and risks discussed with patient.       Plan discussed with CRNA and surgical team.                Abbie Cole MD   4/15/2019

## 2019-04-15 NOTE — PROCEDURES
Emil Brink is a 77 y.o. female patient. No diagnosis found. Past Medical History:   Diagnosis Date    Anxiety and depression     Back pain     Bipolar disorder (HCC)     Bronchitis     Chronic obstructive pulmonary disease (COPD) (HCC)     Cirrhosis (HCC)     Cough     Current every day smoker     1 pack a day for the last 50 years    Fibromyalgia     Hepatitis     History of cervical cancer     is s/p hyst    Liver disease     stage 4    Lung mass 2019    MVP (mitral valve prolapse)     On supplemental oxygen therapy     Wears dentures     full    Wears glasses     Wheezing      Blood pressure 139/67, pulse 80, temperature 99.1 °F (37.3 °C), resp. rate 20, height 5' 3\" (1.6 m), weight 115 lb (52.2 kg), SpO2 97 %.     Procedures  Bronchoscopy Procedure Note    Date of Procedure: 4/15/2019    Pre-op Diagnosis: Right main bronchus/right upper lobe mas    Post-op Diagnosis: R main Bronchus extrinsic mass causing complete obstruction of the right main bronchus with extrinsic severe compression and difficult to see the main marvel because of extrinsic compression, overlying mucosa was smooth and at the distal end there was slightly abnormal mucosa, easy bruising from touching with the bronchoscope    Bronchoscopist: Newton Medel      Anesthesia: Patient was intubated for procedure and on ventilator management by anesthesia    Procedure: Flexible fiberoptic bronchoscopy    Estimated Blood Loss: 10 mL    Complications: None    Indications and History  The patient is a 77 y.o. female with history of liver cirrhosis, thrombocytopenia, found to have a right main bronchus/right upper lobe mass causing atelectasis of right upper lobe      Consent to Procedure  The risks, benefits, complications, treatment options and expected outcomes were discussed with the patient and/or POA  The possibilities of reaction to medication, pulmonary aspiration, perforation of a viscus, bleeding, failure to diagnose a condition and creating a complication requiring transfusion or operation were discussed with the patient who freely signed the consent. Description of Procedure  The patient was intubated on mechanical ventilation and placed on 100% oxygen. Socorro Lamar was monitored by the anesthesia and the standard OR monitoring devices. Qian Jimenez and the procedure were verified as Flexible Fiberoptic Bronchoscopy. A Time Out was held and the above information confirmed. The patient was placed in the appropriate position. The bronchoscope was then passed into the trachea via the ET tube. Lidocaine 2% solution 2 ml at a time was applied topically to the marvel. After careful inspection of the tracheal, the bronchoscope was sequentially passed into all segments of the left and right endobronchial trees to the second and/or third divisions. Endobronchial findings    Vocal Cords Normal  Trachea: Distal trachea on the right side is displaced because of posterior and lateral extrinsic compression from right main bronchus to the left side  Marvel  displaced to the left from right main bronchus extrinsic mass    RightStem Bronchus  Complete Collapse from the R main bronchus mass/extrinsic compression from mass causing complete obstruction of right main bronchus and unable to visualize distal as there was no orifice of right main bronchus.    Right Upper Lobe Bronchi  not visualized  Right Middle Lobe Bronchi not visualized   Right Lower Lobe Bronchi (including the Superior segment)  not visualized    Left Main Stem Bronchus Normal mucosa  Left Upper Lobe Bronchus, Upper Division Normal mucosa  Left Upper Lobe Bronchus, Lingula  Normal mucosa  Left Lower Lobe Bronchus (including the Superior segment)  Normal mucosa    Specimens Taken    Washings -  Amount - 40   Location - Right main bronchus mass  BAL -    Brushings - Location - right main bronchus mass  Endobronchial Biopsies - Number of Biopsies -  8 to 10 biopsy done Location(s) - Right main bronchus mass  Transbronchial Biopsies -       Recovery per anesthesia in post anesthesia care unit.         Sruthi Pearson MD  4/15/2019

## 2019-04-15 NOTE — H&P
cancer, Liver disease, Lung mass, MVP (mitral valve prolapse), On supplemental oxygen therapy, Wears dentures, Wears glasses, and Wheezing. Past Surgical History   has a past surgical history that includes Cervical disc surgery; Hysterectomy; Cholecystectomy; Breast surgery (Left); Carpal tunnel release (Bilateral); and Knee arthroscopy (Right). Social History   reports that she has been smoking cigarettes. She started smoking about 52 years ago. She has a 13.00 pack-year smoking history. She has never used smokeless tobacco.   reports that she does not drink alcohol. reports that she has current or past drug history. Drug: Cocaine. Marital Status   Children one birth- gave up for adoption   Occupation retired  Family History  Family Status   Relation Name Status    Father      [address]      PGF      Mother       family history includes Cancer in her paternal grandmother; Emphysema in her mother; Heart Attack in her father; Clayton Urrutia in her paternal grandfather. OBJECTIVE:   VITALS:  height is 5' 3\" (1.6 m) and weight is 115 lb (52.2 kg). Her temporal temperature is 98.2 °F (36.8 °C). Her blood pressure is 136/75 and her pulse is 72. Her respiration is 18 and oxygen saturation is 97%. CONSTITUTIONAL:alert & orientated x 3, no acute distress. Friendly. SKIN:  Warm and dry, no rashes, dusky skin complexion. HEAD:  Normocephalic, atraumatic   EYES: PERRLA. EOMI    EARS:  Hearing grossly WNL. NOSE:  Nares patent. No rhinorrhea   MOUTH/THROAT:  Mucous membranes moist, benign, edentulous   NECK:supple, no lymphadenopathy  LUNGS: Respirations even and non-labored. Decreased breath sounds. CARDIOVASCULAR: Regular rate and rhythm. ABDOMEN: soft, non tender, non distended, no masses or organomegaly, bowel sounds active x 4   EXTREMITIES: no edema bilateral lower extremities. Peripheral pulses are intact     IMPRESSIONS:   1.  Lung mass      Diagnosis Date    Anxiety and depression     Back pain     Bipolar disorder (HCC)     Bronchitis     Chronic obstructive pulmonary disease (COPD) (HCC)     Cirrhosis (HCC)     Cough     Current every day smoker     1 pack a day for the last 50 years    Fibromyalgia     Hepatitis     History of cervical cancer     is s/p hyst    Liver disease     stage 4    Lung mass 2019    MVP (mitral valve prolapse)     On supplemental oxygen therapy     Wears dentures     full    Wears glasses     Wheezing    2. PLANS:   1. Bronchoscopy     POPEYE MALDONADO CNP  Electronically signed 4/15/2019 at 10:26 AM       Attending Physician Statement  I have discussed the care of Hien Deleon, including pertinent history and exam findings with the CNP. I have reviewed the key elements of all parts of the encounter. I have seen and examined the patient myself. I agree with the assessment and plan and status of the problem list as documented. Please see my full office note from 04/09/19 for details and it is updated and I have reviewed the office note today. As the last platelet count is still show clumps and comes are not available we will transfuse platelets before the bronchoscopy biopsy. Discussed with the patient about the procedure, anesthesia and also seen the patient and discuss about platelet transfusion befor procedure the risk of bleeding infection pneumothorax or discussed with the patient and she freely signed the consent understanding the risk and benefit.       Dalia Friend MD  4/15/2019 12:28 PM

## 2019-04-15 NOTE — ANESTHESIA PRE PROCEDURE
Department of Anesthesiology  Preprocedure Note       Name:  Hien Deleon   Age:  77 y.o.  :  1950                                          MRN:  [id number]         Date:  4/15/2019      Surgeon: Fadumo Bae):  Dalia Friend MD    Procedure: BRONCHOSCOPY BRUSHINGS (N/A )  BRONCHOSCOPY BIOPSY BRONCHUS  BRONCHOSCOPY DIAGNOSTIC OR CELL [address] ONLY    Medications prior to admission:   Prior to Admission medications    Medication Sig Start Date End Date Taking? Authorizing Provider   tiotropium (SPIRIVA RESPIMAT) 2.5 MCG/ACT AERS inhaler Inhale 2 puffs into the lungs daily 19   Dalia Friend MD   hydrOXYzine (ATARAX) 25 MG tablet Take 1 tablet by mouth 2 times daily 3/27/19   Natty Meléndez PA-C   budesonide-formoterol [facility]) 160-4.5 MCG/ACT AERO Inhale 2 puffs into the lungs 2 times daily 3/21/19 4/20/19  Natty Meléndez PA-C   budesonide-formoterol [facility]) 160-4.5 MCG/ACT AERO Inhale 2 puffs into the lungs 2 times daily 3/21/19   Natty Meléndez PA-C   albuterol sulfate HFA (PROVENTIL HFA) 108 (90 Base) MCG/ACT inhaler Inhale 2 puffs into the lungs every 6 hours as needed for Wheezing 3/14/19   Nabeel Colin MD   ibuprofen (ADVIL;MOTRIN) 600 MG tablet Take 1 tablet by mouth every 6 hours as needed for Pain 16  Amado Lane MD       Current medications:    No current facility-administered medications for this visit. No current outpatient medications on file.      Facility-Administered Medications Ordered in Other Visits   Medication Dose Route Frequency Provider Last Rate Last Dose    lactated ringers infusion   Intravenous Continuous Cameron Carrillo  mL/hr at 04/15/19 1025      HYDROmorphone (DILAUDID) injection 0.25 mg  0.25 mg Intravenous Q5 Min PRN Marisol Freeman MD        EPINEPHrine PF 1 MG/ML injection    PRN Dalia Friend MD   0.015 mg at 04/15/19 1328       Allergies:  No Known Allergies    Problem List:    Patient Active Problem List Diagnosis Code    Cellulitis of right arm L03. 113    Cat bite of right upper arm S41.151A, W55. 01XA    [facility] K[facility]    Chronic hepatitis C without hepatic coma (HCC) B18.2    Thrombocytopenia (HCC) D69.6    Chronic obstructive pulmonary disease (HCC) J44.9    Anxiety F41.9       Past Medical History:        Diagnosis Date    Anxiety and depression     Back pain     Bipolar disorder (HCC)     Bronchitis     Chronic obstructive pulmonary disease (COPD) (HCC)     Cirrhosis (HCC)     Cough     Current every day smoker     1 pack a day for the last 50 years    Fibromyalgia     Hepatitis     History of cervical cancer     is s/p hyst    Liver disease     stage 4    Lung mass 2019    MVP (mitral valve prolapse)     On supplemental oxygen therapy     Wears dentures     full    Wears glasses     Wheezing        Past Surgical History:        Procedure Laterality Date    BREAST SURGERY Left     benign lumpectomy, multiple    BRONCHOSCOPY  04/15/2019    biopsies and washings    CARPAL TUNNEL RELEASE Bilateral     CERVICAL DISC SURGERY      X 2    CHOLECYSTECTOMY      HYSTERECTOMY      complete    KNEE ARTHROSCOPY Right        Social History:    Social History     Tobacco Use    Smoking status: Current Every Day Smoker     Packs/day: 0.25     Years: 52.00     Pack years: 13.00     Types: Cigarettes     Start date: 1/1/1967    Smokeless tobacco: Never Used    Tobacco comment: 3-4 cigarettes/day   Substance Use Topics    Alcohol use: No                                Ready to quit: Not Answered  Counseling given: Not Answered  Comment: 3-4 cigarettes/day      Vital Signs (Current): There were no vitals filed for this visit.                                            BP Readings from Last 3 Encounters:   04/15/19 (!) 148/70   04/15/19 (!) 182/64   04/15/19 (!) 142/73       NPO Status:                                                                                 BMI: Wt Readings from Last 3 Encounters:   04/15/19 115 lb (52.2 kg)   04/09/19 115 lb (52.2 kg)   04/02/19 124 lb (56.2 kg)     There is no height or weight on file to calculate BMI.    CBC:   Lab Results   Component Value Date    WBC 8.8 04/04/2019    RBC 3.90 04/04/2019    HGB 12.1 04/04/2019    HCT 36.9 04/04/2019    MCV 94.6 04/04/2019    RDW 13.8 04/04/2019    PLT See Reflexed IPF Result 04/09/2019       CMP:   Lab Results   Component Value Date     03/18/2019    K 3.8 03/18/2019     03/18/2019    CO2 24 03/18/2019    BUN 12 03/18/2019    CREATININE 0.54 03/18/2019    GFRAA >60 03/18/2019    LABGLOM >60 03/18/2019    GLUCOSE 135 03/18/2019    PROT 8.1 04/04/2019    CALCIUM 8.7 03/18/2019    BILITOT 1.41 04/04/2019    ALKPHOS 96 04/04/2019    AST 35 04/04/2019    ALT 21 04/04/2019       POC Tests: No results for input(s): POCGLU, POCNA, POCK, POCCL, POCBUN, POCHEMO, POCHCT in the last 72 hours.     Coags:   Lab Results   Component Value Date    PROTIME 12.7 04/04/2019    INR 1.2 04/04/2019    APTT 25.9 09/22/2013       HCG (If Applicable): No results found for: PREGTESTUR, PREGSERUM, HCG, HCGQUANT     ABGs: No results found for: PHART, PO2ART, HBU9ZIT, BTD5VBP, BEART, S0MBITUL     Type & Screen (If Applicable):  No results found for: [facility], [address]    Anesthesia Evaluation  Patient summary reviewed and Nursing notes reviewed no history of anesthetic complications:   Airway: Mallampati: II  TM distance: >3 FB   Neck ROM: full  Mouth opening: > = 3 FB Dental:    (+) edentulous      Pulmonary:normal exam    (+) COPD: severe,  current smoker                           Cardiovascular:  Exercise tolerance: good (>4 METS),       (-) past MI, CAD, CABG/stent, dysrhythmias and  angina    ECG reviewed               Beta Blocker:  Not on Beta Blocker         Neuro/Psych:   (+) neuromuscular disease:,             GI/Hepatic/Renal:   (+) hepatitis: C, liver disease:,           Endo/Other: Negative Endo/Other ROS   (+) malignancy/cancer. Abdominal:           Vascular:                                          Anesthesia Plan      general     ASA 3     (GETA  )  Induction: intravenous. MIPS: Postoperative opioids intended and Prophylactic antiemetics administered. Anesthetic plan and risks discussed with patient.       Plan discussed with CRNA and surgical team.                  Amparo Quiñones MD   4/15/2019

## 2019-04-16 LAB
BLD PROD TYP BPU: NORMAL
CULTURE: NORMAL
DIRECT EXAM: NORMAL
DISPENSE STATUS BLOOD BANK: NORMAL
EKG ATRIAL RATE: 64 BPM
EKG P AXIS: 48 DEGREES
EKG P-R INTERVAL: 136 MS
EKG Q-T INTERVAL: 424 MS
EKG QRS DURATION: 92 MS
EKG QTC CALCULATION (BAZETT): 437 MS
EKG R AXIS: 5 DEGREES
EKG T AXIS: 49 DEGREES
EKG VENTRICULAR RATE: 64 BPM
Lab: NORMAL
Lab: NORMAL
SPECIMEN DESCRIPTION: NORMAL
SPECIMEN DESCRIPTION: NORMAL
SURGICAL PATHOLOGY REPORT: NORMAL
SURGICAL PATHOLOGY REPORT: NORMAL
TRANSFUSION STATUS: NORMAL
UNIT DIVISION: 0
UNIT NUMBER: NORMAL

## 2019-04-17 ENCOUNTER — TELEPHONE (OUTPATIENT)
Dept: CASE MANAGEMENT | Age: 69
End: 2019-04-17

## 2019-04-17 DIAGNOSIS — C34.01 MALIGNANT NEOPLASM OF RIGHT MAIN BRONCHUS (HCC): Primary | ICD-10-CM

## 2019-04-17 NOTE — RESULT ENCOUNTER NOTE
I received the results of the biopsy from bronchoscopy which was done on Monday 04/15/19. Biopsies positive for squamous cell carcinoma, when I did the bronchoscopy and biopsy I have asked the patient at that time and she wanted me to call her with the results of the biopsy. I have called the patient on the number available in the chart, have discussed the results of the pathology/biopsy and informed her that biopsies positive for cancer, I also informed her that because of her severe COPD, hypoxic respiratory failure, and involvement of the cancer into the right main/marvel, history of chronic liver disease, she is likely not a candidate for surgery and I will refer her for oncology evaluation/consultation and in case oncology thing that she may be a candidate for surgical evaluation done today for her to cardiothoracic surgery evaluation. I have also answered all the questions patient has at this time and informed her that I made a referral to oncology, if she does not get any appointment within next 2-3 days she should call our office.

## 2019-04-22 ENCOUNTER — HOSPITAL ENCOUNTER (OUTPATIENT)
Facility: MEDICAL CENTER | Age: 69
End: 2019-04-22
Payer: MEDICARE

## 2019-04-26 ENCOUNTER — TELEPHONE (OUTPATIENT)
Dept: ONCOLOGY | Age: 69
End: 2019-04-26

## 2019-04-26 ENCOUNTER — INITIAL CONSULT (OUTPATIENT)
Dept: ONCOLOGY | Age: 69
End: 2019-04-26
Payer: MEDICARE

## 2019-04-26 ENCOUNTER — TELEPHONE (OUTPATIENT)
Dept: CASE MANAGEMENT | Age: 69
End: 2019-04-26

## 2019-04-26 VITALS
HEART RATE: 78 BPM | SYSTOLIC BLOOD PRESSURE: 139 MMHG | DIASTOLIC BLOOD PRESSURE: 77 MMHG | WEIGHT: 126.6 LBS | BODY MASS INDEX: 22.43 KG/M2 | HEIGHT: 63 IN | TEMPERATURE: 98.2 F

## 2019-04-26 DIAGNOSIS — B18.2 CHRONIC HEPATITIS C WITHOUT HEPATIC COMA (HCC): ICD-10-CM

## 2019-04-26 DIAGNOSIS — K74.69 OTHER CIRRHOSIS OF LIVER (HCC): ICD-10-CM

## 2019-04-26 DIAGNOSIS — C34.11 MALIGNANT NEOPLASM OF UPPER LOBE OF RIGHT LUNG (HCC): Primary | ICD-10-CM

## 2019-04-26 PROCEDURE — G8420 CALC BMI NORM PARAMETERS: HCPCS | Performed by: INTERNAL MEDICINE

## 2019-04-26 PROCEDURE — 99205 OFFICE O/P NEW HI 60 MIN: CPT | Performed by: INTERNAL MEDICINE

## 2019-04-26 PROCEDURE — 1090F PRES/ABSN URINE INCON ASSESS: CPT | Performed by: INTERNAL MEDICINE

## 2019-04-26 PROCEDURE — G8427 DOCREV CUR MEDS BY ELIG CLIN: HCPCS | Performed by: INTERNAL MEDICINE

## 2019-04-26 PROCEDURE — 99201 HC NEW PT, E/M LEVEL 1: CPT

## 2019-04-26 RX ORDER — OXYCODONE HYDROCHLORIDE 5 MG/1
5 TABLET ORAL EVERY 6 HOURS PRN
Qty: 56 TABLET | Refills: 0 | Status: SHIPPED | OUTPATIENT
Start: 2019-04-26 | End: 2019-05-10 | Stop reason: SDUPTHER

## 2019-04-26 NOTE — TELEPHONE ENCOUNTER
Name: Alberta Burgess  : 1950  MRN: [id number]    Oncology Navigation- Initial Note:  Navigator met with pt. Face to face post MD visit . Pt. Tearful and Navigation packet given to pt. Along with contact information. Navigator offering assistance and introduced pt. To Ricki MELISSA. Pt. Will be needing transportation , will refer to SS.     Electronically signed by Meribeth Seip, RN on 2019 at 10:43 AM

## 2019-04-26 NOTE — LETTER
Will New MD    4/26/2019     [place], [address] [facility] [address] [facility] [address]    Dear Alejandra Brar : Thank you for referring Tk Tolliver, 1950, to me for evaluation. Below are the relevant portions of my assessment and plan of care. DIAGNOSIS:   1. Squamous cell carcinoma, right lung 04/15/2019 with Right Main Stem bronchus invasion. 2. HX Liver disease and hepatitis C    CURRENT THERAPY:  1. S/P bronchoscopy establishing the diagnosis   2. Plan for PET and brain MRI    BRIEF CASE HISTORY: Tk Tolliver is a very pleasant 77 y.o. female who is referred to us for consultation and management of recently diagnosed lung cancer. She initially presented with flu like illness and shortness of breath 11/2018 and was evaluated by PCP and was started on antibiotics, X-ray done at the time showed suspicious findings. Follow up CT showed mass lesion within the right hilum either intervening or originating from the right mainstem bronchus and narrowing of multiple segmental pulmonary arterial branches. Referred to Dr. Miranda Gonsalves for bronchoscopy and right main stem bronchus mass was appreciated, bronchial washings were positive for squamous cell carcinoma. She reports she has liver disease, Hep C and assumed her recent weight loss and cachexia was related to that. She has right shoulder and back pain. She has not been treated for Hep C. She smokes cigarettes and cocaine. She has COPD and is oxygen dependent. PAST MEDICAL HISTORY: has a past medical history of Anxiety and depression, Back pain, Bipolar disorder ([address] Bronchitis, Chronic obstructive pulmonary disease (COPD) ([address] Cirrhosis ([address] Cough, Current every day smoker, Fibromyalgia, Hepatitis, History of cervical cancer, Liver disease, Lung mass, MVP (mitral valve prolapse), On supplemental oxygen therapy, Wears dentures, Wears glasses, and Wheezing. PAST SURGICAL HISTORY: has a past surgical history that includes Cervical disc surgery; Hysterectomy; Cholecystectomy; Breast surgery (Left); Carpal tunnel release (Bilateral); Knee arthroscopy (Right); bronchoscopy (04/15/2019); bronchoscopy (N/A, 4/15/2019); bronchoscopy (4/15/2019); and bronchoscopy (4/15/2019). CURRENT MEDICATIONS:  has a current medication list which includes the following prescription(s): oxygen concentrator, tiotropium, hydroxyzine, budesonide-formoterol, albuterol sulfate hfa, and ibuprofen. ALLERGIES:  has No Known Allergies. FAMILY HISTORY: Negative for any hematological or oncological conditions. SOCIAL HISTORY:  reports that she has been smoking cigarettes. She started smoking about 52 years ago. She has a 13.00 pack-year smoking history. She has never used smokeless tobacco. She reports that she has current or past drug history. Drug: Cocaine. She reports that she does not drink alcohol. REVIEW OF SYSTEMS:   General: No fever or night sweats. lost weight in the last few months. But now gaining back   ENT: No double or blurred vision, no tinnitus or hearing problem, no dysphagia or sore throat   Respiratory: No chest pain, no shortness of breath, no cough or hemoptysis. Cardiovascular: Denies chest pain, PND or orthopnea. No L E swelling or palpitations. Gastrointestinal: No nausea or vomiting, abdominal pain, diarrhea or constipation. Genitourinary: Denies dysuria, hematuria, frequency, urgency or incontinence. Neurological: Denies headaches, decreased LOC, no sensory or motor focal deficits. Musculoskeletal: No arthralgia no back pain or joint swelling. +right shoulder pain  Skin: There are no rashes or bleeding. Psychiatric: No anxiety, no depression. Endocrine: No diabetes or thyroid disease. Hematologic: No bleeding, no adenopathy.     PHYSICAL EXAM: Shows a well appearing 77y.o.-year-old female who is not Note: Biopsy shows fragments of squamous cell carcinoma.  Invasion into subepithelium and/or lung parenchyma is not demonstrated in the biopsy.  Clinical and radiographic correlation is necessary. 1.  RIGHT MAIN SARA LUNG BRONCHIAL WASHINGS: POSITIVE FOR MALIGNANCY.        SQUAMOUS CELL CARCINOMA.       2.  RIGHT MAIN SARA LUNG BRONCHIAL BRUSHING: POSITIVE FOR MALIGNANCY.        SQUAMOUS CELL CARCINOMA. IMPRESSION:   1. Squamous cell carcinoma, right lung 04/15/2019  2. Liver disease and hepatitis C  3. Plan for staging PET and brain MRI  4. Cocaine and cigarette addiction  5. Oxygent dependent     PLAN:   1. We discussed in detial her recent history, work up and results. 2. We reviewed her imaging including tumor location. 3. I explained plan for staging PET and brain MRI to determine if her disease is stage III or IV. 4. She wishes to pursue treatment. 5. Her shoulder pain correlates to imaging. 6. She requested pain medication and I explained that she will need clean urine screen. 7. I am writing for 14 days of Oxycodone. 8. Return in 2 weeks to review results and discuss treatment plan. If you have questions, please do not hesitate to call me. I look forward to following Blanquita Donohue along with you.     Sincerely,    Haleigh Dorman MD  Hematology/ Oncology  Cell [phone]

## 2019-04-26 NOTE — PROGRESS NOTES
DIAGNOSIS:   1. Squamous cell carcinoma, right lung 04/15/2019 with Right Main Stem bronchus invasion. 2. HX Liver disease and hepatitis C    CURRENT THERAPY:  1. S/P bronchoscopy establishing the diagnosis   2. Plan for PET and brain MRI    BRIEF CASE HISTORY: Jen Franco is a very pleasant 77 y.o. female who is referred to us for consultation and management of recently diagnosed lung cancer. She initially presented with flu like illness and shortness of breath 11/2018 and was evaluated by PCP and was started on antibiotics, X-ray done at the time showed suspicious findings. Follow up CT showed mass lesion within the right hilum either intervening or originating from the right mainstem bronchus and narrowing of multiple segmental pulmonary arterial branches. Referred to Dr. Capo Gray for bronchoscopy and right main stem bronchus mass was appreciated, bronchial washings were positive for squamous cell carcinoma. She reports she has liver disease, Hep C and assumed her recent weight loss and cachexia was related to that. She has right shoulder and back pain. She has not been treated for Hep C. She smokes cigarettes and cocaine. She has COPD and is oxygen dependent. PAST MEDICAL HISTORY: has a past medical history of Anxiety and depression, Back pain, Bipolar disorder ([address] Bronchitis, Chronic obstructive pulmonary disease (COPD) ([address] Cirrhosis ([address] Cough, Current every day smoker, Fibromyalgia, Hepatitis, History of cervical cancer, Liver disease, Lung mass, MVP (mitral valve prolapse), On supplemental oxygen therapy, Wears dentures, Wears glasses, and Wheezing. PAST SURGICAL HISTORY: has a past surgical history that includes Cervical disc surgery; Hysterectomy; Cholecystectomy; Breast surgery (Left); Carpal tunnel release (Bilateral); Knee arthroscopy (Right); bronchoscopy (04/15/2019); bronchoscopy (N/A, 4/15/2019); bronchoscopy (4/15/2019); and bronchoscopy (4/15/2019).      CURRENT MEDICATIONS:  has a current medication list which includes the following prescription(s): oxygen concentrator, tiotropium, hydroxyzine, budesonide-formoterol, albuterol sulfate hfa, and ibuprofen. ALLERGIES:  has No Known Allergies. FAMILY HISTORY: Negative for any hematological or oncological conditions. SOCIAL HISTORY:  reports that she has been smoking cigarettes. She started smoking about 52 years ago. She has a 13.00 pack-year smoking history. She has never used smokeless tobacco. She reports that she has current or past drug history. Drug: Cocaine. She reports that she does not drink alcohol. REVIEW OF SYSTEMS:   General: No fever or night sweats. lost weight in the last few months. But now gaining back   ENT: No double or blurred vision, no tinnitus or hearing problem, no dysphagia or sore throat   Respiratory: No chest pain, no shortness of breath, no cough or hemoptysis. Cardiovascular: Denies chest pain, PND or orthopnea. No L E swelling or palpitations. Gastrointestinal: No nausea or vomiting, abdominal pain, diarrhea or constipation. Genitourinary: Denies dysuria, hematuria, frequency, urgency or incontinence. Neurological: Denies headaches, decreased LOC, no sensory or motor focal deficits. Musculoskeletal: No arthralgia no back pain or joint swelling. +right shoulder pain  Skin: There are no rashes or bleeding. Psychiatric: No anxiety, no depression. Endocrine: No diabetes or thyroid disease. Hematologic: No bleeding, no adenopathy. PHYSICAL EXAM: Shows a well appearing 77y.o.-year-old female who is not in pain or distress. Vital Signs: Blood pressure 139/77, pulse 78, temperature 98.2 °F (36.8 °C), temperature source Oral, height 5' 3\" (1.6 m), weight 126 lb 9.6 oz (57.4 kg). HEENT: Normocephalic and atraumatic. Pupils are equal, round, reactive to light and accommodation. Extraocular muscles are intact. Neck: Showed no JVD, no carotid bruit .  Lungs: Clear to auscultation bilaterally. Heart: Regular without any murmur. Abdomen: Soft, nontender. No hepatosplenomegaly. Extremities: Lower extremities show no edema, clubbing, or cyanosis. Breasts: Examination not done today. Neuro exam: intact cranial nerves bilaterally no motor or sensory deficit, gait is normal. Lymphatic: no adenopathy appreciated in the supraclavicular, axillary, cervical or inguinal area    REVIEW OF LABORATORY DATA:   Lab Results   Component Value Date    WBC 8.8 04/04/2019    HGB 12.1 04/04/2019    HCT 36.9 04/04/2019    MCV 94.6 04/04/2019    PLT See Reflexed IPF Result 04/09/2019       Chemistry        Component Value Date/Time     03/18/2019 0812    K 3.8 03/18/2019 0812     03/18/2019 0812    CO2 24 03/18/2019 0812    BUN 12 03/18/2019 0812    CREATININE 0.54 03/18/2019 0812        Component Value Date/Time    CALCIUM 8.7 03/18/2019 0812    ALKPHOS 96 04/04/2019 1150    AST 35 (H) 04/04/2019 1150    ALT 21 04/04/2019 1150    BILITOT 1.41 (H) 04/04/2019 1150            REVIEW OF RADIOLOGICAL RESULTS:   03/18/2019 CT CHEST W CONTRAST IMPRESSION:   Mass lesion within the right hilum either intervening or originating from the   right mainstem bronchus.  There is encasement and narrowing of multiple   segmental pulmonary arterial branches to the right upper lobe. Postobstructive atelectasis of the medial right middle lobe.  Direct   visualization with bronchoscopy and biopsy suggested. PATHOLOGY:   04/15/2019:  Right main/marvel lung, biopsy: Squamous cell carcinoma, at least carcinoma in situ. Note: Biopsy shows fragments of squamous cell carcinoma.  Invasion into subepithelium and/or lung parenchyma is not demonstrated in the biopsy.  Clinical and radiographic correlation is necessary. 1.  RIGHT MAIN MARVEL LUNG BRONCHIAL WASHINGS: POSITIVE FOR MALIGNANCY.        SQUAMOUS CELL CARCINOMA.       2.   RIGHT MAIN MARVEL LUNG BRONCHIAL BRUSHING: POSITIVE FOR MALIGNANCY.        SQUAMOUS CELL CARCINOMA. IMPRESSION:   1. Squamous cell carcinoma, right lung 04/15/2019  2. Liver disease and hepatitis C  3. Plan for staging PET and brain MRI  4. Cocaine and cigarette addiction  5. Oxygent dependent     PLAN:   1. We discussed in detial her recent history, work up and results. 2. We reviewed her imaging including tumor location. 3. I explained plan for staging PET and brain MRI to determine if her disease is stage III or IV. 4. She wishes to pursue treatment. 5. Her shoulder pain correlates to imaging. 6. She requested pain medication and I explained that she will need clean urine screen. 7. I am writing for 14 days of Oxycodone. 8. Return in 2 weeks to review results and discuss treatment plan.

## 2019-04-30 ENCOUNTER — OFFICE VISIT (OUTPATIENT)
Dept: PULMONOLOGY | Age: 69
End: 2019-04-30
Payer: MEDICARE

## 2019-04-30 VITALS
BODY MASS INDEX: 21.79 KG/M2 | HEART RATE: 80 BPM | RESPIRATION RATE: 16 BRPM | HEIGHT: 63 IN | SYSTOLIC BLOOD PRESSURE: 125 MMHG | OXYGEN SATURATION: 95 % | WEIGHT: 123 LBS | DIASTOLIC BLOOD PRESSURE: 72 MMHG

## 2019-04-30 DIAGNOSIS — J44.9 COPD, SEVERE (HCC): Primary | ICD-10-CM

## 2019-04-30 DIAGNOSIS — J98.11 ATELECTASIS: ICD-10-CM

## 2019-04-30 DIAGNOSIS — J96.11 CHRONIC RESPIRATORY FAILURE WITH HYPOXIA (HCC): ICD-10-CM

## 2019-04-30 DIAGNOSIS — C34.01 MALIGNANT NEOPLASM OF HILUS OF RIGHT LUNG (HCC): ICD-10-CM

## 2019-04-30 PROCEDURE — 99215 OFFICE O/P EST HI 40 MIN: CPT | Performed by: INTERNAL MEDICINE

## 2019-04-30 PROCEDURE — 4040F PNEUMOC VAC/ADMIN/RCVD: CPT | Performed by: INTERNAL MEDICINE

## 2019-04-30 PROCEDURE — 1123F ACP DISCUSS/DSCN MKR DOCD: CPT | Performed by: INTERNAL MEDICINE

## 2019-04-30 PROCEDURE — G8420 CALC BMI NORM PARAMETERS: HCPCS | Performed by: INTERNAL MEDICINE

## 2019-04-30 PROCEDURE — G8400 PT W/DXA NO RESULTS DOC: HCPCS | Performed by: INTERNAL MEDICINE

## 2019-04-30 PROCEDURE — G8926 SPIRO NO PERF OR DOC: HCPCS | Performed by: INTERNAL MEDICINE

## 2019-04-30 PROCEDURE — G8427 DOCREV CUR MEDS BY ELIG CLIN: HCPCS | Performed by: INTERNAL MEDICINE

## 2019-04-30 PROCEDURE — 3017F COLORECTAL CA SCREEN DOC REV: CPT | Performed by: INTERNAL MEDICINE

## 2019-04-30 PROCEDURE — 4004F PT TOBACCO SCREEN RCVD TLK: CPT | Performed by: INTERNAL MEDICINE

## 2019-04-30 PROCEDURE — 3023F SPIROM DOC REV: CPT | Performed by: INTERNAL MEDICINE

## 2019-04-30 PROCEDURE — 1090F PRES/ABSN URINE INCON ASSESS: CPT | Performed by: INTERNAL MEDICINE

## 2019-04-30 RX ORDER — ALBUTEROL SULFATE 2.5 MG/3ML
2.5 SOLUTION RESPIRATORY (INHALATION) EVERY 6 HOURS PRN
Qty: 120 EACH | Refills: 5 | Status: SHIPPED | OUTPATIENT
Start: 2019-04-30 | End: 2019-09-25 | Stop reason: SDUPTHER

## 2019-04-30 NOTE — PROGRESS NOTES
OUTPATIENT PULMONARY PROGRESS NOTE      Patient:  Wilda Mishra  MRN: [id number]    Consulting Physician: Grace Butler PA-C  Reason for Consult: Lung mass  Primacy Care Physician: Grace Butler PA-C    HISTORY OF PRESENT ILLNESS:   The patient is a 77 y.o. female   She is referred here for evaluation of her right hilar mass with likely obstructive atelectasis and was seen 3 weeks ago for the first time in the office. When she was seen last time 2 weeks ago she was scheduled to have a bronchoscopy done with anesthesia in OR because of her liver cirrhosis, Thomascytopenia and severe COPD on home O2. Bronchoscopy shows almost complete obstruction of right main bronchus with deviation of the right main bronchus towards the left and extrinsic compression on distal trachea from right side. Biopsies were done from the right main bronchus at the entrance of right upper lobe bronchus which is positive for squamous cell carcinoma in 2 days after the bronchoscopy I have called her and referred her see oncology, she was seen by oncology last week and she is scheduled for PET scan and MRI of the brain and follow-up in the office with him week after pet and MRI. She does complain of right-sided shoulder and scapular pain and she was prescribed pain medicine by oncology. She does have shortness of breath on minimal exertion, any activity. She does have chronic sputum production she denies any change in the color of the sputum or volume of the sputum but she feels like that she still have an infection denies any fever or chills night sweats or hemoptysis. She claimed that she is making herself eat and able to gain some weight otherwise appetite is not good. She denies any headache, weakness, numbness tingling blurring of vision or double vision. She is using oxygen all the time. She is using albuterol inhaler 3 times a day.     Initial evaluation/history   Her symptoms started about 5-6 months ago according to patient in October last year she started having increasing cough associated with worsening shortness of breath cough is mostly dry she is not able to bring the secretions up, she is also having wheezing intermittently, her cough and shortness of breath has been getting worse and she was seen by Dr. Michael Campos when she had a chest x-ray done which shows right suprahilar upper lobe mass she was scheduled to have a CT scan of the chest which again shows and confirm those findings with right upper lobe postobstructive atelectasis. She does complain of shortness of breath on walking small distances, she claims she was diagnosed with COPD about a month ago and she is currently on Symbicort and she take albuterol 3 times a day. Since October she has been losing weight and she claimed that her legs and arms are smaller so she no that she had lost weight and she think that she had lost 30 pounds in last 5-6 months, she does not have good appetite and she is small meals and feels full. She does have pain around the right shoulder and scapular region also upper back on the right side and radiated to the left side. She had no pulmonary function test done in the past and she had a pulmonary function test done in the office 04/02/19 (last week) which shows severe obstructive ventilatory impairment along with severe reduction in diffusion capacity and severe hyperinflation and air trapping. She's never been on home oxygen. She does have history of liver cirrhosis and she was told that she has end-stage liver disease which is related to her long history of drinking in the past which she stopped many years ago and also had history of hepatitis C. She smoke 1 pack per day for 52 years and now smoking less than half pack per day but continued to smoke and difficult to stop smoking.         Past Medical History:        Diagnosis Date    Anxiety and depression     Back pain     Bipolar disorder ([facility] [address]     Bronchitis     tablet by mouth every 6 hours as needed for Pain 30 tablet 0     No facility-administered encounter medications on file as of 4/30/2019. Social History:   TOBACCO:   reports that she has been smoking cigarettes. She started smoking about 52 years ago. She has a 13.00 pack-year smoking history. She has never used smokeless tobacco.  ETOH:   reports that she does not drink alcohol.   OCCUPATION:      Family History:       Problem Relation Age of Onset    Heart Attack Father     Cancer Paternal Grandmother     Lung Cancer Paternal Grandfather     Emphysema Mother        Immunizations:    Immunization History   Administered Date(s) Administered    Influenza, Ransom Marshal, 3 Years and older, IM (Fluzone 3 yrs and older or Afluria 5 yrs and older) 03/21/2019    Influenza, Clifton Marshal, 3 yrs and older, IM, PF (Fluzone 3 yrs and older or Afluria 5 yrs and older) 02/01/2018    Pneumococcal Polysaccharide ([id number]) 03/21/2019         REVIEW OF SYSTEMS:  CONSTITUTIONAL:  positive for  fatigue, malaise, anorexia and weight loss, negative for  fevers, chills and sweats  EYES:  negative for  double vision, blurred vision, dry eyes, eye discharge, visual disturbance, irritation, redness and icterus  HEENT:  Denies change in visual acuity negative for sore throat, nasal congestion, rhinorrhea, negative for  hearing loss, ear drainage, hoarseness and voice change  RESPIRATORY:  Positive for  cough with sputum, dyspnea, wheezing and right sided chest pain, negative for  hemoptysis, pleuritic pain and cyanosis  CARDIOVASCULAR:  negative for  palpitations, orthopnea, PND, early saiety, edema, syncope  GASTROINTESTINAL:  negative for nausea, vomiting, change in bowel habits, constipation, abdominal pain, abdominal mass, abdominal distention, jaundice, dysphagia, reflux, regurgitation, odynophagia, hematemesis and hemtochezia  GENITOURINARY:  negative for frequency, dysuria, urinary incontinence, hesitancy, decreased stream and hematuria  HEMATOLOGIC/LYMPHATIC:  negative for easy bruising, bleeding, lymphadenopathy and petechiae  ALLERGIC/IMMUNOLOGIC:  negative for recurrent infections, urticaria, hay fever, angioedema, anaphylaxis and drug reactions  ENDOCRINE:  negative for heat intolerance, cold intolerance, weight changes and change in bowel habits  MUSCULOSKELETAL:  Positive for  myalgias, negative for arthralgias, joint swelling, stiff joints and decreased range of motion  NEUROLOGICAL:  negative for headaches, dizziness, seizures, memory problems, speech problems, visual disturbance, gait problems, dysphagia, weakness, numbness, syncope and tingling  BEHAVIOR/PSYCH:  negative          Physical Exam:    Vitals: /72 (Site: Right Upper Arm)   Pulse 80   Resp 16   Ht 5' 3\" (1.6 m)   Wt 123 lb (55.8 kg)   SpO2 95% Comment: oxygen set at 2 lpm  BMI 21.79 kg/m²   Last 3 weights: Wt Readings from Last 3 Encounters:   04/30/19 123 lb (55.8 kg)   04/26/19 126 lb 9.6 oz (57.4 kg)   04/15/19 115 lb (52.2 kg)     Body mass index is 21.79 kg/m².     Physical Examination:   General appearance - oriented to person, place, and time, acyanotic, in no respiratory distress and chronically ill appearing  Mental status - alert, oriented to person, place, and time  Eyes - pupils equal and reactive, extraocular eye movements intact, sclera anicteric  Ears - right ear normal, left ear normal  Nose - positive mild nasal congestion present without purulent discharge but clear rhinorrhea present, no erythema, discharge or polyps  Mouth - mucous membranes moist, pharynx normal without lesions  Neck - supple, no significant adenopathy  Chest - she is mildly tachypneic at rest, no accessory muscles no cyanosis or retraction, chest movement is symmetrical, air entry diminished and reduced on the right side as compared to the left side, there is prolonged expiration and the left side, there is no wheezing and no crackles heard   Heart - normal rate, regular rhythm, normal S1, S2, no murmurs, rubs, clicks or gallops  Abdomen - soft, nontender, nondistended, no masses or organomegaly  Neurological - alert, oriented, normal speech, no focal findings or movement disorder noted  Extremities - peripheral pulses normal, no pedal edema, no clubbing or cyanosis  Skin - normal coloration and turgor, no rashes, no suspicious skin lesions noted       LABS:    CBC:   WBC   Date Value Ref Range Status   04/04/2019 8.8 3.5 - 11.3 k/uL Final   02/02/2018 9.9 3.5 - 11.0 k/uL Final   02/01/2018 14.4 (H) 3.5 - 11.0 k/uL Final     Hemoglobin   Date Value Ref Range Status   04/04/2019 12.1 11.9 - 15.1 g/dL Final   02/02/2018 10.9 (L) 12.0 - 16.0 g/dL Final   02/01/2018 12.0 12.0 - 16.0 g/dL Final     Platelets   Date Value Ref Range Status   04/09/2019 See Reflexed IPF Result 138 - 453 k/uL Final   04/04/2019 See Reflexed IPF Result 138 - 453 k/uL Final   02/02/2018 41 (L) 130 - 400 k/uL Final     BMP:   Sodium   Date Value Ref Range Status   03/18/2019 138 135 - 144 mmol/L Final   02/01/2018 137 135 - 144 mmol/L Final   01/31/2018 133 (L) 135 - 144 mmol/L Final     Potassium   Date Value Ref Range Status   03/18/2019 3.8 3.7 - 5.3 mmol/L Final   02/01/2018 3.6 (L) 3.7 - 5.3 mmol/L Final   01/31/2018 4.3 3.7 - 5.3 mmol/L Final     Chloride   Date Value Ref Range Status   03/18/2019 104 98 - 107 mmol/L Final   02/01/2018 105 98 - 107 mmol/L Final   01/31/2018 101 98 - 107 mmol/L Final     CO2   Date Value Ref Range Status   03/18/2019 24 20 - 31 mmol/L Final   02/01/2018 22 20 - 31 mmol/L Final   01/31/2018 23 20 - 31 mmol/L Final     BUN   Date Value Ref Range Status   03/18/2019 12 8 - 23 mg/dL Final   02/01/2018 11 8 - 23 mg/dL Final   01/31/2018 14 8 - 23 mg/dL Final     CREATININE   Date Value Ref Range Status   03/18/2019 0.54 0.50 - 0.90 mg/dL Final   02/01/2018 0.71 0.50 - 0.90 mg/dL Final   01/31/2018 0.71 0.50 - 0.90 mg/dL Final     Glucose   Date Value Ref Range Status Borderline    >59     Desirable            INR:   INR   Date Value Ref Range Status   04/04/2019 1.2  Final     Comment:           Therapeutic Range: Moderate Anticoagulant Intensity:     INR = 2.0-3.0   High Anticoagulant Intensity:     INR = 2.5-3.5           09/22/2013 1.1  Final     Comment:           Therapeutic Range: Moderate Anticoagulant Intensity:     INR = 2.0-3.0   High Anticoagulant Intensity:     INR = 2.5-3.5        High anticoagulant intensity for patients with a mechanical prosthetic heart   valve, thrombosis and antiphospholipid syndrome, or myocardial infarction. Performed at [facility] [address] [place] [facility], [address] [facility] [phone]     Thyroid:   TSH   Date Value Ref Range Status   09/22/2013 0.82 0.35 - 5.50 mIU/L Final     Comment:     Performed at [facility] [address] [place], [address] [facility] [phone]     Urinalysis:   Color, UA   Date Value Ref Range Status   09/22/2013 YELLOW YEL Final     pH, UA   Date Value Ref Range Status   09/22/2013 6.0 5.0 - 8.0 Final     Protein, UA   Date Value Ref Range Status   09/22/2013 NEGATIVE NEG Final     Specific Gravity, UA   Date Value Ref Range Status   09/22/2013 1.010 1.005 - 1.030 Final     Bilirubin Urine   Date Value Ref Range Status   09/22/2013 NEGATIVE NEG Final     Nitrite, Urine   Date Value Ref Range Status   09/22/2013 NEGATIVE NEG Final     Leukocyte Esterase, Urine   Date Value Ref Range Status   09/22/2013 NEGATIVE NEG Final     Glucose, Ur   Date Value Ref Range Status   09/22/2013 NEGATIVE NEG Final     Cultures:-  -----------------------------------------------------------------    ABGs: No results found for: PHART, PO2ART, FLS8MPR    Pulmonary Functions Testing Results:    04/02/2019  Spirometry shows FVC is 1.45 53% predicted, FEV1 is 1.00 46% predicted consistent with severe obstructive ventilatory impairment, FEV1 FVC is 87% predicted consistent with obstructive disease.   Flow volume loop is suggestive of obstructive ventilatory impairment  Lung volume shows RV is 2.44 188% predicted, TLC is 5.09 110% predicted. Diffusion capacity is 6.43 36% predicted consistent with severe reduction in diffusion capacity which could be secondary to emphysema, pulmonary vascular disease and/or anemia and clinical correlation is recommended. Impression: This pulmonary function test is consistent with severe obstructive ventilatory impairment. Lung volume shows increase in his volume consistent with hyperinflation and airway trapping. There is severe reduction in diffusion capacity which is likely secondary to emphysema and concomitant pulmonary vascular disease. Clinical correlation is recommended. No results found for: FEV1, FVC, DZV4GGE, TLC, DLCO    CXR  03/14/2019  Right upper lobe/suprahilar mass present. CT Scans  03/18/2019  Mass lesion within the right hilum either intervening or originating from the   right mainstem bronchus.  There is encasement and narrowing of multiple   segmental pulmonary arterial branches to the right upper lobe. Postobstructive atelectasis of the medial right middle lobe.  Direct   visualization with bronchoscopy and biopsy suggested. Bronchoscopy 04/15/19    Endobronchial findings     Vocal Cords Normal  Trachea: Distal trachea on the right side is displaced because of posterior and lateral extrinsic compression from right main bronchus to the left side  Masha  displaced to the left from right main bronchus extrinsic mass     RightStem Bronchus  Complete Collapse from the R main bronchus mass/extrinsic compression from mass causing complete obstruction of right main bronchus and unable to visualize distal as there was no orifice of right main bronchus.    Right Upper Lobe Bronchi  not visualized  Right Middle Lobe Bronchi not visualized   Right Lower Lobe Bronchi (including the Superior segment)  not visualized     Left Main Stem Bronchus Normal mucosa  Left Upper Lobe Bronchus, Upper Division Normal mucosa  Left Upper Lobe Bronchus, Lingula  Normal mucosa  Left Lower Lobe Bronchus (including the Superior segment)  Normal mucosa       PATHOLOGY 04/15/19  Right main/marvel lung, biopsy:   Squamous cell carcinoma, at least carcinoma in situ. Note: Biopsy shows fragments of squamous cell carcinoma.  Invasion   into subepithelium and/or lung parenchyma is not demonstrated in the   biopsy.  Clinical and radiographic correlation is necessary. Assessment and Plan       ICD-10-CM    1. COPD, severe ([address] J44.9    2. Chronic respiratory failure with hypoxia (HCC) J96.11    3. Malignant neoplasm of hilus of right lung (HCC) C34.01    4. Atelectasis J98.11         CL (cirrhosis of liver) (HCC)    Chronic hepatitis C without hepatic coma (HCC)    Thrombocytopenia (HCC)    Anxiety     Assessment  CT scan of the chest showed right hilar mass either causing compression of the right upper lobe bronchus or originating from right main bronchus and bronchus intermedius and causing atelectasis which is secondary to postobstructive atelectasis. Pulmonary function test shows severe COPD. Pathology was discussed with the patient over the phone after the bronchoscopy and she is aware and had discuss with oncology about the diagnosis and prognosis, she is scheduled for PET scan and MRI of the brain and follow-up with oncology soon  She is most complete obstruction of right main bronchus on bronchoscopy and has postobstructive atelectasis and will need to start treatment for cancer as early as possible which may help to improve atelectasis and to improve symptoms depending upon response to therapy.     Plan   Seat with PET scan and MRI as scheduled this week  Follow up with Oncology as a scheduled after PET scan and MRI  Start albuterol aerosol every q6h as needed  Prescription refills given  Continue home O2 at 2 L   Continue symbicort  Albuterol as needed  Continue spiriva Respimat   Complete smoking cessation discussed. Vaccinations recommended   Up to date with vaccinations from pulm perspective. RTC 3 months. Questions answered to pt's satisfaction    It was my pleasure to evaluate Jean Waller today. Please call with questions. Orin Bae MD             4/30/2019, 9:37 AM    Please note that this chart was generated using voice recognition Dragon dictation software. Although every effort was made to ensure the accuracy of this automated transcription, some errors in transcription may have occurred.

## 2019-05-01 NOTE — PATIENT INSTRUCTIONS
Patient DME order and progress note was faxed to [phone] at [phone]. 05/01/2019 TS.

## 2019-05-02 ENCOUNTER — OFFICE VISIT (OUTPATIENT)
Dept: PRIMARY CARE CLINIC | Age: 69
End: 2019-05-02
Payer: MEDICARE

## 2019-05-02 VITALS
HEART RATE: 71 BPM | RESPIRATION RATE: 20 BRPM | BODY MASS INDEX: 22.15 KG/M2 | WEIGHT: 125 LBS | SYSTOLIC BLOOD PRESSURE: 125 MMHG | OXYGEN SATURATION: 96 % | HEIGHT: 63 IN | DIASTOLIC BLOOD PRESSURE: 73 MMHG

## 2019-05-02 DIAGNOSIS — C34.11 MALIGNANT NEOPLASM OF UPPER LOBE OF RIGHT LUNG (HCC): Primary | ICD-10-CM

## 2019-05-02 DIAGNOSIS — F41.9 ANXIETY: ICD-10-CM

## 2019-05-02 DIAGNOSIS — Z72.0 TOBACCO ABUSE: ICD-10-CM

## 2019-05-02 DIAGNOSIS — J44.9 CHRONIC OBSTRUCTIVE PULMONARY DISEASE, UNSPECIFIED COPD TYPE (HCC): ICD-10-CM

## 2019-05-02 DIAGNOSIS — F19.10 SUBSTANCE ABUSE (HCC): ICD-10-CM

## 2019-05-02 PROCEDURE — 3017F COLORECTAL CA SCREEN DOC REV: CPT | Performed by: PHYSICIAN ASSISTANT

## 2019-05-02 PROCEDURE — 4004F PT TOBACCO SCREEN RCVD TLK: CPT | Performed by: PHYSICIAN ASSISTANT

## 2019-05-02 PROCEDURE — G8400 PT W/DXA NO RESULTS DOC: HCPCS | Performed by: PHYSICIAN ASSISTANT

## 2019-05-02 PROCEDURE — 1090F PRES/ABSN URINE INCON ASSESS: CPT | Performed by: PHYSICIAN ASSISTANT

## 2019-05-02 PROCEDURE — G8926 SPIRO NO PERF OR DOC: HCPCS | Performed by: PHYSICIAN ASSISTANT

## 2019-05-02 PROCEDURE — 3023F SPIROM DOC REV: CPT | Performed by: PHYSICIAN ASSISTANT

## 2019-05-02 PROCEDURE — 4040F PNEUMOC VAC/ADMIN/RCVD: CPT | Performed by: PHYSICIAN ASSISTANT

## 2019-05-02 PROCEDURE — 1123F ACP DISCUSS/DSCN MKR DOCD: CPT | Performed by: PHYSICIAN ASSISTANT

## 2019-05-02 PROCEDURE — G8420 CALC BMI NORM PARAMETERS: HCPCS | Performed by: PHYSICIAN ASSISTANT

## 2019-05-02 PROCEDURE — G8427 DOCREV CUR MEDS BY ELIG CLIN: HCPCS | Performed by: PHYSICIAN ASSISTANT

## 2019-05-02 PROCEDURE — 99214 OFFICE O/P EST MOD 30 MIN: CPT | Performed by: PHYSICIAN ASSISTANT

## 2019-05-02 RX ORDER — HYDROXYZINE HYDROCHLORIDE 25 MG/1
25 TABLET, FILM COATED ORAL 2 TIMES DAILY
Qty: 60 TABLET | Refills: 3 | Status: SHIPPED | OUTPATIENT
Start: 2019-05-02 | End: 2019-09-03 | Stop reason: SDUPTHER

## 2019-05-02 RX ORDER — VARENICLINE TARTRATE 25 MG
KIT ORAL
Qty: 1 BOX | Refills: 0 | Status: SHIPPED | OUTPATIENT
Start: 2019-05-02 | End: 2019-06-14

## 2019-05-02 RX ORDER — BUDESONIDE AND FORMOTEROL FUMARATE DIHYDRATE 160; 4.5 UG/1; UG/1
2 AEROSOL RESPIRATORY (INHALATION) 2 TIMES DAILY
Qty: 1 INHALER | Refills: 3 | Status: SHIPPED | OUTPATIENT
Start: 2019-05-02 | End: 2019-09-03 | Stop reason: SDUPTHER

## 2019-05-02 RX ORDER — ALBUTEROL SULFATE 90 UG/1
2 AEROSOL, METERED RESPIRATORY (INHALATION) EVERY 6 HOURS PRN
Qty: 1 INHALER | Refills: 3 | Status: SHIPPED | OUTPATIENT
Start: 2019-05-02 | End: 2019-01-01 | Stop reason: SDUPTHER

## 2019-05-02 RX ORDER — VARENICLINE TARTRATE 1 MG/1
1 TABLET, FILM COATED ORAL 2 TIMES DAILY
Qty: 60 TABLET | Refills: 3 | Status: SHIPPED | OUTPATIENT
Start: 2019-05-02 | End: 2019-06-14

## 2019-05-02 NOTE — PROGRESS NOTES
[facility]  [address]  Dept: [phone]  Dept Fax: [phone]    Office Progress/Follow Up Note  Date of patient's visit: 5/2/2019  Patient's Name:  Sherine Adams YOB: 1950            Patient Care Team:  Herminio Banuelos PA-C as PCP - General (Physician Assistant)  Jose Gabriel MD as Consulting Physician (Pulmonology)  Brenna Parra RN as Nurse Navigator (Oncology)  Edwinna Dubin, MD as Consulting Physician (Radiation Oncology)  Rubén Evans MD as Consulting Physician (Hematology and Oncology)  ================================================================    REASON FOR VISIT/CHIEF COMPLAINT:  Discuss Labs    HISTORY OF PRESENTING ILLNESS:  History was obtained from: patient. Sherine Adams is a 77 y.o. is here for lung cancer, anxiety, COPD. Patient states she is compliant with all medications. Patient is seen by oncology and pulmonology for lung cancer, COPD, states she will be having a PET and MRI brain completed, currently on O2. Patient states she has not used crack cocaine for the last week. Discussed substance abuse in depth with patient, encouraged patient not to use substance. Patient states anxiety has improved with medication. Patient blood pressure stable in office. Patient weight is stable. pt is a smoker, up to a pack a day, states she has tried nicotine patches in the past, \"did not help\", requesting be started on Chantix. Discussed tobacco cessation in depth with patient, informed patient she will be started on Chantix. Patient is requesting medication refills. Labs reviewed, patient had lab work completed, unremarkable. Health maintenance reviewed. Medications reviewed, prescribed Chantix starting and continuing pack refilled Symbicort 160-4.5 MCG inhaler, Atarax 25 mg, albuterol 108 MCG inhaler.     HPI    Patient Active Problem List   Diagnosis    Cellulitis Route Frequency Provider Last Rate Last Dose    0.9 % sodium chloride infusion   Intravenous Continuous Eden Laguerre MD   Stopped at 05/13/19 1530    acetaminophen (TYLENOL) tablet 650 mg  650 mg Oral Q4H PRN Marina Barron MD   650 mg at 05/13/19 1357       Social History     Tobacco Use    Smoking status: Current Every Day Smoker     Packs/day: 0.25     Years: 52.00     Pack years: 13.00     Types: Cigarettes     Start date: 1/1/1967    Smokeless tobacco: Never Used    Tobacco comment: smoking 5 or so a day   Substance Use Topics    Alcohol use: No    Drug use: Yes     Types: Cocaine       Family History   Problem Relation Age of Onset    Heart Attack Father     Cancer Paternal Grandmother     Lung Cancer Paternal Grandfather     Emphysema Mother         REVIEW OFSYSTEMS:  Review of Systems   Constitutional: Negative for chills and fever. HENT: Negative for congestion. Respiratory: Negative for cough, shortness of breath and wheezing. Cardiovascular: Negative for chest pain. Gastrointestinal: Negative for constipation, diarrhea, nausea and vomiting. Genitourinary: Negative for dysuria, frequency and urgency. Musculoskeletal: Negative for back pain, myalgias and neck pain. Neurological: Negative for headaches. PHYSICAL EXAM:  Vitals:    05/02/19 1153   BP: 125/73   Site: Right Upper Arm   Position: Sitting   Cuff Size: Medium Adult   Pulse: 71   Resp: 20   SpO2: 96%   Weight: 125 lb (56.7 kg)   Height: 5' 3\" (1.6 m)     BP Readings from Last 3 Encounters:   05/13/19 138/80   05/10/19 (!) 147/76   05/02/19 125/73        Physical Exam   Constitutional: She is oriented to person, place, and time. Vital signs are normal. She appears well-developed and well-nourished. She is cooperative. HENT:   Head: Normocephalic and atraumatic. Right Ear: External ear normal.   Left Ear: External ear normal.   Nose: Nose normal.   Eyes: Pupils are equal, round, and reactive to light. Conjunctivae and lids are normal.   Cardiovascular: Normal rate, regular rhythm and normal heart sounds. Pulmonary/Chest: Effort normal and breath sounds normal.   Abdominal: Soft. She exhibits no mass. There is no tenderness. Musculoskeletal: She exhibits no tenderness. Neurological: She is alert and oriented to person, place, and time. Skin: Skin is warm and dry. Vitals reviewed. DIAGNOSTIC FINDINGS:  CBC:  Lab Results   Component Value Date    WBC 8.8 04/04/2019    HGB 12.1 04/04/2019     05/13/2019       BMP:    Lab Results   Component Value Date     03/18/2019    K 3.8 03/18/2019     03/18/2019    CO2 24 03/18/2019    BUN 12 03/18/2019    CREATININE 0.52 05/06/2019    GLUCOSE 135 03/18/2019       HEMOGLOBIN A1C: No results found for: LABA1C    FASTING LIPID PANEL:  Lab Results   Component Value Date    CHOL 115 04/04/2019    HDL 34 (L) 04/04/2019    TRIG 58 04/04/2019       ASSESSMENT AND PLAN:  Corrinne Boston was seen today for discuss labs. Diagnoses and all orders for this visit:    Malignant neoplasm of upper lobe of right lung (HCC)    Chronic obstructive pulmonary disease, unspecified COPD type ([facility] [address]  -     budesonide-formoterol (SYMBICORT) 160-4.5 MCG/ACT AERO; Inhale 2 puffs into the lungs 2 times daily  -     albuterol sulfate HFA (PROVENTIL HFA) 108 (90 Base) MCG/ACT inhaler; Inhale 2 puffs into the lungs every 6 hours as needed for Wheezing    Tobacco abuse  -     varenicline (CHANTIX USHA) 0.5 MG X 11 & 1 MG X 42 tablet; Take by mouth.  -     varenicline (CHANTIX CONTINUING MONTH USHA) 1 MG tablet; Take 1 tablet by mouth 2 times daily    Substance abuse (HCC)    Anxiety  -     hydrOXYzine (ATARAX) 25 MG tablet; Take 1 tablet by mouth 2 times daily      FOLLOW UP AND INSTRUCTIONS:  Return in about 4 months (around 9/2/2019) for .     · Corrinne Boston received counseling on the following healthy behaviors: abstain from substance abuse    · Discussed use, benefit, and side effects of prescribed medications. Barriers to medication compliance addressed. All patient questions answered. Pt voiced understanding. · Patient given educational materials - see patient instructions    Electronically signed by Jaky Hawley PA-C on 5/2/19 at 12:16 PM    This note is created with the assistance of a speech-recognition program. While intendingto generate a document that actually reflects the content of the visit, the document can still have some mistakes which may not have been identified and corrected by editing.

## 2019-05-02 NOTE — PROGRESS NOTES
Visit Information    Have you changed or started any medications since your last visit including any over-the-counter medicines, vitamins, or herbal medicines? no   Have you stopped taking any of your medications? Is so, why? -  no  Are you having any side effects from any of your medications? - no    Have you seen any other physician or provider since your last visit? yes    Have you had any other diagnostic tests since your last visit?  no   Have you been seen in the emergency room and/or had an admission in a hospital since we last saw you?  no   Have you had your routine dental cleaning in the past 6 months?  no     Do you have an active MyChart account? If no, what is the barrier?   Yes    Patient Care Team:  [facility] - General (Physician Assistant)  Clay Scott MD as Consulting Physician (Pulmonology)  Andres Eugene RN as Nurse Navigator (Oncology)    Medical History Review  Past Medical, Family, and Social History reviewed and does not contribute to the patient presenting condition    Health Maintenance   Topic Date Due    Hepatitis A vaccine (1 of 2 - Risk 2-dose series) 03/21/1951    Hepatitis B Vaccine (1 of 3 - Risk 3-dose series) 03/21/1970    DTaP/Tdap/Td vaccine (1 - Tdap) 03/21/1970    Breast cancer screen  03/21/2001    Shingles Vaccine (1 of 2) 03/21/2001    Colon cancer screen colonoscopy  03/21/2001    DEXA (modify frequency per FRAX score)  03/21/2016    Pneumococcal 65+ years Vaccine (2 of 2 - PCV13) 03/21/2020    Lipid screen  04/04/2024    Flu vaccine  Completed

## 2019-05-03 ENCOUNTER — HOSPITAL ENCOUNTER (OUTPATIENT)
Dept: NUCLEAR MEDICINE | Age: 69
Discharge: HOME | End: 2019-05-05
Payer: MEDICARE

## 2019-05-03 DIAGNOSIS — C34.11 MALIGNANT NEOPLASM OF UPPER LOBE OF RIGHT LUNG (HCC): ICD-10-CM

## 2019-05-03 PROCEDURE — A9552 F18 FDG: HCPCS | Performed by: INTERNAL MEDICINE

## 2019-05-03 PROCEDURE — 3430000000 HC RX DIAGNOSTIC RADIOPHARMACEUTICAL: Performed by: INTERNAL MEDICINE

## 2019-05-03 PROCEDURE — 78815 PET IMAGE W/CT SKULL-THIGH: CPT

## 2019-05-03 RX ADMIN — FLUDEOXYGLUCOSE F 18 13 MILLICURIE: 200 INJECTION, SOLUTION INTRAVENOUS at 13:28

## 2019-05-04 RX ORDER — FLUDEOXYGLUCOSE F 18 200 MCI/ML
13 INJECTION, SOLUTION INTRAVENOUS
Status: COMPLETED | OUTPATIENT
Start: 2019-05-04 | End: 2019-05-03

## 2019-05-06 ENCOUNTER — HOSPITAL ENCOUNTER (OUTPATIENT)
Dept: MRI IMAGING | Age: 69
Discharge: HOME | End: 2019-05-08
Payer: MEDICARE

## 2019-05-06 DIAGNOSIS — C34.11 MALIGNANT NEOPLASM OF UPPER LOBE OF RIGHT LUNG (HCC): ICD-10-CM

## 2019-05-06 LAB
CREAT SERPL-MCNC: 0.52 MG/DL (ref 0.5–0.9)
GFR AFRICAN AMERICAN: >60 ML/MIN
GFR NON-AFRICAN AMERICAN: >60 ML/MIN
GFR SERPL CREATININE-BSD FRML MDRD: NORMAL ML/MIN/{1.73_M2}
GFR SERPL CREATININE-BSD FRML MDRD: NORMAL ML/MIN/{1.73_M2}

## 2019-05-06 PROCEDURE — 36415 COLL VENOUS BLD VENIPUNCTURE: CPT

## 2019-05-06 PROCEDURE — A9579 GAD-BASE MR CONTRAST NOS,1ML: HCPCS | Performed by: INTERNAL MEDICINE

## 2019-05-06 PROCEDURE — 70553 MRI BRAIN STEM W/O & W/DYE: CPT

## 2019-05-06 PROCEDURE — 82565 ASSAY OF CREATININE: CPT

## 2019-05-06 PROCEDURE — 6360000004 HC RX CONTRAST MEDICATION: Performed by: INTERNAL MEDICINE

## 2019-05-06 RX ADMIN — GADOTERIDOL 12 ML: 279.3 INJECTION, SOLUTION INTRAVENOUS at 14:57

## 2019-05-10 ENCOUNTER — TELEPHONE (OUTPATIENT)
Dept: ONCOLOGY | Age: 69
End: 2019-05-10

## 2019-05-10 ENCOUNTER — OFFICE VISIT (OUTPATIENT)
Dept: ONCOLOGY | Age: 69
End: 2019-05-10
Payer: MEDICARE

## 2019-05-10 VITALS
TEMPERATURE: 98.8 F | WEIGHT: 125.3 LBS | SYSTOLIC BLOOD PRESSURE: 147 MMHG | HEART RATE: 81 BPM | BODY MASS INDEX: 22.2 KG/M2 | DIASTOLIC BLOOD PRESSURE: 76 MMHG | RESPIRATION RATE: 18 BRPM

## 2019-05-10 DIAGNOSIS — K74.69 OTHER CIRRHOSIS OF LIVER (HCC): ICD-10-CM

## 2019-05-10 DIAGNOSIS — C34.11 MALIGNANT NEOPLASM OF UPPER LOBE OF RIGHT LUNG (HCC): Primary | ICD-10-CM

## 2019-05-10 DIAGNOSIS — B18.2 CHRONIC HEPATITIS C WITHOUT HEPATIC COMA (HCC): ICD-10-CM

## 2019-05-10 PROCEDURE — G8427 DOCREV CUR MEDS BY ELIG CLIN: HCPCS | Performed by: INTERNAL MEDICINE

## 2019-05-10 PROCEDURE — G8420 CALC BMI NORM PARAMETERS: HCPCS | Performed by: INTERNAL MEDICINE

## 2019-05-10 PROCEDURE — 99211 OFF/OP EST MAY X REQ PHY/QHP: CPT

## 2019-05-10 PROCEDURE — 4040F PNEUMOC VAC/ADMIN/RCVD: CPT | Performed by: INTERNAL MEDICINE

## 2019-05-10 PROCEDURE — G8400 PT W/DXA NO RESULTS DOC: HCPCS | Performed by: INTERNAL MEDICINE

## 2019-05-10 PROCEDURE — 99215 OFFICE O/P EST HI 40 MIN: CPT | Performed by: INTERNAL MEDICINE

## 2019-05-10 PROCEDURE — 1090F PRES/ABSN URINE INCON ASSESS: CPT | Performed by: INTERNAL MEDICINE

## 2019-05-10 PROCEDURE — 1123F ACP DISCUSS/DSCN MKR DOCD: CPT | Performed by: INTERNAL MEDICINE

## 2019-05-10 PROCEDURE — 3017F COLORECTAL CA SCREEN DOC REV: CPT | Performed by: INTERNAL MEDICINE

## 2019-05-10 PROCEDURE — 4004F PT TOBACCO SCREEN RCVD TLK: CPT | Performed by: INTERNAL MEDICINE

## 2019-05-10 RX ORDER — SODIUM CHLORIDE 9 MG/ML
INJECTION, SOLUTION INTRAVENOUS CONTINUOUS
Status: CANCELLED | OUTPATIENT
Start: 2019-05-10

## 2019-05-10 RX ORDER — OXYCODONE HYDROCHLORIDE 5 MG/1
5 TABLET ORAL EVERY 6 HOURS PRN
Qty: 56 TABLET | Refills: 0 | Status: SHIPPED | OUTPATIENT
Start: 2019-05-10 | End: 2019-05-22 | Stop reason: SDUPTHER

## 2019-05-10 NOTE — PROGRESS NOTES
DIAGNOSIS:   1. Squamous cell carcinoma, right lung 04/15/2019 with right main stem bronchus invasion  2. PET showed localized disease; brain MRI negative for metastases, but there is right pleural effusion, no FDG avid. 3. HX Liver disease and hepatitis C    CURRENT THERAPY:  1. S/P bronchoscopy establishing the diagnosis   2. Plan for thoracocentesis with cytology   3. If negative, Plan for chemo and radiation     BRIEF CASE HISTORY: Emil Brink is a very pleasant 77 y.o. female who is referred to us for consultation and management of recently diagnosed lung cancer. She initially presented with flu like illness and shortness of breath 11/2018 and was evaluated by PCP and was started on antibiotics, X-ray done at the time showed suspicious findings. Follow up CT showed mass lesion within the right hilum either intervening or originating from the right mainstem bronchus and narrowing of multiple segmental pulmonary arterial branches. Referred to Dr. Giuliana Hand for bronchoscopy and right main stem bronchus mass was appreciated, bronchial washings were positive for squamous cell carcinoma. She reports she has liver disease, Hep C and assumed her recent weight loss and cachexia was related to that. She has right shoulder and back pain. She has not been treated for Hep C. She smokes cigarettes and cocaine. She has COPD and is oxygen dependent. Staging PET showed localized disease, pleural effusion seen; brain MRI was negative for metastatic disease. She will need pleurocentesis with cytological evaluation of the fluid, assuming it is negative , and since she is not surgical candidate. Plan for chemo and radiation. INTERIM HISTORY: The patient presents for follow up for lung cancer, to review staging studies and discuss treatment plan. She has been reducing cigarette usage with Chantix and has not smoked cocaine in the interim. She has been using pain medication and reports it is controlling her pain.  She has been irritable since her diagnosis due to stress. PAST MEDICAL HISTORY: has a past medical history of Anxiety and depression, Back pain, Bipolar disorder ([facility] [address] Bronchitis, Chronic obstructive pulmonary disease (COPD) ([facility] [address] Cirrhosis ([facility] [address] Cough, Current every day smoker, Fibromyalgia, Hepatitis, History of cervical cancer, Liver disease, Lung mass, MVP (mitral valve prolapse), On supplemental oxygen therapy, Wears dentures, Wears glasses, and Wheezing. PAST SURGICAL HISTORY: has a past surgical history that includes Cervical disc surgery; Hysterectomy; Cholecystectomy; Breast surgery (Left); Carpal tunnel release (Bilateral); Knee arthroscopy (Right); bronchoscopy (04/15/2019); bronchoscopy (N/A, 4/15/2019); bronchoscopy (4/15/2019); and bronchoscopy (4/15/2019). CURRENT MEDICATIONS:  has a current medication list which includes the following prescription(s): varenicline, varenicline, hydroxyzine, budesonide-formoterol, albuterol sulfate hfa, albuterol, oxygen concentrator, oxycodone, tiotropium, and ibuprofen. ALLERGIES:  has No Known Allergies. FAMILY HISTORY: Negative for any hematological or oncological conditions. SOCIAL HISTORY:  reports that she has been smoking cigarettes. She started smoking about 52 years ago. She has a 13.00 pack-year smoking history. She has never used smokeless tobacco. She reports that she has current or past drug history. Drug: Cocaine. She reports that she does not drink alcohol. REVIEW OF SYSTEMS:   General: No fever or night sweats. lost weight in the last few months. But now gaining back   ENT: No double or blurred vision, no tinnitus or hearing problem, no dysphagia or sore throat   Respiratory: No chest pain, no shortness of breath, no cough or hemoptysis. Cardiovascular: Denies chest pain, PND or orthopnea. No L E swelling or palpitations. Gastrointestinal: No nausea or vomiting, abdominal pain, diarrhea or constipation.    Genitourinary: Denies dysuria, hematuria, frequency, urgency or incontinence. Neurological: Denies headaches, decreased LOC, no sensory or motor focal deficits. Musculoskeletal: No arthralgia no back pain or joint swelling. +right shoulder pain  Skin: There are no rashes or bleeding. Psychiatric: No anxiety, no depression. Endocrine: No diabetes or thyroid disease. Hematologic: No bleeding, no adenopathy. PHYSICAL EXAM: Shows a well appearing 77y.o.-year-old female who is not in pain or distress. Vital Signs: Blood pressure (!) 147/76, pulse 81, temperature 98.8 °F (37.1 °C), temperature source Oral, resp. rate 18, weight 125 lb 4.8 oz (56.8 kg). HEENT: Normocephalic and atraumatic. Pupils are equal, round, reactive to light and accommodation. Extraocular muscles are intact. Neck: Showed no JVD, no carotid bruit . Lungs: Clear to auscultation bilaterally. Heart: Regular without any murmur. Abdomen: Soft, nontender. No hepatosplenomegaly. Extremities: Lower extremities show no edema, clubbing, or cyanosis. Breasts: Examination not done today.  Neuro exam: intact cranial nerves bilaterally no motor or sensory deficit, gait is normal. Lymphatic: no adenopathy appreciated in the supraclavicular, axillary, cervical or inguinal area    REVIEW OF LABORATORY DATA:   Lab Results   Component Value Date    WBC 8.8 04/04/2019    HGB 12.1 04/04/2019    HCT 36.9 04/04/2019    MCV 94.6 04/04/2019    PLT See Reflexed IPF Result 04/09/2019       Chemistry        Component Value Date/Time     03/18/2019 0812    K 3.8 03/18/2019 0812     03/18/2019 0812    CO2 24 03/18/2019 0812    BUN 12 03/18/2019 0812    CREATININE 0.52 05/06/2019 1401        Component Value Date/Time    CALCIUM 8.7 03/18/2019 0812    ALKPHOS 96 04/04/2019 1150    AST 35 (H) 04/04/2019 1150    ALT 21 04/04/2019 1150    BILITOT 1.41 (H) 04/04/2019 1150            REVIEW OF RADIOLOGICAL RESULTS:  05/03/2019 PET CT IMPRESSION:   1.  Biopsy-proven malignant mass in the anterior segment of the right upper   lobe invades and completely obstructs the right main bronchus, extending to   the level of the marvel. It invades the right lung hilum.       2.  No metastatic disease in the chest, abdomen or pelvis.       3.  New infiltrate in the right lower lobe. New moderate right pleural   effusion without hypermetabolic activity. 05/06/2019 MRI BRAIN W WO CONTRAST IMPRESSION:   Chronic microvascular disease without acute intracranial abnormality.  No   abnormal postcontrast enhancement. PATHOLOGY:           IMPRESSION:   1. Squamous cell carcinoma, right lung 04/15/2019  2. Liver disease and hepatitis C  3. Staging PET showed localized disease, pleural effusion seen; Brain MRI was negative for metastatic disease  4. Plan for pleurocentesis   5. Cocaine and cigarette addiction , quit drugs and quitting smoking with chantix help   6. Oxygent dependent     PLAN:   1. We reviewed her PET showing localized disease but pleural effusion present, brain MRI was also negative. 2. Plan for pleurocentesis with cytology, assuming it is negative she has localized disease, and since she is not candidate for surgery. Plan chemoradiaiton  3. Difficult situation due to her multiple comorbidity but I think she might be able to get through it. I plan weekly taxol and carboplatin. 4. I discussed prognostic data and treatment plan with chemo and radiation. 5. Referring to rad-onc. 6. I am putting in orders for port and chemo. 7. I will refill Percocet when it is due. 8. Return to commence with treatment.

## 2019-05-10 NOTE — PATIENT INSTRUCTIONS
Refer to rad onc  IR to do 1- thoracentesis  2- mediport  See chemo orders.    rv after thoracentesis (in 2-3 weeks)

## 2019-05-13 ENCOUNTER — HOSPITAL ENCOUNTER (OUTPATIENT)
Dept: INTERVENTIONAL RADIOLOGY/VASCULAR | Age: 69
Discharge: HOME | End: 2019-05-15
Payer: MEDICARE

## 2019-05-13 VITALS
HEIGHT: 63 IN | OXYGEN SATURATION: 97 % | DIASTOLIC BLOOD PRESSURE: 80 MMHG | HEART RATE: 95 BPM | TEMPERATURE: 97.7 F | BODY MASS INDEX: 22.32 KG/M2 | RESPIRATION RATE: 14 BRPM | SYSTOLIC BLOOD PRESSURE: 138 MMHG | WEIGHT: 126 LBS

## 2019-05-13 DIAGNOSIS — C34.11 MALIGNANT NEOPLASM OF UPPER LOBE, RIGHT BRONCHUS OR LUNG (HCC): ICD-10-CM

## 2019-05-13 DIAGNOSIS — J90 PLEURAL EFFUSION: ICD-10-CM

## 2019-05-13 LAB
CASE NUMBER:: NORMAL
INR BLD: 1.3
PARTIAL THROMBOPLASTIN TIME: 29.9 SEC (ref 23–31)
PLATELET # BLD: 165 K/UL (ref 138–453)
PROTHROMBIN TIME: 13.1 SEC (ref 9.7–11.6)
SPECIMEN DESCRIPTION: NORMAL

## 2019-05-13 PROCEDURE — 6370000000 HC RX 637 (ALT 250 FOR IP): Performed by: RADIOLOGY

## 2019-05-13 PROCEDURE — 32555 ASPIRATE PLEURA W/ IMAGING: CPT | Performed by: RADIOLOGY

## 2019-05-13 PROCEDURE — 85610 PROTHROMBIN TIME: CPT

## 2019-05-13 PROCEDURE — 85730 THROMBOPLASTIN TIME PARTIAL: CPT

## 2019-05-13 PROCEDURE — C1729 CATH, DRAINAGE: HCPCS

## 2019-05-13 PROCEDURE — 36561 INSERT TUNNELED CV CATH: CPT | Performed by: RADIOLOGY

## 2019-05-13 PROCEDURE — 76937 US GUIDE VASCULAR ACCESS: CPT | Performed by: RADIOLOGY

## 2019-05-13 PROCEDURE — 7100000010 HC PHASE II RECOVERY - FIRST 15 MIN

## 2019-05-13 PROCEDURE — 88305 TISSUE EXAM BY PATHOLOGIST: CPT

## 2019-05-13 PROCEDURE — 7100000011 HC PHASE II RECOVERY - ADDTL 15 MIN

## 2019-05-13 PROCEDURE — 6360000004 HC RX CONTRAST MEDICATION: Performed by: RADIOLOGY

## 2019-05-13 PROCEDURE — 88112 CYTOPATH CELL ENHANCE TECH: CPT

## 2019-05-13 PROCEDURE — 85049 AUTOMATED PLATELET COUNT: CPT

## 2019-05-13 PROCEDURE — 99153 MOD SED SAME PHYS/QHP EA: CPT | Performed by: RADIOLOGY

## 2019-05-13 PROCEDURE — 77001 FLUOROGUIDE FOR VEIN DEVICE: CPT | Performed by: RADIOLOGY

## 2019-05-13 PROCEDURE — 2580000003 HC RX 258: Performed by: RADIOLOGY

## 2019-05-13 PROCEDURE — C1788 PORT, INDWELLING, IMP: HCPCS

## 2019-05-13 PROCEDURE — 6360000002 HC RX W HCPCS: Performed by: RADIOLOGY

## 2019-05-13 PROCEDURE — 99152 MOD SED SAME PHYS/QHP 5/>YRS: CPT | Performed by: RADIOLOGY

## 2019-05-13 RX ORDER — MIDAZOLAM HYDROCHLORIDE 1 MG/ML
INJECTION INTRAMUSCULAR; INTRAVENOUS
Status: COMPLETED | OUTPATIENT
Start: 2019-05-13 | End: 2019-05-13

## 2019-05-13 RX ORDER — ACETAMINOPHEN 325 MG/1
650 TABLET ORAL EVERY 4 HOURS PRN
Status: DISCONTINUED | OUTPATIENT
Start: 2019-05-13 | End: 2019-05-16 | Stop reason: HOSPADM

## 2019-05-13 RX ORDER — SODIUM CHLORIDE 9 MG/ML
INJECTION, SOLUTION INTRAVENOUS CONTINUOUS
Status: DISCONTINUED | OUTPATIENT
Start: 2019-05-13 | End: 2019-05-16 | Stop reason: HOSPADM

## 2019-05-13 RX ORDER — IODIXANOL 320 MG/ML
INJECTION, SOLUTION INTRAVASCULAR
Status: COMPLETED | OUTPATIENT
Start: 2019-05-13 | End: 2019-05-13

## 2019-05-13 RX ORDER — FENTANYL CITRATE 50 UG/ML
INJECTION, SOLUTION INTRAMUSCULAR; INTRAVENOUS
Status: COMPLETED | OUTPATIENT
Start: 2019-05-13 | End: 2019-05-13

## 2019-05-13 RX ORDER — CEFAZOLIN SODIUM 1 G/50ML
1 INJECTION, SOLUTION INTRAVENOUS
Status: COMPLETED | OUTPATIENT
Start: 2019-05-13 | End: 2019-05-13

## 2019-05-13 RX ADMIN — MIDAZOLAM 1 MG: 1 INJECTION INTRAMUSCULAR; INTRAVENOUS at 12:02

## 2019-05-13 RX ADMIN — ACETAMINOPHEN 650 MG: 325 TABLET ORAL at 13:57

## 2019-05-13 RX ADMIN — IODIXANOL 10 ML: 320 INJECTION, SOLUTION INTRAVASCULAR at 12:18

## 2019-05-13 RX ADMIN — Medication 500 UNITS: at 12:41

## 2019-05-13 RX ADMIN — CEFAZOLIN SODIUM 1 G: 1 INJECTION, SOLUTION INTRAVENOUS at 11:53

## 2019-05-13 RX ADMIN — SODIUM CHLORIDE: 9 INJECTION, SOLUTION INTRAVENOUS at 10:52

## 2019-05-13 RX ADMIN — Medication 50 MCG: at 12:02

## 2019-05-13 ASSESSMENT — ENCOUNTER SYMPTOMS
DIARRHEA: 0
BACK PAIN: 0
COUGH: 0
SHORTNESS OF BREATH: 0
WHEEZING: 0
CONSTIPATION: 0
VOMITING: 0
NAUSEA: 0

## 2019-05-13 ASSESSMENT — PAIN DESCRIPTION - ORIENTATION: ORIENTATION: LEFT;UPPER

## 2019-05-13 ASSESSMENT — PAIN DESCRIPTION - ONSET: ONSET: AWAKENED FROM SLEEP

## 2019-05-13 ASSESSMENT — PAIN DESCRIPTION - PROGRESSION: CLINICAL_PROGRESSION: GRADUALLY WORSENING

## 2019-05-13 ASSESSMENT — PAIN SCALES - GENERAL
PAINLEVEL_OUTOF10: 5
PAINLEVEL_OUTOF10: 5

## 2019-05-13 ASSESSMENT — PAIN DESCRIPTION - FREQUENCY: FREQUENCY: CONTINUOUS

## 2019-05-13 ASSESSMENT — PAIN DESCRIPTION - DESCRIPTORS
DESCRIPTORS: STABBING;THROBBING
DESCRIPTORS: ACHING

## 2019-05-13 ASSESSMENT — PAIN - FUNCTIONAL ASSESSMENT
PAIN_FUNCTIONAL_ASSESSMENT: 0-10
PAIN_FUNCTIONAL_ASSESSMENT: PREVENTS OR INTERFERES WITH MANY ACTIVE NOT PASSIVE ACTIVITIES

## 2019-05-13 ASSESSMENT — PAIN DESCRIPTION - LOCATION: LOCATION: CHEST

## 2019-05-13 ASSESSMENT — PAIN DESCRIPTION - PAIN TYPE: TYPE: ACUTE PAIN;SURGICAL PAIN

## 2019-05-13 NOTE — H&P
History and Physical Update    Pt Name: Emil Brink  MRN: [id number]  YOB: 1950  Date of evaluation: 5/13/2019      [x] I have reviewed the progress note found in Epic by Dr. Darby Muniz from 05/10/2019 which meets the criteria for an Interval History and Physical note. [x] I have examined  Emil Brink a 77 y.o., female who is scheduled for port placement and thoracentesis in  by Dr. Claudene Mccallum due to malignant neoplasm of upper lobe, right bronchus, or lung. The patient denies health changes since her appointment with Dr. Darby Muniz on 05/10/2019. Pt denies fever, chills, chest pain, open sores, rashes, and wounds. Pt becomes SOB while talking and with any activity. Pt frequently wears 2 liters of oxygen per nasal cannula. Pt denies history of diabetes. Pt denies taking Motrin and other blood thinning medications. Vital signs: /65   Pulse 82   Temp 98.1 °F (36.7 °C) (Oral)   Resp 19   Ht 5' 3\" (1.6 m)   Wt 126 lb (57.2 kg)   SpO2 94%   BMI 22.32 kg/m²      Allergies:  Patient has no known allergies. Past medical history, surgical history, social history, and family history were reviewed and updated in EPIC as indicated. Medications:    Prior to Admission medications    Medication Sig Start Date End Date Taking? Authorizing Provider   oxyCODONE (ROXICODONE) 5 MG immediate release tablet Take 1 tablet by mouth every 6 hours as needed for Pain for up to 14 days. Intended supply: 5 days.  Take lowest dose possible to manage pain 5/10/19 5/24/19 Yes Leyla Wise MD   varenicline (CHANTIX CONTINUING MONTH USHA) 1 MG tablet Take 1 tablet by mouth 2 times daily 5/2/19  Yes Brook Connolly PA-C   hydrOXYzine (ATARAX) 25 MG tablet Take 1 tablet by mouth 2 times daily 5/2/19  Yes Brook Connolly PA-C   budesonide-formoterol [facility]) 160-4.5 MCG/ACT AERO Inhale 2 puffs into the lungs 2 times daily 5/2/19  Yes Brook Connolly PA-C   albuterol sulfate HFA (PROVENTIL HFA) 108 (90 Base) MCG/ACT inhaler Inhale 2 puffs into the lungs every 6 hours as needed for Wheezing 5/2/19  Yes Bonifacio Jarrett PA-C   albuterol (PROVENTIL) (2.5 MG/3ML) 0.083% nebulizer solution Take 3 mLs by nebulization every 6 hours as needed for Wheezing 4/30/19  Yes Bowen Moreno MD   tiotropium (SPIRIVA RESPIMAT) 2.5 MCG/ACT AERS inhaler Inhale 2 puffs into the lungs daily 4/2/19  Yes Bowen Moreno MD   varenicline (CHANTIX USHA) 0.5 MG X 11 & 1 MG X 42 tablet Take by mouth. 5/2/19   Bonifacio Jarrett PA-C   Oxygen Concentrator     Historical Provider, MD   ibuprofen (ADVIL;MOTRIN) 600 MG tablet Take 1 tablet by mouth every 6 hours as needed for Pain 6/20/16 1/31/18  Sergio Dawn MD       This is a 77 y.o. female who is pleasant, cooperative, alert and oriented x 3, in no acute distress. Slightly jaundice. Heart: Regular rate and rhythm without murmur, gallop, or rub. Lungs: Course lung sounds throughout. Pt is wearing 2 liters of oxygen per NC. Normal respiratory effort, unlabored, and without wheezes or rales bilaterally   Abdomen: Soft, non-tender, non-distended with active bowel sounds. Pedal pulses: 2+ bilaterally         Labs:  Recent Labs     05/13/19  1045 05/06/19  1401     --    CREATININE  --  0.52       Wang DE JESUS, FNP-BC  Electronically signed 5/13/2019 at 11:05 Cydney Lopes MD   Physician   Hematology and Oncology   Progress Notes      Signed   Encounter Date:  5/10/2019          Related encounter[facility]  [x]Manual[x]Template[x]Copied    Added by:  [x]Mohammad Leonidas Simmonds, MD      []Shonna for details              DIAGNOSIS:   1. Squamous cell carcinoma, right lung 04/15/2019 with right main stem bronchus invasion  2. PET showed localized disease; brain MRI negative for metastases, but there is right pleural effusion, no FDG avid.    3. HX Liver disease and hepatitis C     CURRENT THERAPY:  1. S/P bronchoscopy establishing the diagnosis   2. Plan for thoracocentesis with cytology   3. If negative, Plan for chemo and radiation      BRIEF CASE HISTORY: Hai Herndon is a very pleasant 77 y.o. female who is referred to us for consultation and management of recently diagnosed lung cancer. She initially presented with flu like illness and shortness of breath 11/2018 and was evaluated by PCP and was started on antibiotics, X-ray done at the time showed suspicious findings. Follow up CT showed mass lesion within the right hilum either intervening or originating from the right mainstem bronchus and narrowing of multiple segmental pulmonary arterial branches. Referred to Dr. Ashli Vargas for bronchoscopy and right main stem bronchus mass was appreciated, bronchial washings were positive for squamous cell carcinoma. She reports she has liver disease, Hep C and assumed her recent weight loss and cachexia was related to that. She has right shoulder and back pain. She has not been treated for Hep C. She smokes cigarettes and cocaine. She has COPD and is oxygen dependent. Staging PET showed localized disease, pleural effusion seen; brain MRI was negative for metastatic disease. She will need pleurocentesis with cytological evaluation of the fluid, assuming it is negative , and since she is not surgical candidate. Plan for chemo and radiation.      INTERIM HISTORY: The patient presents for follow up for lung cancer, to review staging studies and discuss treatment plan. She has been reducing cigarette usage with Chantix and has not smoked cocaine in the interim. She has been using pain medication and reports it is controlling her pain.  She has been irritable since her diagnosis due to stress.      PAST MEDICAL HISTORY: has a past medical history of Anxiety and depression, Back pain, Bipolar disorder ([address] Bronchitis, Chronic obstructive pulmonary disease (COPD) ([facility] [address] Cirrhosis ([facility] [address] Cough, Current every day smoker, Fibromyalgia, Hepatitis, History of cervical cancer, Liver disease, Lung mass, MVP (mitral valve prolapse), On supplemental oxygen therapy, Wears dentures, Wears glasses, and Wheezing.     PAST SURGICAL HISTORY: has a past surgical history that includes Cervical disc surgery; Hysterectomy; Cholecystectomy; Breast surgery (Left); Carpal tunnel release (Bilateral); Knee arthroscopy (Right); bronchoscopy (04/15/2019); bronchoscopy (N/A, 4/15/2019); bronchoscopy (4/15/2019); and bronchoscopy (4/15/2019).    CURRENT MEDICATIONS:  has a current medication list which includes the following prescription(s): varenicline, varenicline, hydroxyzine, budesonide-formoterol, albuterol sulfate hfa, albuterol, oxygen concentrator, oxycodone, tiotropium, and ibuprofen.     ALLERGIES:  has No Known Allergies.     FAMILY HISTORY: Negative for any hematological or oncological conditions.      SOCIAL HISTORY:  reports that she has been smoking cigarettes. She started smoking about 52 years ago. She has a 13.00 pack-year smoking history. She has never used smokeless tobacco. She reports that she has current or past drug history. Drug: Cocaine. She reports that she does not drink alcohol.     REVIEW OF SYSTEMS:   General: No fever or night sweats. lost weight in the last few months. But now gaining back   ENT: No double or blurred vision, no tinnitus or hearing problem, no dysphagia or sore throat   Respiratory: No chest pain, no shortness of breath, no cough or hemoptysis. Cardiovascular: Denies chest pain, PND or orthopnea. No L E swelling or palpitations. Gastrointestinal: No nausea or vomiting, abdominal pain, diarrhea or constipation. Genitourinary: Denies dysuria, hematuria, frequency, urgency or incontinence. Neurological: Denies headaches, decreased LOC, no sensory or motor focal deficits.   Musculoskeletal: No arthralgia no back pain or joint swelling. +right shoulder pain  Skin: There are

## 2019-05-13 NOTE — BRIEF OP NOTE
Brief Postoperative Note    Wilda Mishra  YOB: 1950  [id number]    Pre-operative Diagnosis: Lung cancer    Post-operative Diagnosis: Same    Procedure: Left IJ port placement    Anesthesia: Moderate Sedation    Surgeons/Assistants: Greg Hines MD    Estimated Blood Loss: less than 50     Complications: None    Specimens: Was Not Obtained    Findings: Std 8 F port inserted via left IJ vein with tip at cavo-atrial junction. Port flushed and ready for use at this time.     Electronically signed by Greg Hines MD on 5/13/2019 at 1:04 PM

## 2019-05-13 NOTE — PROGRESS NOTES
Patient tolerated Port placement and thoracentesis without difficulty. 750 ml natalie fluid removed.

## 2019-05-13 NOTE — POST SEDATION
Sedation Post Procedure Note    Patient Name: Saba Méndez   YOB: 1950  Room/Bed: Room/bed info not found  Medical Record Number: [id number]  Date: 5/13/2019   Time: 1:03 PM         Physicians/Assistants: Eladio Lim MD    Procedure Performed:  Port placement    Post-Sedation Vital Signs:  Vitals:    05/13/19 1250   BP: (!) 130/56   Pulse: 74   Resp: 21   Temp:    SpO2: 98%      Vital signs were reviewed and were stable after the procedure (see flow sheet for vitals)            Post-Sedation Exam: Lungs: no crackles or wheezing           Complications: none    Electronically signed by Eladio Lim MD on 5/13/2019 at 1:03 PM

## 2019-05-14 LAB — SURGICAL PATHOLOGY REPORT: NORMAL

## 2019-05-15 ENCOUNTER — TELEPHONE (OUTPATIENT)
Dept: ONCOLOGY | Age: 69
End: 2019-05-15

## 2019-05-15 NOTE — TELEPHONE ENCOUNTER
Name: Helen Lao  : 1950  MRN: [id number]    Oncology Navigation Follow-Up Note    Contact Type:  Telephone    Subjective:     Objective:     Assistance Needed:     Receptive to Advanced Care Planning / Palliative Care:      Referrals:     Education:     Notes: Navigator spoke with pt. And Port placement yeast. Site somewhat tender, but okay. Pt. To RO in tomorrow and MO on Friday. Navigator checking on chemotherapy and showing approved.      Electronically signed by Sonia Pérez RN on 5/15/2019 at 2:30 PM

## 2019-05-16 ENCOUNTER — HOSPITAL ENCOUNTER (OUTPATIENT)
Dept: RADIATION ONCOLOGY | Facility: MEDICAL CENTER | Age: 69
Discharge: HOME | End: 2019-05-16
Payer: MEDICARE

## 2019-05-16 VITALS
OXYGEN SATURATION: 96 % | HEART RATE: 74 BPM | TEMPERATURE: 98.2 F | DIASTOLIC BLOOD PRESSURE: 68 MMHG | RESPIRATION RATE: 16 BRPM | WEIGHT: 123.5 LBS | HEIGHT: 63 IN | BODY MASS INDEX: 21.88 KG/M2 | SYSTOLIC BLOOD PRESSURE: 113 MMHG

## 2019-05-16 DIAGNOSIS — C34.11 MALIGNANT NEOPLASM OF UPPER LOBE OF RIGHT LUNG (HCC): Primary | ICD-10-CM

## 2019-05-16 PROCEDURE — 99213 OFFICE O/P EST LOW 20 MIN: CPT | Performed by: RADIOLOGY

## 2019-05-16 ASSESSMENT — PAIN DESCRIPTION - PAIN TYPE: TYPE: ACUTE PAIN

## 2019-05-16 ASSESSMENT — PAIN SCALES - GENERAL: PAINLEVEL_OUTOF10: 4

## 2019-05-16 ASSESSMENT — PAIN DESCRIPTION - ONSET: ONSET: ON-GOING

## 2019-05-16 ASSESSMENT — PAIN DESCRIPTION - LOCATION: LOCATION: SHOULDER

## 2019-05-16 ASSESSMENT — PAIN DESCRIPTION - FREQUENCY: FREQUENCY: CONTINUOUS

## 2019-05-16 ASSESSMENT — PAIN DESCRIPTION - ORIENTATION: ORIENTATION: RIGHT;POSTERIOR

## 2019-05-16 NOTE — PROGRESS NOTES
Referring Physician: Arnol Bacon    Pt here for consult. Pt states pain 4/10 in her right posterior shoulder blade. Pt started coughing up blood today. Patient wears 2L NC oxygen when needed. Port placed in the left chest at that time had 750mls of fluid taken off, improvement in her breathing afterwards. Hx cervical cancer with no chemo or RT tx. October through she had respiratory infection and wasn't going away.  went to walk in clinic at [facility]. V's and told Bronchitis, COPD and got xray and showed mass and went to get CT and go to pulmonology and got biopsy. Told lung cancer. Dr Cristian Epps into eval. Patient signed consent and will come back for Teach and Sim 19. Patient given order for labs to get them done before Teach and sim. Vitals:    19 1306   BP: 113/68   Pulse: 74   Resp: 16   Temp: 98.2 °F (36.8 °C)   SpO2: 96%    : Wt Readings from Last 1 Encounters:   19 123 lb 8 oz (56 kg)        Body mass index is 21.88 kg/m². Height: 5' 3\" (160 cm)          No chief complaint on file. Cancer Staging  No matching staging information was found for the patient. Prior Radiation Therapy? No   If yes, site treated:   Facility:                             Date:    Concurrent Chemo/radiation? No   If yes, start date:    Prior Chemotherapy? No   If yes, site treated:   Facility:                             Date:    Prior Hormonal Therapy? No   If yes, site treated:   Facility:                             Date:    Head and Neck Cancer? No   If yes, please remind physician to place swallow study order and speech therapy order.       Pacemaker/Defibulator/ICD:  No          Immunizations:    Influenza status:    []   Current   []   Patient declined    Pneumococcal status:  []   Current  []   Patient declined    Smoking Status:    [] Smoker - PPD:   [] Nonsmoker - Quit Date:               [] Never a smoker                 LMP:    Age at first Menses:    Para:    :        Current Outpatient Medications   Medication Sig Dispense Refill    oxyCODONE (ROXICODONE) 5 MG immediate release tablet Take 1 tablet by mouth every 6 hours as needed for Pain for up to 14 days. Intended supply: 5 days. Take lowest dose possible to manage pain 56 tablet 0    varenicline (CHANTIX USHA) 0.5 MG X 11 & 1 MG X 42 tablet Take by mouth. 1 box 0    varenicline (CHANTIX CONTINUING MONTH USHA) 1 MG tablet Take 1 tablet by mouth 2 times daily 60 tablet 3    hydrOXYzine (ATARAX) 25 MG tablet Take 1 tablet by mouth 2 times daily 60 tablet 3    budesonide-formoterol (SYMBICORT) 160-4.5 MCG/ACT AERO Inhale 2 puffs into the lungs 2 times daily 1 Inhaler 3    albuterol sulfate HFA (PROVENTIL HFA) 108 (90 Base) MCG/ACT inhaler Inhale 2 puffs into the lungs every 6 hours as needed for Wheezing 1 Inhaler 3    albuterol (PROVENTIL) (2.5 MG/3ML) 0.083% nebulizer solution Take 3 mLs by nebulization every 6 hours as needed for Wheezing 120 each 5    Oxygen Concentrator       tiotropium (SPIRIVA RESPIMAT) 2.5 MCG/ACT AERS inhaler Inhale 2 puffs into the lungs daily 1 Inhaler 5     No current facility-administered medications for this encounter. Past Medical History:   Diagnosis Date    Anxiety and depression     Back pain     Bipolar disorder (HCC)     Bronchitis     Cancer (HCC)     Chronic obstructive pulmonary disease (COPD) (HCC)     Cirrhosis (HCC)     Cough     Current every day smoker     1 pack a day for the last 50 years    Fibromyalgia     Hepatitis     Hepatitis C per pt.     History of cervical cancer     is s/p hyst    Liver disease     stage 4    Lung mass 2019    MVP (mitral valve prolapse)     On supplemental oxygen therapy     Wears dentures     full    Wears glasses     Wheezing        Past Surgical History:   Procedure Laterality Date    BREAST SURGERY Left     benign lumpectomy, multiple    BRONCHOSCOPY  04/15/2019    biopsies and washings    BRONCHOSCOPY N/A 4/15/2019 BRONCHOSCOPY BRUSHINGS performed by Saskia Frey MD at [address]  4/15/2019    BRONCHOSCOPY BIOPSY BRONCHUS performed by Saskia Frey MD at [address]  4/15/2019    BRONCHOSCOPY DIAGNOSTIC OR CELL [address] ONLY performed by Saskia Frey MD at [facility], [address] Bilateral    [address]      X 2    CHOLECYSTECTOMY      HYSTERECTOMY      complete    KNEE ARTHROSCOPY Right     THORACENTESIS Right 05/13/2019    TUNNELED CENTRAL VENOUS CATHETER W/ SUBCUTANEOUS PORT Right 05/13/2019       Family History   Problem Relation Age of Onset    Heart Attack Father     Cancer Paternal Grandmother     Lung Cancer Paternal Grandfather     Emphysema Mother        Social History     Socioeconomic History    Marital status:      Spouse name: Not on file    Number of children: Not on file    Years of education: Not on file    Highest education level: Not on file   Occupational History    Not on file   Social Needs    Financial resource strain: Not on file    Food insecurity:     Worry: Not on file     Inability: Not on file    Transportation needs:     Medical: Not on file     Non-medical: Not on file   Tobacco Use    Smoking status: Current Every Day Smoker     Packs/day: 0.25     Years: 52.00     Pack years: 13.00     Types: Cigarettes     Start date: 1/1/1967    Smokeless tobacco: Never Used    Tobacco comment: smoking 5 or so a day.  5-16-19   Substance and Sexual Activity    Alcohol use: No    Drug use: Yes     Types: Cocaine     Comment: April 15th 2019    Sexual activity: Not on file   Lifestyle    Physical activity:     Days per week: Not on file     Minutes per session: Not on file    Stress: Not on file   Relationships    Social connections:     Talks on phone: Not on file     Gets together: Not on file     Attends Temple service: Not on file     Active member of club or organization: Not on file     Attends meetings of clubs or organizations: Not on file     Relationship status: Not on file    Intimate partner violence:     Fear of current or ex partner: Not on file     Emotionally abused: Not on file     Physically abused: Not on file     Forced sexual activity: Not on file   Other Topics Concern    Not on file   Social History Narrative    Not on file             FALLS RISK SCREEN  Instructions:  Assess the patient and enter the appropriate indicators that are present for fall risk identification. Total the numbers entered and assign a fall risk score from Table 2.  Reassess patient at a minimum every 12 weeks or with status change. Assessment   Date  5/16/2019     1. Mental Ability: confusion/cognitively impaired 0 (3)       2. Elimination Issues: incontinence, frequency 0 (3)       3. Ambulatory: use of assistive devices (walker, cane, off-loading devices),        attached to equipment (IV pole, oxygen) 0 (2)     4.  Sensory Limitations: dizziness, vertigo, impaired vision 0 (3)       5. Age less than 65                  0 (0)       6. Age 72 or greater 1 (1)     7. Medication: diuretics, strong analgesics, hypnotics, sedatives,        antihypertensive agents 1 (3)   8. Falls:  recent history of falls within the last 3 months (not to include slipping or        tripping) 0 (7)   TOTAL 2    If score of 4 or greater was education given? No       TABLE 2   Risk Score Risk Level Plan of Care   0-3 Little or  No Risk 1. Provide assistance as indicated for ambulation activities  2. Reorient confused/cognitively impaired patient  3. Call-light/bell within patient's reach  4. Chair/bed in low position, stretcher/bed with siderails up except when performing patient care activities  5. Educate patient/family/caregiver on falls prevention  6.  Reassess in 12 weeks or with any noted change in patient condition which places them at a risk for a fall   4-6 Moderate Risk 1.   Provide assistance as indicated for ambulation activities  2. Reorient confused/cognitively impaired patient  3. Call-light/bell within patient's reach  4. Chair/bed in low position, stretcher/bed with siderails up except when performing patient care activities  5. Educate patient/family/caregiver on falls prevention     7 or   Higher High Risk 1. Place patient in easily observable treatment room  2. Patient attended at all times by family member or staff  3. Provide assistance as indicated for ambulation activities  4. Reorient confused/cognitively impaired patient  5. Call-light/bell within patient's reach  6. Chair/bed in low position, stretcher/bed with siderails up except when performing patient care activities  7.   Educate patient/family/caregiver on falls prevention             PLAN:         Kwame Allen 5/16/2019 1:09 PM

## 2019-05-16 NOTE — PROGRESS NOTES
Karis Darby M.D. Johnnie Cuellar. Farhana Diop, Ph.D., M.D., Diane Quinonez M.D. Tomi Maki, Ph.D., M.D. Kymberly Elam M.D.       2019    Patient: Britney Lopez   YOB: 1950       Dear Dr Rocio Rae: Thank you for referring Britney Lopez to me for evaluation. Below are the relevant portions of my assessment and plan of care. She appears to have at least locally advanced carcinoma of the right lung, clinically symptomatic with increasing shortness of breath and developing hemoptysis. I believe she will benefit from expeditious radiation therapy which we will coordinate with your planned systemic treatment. I plan a nominal dose delivery of 6000 cGy given over 30 daily fractions, providing satisfactory sparing of surrounding normal tissue structures can be achieved. If you have questions, please do not hesitate to call me. I look forward to following this patient along with you. Sincerely,      Electronically signed by Jennifer Gaston MD on 19 at 2:15 PM      CC: Patient Care Team:  Morgan Atkins PA-C as PCP - General (Physician Assistant)  Mikki Ramírez MD as Consulting Physician (Pulmonology)  Cl Jane RN as Nurse Navigator (Oncology)  Maricarmen Ruiz MD as Consulting Physician (Radiation Oncology)  Yousuf Minor MD as Consulting Physician (Hematology and Oncology)          RADIATION ONCOLOGY NEW PATIENT ASSESSMENT    Date of Service: 2019    Location:  [facility] Radiation Oncology,   [address], [address] [place] [address]   [phone]     Patient ID:   Britney Lopez  : 1950   MRN: [id number]    Britney Lopez is being seen today for an oncologic opinion at the request of Dr. Rocio Rae. By the records available to me at time of evaluation this 81-year-old  female presented with progressively worsening cough that did not respond to conservative management.   Imaging studies of her chest revealed a lesion in the right upper lobe with pleural effusion. CT scan of the chest performed March 18, 2019 described a large mass within the right hilum either intervening or originating from the right mainstem bronchus with endobronchial component. There was encasement and narrowing of multiple pulmonary arterial branches to the right upper lobe, post obstructive atelectasis of the medial right middle lobe. Biopsy was obtained April 15th, revealing squamous cell carcinoma, at least in situ. PET/CT was done on May 3, 2019 showing increased radiotracer uptake in this right upper lobe mass, maximum SUV 13. Superior vena cava was moderately compressed, no metastatic disease distantly was identified by report. MRI of the brain was done May 6, 2019, reporting no evidence of intracranial metastasis. Thoracentesis was performed May 13th, revealing no evidence of malignancy in the pleural fluid. An infusion port was placed in anticipation of systemic chemotherapy. She has developed hemoptysis  along with shortness of breath. Ms Norman Sabillon is now seen at the [facility] radiation oncology department at [facility] for our oncologic opinion. Chief Complaint:  Cancer Staging  No matching staging information was found for the patient. Patient Active Problem List   Diagnosis Code    Cellulitis of right arm L03. 113    Cat bite of right upper arm S41.151A, W55. 01XA    CL (cirrhosis of liver) ([address] K74.60    Chronic hepatitis C without hepatic coma (HCC) B18.2    Thrombocytopenia (HCC) D69.6    Chronic obstructive pulmonary disease (HCC) J44.9    Anxiety F41.9    Malignant neoplasm of upper lobe of right lung (HCC) C34.11     HISTORY OF PRESENT ILLNESS:   Myrna Cosme    Past Medical History:   Diagnosis Date    Anxiety and depression     Back pain     Bipolar disorder (HCC)     Bronchitis     Cancer ([address]     Chronic obstructive pulmonary disease (COPD) ([address]     Cirrhosis (HCC)     Cough     Current every Rfl: 3    albuterol (PROVENTIL) (2.5 MG/3ML) 0.083% nebulizer solution, Take 3 mLs by nebulization every 6 hours as needed for Wheezing, Disp: 120 each, Rfl: 5    Oxygen Concentrator, , Disp: , Rfl:     tiotropium (SPIRIVA RESPIMAT) 2.5 MCG/ACT AERS inhaler, Inhale 2 puffs into the lungs daily, Disp: 1 Inhaler, Rfl: 5    ALLERGIES: No Known Allergies    Social History     Socioeconomic History    Marital status:      Spouse name: None    Number of children: None    Years of education: None    Highest education level: None   Occupational History    None   Social Needs    Financial resource strain: None    Food insecurity:     Worry: None     Inability: None    Transportation needs:     Medical: None     Non-medical: None   Tobacco Use    Smoking status: Current Every Day Smoker     Packs/day: 0.25     Years: 52.00     Pack years: 13.00     Types: Cigarettes     Start date: 1/1/1967    Smokeless tobacco: Never Used    Tobacco comment: smoking 5 or so a day.  5-16-19   Substance and Sexual Activity    Alcohol use: No    Drug use: Yes     Types: Cocaine     Comment: April 15th 2019    Sexual activity: None   Lifestyle    Physical activity:     Days per week: None     Minutes per session: None    Stress: None   Relationships    Social connections:     Talks on phone: None     Gets together: None     Attends Baptism service: None     Active member of club or organization: None     Attends meetings of clubs or organizations: None     Relationship status: None    Intimate partner violence:     Fear of current or ex partner: None     Emotionally abused: None     Physically abused: None     Forced sexual activity: None   Other Topics Concern    None   Social History Narrative    None       Family History   Problem Relation Age of Onset    Heart Attack Father     Cancer Paternal Grandmother     Lung Cancer Paternal Grandfather     Emphysema Mother        Labs:  WBC   Date Value Ref Range patient.      Electronically signed by Black Marshall MD on 5/16/2019 at 2:15 PM  [address]    CC:  Patient Care Team:  Sara Polanco PA-C as PCP - General (Physician Assistant)  Ana Dukes MD as Consulting Physician (Pulmonology)  Oneyda Ocasio RN as Nurse Navigator (Oncology)  Jose López MD as Consulting Physician (Radiation Oncology)  Katya Montes MD as Consulting Physician (Hematology and Oncology)     Drugs Prescribed:  New Prescriptions    No medications on file       Orders Placed:     Orders Placed This Encounter   Procedures    BUN    Creatinine, Serum    CBC Auto Differential

## 2019-05-17 ENCOUNTER — TELEPHONE (OUTPATIENT)
Dept: ONCOLOGY | Age: 69
End: 2019-05-17

## 2019-05-17 ENCOUNTER — OFFICE VISIT (OUTPATIENT)
Dept: ONCOLOGY | Age: 69
End: 2019-05-17
Payer: MEDICARE

## 2019-05-17 ENCOUNTER — HOSPITAL ENCOUNTER (OUTPATIENT)
Age: 69
Discharge: HOME | End: 2019-05-17
Payer: MEDICARE

## 2019-05-17 ENCOUNTER — TELEPHONE (OUTPATIENT)
Dept: CASE MANAGEMENT | Age: 69
End: 2019-05-17

## 2019-05-17 VITALS
HEART RATE: 83 BPM | WEIGHT: 124.6 LBS | SYSTOLIC BLOOD PRESSURE: 113 MMHG | TEMPERATURE: 98.1 F | RESPIRATION RATE: 24 BRPM | DIASTOLIC BLOOD PRESSURE: 71 MMHG | BODY MASS INDEX: 22.07 KG/M2

## 2019-05-17 DIAGNOSIS — C34.11 MALIGNANT NEOPLASM OF UPPER LOBE OF RIGHT LUNG (HCC): Primary | ICD-10-CM

## 2019-05-17 LAB
CREAT SERPL-MCNC: 0.5 MG/DL (ref 0.5–0.9)
GFR AFRICAN AMERICAN: >60 ML/MIN
GFR NON-AFRICAN AMERICAN: >60 ML/MIN
GFR SERPL CREATININE-BSD FRML MDRD: NORMAL ML/MIN/{1.73_M2}
GFR SERPL CREATININE-BSD FRML MDRD: NORMAL ML/MIN/{1.73_M2}

## 2019-05-17 PROCEDURE — 99215 OFFICE O/P EST HI 40 MIN: CPT | Performed by: INTERNAL MEDICINE

## 2019-05-17 PROCEDURE — 4040F PNEUMOC VAC/ADMIN/RCVD: CPT | Performed by: INTERNAL MEDICINE

## 2019-05-17 PROCEDURE — 1090F PRES/ABSN URINE INCON ASSESS: CPT | Performed by: INTERNAL MEDICINE

## 2019-05-17 PROCEDURE — G8400 PT W/DXA NO RESULTS DOC: HCPCS | Performed by: INTERNAL MEDICINE

## 2019-05-17 PROCEDURE — 1123F ACP DISCUSS/DSCN MKR DOCD: CPT | Performed by: INTERNAL MEDICINE

## 2019-05-17 PROCEDURE — 99211 OFF/OP EST MAY X REQ PHY/QHP: CPT

## 2019-05-17 PROCEDURE — 99211 OFF/OP EST MAY X REQ PHY/QHP: CPT | Performed by: INTERNAL MEDICINE

## 2019-05-17 PROCEDURE — 36415 COLL VENOUS BLD VENIPUNCTURE: CPT

## 2019-05-17 PROCEDURE — G8427 DOCREV CUR MEDS BY ELIG CLIN: HCPCS | Performed by: INTERNAL MEDICINE

## 2019-05-17 PROCEDURE — 4004F PT TOBACCO SCREEN RCVD TLK: CPT | Performed by: INTERNAL MEDICINE

## 2019-05-17 PROCEDURE — 82565 ASSAY OF CREATININE: CPT

## 2019-05-17 PROCEDURE — 3017F COLORECTAL CA SCREEN DOC REV: CPT | Performed by: INTERNAL MEDICINE

## 2019-05-17 PROCEDURE — G8420 CALC BMI NORM PARAMETERS: HCPCS | Performed by: INTERNAL MEDICINE

## 2019-05-17 RX ORDER — ONDANSETRON 8 MG/1
8 TABLET, ORALLY DISINTEGRATING ORAL EVERY 8 HOURS PRN
Qty: 30 TABLET | Refills: 3 | Status: SHIPPED | OUTPATIENT
Start: 2019-05-17 | End: 2019-08-14

## 2019-05-17 RX ORDER — LORAZEPAM 0.5 MG/1
TABLET ORAL
Qty: 45 TABLET | Refills: 0 | Status: SHIPPED | OUTPATIENT
Start: 2019-05-17 | End: 2019-06-17

## 2019-05-17 RX ORDER — OMEPRAZOLE 20 MG/1
20 CAPSULE, DELAYED RELEASE ORAL DAILY
Qty: 30 CAPSULE | Refills: 3 | Status: SHIPPED | OUTPATIENT
Start: 2019-05-17 | End: 2019-08-14

## 2019-05-17 NOTE — TELEPHONE ENCOUNTER
Name: Jean-Paul Mack  : 1950  MRN: [id number]    Oncology Navigation Follow-Up Note    Contact Type:  Telephone    Subjective:     Objective:     Assistance Needed:     Receptive to Advanced Care Planning / Palliative Care:      Referrals:     Education:     Notes: Navigator spoke with pt. And may need assistance with transportation? Otherwise denies needing additional assistance at this time.  Pt. [facility] , but will be going to [facility] for now, then planning to return to [employer] [facility]    Electronically signed by Pieter Palacios RN on 2019 at 3:50 PM

## 2019-05-17 NOTE — TELEPHONE ENCOUNTER
Plan to start chemo radiation  Please coordinate start date with rad onc. rv to see me with chemo start . - AVS FAXED TO [facility] , WHO IS ASSISTING WITH SCHEDULING TREATMENTS AND RV'S WITH TREATMENTS. Pt is agreeable to go to  for chemo until [facility] is [facility], but needs help with transportation - [address] [facility].

## 2019-05-17 NOTE — PATIENT INSTRUCTIONS
Plan to start chemo radiation  Please coordinate start date with rad onc. rv to see me with chemo start .   Pt is agreeable to go to [facility] for [facility], but needs help with transportation

## 2019-05-17 NOTE — PROGRESS NOTES
DIAGNOSIS:   1. Squamous cell carcinoma, right lung 04/15/2019 with right main stem bronchus invasion (T3N0M0)   2. PET showed localized disease; brain MRI negative for metastases,   3. HX Liver disease and hepatitis C    CURRENT THERAPY:  1. S/P bronchoscopy establishing the diagnosis   2. Plan for chemoradiation with taxol and carboplatin     BRIEF CASE HISTORY: Bon Limon is a very pleasant 77 y.o. female who is referred to us for consultation and management of recently diagnosed lung cancer. She initially presented with flu like illness and shortness of breath 11/2018 and was evaluated by PCP and was started on antibiotics, X-ray done at the time showed suspicious findings. Follow up CT showed mass lesion within the right hilum either intervening or originating from the right mainstem bronchus and narrowing of multiple segmental pulmonary arterial branches. Referred to Dr. Elvira Campos for bronchoscopy and right main stem bronchus mass was appreciated, bronchial washings were positive for squamous cell carcinoma. She reports she has liver disease, Hep C and assumed her recent weight loss and cachexia was related to that. She has right shoulder and back pain. She has not been treated for Hep C. She smokes cigarettes and cocaine. She has COPD and is oxygen dependent. Staging PET showed localized disease, pleural effusion seen; brain MRI was negative for metastatic disease. She will need pleurocentesis with cytological evaluation of the fluid, assuming it is negative , and since she is not surgical candidate. Thoracentesis was done and fluid was negative for malignancy. Plan for chemoradiation   Carbo/Taxol. INTERIM HISTORY: The patient presents for follow up for lung cancer, to discuss recent pathology, and treatment plan. She underwent thoracentesis and notes improvement in breathing. She has had consult with radiation and simulation has been schedule.  She has some concerns about anxiety with radiation. She has some discomfort at recent port insertion site. She continues to use Chantix for smoking cessation and is down to 5 cigarettes daily. PAST MEDICAL HISTORY: has a past medical history of Anxiety and depression, Back pain, Bipolar disorder ([address] Bronchitis, Cancer ([facility] Chronic obstructive pulmonary disease (COPD) ([address] Cirrhosis ([address] Cough, Current every day smoker, Fibromyalgia, Hepatitis, History of cervical cancer, Liver disease, Lung mass, MVP (mitral valve prolapse), On supplemental oxygen therapy, Wears dentures, Wears glasses, and Wheezing. PAST SURGICAL HISTORY: has a past surgical history that includes Cervical disc surgery; Hysterectomy; Cholecystectomy; Breast surgery (Left); Carpal tunnel release (Bilateral); Knee arthroscopy (Right); bronchoscopy (04/15/2019); bronchoscopy (N/A, 4/15/2019); bronchoscopy (4/15/2019); bronchoscopy (4/15/2019); TUNNELED CENTRAL VENOUS CATHETER W/ SUBCUTANEOUS PORT (Right, 05/13/2019); and thoracentesis (Right, 05/13/2019). CURRENT MEDICATIONS:  has a current medication list which includes the following prescription(s): oxycodone, varenicline, varenicline, hydroxyzine, budesonide-formoterol, albuterol sulfate hfa, albuterol, oxygen concentrator, tiotropium, and ibuprofen. ALLERGIES:  has No Known Allergies. FAMILY HISTORY: Negative for any hematological or oncological conditions. SOCIAL HISTORY:  reports that she has been smoking cigarettes. She started smoking about 52 years ago. She has a 13.00 pack-year smoking history. She has never used smokeless tobacco. She reports that she has current or past drug history. Drug: Cocaine. She reports that she does not drink alcohol. REVIEW OF SYSTEMS:   General: No fever or night sweats. lost weight in the last few months.  But now gaining back   ENT: No double or blurred vision, no tinnitus or hearing problem, no dysphagia or sore throat   Respiratory: No chest pain, no shortness of breath, no cough or hemoptysis. Cardiovascular: Denies chest pain, PND or orthopnea. No L E swelling or palpitations. Gastrointestinal: No nausea or vomiting, abdominal pain, diarrhea or constipation. Genitourinary: Denies dysuria, hematuria, frequency, urgency or incontinence. Neurological: Denies headaches, decreased LOC, no sensory or motor focal deficits. Musculoskeletal: No arthralgia no back pain or joint swelling. +right shoulder pain  Skin: There are no rashes or bleeding. Psychiatric: No anxiety, no depression. Endocrine: No diabetes or thyroid disease. Hematologic: No bleeding, no adenopathy. PHYSICAL EXAM: Shows a well appearing 77y.o.-year-old female who is not in pain or distress. Vital Signs: There were no vitals taken for this visit. HEENT: Normocephalic and atraumatic. Pupils are equal, round, reactive to light and accommodation. Extraocular muscles are intact. Neck: Showed no JVD, no carotid bruit . Lungs: Clear to auscultation bilaterally. Heart: Regular without any murmur. Abdomen: Soft, nontender. No hepatosplenomegaly. Extremities: Lower extremities show no edema, clubbing, or cyanosis. Breasts: Examination not done today.  Neuro exam: intact cranial nerves bilaterally no motor or sensory deficit, gait is normal. Lymphatic: no adenopathy appreciated in the supraclavicular, axillary, cervical or inguinal area    REVIEW OF LABORATORY DATA:   Lab Results   Component Value Date    WBC 8.8 04/04/2019    HGB 12.1 04/04/2019    HCT 36.9 04/04/2019    MCV 94.6 04/04/2019     05/13/2019       Chemistry        Component Value Date/Time     03/18/2019 0812    K 3.8 03/18/2019 0812     03/18/2019 0812    CO2 24 03/18/2019 0812    BUN 12 03/18/2019 0812    CREATININE 0.52 05/06/2019 1401        Component Value Date/Time    CALCIUM 8.7 03/18/2019 0812    ALKPHOS 96 04/04/2019 1150    AST 35 (H) 04/04/2019 1150    ALT 21 04/04/2019 1150    BILITOT 1.41 (H) 04/04/2019 1150 REVIEW OF RADIOLOGICAL RESULTS:     PATHOLOGY:   05/13/2019:  PLEURAL FLUID:  NEGATIVE FOR MALIGNANCY. IMPRESSION:   1. Squamous cell carcinoma, right lung 04/15/2019, clinical stage T3, N0, M0.   2. Liver disease and hepatitis C, severe comorbidity   3. Staging PET showed localized disease, pleural effusion seen; Brain MRI was negative for metastatic disease  4. Pleural effusion with negative cytology. pleurocentesis - negatvie  5. Cocaine and cigarette addiction , quit drugs and quitting smoking with chantix help   6. Oxygent dependent   7. Plan for chemo radiation if tolerated     PLAN:   1. We discussed her recent pathology of pleural fluid which was negative. 2. I explained plan for chemo with Carbo/Taxol to be given concurrently with  radiation including dosing schedule and possible side effects. 3. Due to her severe comorbidity, she might not be able to handle concurrent treatment, if so, we will treat her sequentially   4. I am putting in orders for chemo class and chemo. 5. I am writing for Ativan, Prilosec, Emla, and Zofran. 6. Return to commence with treatment.

## 2019-05-20 ENCOUNTER — TELEPHONE (OUTPATIENT)
Dept: ONCOLOGY | Age: 69
End: 2019-05-20

## 2019-05-20 ENCOUNTER — TELEPHONE (OUTPATIENT)
Dept: RADIATION ONCOLOGY | Facility: MEDICAL CENTER | Age: 69
End: 2019-05-20

## 2019-05-20 ENCOUNTER — HOSPITAL ENCOUNTER (OUTPATIENT)
Dept: RADIATION ONCOLOGY | Facility: MEDICAL CENTER | Age: 69
Discharge: HOME | End: 2019-05-20
Attending: RADIOLOGY
Payer: MEDICARE

## 2019-05-20 PROCEDURE — 77470 SPECIAL RADIATION TREATMENT: CPT | Performed by: RADIOLOGY

## 2019-05-20 PROCEDURE — 77290 THER RAD SIMULAJ FIELD CPLX: CPT | Performed by: RADIOLOGY

## 2019-05-20 PROCEDURE — 77334 RADIATION TREATMENT AID(S): CPT | Performed by: RADIOLOGY

## 2019-05-20 RX ORDER — 0.9 % SODIUM CHLORIDE 0.9 %
10 VIAL (ML) INJECTION ONCE
Status: CANCELLED | OUTPATIENT
Start: 2019-05-31

## 2019-05-20 RX ORDER — HEPARIN SODIUM (PORCINE) LOCK FLUSH IV SOLN 100 UNIT/ML 100 UNIT/ML
500 SOLUTION INTRAVENOUS PRN
Status: CANCELLED | OUTPATIENT
Start: 2019-05-31

## 2019-05-20 RX ORDER — DIPHENHYDRAMINE HYDROCHLORIDE 50 MG/ML
50 INJECTION INTRAMUSCULAR; INTRAVENOUS ONCE
Status: CANCELLED | OUTPATIENT
Start: 2019-05-31

## 2019-05-20 RX ORDER — DIPHENHYDRAMINE HYDROCHLORIDE 50 MG/ML
50 INJECTION INTRAMUSCULAR; INTRAVENOUS ONCE
Status: CANCELLED | OUTPATIENT
Start: 2019-05-23

## 2019-05-20 RX ORDER — METHYLPREDNISOLONE SODIUM SUCCINATE 125 MG/2ML
125 INJECTION, POWDER, LYOPHILIZED, FOR SOLUTION INTRAMUSCULAR; INTRAVENOUS ONCE
Status: CANCELLED | OUTPATIENT
Start: 2019-05-23

## 2019-05-20 RX ORDER — MEPERIDINE HYDROCHLORIDE 50 MG/ML
12.5 INJECTION INTRAMUSCULAR; INTRAVENOUS; SUBCUTANEOUS ONCE
Status: CANCELLED | OUTPATIENT
Start: 2019-05-23

## 2019-05-20 RX ORDER — SODIUM CHLORIDE 0.9 % (FLUSH) 0.9 %
5 SYRINGE (ML) INJECTION PRN
Status: CANCELLED | OUTPATIENT
Start: 2019-05-31

## 2019-05-20 RX ORDER — SODIUM CHLORIDE 9 MG/ML
20 INJECTION, SOLUTION INTRAVENOUS ONCE
Status: CANCELLED | OUTPATIENT
Start: 2019-05-23

## 2019-05-20 RX ORDER — SODIUM CHLORIDE 0.9 % (FLUSH) 0.9 %
5 SYRINGE (ML) INJECTION PRN
Status: CANCELLED | OUTPATIENT
Start: 2019-05-23

## 2019-05-20 RX ORDER — SODIUM CHLORIDE 9 MG/ML
20 INJECTION, SOLUTION INTRAVENOUS ONCE
Status: CANCELLED | OUTPATIENT
Start: 2019-05-31

## 2019-05-20 RX ORDER — PALONOSETRON 0.05 MG/ML
0.25 INJECTION, SOLUTION INTRAVENOUS ONCE
Status: CANCELLED | OUTPATIENT
Start: 2019-05-23

## 2019-05-20 RX ORDER — SODIUM CHLORIDE 0.9 % (FLUSH) 0.9 %
10 SYRINGE (ML) INJECTION PRN
Status: CANCELLED | OUTPATIENT
Start: 2019-05-31

## 2019-05-20 RX ORDER — MEPERIDINE HYDROCHLORIDE 50 MG/ML
12.5 INJECTION INTRAMUSCULAR; INTRAVENOUS; SUBCUTANEOUS ONCE
Status: CANCELLED | OUTPATIENT
Start: 2019-05-31

## 2019-05-20 RX ORDER — 0.9 % SODIUM CHLORIDE 0.9 %
10 VIAL (ML) INJECTION ONCE
Status: CANCELLED | OUTPATIENT
Start: 2019-05-23

## 2019-05-20 RX ORDER — SODIUM CHLORIDE 0.9 % (FLUSH) 0.9 %
10 SYRINGE (ML) INJECTION PRN
Status: CANCELLED | OUTPATIENT
Start: 2019-05-23

## 2019-05-20 RX ORDER — METHYLPREDNISOLONE SODIUM SUCCINATE 125 MG/2ML
125 INJECTION, POWDER, LYOPHILIZED, FOR SOLUTION INTRAMUSCULAR; INTRAVENOUS ONCE
Status: CANCELLED | OUTPATIENT
Start: 2019-05-31

## 2019-05-20 RX ORDER — HEPARIN SODIUM (PORCINE) LOCK FLUSH IV SOLN 100 UNIT/ML 100 UNIT/ML
500 SOLUTION INTRAVENOUS PRN
Status: CANCELLED | OUTPATIENT
Start: 2019-05-23

## 2019-05-20 RX ORDER — SODIUM CHLORIDE 9 MG/ML
INJECTION, SOLUTION INTRAVENOUS CONTINUOUS
Status: CANCELLED | OUTPATIENT
Start: 2019-05-23

## 2019-05-20 RX ORDER — PALONOSETRON 0.05 MG/ML
0.25 INJECTION, SOLUTION INTRAVENOUS ONCE
Status: CANCELLED | OUTPATIENT
Start: 2019-05-31

## 2019-05-20 RX ORDER — SODIUM CHLORIDE 9 MG/ML
INJECTION, SOLUTION INTRAVENOUS CONTINUOUS
Status: CANCELLED | OUTPATIENT
Start: 2019-05-31

## 2019-05-20 NOTE — TELEPHONE ENCOUNTER
Writer called Medical Oncology in [place] to see if patient is ready to start chemotherapy. No one answered, so message left for [employer]. Gurwinder Hernandes called back stating patient stuff still in pre certification and they will get back in touch with us when pre certification comes back. Will continue to follow.

## 2019-05-20 NOTE — PROGRESS NOTES
Patient here for Teach and Sim. Radiation booklet provided along with samples of aquaphor and ensure. Port accessed, see Ct /sim documentation. Pt on oxygen. Pt ambulated to sim without difficulty. Radiation Therapy Education  · A Simulation Scan is like having a CT scan. Your physician will use the images obtained to develop your radiation treatment. · May take 30 minutes to 1 hour to complete  · Marking will be applied to your skin to help insure accurate positioning for your future treatments. · A mold or a mask may also be needed to help aid in your positioning    · Schedule: You will have treatments Monday - Friday  · Treatments should take about 15 minutes from the time you enter the treatment room  · You will usually have the same appointment time each day  · You will see your doctor and nurse on Mondays while you are undergoing treatments. · Every effort will be make to start your treatment at the scheduled time. However, there may be occasional delays due to emergency patients, technical problems, or other difficulties. · Side Effects: Radiation is a local treatment so any side effects you may experience will be in your treatment area only. · If you experience side effects they will typically occur after the 2nd or 3rd week of treatments. · Many patients do not experience severe side effects and are able to continue working during their treatments. If you feel you treatments are interfering with your job, please notify your nurse        Skin Care During Radiation Treatments          Start applying moisturizers to the skin two times a day when you start your radiation        treatments  · Suggested moisturizers include: Aquaphor, Eucerin, Exclair, Borion gel (may be purchased at [facility]) or Leonie Boast (to order call [phone] or go to www.Twitter)  · Do not use moisturizers within two hours before your radiation treatment  · Do not apply anything to your skin in your treatment area that is not recommended by your radiation nurse and/or doctor  Good general hygiene/bathing should be performed daily  · Use moisturizing soaps that do not contain perfume or fragrances. Recommended soaps include Dove or Dial  · Use warm water, not hot water when bathing  · After bathing, pat the skin dry rather than rubbing it, especially at the treatment site  · Do not shave the treatment area. If you must shave, such as a beard, use an electric shaver. Don't use pre/after shave creams on treatment area. Avoid friction on and around your treatment area  · Do not use tape, bandages, or medicated patches in the treatment area  · Avoid tight fitting clothing  · No massaging or scratching    Avoid extremes of temperature on the treatment area such as heating pads, ice packs, hot tubs, or saunas    If the area being treated is exposed to the sun, apply sunscreen routinely to the treatment site whenever you are outdoors. A sunscreen with a minimum of SFF30 should be used. Since the area being treated will always be more sensitive than the rest of your skin, continue to protect the area from sun exposure after your treatment ends    If your armpit is in the treatment area, do not use antiperspirants. An aluminum-free, non-metallic deodorant may be used. Tell your nurse or doctor if you have any of the following symptoms:  · Blistered, open, swollen or tender areas of the skin in and around the treatment area  · Pain or itching that is not helped by prescribed medications or recommended ointments      Questions and Answers about Radiation Therapy  1. What is radiation therapy? Radiation therapy (also called radiotherapy) is a cancer treatment that uses high  doses of radiation to kill cancer cells and shrink tumors. At low doses, radiation  is used as an x-ray to see inside your body and take pictures, such as x-rays of  your teeth or broken bones. 2.  How is radiation therapy given?    Radiation radiation dose allows normal cells to recover while cancer cells die. · Aimimng radiation at a precise part of your body. Some types of radiation therapy allow your doctor to aim high doses of radiaiton at your cancer while reducing radiation to nearby healthy tissue. These techniques use a computer to deliver precise radiation doses to a cancer tumor or to specific areas within the tumor.     [employer]

## 2019-05-21 ENCOUNTER — HOSPITAL ENCOUNTER (OUTPATIENT)
Dept: RADIATION ONCOLOGY | Facility: MEDICAL CENTER | Age: 69
Discharge: HOME | End: 2019-05-21
Attending: RADIOLOGY
Payer: MEDICARE

## 2019-05-21 PROCEDURE — 77334 RADIATION TREATMENT AID(S): CPT | Performed by: RADIOLOGY

## 2019-05-21 PROCEDURE — 77295 3-D RADIOTHERAPY PLAN: CPT | Performed by: RADIOLOGY

## 2019-05-21 PROCEDURE — 77300 RADIATION THERAPY DOSE PLAN: CPT | Performed by: RADIOLOGY

## 2019-05-22 ENCOUNTER — TELEPHONE (OUTPATIENT)
Dept: ONCOLOGY | Age: 69
End: 2019-05-22

## 2019-05-22 DIAGNOSIS — C34.11 MALIGNANT NEOPLASM OF UPPER LOBE OF RIGHT LUNG (HCC): ICD-10-CM

## 2019-05-22 RX ORDER — OXYCODONE HYDROCHLORIDE 5 MG/1
5 TABLET ORAL EVERY 6 HOURS PRN
Qty: 56 TABLET | Refills: 0 | Status: SHIPPED | OUTPATIENT
Start: 2019-05-22 | End: 2019-06-05 | Stop reason: SDUPTHER

## 2019-05-22 NOTE — TELEPHONE ENCOUNTER
Chemotherapy orders received:  Taxol/Carbo wkly x 7 cycles concurrent with RT.  Ht=63in Bo=471rn BSA=1.59  Chemotherapy doses verified:  Taxol 45mg/m2=72mg  Carbo AUC 2 to be calculated day of tx.//SL

## 2019-05-23 ENCOUNTER — HOSPITAL ENCOUNTER (OUTPATIENT)
Dept: INFUSION THERAPY | Age: 69
Discharge: HOME | End: 2019-05-23
Payer: MEDICARE

## 2019-05-23 ENCOUNTER — HOSPITAL ENCOUNTER (OUTPATIENT)
Dept: RADIATION ONCOLOGY | Facility: MEDICAL CENTER | Age: 69
Discharge: HOME | End: 2019-05-23
Attending: RADIOLOGY
Payer: MEDICARE

## 2019-05-23 ENCOUNTER — OFFICE VISIT (OUTPATIENT)
Dept: ONCOLOGY | Age: 69
End: 2019-05-23
Payer: MEDICARE

## 2019-05-23 ENCOUNTER — TELEPHONE (OUTPATIENT)
Dept: CASE MANAGEMENT | Age: 69
End: 2019-05-23

## 2019-05-23 VITALS
SYSTOLIC BLOOD PRESSURE: 113 MMHG | BODY MASS INDEX: 22.67 KG/M2 | TEMPERATURE: 97.7 F | DIASTOLIC BLOOD PRESSURE: 64 MMHG | HEART RATE: 70 BPM | WEIGHT: 128 LBS

## 2019-05-23 VITALS
HEART RATE: 70 BPM | SYSTOLIC BLOOD PRESSURE: 113 MMHG | WEIGHT: 128.8 LBS | RESPIRATION RATE: 22 BRPM | BODY MASS INDEX: 22.82 KG/M2 | DIASTOLIC BLOOD PRESSURE: 64 MMHG | TEMPERATURE: 97.7 F

## 2019-05-23 DIAGNOSIS — B18.2 CHRONIC HEPATITIS C WITHOUT HEPATIC COMA (HCC): ICD-10-CM

## 2019-05-23 DIAGNOSIS — K74.69 OTHER CIRRHOSIS OF LIVER (HCC): ICD-10-CM

## 2019-05-23 DIAGNOSIS — C34.11 MALIGNANT NEOPLASM OF UPPER LOBE OF RIGHT LUNG (HCC): Primary | ICD-10-CM

## 2019-05-23 LAB
ABSOLUTE EOS #: 0.1 K/UL (ref 0–0.4)
ABSOLUTE IMMATURE GRANULOCYTE: ABNORMAL K/UL (ref 0–0.3)
ABSOLUTE LYMPH #: 1 K/UL (ref 1–4.8)
ABSOLUTE MONO #: 0.7 K/UL (ref 0.1–1.2)
ALBUMIN SERPL-MCNC: 2.7 G/DL (ref 3.5–5.2)
ALBUMIN/GLOBULIN RATIO: 0.6 (ref 1–2.5)
ALP BLD-CCNC: 91 U/L (ref 35–104)
ALT SERPL-CCNC: 32 U/L (ref 5–33)
ANION GAP SERPL CALCULATED.3IONS-SCNC: 9 MMOL/L (ref 9–17)
AST SERPL-CCNC: 63 U/L
BASOPHILS # BLD: 1 % (ref 0–2)
BASOPHILS ABSOLUTE: 0 K/UL (ref 0–0.2)
BILIRUB SERPL-MCNC: 0.7 MG/DL (ref 0.3–1.2)
BUN BLDV-MCNC: 13 MG/DL (ref 8–23)
BUN/CREAT BLD: ABNORMAL (ref 9–20)
CALCIUM SERPL-MCNC: 8.3 MG/DL (ref 8.6–10.4)
CHLORIDE BLD-SCNC: 96 MMOL/L (ref 98–107)
CO2: 24 MMOL/L (ref 20–31)
CREAT SERPL-MCNC: 0.6 MG/DL (ref 0.5–0.9)
DIFFERENTIAL TYPE: ABNORMAL
EOSINOPHILS RELATIVE PERCENT: 2 % (ref 1–4)
GFR AFRICAN AMERICAN: >60 ML/MIN
GFR NON-AFRICAN AMERICAN: >60 ML/MIN
GFR SERPL CREATININE-BSD FRML MDRD: ABNORMAL ML/MIN/{1.73_M2}
GFR SERPL CREATININE-BSD FRML MDRD: ABNORMAL ML/MIN/{1.73_M2}
GLUCOSE BLD-MCNC: 121 MG/DL (ref 70–99)
HCT VFR BLD CALC: 34 % (ref 36–46)
HEMOGLOBIN: 11.8 G/DL (ref 12–16)
IMMATURE GRANULOCYTES: ABNORMAL %
LYMPHOCYTES # BLD: 13 % (ref 24–44)
MCH RBC QN AUTO: 31 PG (ref 26–34)
MCHC RBC AUTO-ENTMCNC: 34.7 G/DL (ref 31–37)
MCV RBC AUTO: 89.5 FL (ref 80–100)
MONOCYTES # BLD: 9 % (ref 2–11)
NRBC AUTOMATED: ABNORMAL PER 100 WBC
PDW BLD-RTO: 13.7 % (ref 12.5–15.4)
PLATELET # BLD: 138 K/UL (ref 140–450)
PLATELET ESTIMATE: ABNORMAL
PMV BLD AUTO: 7.2 FL (ref 6–12)
POTASSIUM SERPL-SCNC: 4.5 MMOL/L (ref 3.7–5.3)
RBC # BLD: 3.79 M/UL (ref 4–5.2)
RBC # BLD: ABNORMAL 10*6/UL
SEG NEUTROPHILS: 75 % (ref 36–66)
SEGMENTED NEUTROPHILS ABSOLUTE COUNT: 5.9 K/UL (ref 1.8–7.7)
SODIUM BLD-SCNC: 129 MMOL/L (ref 135–144)
TOTAL PROTEIN: 7.3 G/DL (ref 6.4–8.3)
WBC # BLD: 7.7 K/UL (ref 3.5–11)
WBC # BLD: ABNORMAL 10*3/UL

## 2019-05-23 PROCEDURE — 77280 THER RAD SIMULAJ FIELD SMPL: CPT | Performed by: RADIOLOGY

## 2019-05-23 PROCEDURE — 96375 TX/PRO/DX INJ NEW DRUG ADDON: CPT

## 2019-05-23 PROCEDURE — 4004F PT TOBACCO SCREEN RCVD TLK: CPT | Performed by: INTERNAL MEDICINE

## 2019-05-23 PROCEDURE — 2500000003 HC RX 250 WO HCPCS: Performed by: INTERNAL MEDICINE

## 2019-05-23 PROCEDURE — 80053 COMPREHEN METABOLIC PANEL: CPT

## 2019-05-23 PROCEDURE — 77412 RADIATION TX DELIVERY LVL 3: CPT | Performed by: RADIOLOGY

## 2019-05-23 PROCEDURE — 99214 OFFICE O/P EST MOD 30 MIN: CPT | Performed by: INTERNAL MEDICINE

## 2019-05-23 PROCEDURE — 36591 DRAW BLOOD OFF VENOUS DEVICE: CPT

## 2019-05-23 PROCEDURE — G8420 CALC BMI NORM PARAMETERS: HCPCS | Performed by: INTERNAL MEDICINE

## 2019-05-23 PROCEDURE — 6360000002 HC RX W HCPCS: Performed by: INTERNAL MEDICINE

## 2019-05-23 PROCEDURE — 2580000003 HC RX 258: Performed by: INTERNAL MEDICINE

## 2019-05-23 PROCEDURE — 4040F PNEUMOC VAC/ADMIN/RCVD: CPT | Performed by: INTERNAL MEDICINE

## 2019-05-23 PROCEDURE — 96413 CHEMO IV INFUSION 1 HR: CPT

## 2019-05-23 PROCEDURE — G8427 DOCREV CUR MEDS BY ELIG CLIN: HCPCS | Performed by: INTERNAL MEDICINE

## 2019-05-23 PROCEDURE — G8400 PT W/DXA NO RESULTS DOC: HCPCS | Performed by: INTERNAL MEDICINE

## 2019-05-23 PROCEDURE — 85025 COMPLETE CBC W/AUTO DIFF WBC: CPT

## 2019-05-23 PROCEDURE — 96417 CHEMO IV INFUS EACH ADDL SEQ: CPT

## 2019-05-23 PROCEDURE — 1090F PRES/ABSN URINE INCON ASSESS: CPT | Performed by: INTERNAL MEDICINE

## 2019-05-23 PROCEDURE — 77387 GUIDANCE FOR RADJ TX DLVR: CPT | Performed by: RADIOLOGY

## 2019-05-23 PROCEDURE — 1123F ACP DISCUSS/DSCN MKR DOCD: CPT | Performed by: INTERNAL MEDICINE

## 2019-05-23 PROCEDURE — 3017F COLORECTAL CA SCREEN DOC REV: CPT | Performed by: INTERNAL MEDICINE

## 2019-05-23 RX ORDER — SODIUM CHLORIDE 0.9 % (FLUSH) 0.9 %
10 SYRINGE (ML) INJECTION PRN
Status: DISCONTINUED | OUTPATIENT
Start: 2019-05-23 | End: 2019-05-24 | Stop reason: HOSPADM

## 2019-05-23 RX ORDER — DIPHENHYDRAMINE HYDROCHLORIDE 50 MG/ML
50 INJECTION INTRAMUSCULAR; INTRAVENOUS ONCE
Status: COMPLETED | OUTPATIENT
Start: 2019-05-23 | End: 2019-05-23

## 2019-05-23 RX ORDER — PALONOSETRON 0.05 MG/ML
0.25 INJECTION, SOLUTION INTRAVENOUS ONCE
Status: COMPLETED | OUTPATIENT
Start: 2019-05-23 | End: 2019-05-23

## 2019-05-23 RX ORDER — SODIUM CHLORIDE 9 MG/ML
20 INJECTION, SOLUTION INTRAVENOUS ONCE
Status: COMPLETED | OUTPATIENT
Start: 2019-05-23 | End: 2019-05-23

## 2019-05-23 RX ORDER — HEPARIN SODIUM (PORCINE) LOCK FLUSH IV SOLN 100 UNIT/ML 100 UNIT/ML
500 SOLUTION INTRAVENOUS PRN
Status: DISCONTINUED | OUTPATIENT
Start: 2019-05-23 | End: 2019-05-24 | Stop reason: HOSPADM

## 2019-05-23 RX ORDER — 0.9 % SODIUM CHLORIDE 0.9 %
10 VIAL (ML) INJECTION ONCE
Status: COMPLETED | OUTPATIENT
Start: 2019-05-23 | End: 2019-05-23

## 2019-05-23 RX ORDER — DEXAMETHASONE SODIUM PHOSPHATE 10 MG/ML
10 INJECTION INTRAMUSCULAR; INTRAVENOUS ONCE
Status: COMPLETED | OUTPATIENT
Start: 2019-05-23 | End: 2019-05-23

## 2019-05-23 RX ADMIN — Medication 10 ML: at 12:55

## 2019-05-23 RX ADMIN — DIPHENHYDRAMINE HYDROCHLORIDE 50 MG: 50 INJECTION, SOLUTION INTRAMUSCULAR; INTRAVENOUS at 13:05

## 2019-05-23 RX ADMIN — Medication 10 ML: at 11:35

## 2019-05-23 RX ADMIN — SODIUM CHLORIDE 20 ML/HR: 9 INJECTION, SOLUTION INTRAVENOUS at 12:43

## 2019-05-23 RX ADMIN — PACLITAXEL 72 MG: 6 INJECTION, SOLUTION INTRAVENOUS at 13:38

## 2019-05-23 RX ADMIN — CARBOPLATIN 170 MG: 10 INJECTION, SOLUTION INTRAVENOUS at 14:44

## 2019-05-23 RX ADMIN — DEXAMETHASONE SODIUM PHOSPHATE 10 MG: 10 INJECTION INTRAMUSCULAR; INTRAVENOUS at 12:58

## 2019-05-23 RX ADMIN — PALONOSETRON HYDROCHLORIDE 0.25 MG: 0.25 INJECTION, SOLUTION INTRAVENOUS at 12:53

## 2019-05-23 RX ADMIN — Medication 10 ML: at 11:36

## 2019-05-23 RX ADMIN — FAMOTIDINE 20 MG: 10 INJECTION, SOLUTION INTRAVENOUS at 12:55

## 2019-05-23 RX ADMIN — Medication 500 UNITS: at 15:29

## 2019-05-23 RX ADMIN — Medication 10 ML: at 15:29

## 2019-05-23 ASSESSMENT — PAIN - FUNCTIONAL ASSESSMENT: PAIN_FUNCTIONAL_ASSESSMENT: PREVENTS OR INTERFERES WITH ALL ACTIVE AND SOME PASSIVE ACTIVITIES

## 2019-05-23 ASSESSMENT — PAIN DESCRIPTION - FREQUENCY: FREQUENCY: CONTINUOUS

## 2019-05-23 ASSESSMENT — PAIN SCALES - GENERAL: PAINLEVEL_OUTOF10: 6

## 2019-05-23 ASSESSMENT — PAIN DESCRIPTION - PROGRESSION: CLINICAL_PROGRESSION: GRADUALLY WORSENING

## 2019-05-23 ASSESSMENT — PAIN DESCRIPTION - LOCATION: LOCATION: SHOULDER

## 2019-05-23 ASSESSMENT — PAIN DESCRIPTION - ONSET: ONSET: ON-GOING

## 2019-05-23 ASSESSMENT — PAIN DESCRIPTION - PAIN TYPE: TYPE: ACUTE PAIN

## 2019-05-23 ASSESSMENT — PAIN DESCRIPTION - ORIENTATION: ORIENTATION: RIGHT

## 2019-05-23 NOTE — PLAN OF CARE
Problem: Pain:  Goal: Pain level will decrease  Description  Pain level will decrease  Outcome: Ongoing  Goal: Control of acute pain  Description  Control of acute pain  Outcome: Ongoing  Goal: Control of chronic pain  Description  Control of chronic pain  Outcome: Ongoing  Goal: Patient's pain/discomfort is manageable  Description  Patient's pain/discomfort is manageable  Outcome: Ongoing     Problem: Safety:  Goal: Free from accidental physical injury  Description  Free from accidental physical injury  Outcome: Ongoing  Goal: Free from intentional harm  Description  Free from intentional harm  Outcome: Ongoing

## 2019-05-23 NOTE — PROGRESS NOTES
DIAGNOSIS:   1. Squamous cell carcinoma, right lung 04/15/2019 with right main stem bronchus invasion (T3N0M0)   2. PET showed localized disease; brain MRI negative for metastases,   3. HX Liver disease and hepatitis C    CURRENT THERAPY:  1. S/P bronchoscopy establishing the diagnosis   2. Chemoradiation with taxol and carboplatin - started 05/23/2019    BRIEF CASE HISTORY: Jean Waller is a very pleasant 77 y.o. female who is referred to us for consultation and management of recently diagnosed lung cancer. She initially presented with flu like illness and shortness of breath 11/2018 and was evaluated by PCP and was started on antibiotics, X-ray done at the time showed suspicious findings. Follow up CT showed mass lesion within the right hilum either intervening or originating from the right mainstem bronchus and narrowing of multiple segmental pulmonary arterial branches. Referred to Dr. Keny Guzman for bronchoscopy and right main stem bronchus mass was appreciated, bronchial washings were positive for squamous cell carcinoma. She reports she has liver disease, Hep C and assumed her recent weight loss and cachexia was related to that. She has right shoulder and back pain. She has not been treated for Hep C. She smokes cigarettes and cocaine. She has COPD and is oxygen dependent. Staging PET showed localized disease, pleural effusion seen; brain MRI was negative for metastatic disease. She will need pleurocentesis with cytological evaluation of the fluid, assuming it is negative , and since she is not surgical candidate. Thoracentesis was done and fluid was negative for malignancy. Plan for chemoradiation Carbo/Taxol -started 05/23/2019. INTERIM HISTORY: The patient presents for follow up for lung cancer and to commence with treatment. She consulted with rad-onc and underwent first fraction today. She denies nausea, vomiting, diarrhea.       PAST MEDICAL HISTORY: has a past medical history of Anxiety and depression, Back pain, Bipolar disorder ([facility] [address] Bronchitis, Cancer ([facility] [address] Chronic obstructive pulmonary disease (COPD) ([facility] [address] Cirrhosis ([facility] [address] Cough, Current every day smoker, Fibromyalgia, Hepatitis, History of cervical cancer, Liver disease, Lung mass, MVP (mitral valve prolapse), On supplemental oxygen therapy, Wears dentures, Wears glasses, and Wheezing. PAST SURGICAL HISTORY: has a past surgical history that includes Cervical disc surgery; Hysterectomy; Cholecystectomy; Breast surgery (Left); Carpal tunnel release (Bilateral); Knee arthroscopy (Right); bronchoscopy (04/15/2019); bronchoscopy (N/A, 4/15/2019); bronchoscopy (4/15/2019); bronchoscopy (4/15/2019); TUNNELED CENTRAL VENOUS CATHETER W/ SUBCUTANEOUS PORT (Right, 05/13/2019); and thoracentesis (Right, 05/13/2019). CURRENT MEDICATIONS:  has a current medication list which includes the following prescription(s): oxycodone, ondansetron, omeprazole, lorazepam, varenicline, varenicline, hydroxyzine, budesonide-formoterol, albuterol sulfate hfa, albuterol, oxygen concentrator, tiotropium, and ibuprofen, and the following Facility-Administered Medications: sodium chloride flush, heparin flush, sodium chloride, PACLitaxel (TAXOL) 72 mg in sodium chloride 0.9 % 250 mL chemo infusion, and CARBOplatin (PARAPLATIN) 170 mg in sodium chloride 0.9 % 100 mL chemo IVPB. ALLERGIES:  has No Known Allergies. FAMILY HISTORY: Negative for any hematological or oncological conditions. SOCIAL HISTORY:  reports that she has been smoking cigarettes. She started smoking about 52 years ago. She has a 13.00 pack-year smoking history. She has never used smokeless tobacco. She reports that she has current or past drug history. Drug: Cocaine. She reports that she does not drink alcohol. REVIEW OF SYSTEMS:   General: No fever or night sweats. lost weight in the last few months.  But now gaining back   ENT: No double or blurred vision, no tinnitus or hearing problem, no dysphagia or sore throat   Respiratory: No chest pain, no shortness of breath, no cough or hemoptysis. Cardiovascular: Denies chest pain, PND or orthopnea. No L E swelling or palpitations. Gastrointestinal: No nausea or vomiting, abdominal pain, diarrhea or constipation. Genitourinary: Denies dysuria, hematuria, frequency, urgency or incontinence. Neurological: Denies headaches, decreased LOC, no sensory or motor focal deficits. Musculoskeletal: No arthralgia no back pain or joint swelling. +right shoulder pain  Skin: There are no rashes or bleeding. Psychiatric: No anxiety, no depression. Endocrine: No diabetes or thyroid disease. Hematologic: No bleeding, no adenopathy. PHYSICAL EXAM: Shows a well appearing 77y.o.-year-old female who is not in pain or distress. Vital Signs: There were no vitals taken for this visit. HEENT: Normocephalic and atraumatic. Pupils are equal, round, reactive to light and accommodation. Extraocular muscles are intact. Neck: Showed no JVD, no carotid bruit . Lungs: Decreased air entry. Clear to auscultation bilaterally. Heart: Regular without any murmur. Abdomen: Soft, nontender. No hepatosplenomegaly. Extremities: Lower extremities show no edema, clubbing, or cyanosis. Breasts: Examination not done today.  Neuro exam: intact cranial nerves bilaterally no motor or sensory deficit, gait is normal. Lymphatic: no adenopathy appreciated in the supraclavicular, axillary, cervical or inguinal area    REVIEW OF LABORATORY DATA:   Lab Results   Component Value Date    WBC 7.7 05/23/2019    HGB 11.8 (L) 05/23/2019    HCT 34.0 (L) 05/23/2019    MCV 89.5 05/23/2019     (L) 05/23/2019       Chemistry        Component Value Date/Time     (L) 05/23/2019 1135    K 4.5 05/23/2019 1135    CL 96 (L) 05/23/2019 1135    CO2 24 05/23/2019 1135    BUN 13 05/23/2019 1135    CREATININE 0.60 05/23/2019 1135        Component Value Date/Time    CALCIUM 8.3 (L) 05/23/2019 1135

## 2019-05-24 ENCOUNTER — HOSPITAL ENCOUNTER (OUTPATIENT)
Dept: RADIATION ONCOLOGY | Facility: MEDICAL CENTER | Age: 69
Discharge: HOME | End: 2019-05-24
Attending: RADIOLOGY
Payer: MEDICARE

## 2019-05-24 ENCOUNTER — TELEPHONE (OUTPATIENT)
Dept: ONCOLOGY | Age: 69
End: 2019-05-24

## 2019-05-24 PROCEDURE — 77412 RADIATION TX DELIVERY LVL 3: CPT | Performed by: RADIOLOGY

## 2019-05-24 PROCEDURE — 77387 GUIDANCE FOR RADJ TX DLVR: CPT | Performed by: RADIOLOGY

## 2019-05-24 NOTE — TELEPHONE ENCOUNTER
Name: Britney Lopez  : 1950  MRN: [id number]    Oncology Navigation Follow-Up Note    Contact Type:  Telephone    Subjective:     Objective:     Assistance Needed:     Receptive to Advanced Care Planning / Palliative Care:      Referrals:     Education:     Notes:Navigator received call and pt. Requesting information on mobile meals as well as obtaining new mattress for hospital bed? Navigator will speak with Alvarado next week, left message for her.     Electronically signed by Cl Jane RN on 2019 at 2:26 PM

## 2019-05-24 NOTE — TELEPHONE ENCOUNTER
Chemo orders received, ht 63\", wt 128 lbs, and bsa 1.61verified. Dose of chemotherapy verified:  Taxol 45mg/m2 = 72.45mg rounded to 72mg  Carbo AUC 2  Carbo/Taxol weekly.  Tavon Davalos

## 2019-05-28 ENCOUNTER — HOSPITAL ENCOUNTER (OUTPATIENT)
Dept: RADIATION ONCOLOGY | Facility: MEDICAL CENTER | Age: 69
Discharge: HOME | End: 2019-05-28
Payer: MEDICARE

## 2019-05-28 ENCOUNTER — HOSPITAL ENCOUNTER (OUTPATIENT)
Dept: RADIATION ONCOLOGY | Facility: MEDICAL CENTER | Age: 69
Discharge: HOME | End: 2019-05-28
Attending: RADIOLOGY
Payer: MEDICARE

## 2019-05-28 ENCOUNTER — HOSPITAL ENCOUNTER (OUTPATIENT)
Age: 69
Setting detail: SPECIMEN
Discharge: HOME | End: 2019-05-28
Payer: MEDICARE

## 2019-05-28 ENCOUNTER — TELEPHONE (OUTPATIENT)
Dept: ONCOLOGY | Age: 69
End: 2019-05-28

## 2019-05-28 VITALS
SYSTOLIC BLOOD PRESSURE: 119 MMHG | BODY MASS INDEX: 22.41 KG/M2 | TEMPERATURE: 98.8 F | WEIGHT: 126.5 LBS | OXYGEN SATURATION: 97 % | HEART RATE: 96 BPM | RESPIRATION RATE: 16 BRPM | DIASTOLIC BLOOD PRESSURE: 66 MMHG

## 2019-05-28 DIAGNOSIS — C34.11 MALIGNANT NEOPLASM OF UPPER LOBE OF RIGHT LUNG (HCC): Primary | ICD-10-CM

## 2019-05-28 DIAGNOSIS — C34.11 MALIGNANT NEOPLASM OF UPPER LOBE OF RIGHT LUNG (HCC): ICD-10-CM

## 2019-05-28 LAB
ABSOLUTE EOS #: 0 K/UL (ref 0–0.4)
ABSOLUTE IMMATURE GRANULOCYTE: 0 K/UL (ref 0–0.3)
ABSOLUTE LYMPH #: 0.76 K/UL (ref 1–4.8)
ABSOLUTE MONO #: 0.11 K/UL (ref 0.1–0.8)
BASOPHILS # BLD: 0 % (ref 0–2)
BASOPHILS ABSOLUTE: 0 K/UL (ref 0–0.2)
DIFFERENTIAL TYPE: ABNORMAL
EOSINOPHILS RELATIVE PERCENT: 0 % (ref 1–4)
HCT VFR BLD CALC: 35.1 % (ref 36.3–47.1)
HEMOGLOBIN: 11.4 G/DL (ref 11.9–15.1)
IMMATURE GRANULOCYTES: 0 %
LYMPHOCYTES # BLD: 7 % (ref 24–44)
MCH RBC QN AUTO: 29.5 PG (ref 25.2–33.5)
MCHC RBC AUTO-ENTMCNC: 32.5 G/DL (ref 28.4–34.8)
MCV RBC AUTO: 90.9 FL (ref 82.6–102.9)
MONOCYTES # BLD: 1 % (ref 1–7)
MORPHOLOGY: NORMAL
NRBC AUTOMATED: 0 PER 100 WBC
PDW BLD-RTO: 12.7 % (ref 11.8–14.4)
PLATELET # BLD: ABNORMAL K/UL (ref 138–453)
PLATELET ESTIMATE: ABNORMAL
PLATELET, FLUORESCENCE: NORMAL K/UL (ref 138–453)
PLATELET, IMMATURE FRACTION: NORMAL % (ref 1.1–10.3)
PMV BLD AUTO: ABNORMAL FL (ref 8.1–13.5)
RBC # BLD: 3.86 M/UL (ref 3.95–5.11)
RBC # BLD: ABNORMAL 10*6/UL
SEG NEUTROPHILS: 92 % (ref 36–66)
SEGMENTED NEUTROPHILS ABSOLUTE COUNT: 10.03 K/UL (ref 1.8–7.7)
WBC # BLD: 10.9 K/UL (ref 3.5–11.3)
WBC # BLD: ABNORMAL 10*3/UL

## 2019-05-28 PROCEDURE — 77412 RADIATION TX DELIVERY LVL 3: CPT | Performed by: RADIOLOGY

## 2019-05-28 PROCEDURE — 77387 GUIDANCE FOR RADJ TX DLVR: CPT | Performed by: RADIOLOGY

## 2019-05-28 NOTE — PROGRESS NOTES
Saba Marker  5/28/2019  Wt Readings from Last 3 Encounters:   05/28/19 126 lb 8 oz (57.4 kg)   05/23/19 128 lb 12.8 oz (58.4 kg)   05/23/19 128 lb (58.1 kg)     Body mass index is 22.41 kg/m². Pt here for OTV. Patient VS taken. Patient currently on oxygen 2L NC. Patient states Saturday she started to not feel well, her glands in her throat hurt, she is having fever/chills up to 100. Patient coughing to the point of vomiting light green mucus and feels like she needs more oxygen. Denies chest pain. Patient feels more short of breath. Dr Anthony Ricks to [facility]. Ordered CBC for patient and she will go get it done today in the [facility] lab upstairs. Treatment Area:lung    Patient was seen today for weekly visit.       Comfort Alteration  Fatigue: Severe    Ventilation Alterations  Cough: Yes  Hemoptysis: No  Mucus Color: light green  Dyspnea: Yes  O2 Sat: 97%    Nutritional Alteration  Anorexia: No  Nausea: No   Vomiting: Yes     Skin Alteration   Sensation:none    Radiation Dermatitis:  none    Mucous Membrane Alteration  Voice Changes/ Stridor/Larynx: yes - started yesterday trouble speaking  Pharynx & Esophagus: none2    Elimination Alterations  Constipation: yes, feels constipated, but going 3-4 times a day with mucus in the stool  Diarrhea:  no      Emotional  Coping: effective      Injury, potential bleeding or infection: none     Other:none    Lab Results   Component Value Date    WBC 7.7 05/23/2019     (L) 05/23/2019         /66   Pulse 96   Temp 98.8 °F (37.1 °C) (Oral)   Resp 16   Wt 126 lb 8 oz (57.4 kg)   SpO2 97%   BMI 22.41 kg/m²             Beni Jackson

## 2019-05-28 NOTE — TELEPHONE ENCOUNTER
SW received a message from Viviana Rincon, that patient would like a referral for home delivered meals. SW called [facility] and made a referral to their [facility] [employer] [facility].  SW spoke with patient to update her and she will expect a call from [facility] to complete the intake process. May Gomez.  Rashid Body

## 2019-05-28 NOTE — PROGRESS NOTES
Shi Munroe M.D. Aidan Campos. Ericka Dalal, Ph.D., M.D., Laure Epley, M.D. Lesvia Shultz, Ph.D., M.D. Cristian Bojorquez M.D.        Date of Service: 2019    Location:  [facility],   [address], [place], [address]   [facility] WEEKLY PROGRESS NOTE    Patient ID:   Jean Waller  : 1950   MRN: [id number]    Diagnosis:  Cancer Staging  Malignant neoplasm of upper lobe of right lung [facility])  Staging form: Lung, AJCC 8th Edition  - Clinical stage from 4/15/2019: Stage IIIA (cT4, cN0, cM0) - Signed by Rubina Jorgensen MD on 2019  Staging comments: Squamous cell carcinoma      Radiation Treatment Information:   Actual Dose: 600 cGy  Total Planned Dose: 6,000 cGy  Treatment Site: right lung    IMAGING:   Image match with port films    CHEMOTHERAPY UPDATE:   Treatment Summary   Plan Name OP Non-Small Cell Lung: PACLItaxel/CARBOplatin with Concurrent Radiation (Induction Course)   Treatment goal Curative   Provider Neha Burnett MD   Status Active   Start Date 2019   End Date 2019 (Planned)   Treatment plan weight/height/BSA Weight type: Documented (effective on 5/10/2019), 56.8 kg (entered on 5/10/2019), 160 cm (entered on 2019), BSA 1.59 m2   Most recent weight/height/BSA Weight: Weight: 126 lb 8 oz (57.4 kg)    Height: Height: 5' 3\" (1.6 m)    BSA: BSA (Calculated - sq m): 1.58 sq meters      Treatment on Clinical Trial? [This plan is not part of a research study]   Reason for stopping treatment [Plan is still active]   Treatment Plan Medications alteplase (CATHFLO) injection 2 mg, 2 mg, Intracatheter, ONCE, 1 of 7 cycles    palonosetron (ALOXI) injection 0.25 mg, 0.25 mg, Intravenous, ONCE, 1 of 7 cycles  Administration: 0.25 mg (2019)    hydrocortisone sodium succinate PF (SOLU-CORTEF) injection 100 mg, 100 mg, Intravenous, ONCE, 1 of 7 cycles    CARBOplatin (PARAPLATIN) 170 mg in sodium chloride 0.9 % 100 mL chemo IVPB, 170 mg, Intravenous, ONCE, 1 of 7 cycles  Administration: 170 mg (5/23/2019)    PACLitaxel (TAXOL) 72 mg in sodium chloride 0.9 % 250 mL chemo infusion, 45 mg/m2 = 72 mg, Intravenous, ONCE, 1 of 7 cycles  Administration: 72 mg (5/23/2019)    dexamethasone (DECADRON) injection 10 mg, 10 mg, Intravenous, ONCE, 1 of 7 cycles  Administration: 10 mg (5/23/2019)    methylPREDNISolone sodium (SOLU-MEDROL) injection 125 mg, 125 mg, Intravenous, ONCE, 1 of 7 cycles    famotidine (PEPCID) injection 20 mg, 20 mg, Intravenous, ONCE, 1 of 7 cycles  Administration: 20 mg (5/23/2019)           SUBJECTIVE: Ej Rodriguez has started combined modality therapy for locally advanced nonsmall cell carcinoma. She has not done well over the weekend with increasing shortness of breath, subjective fevers, chills, sore throat and malaise. Systemic chemotherapy was administered late last week by her report; laboratory studies from May 23rd were within acceptable range to proceed with radiation treatment. Her appetite is good, she does not have difficulty swallowing solids or liquids, bowels and bladder are functioning \"normal\". She denies any skin irritation about the chest, no productive cough or hemoptysis at this time. OBJECTIVE:     Labs:  WBC   Date Value Ref Range Status   05/23/2019 7.7 3.5 - 11.0 k/uL Final     Segs Absolute   Date Value Ref Range Status   05/23/2019 5.90 1.8 - 7.7 k/uL Final     Hemoglobin   Date Value Ref Range Status   05/23/2019 11.8 (L) 12.0 - 16.0 g/dL Final     Platelets   Date Value Ref Range Status   05/23/2019 138 (L) 140 - 450 k/uL Final   Medications:    Current Outpatient Medications:     oxyCODONE (ROXICODONE) 5 MG immediate release tablet, Take 1 tablet by mouth every 6 hours as needed for Pain for up to 14 days.  Take lowest dose possible to manage pain, Disp: 56 tablet, Rfl: 0    ondansetron (ZOFRAN ODT) 8 MG TBDP disintegrating tablet, Place 1 tablet under the tongue every 8 hours as needed for Nausea or Vomiting, Disp: 30 tablet, Rfl: 3    omeprazole (PRILOSEC) 20 MG delayed release capsule, Take 1 capsule by mouth daily, Disp: 30 capsule, Rfl: 3    LORazepam (ATIVAN) 0.5 MG tablet, Use 1-2 tabs prior to radiation to help with anxiety, Disp: 45 tablet, Rfl: 0    varenicline (CHANTIX USHA) 0.5 MG X 11 & 1 MG X 42 tablet, Take by mouth., Disp: 1 box, Rfl: 0    varenicline (CHANTIX CONTINUING MONTH USHA) 1 MG tablet, Take 1 tablet by mouth 2 times daily, Disp: 60 tablet, Rfl: 3    hydrOXYzine (ATARAX) 25 MG tablet, Take 1 tablet by mouth 2 times daily, Disp: 60 tablet, Rfl: 3    budesonide-formoterol (SYMBICORT) 160-4.5 MCG/ACT AERO, Inhale 2 puffs into the lungs 2 times daily, Disp: 1 Inhaler, Rfl: 3    albuterol sulfate HFA (PROVENTIL HFA) 108 (90 Base) MCG/ACT inhaler, Inhale 2 puffs into the lungs every 6 hours as needed for Wheezing, Disp: 1 Inhaler, Rfl: 3    albuterol (PROVENTIL) (2.5 MG/3ML) 0.083% nebulizer solution, Take 3 mLs by nebulization every 6 hours as needed for Wheezing, Disp: 120 each, Rfl: 5    Oxygen Concentrator, , Disp: , Rfl:     tiotropium (SPIRIVA RESPIMAT) 2.5 MCG/ACT AERS inhaler, Inhale 2 puffs into the lungs daily, Disp: 1 Inhaler, Rfl: 5    Pain Plan:  Patient is satisfied with current level of pain control       Patient Currently in Pain: No  Pain Assessment: 0-10      Immunizations/Smoking Status:  Immunization History   Administered Date(s) Administered    Influenza, Quadv, 3 Years and older, IM (Fluzone 3 yrs and older or Afluria 5 yrs and older) 03/21/2019    Influenza, Cherise Hole, 3 yrs and older, IM, PF (Fluzone 3 yrs and older or Afluria 5 yrs and older) 02/01/2018    Pneumococcal Polysaccharide ([id number]) 03/21/2019       Social History     Tobacco Use   Smoking Status Current Every Day Smoker    Packs/day: 0.25    Years: 52.00    Pack years: 13.00    Types: Cigarettes    Start date: 1/1/1967   Smokeless Tobacco Never

## 2019-05-29 ENCOUNTER — HOSPITAL ENCOUNTER (OUTPATIENT)
Dept: RADIATION ONCOLOGY | Facility: MEDICAL CENTER | Age: 69
Discharge: HOME | End: 2019-05-29
Attending: RADIOLOGY
Payer: MEDICARE

## 2019-05-29 ENCOUNTER — TELEPHONE (OUTPATIENT)
Dept: CASE MANAGEMENT | Age: 69
End: 2019-05-29

## 2019-05-29 PROCEDURE — 77412 RADIATION TX DELIVERY LVL 3: CPT | Performed by: RADIOLOGY

## 2019-05-29 PROCEDURE — 77387 GUIDANCE FOR RADJ TX DLVR: CPT | Performed by: RADIOLOGY

## 2019-05-29 NOTE — TELEPHONE ENCOUNTER
Name: Myrna Cosme  : 1950  MRN: [id number]    Oncology Navigation Follow-Up Note    Contact Type:  Telephone    Subjective:     Objective:     Assistance Needed:     Receptive to Advanced Care Planning / Palliative Care:      Referrals:     Education:     Notes: Navigator spoke with pt. And pt. Will received meals starting tomorrow. Pt. Updated regarding mattress for hospital bed. If unable to bill insurance for mattress, pt. Willing to pay out of pocket for mattress.  Await return call [address] [phone]    Electronically signed by Rocio Montague RN on 2019 at 1:45 PM

## 2019-05-30 ENCOUNTER — TELEPHONE (OUTPATIENT)
Dept: ONCOLOGY | Age: 69
End: 2019-05-30

## 2019-05-30 ENCOUNTER — HOSPITAL ENCOUNTER (OUTPATIENT)
Dept: RADIATION ONCOLOGY | Facility: MEDICAL CENTER | Age: 69
Discharge: HOME | End: 2019-05-30
Attending: RADIOLOGY
Payer: MEDICARE

## 2019-05-30 PROCEDURE — 77412 RADIATION TX DELIVERY LVL 3: CPT | Performed by: RADIOLOGY

## 2019-05-30 PROCEDURE — 77387 GUIDANCE FOR RADJ TX DLVR: CPT | Performed by: RADIOLOGY

## 2019-05-30 PROCEDURE — 77336 RADIATION PHYSICS CONSULT: CPT | Performed by: RADIOLOGY

## 2019-05-30 NOTE — TELEPHONE ENCOUNTER
SW met with patient to review her psychosocial form. Patient had previously been set up with [facility] and she states this is working well for her. We discussed insurance coverage and she worries about co-pays. [place] [id number] Financial Assistance Program explained to patient and she was provided applications to complete, questions answered. SW confirmed her upcoming chemo appointment at [address]. SW also asked RN, Ezequiel Grey, to meet with patient as she had some symptoms and questions about labs. SW contact provided and patient encouraged to call if needed. Lizzy Narvaez.  Karen Medeiros

## 2019-05-30 NOTE — PROGRESS NOTES
Food [facility] Nutrition Services Out-Patient Record Summary  Pt Name: Shraddha Gonzales   PCP: Meera Thomas PA-C     Date of Service:  5/30/19, Time 10:00 am  Client Data:   Dx: Malignant neoplasm of upper lobe of (R) lung, Stage IIIA (cT4, cN0, cM0)   Pertinent Medical History: MVP, Chronic hepatitis C, cirrhosis of liver, COPD, thrombocytopenia, bipolar disorder, anxiety & depression. +Smoker. Pertinent Surgical History:  s/p cholecystectomy, hysterectomy, thoracentesis, bronchoscopy with bx (4/15) & (R) tunneled central venous catheter w/ PORT (5/13). Pertinent Medications:  oxyCODONE 5 MG tablet, PRILOSEC 20 MG capsule, ATIVAN 0.5 MG tablet & ZOFRAN ODT 8 MG TBDP tablet every 8 hours as needed for N/V. Tx:  PACLltaxel/CARBOplatin with Concurrent Radiation (Induction Course) Ht:  160.02 cm (63\")  Wt:  57.4 kg (126 lb)   BMI  22.4 (Normal)    IBW:  59 kg (130 lb) %IBW:  97%   UBW (x 6 months ago):  72.7 kg (160 lb) % UBW:  79%    BMR:  1,190 kcal/day (HBE)    Estimated Kcal Needs:  ~1,650-1,800 kcal / day to gain 1# per week  Estimated Protein Needs:  ~75-85 g protein /day  Estimated Fluid Needs:  ~2,000-2,100 ml / day       Patient Comments (on O2 therapy) :  c/o having a +sore throat, +ear aches, +appetite is not the greatest & having a lot of +fatigue; needs to drag self out of bed. Dietary hx:  Following a general diet, taking smaller/more frequent meals/day. Likes Ensure Plus ONS (chocolate/strawberry); but c/o them being expensive. Mobile Meals starting @ 3 pm today per Pt. Nutrition Dx: Inadequate energy intake related to Organ/system dysfunction: Lung CA as evidenced by Weight loss of +15.3 kg (21.3%) x past ~6 months, 79% UBW  Nutrition Goals:  1. Address current cancer and tx related issues, concerns, and educational needs. 2. Minimize tx related side effects, tx delays, and the need for hospital admissions.   3. Anticipate and help manage acute, delayed/late emerging, and late occurring Low Back Pain: Exercises  Your Care Instructions  Here are some examples of typical rehabilitation exercises for your condition. Start each exercise slowly. Ease off the exercise if you start to have pain. Your doctor or physical therapist will tell you when you can start these exercises and which ones will work best for you. How to do the exercises  Press-up    1. Lie on your stomach, supporting your body with your forearms. 2. Press your elbows down into the floor to raise your upper back. As you do this, relax your stomach muscles and allow your back to arch without using your back muscles. As your press up, do not let your hips or pelvis come off the floor. 3. Hold for 15 to 30 seconds, then relax. 4. Repeat 2 to 4 times. Alternate arm and leg (bird dog) exercise    1. Start on the floor, on your hands and knees. 2. Tighten your belly muscles. 3. Raise one leg off the floor, and hold it straight out behind you. Be careful not to let your hip drop down, because that will twist your trunk. 4. Hold for about 6 seconds, then lower your leg and switch to the other leg. 5. Repeat 8 to 12 times on each leg. 6. Over time, work up to holding for 10 to 30 seconds each time. 7. If you feel stable and secure with your leg raised, try raising the opposite arm straight out in front of you at the same time. Knee-to-chest exercise    1. Lie on your back with your knees bent and your feet flat on the floor. 2. Bring one knee to your chest, keeping the other foot flat on the floor (or keeping the other leg straight, whichever feels better on your lower back). 3. Keep your lower back pressed to the floor. Hold for at least 15 to 30 seconds. 4. Relax, and lower the knee to the starting position. 5. Repeat with the other leg. Repeat 2 to 4 times with each leg. 6. To get more stretch, put your other leg flat on the floor while pulling your knee to your chest.    Curl-ups    1.  Lie on the floor on your back with your knees bent at a 90-degree angle. Your feet should be flat on the floor, about 12 inches from your buttocks. 2. Cross your arms over your chest. If this bothers your neck, try putting your hands behind your neck (not your head), with your elbows spread apart. 3. Slowly tighten your belly muscles and raise your shoulder blades off the floor. 4. Keep your head in line with your body, and do not press your chin to your chest.  5. Hold this position for 1 or 2 seconds, then slowly lower yourself back down to the floor. 6. Repeat 8 to 12 times. Pelvic tilt exercise    1. Lie on your back with your knees bent. 2. \"Brace\" your stomach. This means to tighten your muscles by pulling in and imagining your belly button moving toward your spine. You should feel like your back is pressing to the floor and your hips and pelvis are rocking back. 3. Hold for about 6 seconds while you breathe smoothly. 4. Repeat 8 to 12 times. Heel dig bridging    1. Lie on your back with both knees bent and your ankles bent so that only your heels are digging into the floor. Your knees should be bent about 90 degrees. 2. Then push your heels into the floor, squeeze your buttocks, and lift your hips off the floor until your shoulders, hips, and knees are all in a straight line. 3. Hold for about 6 seconds as you continue to breathe normally, and then slowly lower your hips back down to the floor and rest for up to 10 seconds. 4. Do 8 to 12 repetitions. Hamstring stretch in doorway    1. Lie on your back in a doorway, with one leg through the open door. 2. Slide your leg up the wall to straighten your knee. You should feel a gentle stretch down the back of your leg. 3. Hold the stretch for at least 15 to 30 seconds. Do not arch your back, point your toes, or bend either knee. Keep one heel touching the floor and the other heel touching the wall. 4. Repeat with your other leg. 5. Do 2 to 4 times for each leg.     Hip flexor stretch    1. Kneel on the floor with one knee bent and one leg behind you. Place your forward knee over your foot. Keep your other knee touching the floor. 2. Slowly push your hips forward until you feel a stretch in the upper thigh of your rear leg. 3. Hold the stretch for at least 15 to 30 seconds. Repeat with your other leg. 4. Do 2 to 4 times on each side. Wall sit    1. Stand with your back 10 to 12 inches away from a wall. 2. Lean into the wall until your back is flat against it. 3. Slowly slide down until your knees are slightly bent, pressing your lower back into the wall. 4. Hold for about 6 seconds, then slide back up the wall. 5. Repeat 8 to 12 times. Follow-up care is a key part of your treatment and safety. Be sure to make and go to all appointments, and call your doctor if you are having problems. It's also a good idea to know your test results and keep a list of the medicines you take. Where can you learn more? Go to http://josé miguel-reid.info/. Enter N670 in the search box to learn more about \"Low Back Pain: Exercises. \"  Current as of: September 20, 2018  Content Version: 11.9  © 7248-0359 [employer]. Care instructions adapted under license by [employer] (which disclaims liability or warranty for this information). If you have questions about a medical condition or this instruction, always ask your healthcare professional. [place] [address] any warranty or liability for your use of this information. Low Back Pain: Exercises  Your Care Instructions  Here are some examples of typical rehabilitation exercises for your condition. Start each exercise slowly. Ease off the exercise if you start to have pain. Your doctor or physical therapist will tell you when you can start these exercises and which ones will work best for you. How to do the exercises  Press-up    5.  Lie on your stomach, supporting your body with your forearms. 6. Press your elbows down into the floor to raise your upper back. As you do this, relax your stomach muscles and allow your back to arch without using your back muscles. As your press up, do not let your hips or pelvis come off the floor. 7. Hold for 15 to 30 seconds, then relax. 8. Repeat 2 to 4 times. Alternate arm and leg (bird dog) exercise    8. Start on the floor, on your hands and knees. 9. Tighten your belly muscles. 10. Raise one leg off the floor, and hold it straight out behind you. Be careful not to let your hip drop down, because that will twist your trunk. 11. Hold for about 6 seconds, then lower your leg and switch to the other leg. 12. Repeat 8 to 12 times on each leg. 13. Over time, work up to holding for 10 to 30 seconds each time. 14. If you feel stable and secure with your leg raised, try raising the opposite arm straight out in front of you at the same time. Knee-to-chest exercise    7. Lie on your back with your knees bent and your feet flat on the floor. 8. Bring one knee to your chest, keeping the other foot flat on the floor (or keeping the other leg straight, whichever feels better on your lower back). 9. Keep your lower back pressed to the floor. Hold for at least 15 to 30 seconds. 10. Relax, and lower the knee to the starting position. 11. Repeat with the other leg. Repeat 2 to 4 times with each leg. 12. To get more stretch, put your other leg flat on the floor while pulling your knee to your chest.    Curl-ups    7. Lie on the floor on your back with your knees bent at a 90-degree angle. Your feet should be flat on the floor, about 12 inches from your buttocks. 8. Cross your arms over your chest. If this bothers your neck, try putting your hands behind your neck (not your head), with your elbows spread apart. 9. Slowly tighten your belly muscles and raise your shoulder blades off the floor.   10. Keep your head in line with your body, and do not press your chin to your chest.  11. Hold this position for 1 or 2 seconds, then slowly lower yourself back down to the floor. 12. Repeat 8 to 12 times. Pelvic tilt exercise    5. Lie on your back with your knees bent. 6. \"Brace\" your stomach. This means to tighten your muscles by pulling in and imagining your belly button moving toward your spine. You should feel like your back is pressing to the floor and your hips and pelvis are rocking back. 7. Hold for about 6 seconds while you breathe smoothly. 8. Repeat 8 to 12 times. Heel dig bridging    5. Lie on your back with both knees bent and your ankles bent so that only your heels are digging into the floor. Your knees should be bent about 90 degrees. 6. Then push your heels into the floor, squeeze your buttocks, and lift your hips off the floor until your shoulders, hips, and knees are all in a straight line. 7. Hold for about 6 seconds as you continue to breathe normally, and then slowly lower your hips back down to the floor and rest for up to 10 seconds. 8. Do 8 to 12 repetitions. Hamstring stretch in doorway    6. Lie on your back in a doorway, with one leg through the open door. 7. Slide your leg up the wall to straighten your knee. You should feel a gentle stretch down the back of your leg. 8. Hold the stretch for at least 15 to 30 seconds. Do not arch your back, point your toes, or bend either knee. Keep one heel touching the floor and the other heel touching the wall. 9. Repeat with your other leg. 10. Do 2 to 4 times for each leg. Hip flexor stretch    5. Kneel on the floor with one knee bent and one leg behind you. Place your forward knee over your foot. Keep your other knee touching the floor. 6. Slowly push your hips forward until you feel a stretch in the upper thigh of your rear leg. 7. Hold the stretch for at least 15 to 30 seconds. Repeat with your other leg. 8. Do 2 to 4 times on each side. Wall sit    6.  Stand with your back 10 to 12 inches away from a wall. 7. Lean into the wall until your back is flat against it. 8. Slowly slide down until your knees are slightly bent, pressing your lower back into the wall. 9. Hold for about 6 seconds, then slide back up the wall. 10. Repeat 8 to 12 times. Follow-up care is a key part of your treatment and safety. Be sure to make and go to all appointments, and call your doctor if you are having problems. It's also a good idea to know your test results and keep a list of the medicines you take. Where can you learn more? Go to http://josé miguel-reid.info/. Enter O113 in the search box to learn more about \"Low Back Pain: Exercises. \"  Current as of: September 20, 2018  Content Version: 11.9  © 8762-9084 [employer]. Care instructions adapted under license by [employer] (which disclaims liability or warranty for this information). If you have questions about a medical condition or this instruction, always ask your healthcare professional. [address] any warranty or liability for your use of this information.

## 2019-05-31 ENCOUNTER — TELEPHONE (OUTPATIENT)
Dept: CASE MANAGEMENT | Age: 69
End: 2019-05-31

## 2019-05-31 ENCOUNTER — HOSPITAL ENCOUNTER (OUTPATIENT)
Dept: RADIATION ONCOLOGY | Facility: MEDICAL CENTER | Age: 69
Discharge: HOME | End: 2019-05-31
Attending: RADIOLOGY
Payer: MEDICARE

## 2019-05-31 ENCOUNTER — HOSPITAL ENCOUNTER (OUTPATIENT)
Dept: INFUSION THERAPY | Age: 69
Discharge: HOME | End: 2019-05-31
Payer: MEDICARE

## 2019-05-31 VITALS
SYSTOLIC BLOOD PRESSURE: 126 MMHG | BODY MASS INDEX: 22.96 KG/M2 | TEMPERATURE: 99.1 F | OXYGEN SATURATION: 97 % | RESPIRATION RATE: 22 BRPM | HEART RATE: 86 BPM | DIASTOLIC BLOOD PRESSURE: 71 MMHG | WEIGHT: 129.6 LBS

## 2019-05-31 DIAGNOSIS — C34.11 MALIGNANT NEOPLASM OF UPPER LOBE OF RIGHT LUNG (HCC): Primary | ICD-10-CM

## 2019-05-31 LAB
ABSOLUTE EOS #: 0.2 K/UL (ref 0–0.4)
ABSOLUTE IMMATURE GRANULOCYTE: ABNORMAL K/UL (ref 0–0.3)
ABSOLUTE LYMPH #: 0.5 K/UL (ref 1–4.8)
ABSOLUTE MONO #: 0.7 K/UL (ref 0.1–1.2)
ALBUMIN SERPL-MCNC: 2.7 G/DL (ref 3.5–5.2)
ALBUMIN/GLOBULIN RATIO: 0.6 (ref 1–2.5)
ALP BLD-CCNC: 97 U/L (ref 35–104)
ALT SERPL-CCNC: 20 U/L (ref 5–33)
ANION GAP SERPL CALCULATED.3IONS-SCNC: 8 MMOL/L (ref 9–17)
AST SERPL-CCNC: 30 U/L
BASOPHILS # BLD: 0 % (ref 0–2)
BASOPHILS ABSOLUTE: 0 K/UL (ref 0–0.2)
BILIRUB SERPL-MCNC: 0.81 MG/DL (ref 0.3–1.2)
BUN BLDV-MCNC: 8 MG/DL (ref 8–23)
BUN/CREAT BLD: ABNORMAL (ref 9–20)
CALCIUM SERPL-MCNC: 8.4 MG/DL (ref 8.6–10.4)
CHLORIDE BLD-SCNC: 96 MMOL/L (ref 98–107)
CO2: 24 MMOL/L (ref 20–31)
CREAT SERPL-MCNC: 0.44 MG/DL (ref 0.5–0.9)
DIFFERENTIAL TYPE: ABNORMAL
EOSINOPHILS RELATIVE PERCENT: 4 % (ref 1–4)
GFR AFRICAN AMERICAN: >60 ML/MIN
GFR NON-AFRICAN AMERICAN: >60 ML/MIN
GFR SERPL CREATININE-BSD FRML MDRD: ABNORMAL ML/MIN/{1.73_M2}
GFR SERPL CREATININE-BSD FRML MDRD: ABNORMAL ML/MIN/{1.73_M2}
GLUCOSE BLD-MCNC: 155 MG/DL (ref 70–99)
HCT VFR BLD CALC: 30 % (ref 36–46)
HEMOGLOBIN: 10.4 G/DL (ref 12–16)
IMMATURE GRANULOCYTES: ABNORMAL %
LYMPHOCYTES # BLD: 11 % (ref 24–44)
MCH RBC QN AUTO: 30.6 PG (ref 26–34)
MCHC RBC AUTO-ENTMCNC: 34.7 G/DL (ref 31–37)
MCV RBC AUTO: 88.1 FL (ref 80–100)
MONOCYTES # BLD: 16 % (ref 2–11)
NRBC AUTOMATED: ABNORMAL PER 100 WBC
PDW BLD-RTO: 13 % (ref 12.5–15.4)
PLATELET # BLD: 163 K/UL (ref 140–450)
PLATELET ESTIMATE: ABNORMAL
PMV BLD AUTO: 7.1 FL (ref 6–12)
POTASSIUM SERPL-SCNC: 4.1 MMOL/L (ref 3.7–5.3)
RBC # BLD: 3.4 M/UL (ref 4–5.2)
RBC # BLD: ABNORMAL 10*6/UL
SEG NEUTROPHILS: 69 % (ref 36–66)
SEGMENTED NEUTROPHILS ABSOLUTE COUNT: 2.9 K/UL (ref 1.8–7.7)
SODIUM BLD-SCNC: 128 MMOL/L (ref 135–144)
TOTAL PROTEIN: 6.9 G/DL (ref 6.4–8.3)
WBC # BLD: 4.2 K/UL (ref 3.5–11)
WBC # BLD: ABNORMAL 10*3/UL

## 2019-05-31 PROCEDURE — 96417 CHEMO IV INFUS EACH ADDL SEQ: CPT | Performed by: NURSE PRACTITIONER

## 2019-05-31 PROCEDURE — 2500000003 HC RX 250 WO HCPCS: Performed by: INTERNAL MEDICINE

## 2019-05-31 PROCEDURE — 77417 THER RADIOLOGY PORT IMAGE(S): CPT | Performed by: RADIOLOGY

## 2019-05-31 PROCEDURE — 77412 RADIATION TX DELIVERY LVL 3: CPT | Performed by: RADIOLOGY

## 2019-05-31 PROCEDURE — 96413 CHEMO IV INFUSION 1 HR: CPT | Performed by: NURSE PRACTITIONER

## 2019-05-31 PROCEDURE — 36591 DRAW BLOOD OFF VENOUS DEVICE: CPT | Performed by: NURSE PRACTITIONER

## 2019-05-31 PROCEDURE — 6360000002 HC RX W HCPCS: Performed by: INTERNAL MEDICINE

## 2019-05-31 PROCEDURE — 96375 TX/PRO/DX INJ NEW DRUG ADDON: CPT | Performed by: NURSE PRACTITIONER

## 2019-05-31 PROCEDURE — 2580000003 HC RX 258: Performed by: INTERNAL MEDICINE

## 2019-05-31 PROCEDURE — 80053 COMPREHEN METABOLIC PANEL: CPT

## 2019-05-31 PROCEDURE — 85025 COMPLETE CBC W/AUTO DIFF WBC: CPT

## 2019-05-31 RX ORDER — PALONOSETRON 0.05 MG/ML
0.25 INJECTION, SOLUTION INTRAVENOUS ONCE
Status: COMPLETED | OUTPATIENT
Start: 2019-05-31 | End: 2019-05-31

## 2019-05-31 RX ORDER — SODIUM CHLORIDE 9 MG/ML
20 INJECTION, SOLUTION INTRAVENOUS ONCE
Status: COMPLETED | OUTPATIENT
Start: 2019-05-31 | End: 2019-05-31

## 2019-05-31 RX ORDER — DEXAMETHASONE SODIUM PHOSPHATE 10 MG/ML
10 INJECTION INTRAMUSCULAR; INTRAVENOUS ONCE
Status: COMPLETED | OUTPATIENT
Start: 2019-05-31 | End: 2019-05-31

## 2019-05-31 RX ORDER — DIPHENHYDRAMINE HYDROCHLORIDE 50 MG/ML
50 INJECTION INTRAMUSCULAR; INTRAVENOUS ONCE
Status: COMPLETED | OUTPATIENT
Start: 2019-05-31 | End: 2019-05-31

## 2019-05-31 RX ORDER — HEPARIN SODIUM (PORCINE) LOCK FLUSH IV SOLN 100 UNIT/ML 100 UNIT/ML
500 SOLUTION INTRAVENOUS PRN
Status: DISCONTINUED | OUTPATIENT
Start: 2019-05-31 | End: 2019-06-01 | Stop reason: HOSPADM

## 2019-05-31 RX ORDER — SODIUM CHLORIDE 0.9 % (FLUSH) 0.9 %
10 SYRINGE (ML) INJECTION PRN
Status: DISCONTINUED | OUTPATIENT
Start: 2019-05-31 | End: 2019-06-01 | Stop reason: HOSPADM

## 2019-05-31 RX ADMIN — FAMOTIDINE 20 MG: 10 INJECTION, SOLUTION INTRAVENOUS at 12:29

## 2019-05-31 RX ADMIN — CARBOPLATIN 170 MG: 10 INJECTION, SOLUTION INTRAVENOUS at 14:07

## 2019-05-31 RX ADMIN — Medication 10 ML: at 10:59

## 2019-05-31 RX ADMIN — SODIUM CHLORIDE 20 ML/HR: 9 INJECTION, SOLUTION INTRAVENOUS at 12:10

## 2019-05-31 RX ADMIN — Medication 10 ML: at 15:27

## 2019-05-31 RX ADMIN — PACLITAXEL 72 MG: 6 INJECTION, SOLUTION INTRAVENOUS at 12:44

## 2019-05-31 RX ADMIN — Medication 500 UNITS: at 15:28

## 2019-05-31 RX ADMIN — DIPHENHYDRAMINE HYDROCHLORIDE 50 MG: 50 INJECTION, SOLUTION INTRAMUSCULAR; INTRAVENOUS at 12:32

## 2019-05-31 RX ADMIN — DEXAMETHASONE SODIUM PHOSPHATE 10 MG: 10 INJECTION INTRAMUSCULAR; INTRAVENOUS at 12:26

## 2019-05-31 RX ADMIN — Medication 10 ML: at 10:57

## 2019-05-31 RX ADMIN — PALONOSETRON HYDROCHLORIDE 0.25 MG: 0.25 INJECTION, SOLUTION INTRAVENOUS at 12:25

## 2019-05-31 RX ADMIN — Medication 10 ML: at 12:29

## 2019-05-31 NOTE — TELEPHONE ENCOUNTER
Name: Cali Marion  : 1950  MRN: [id number]    Oncology Navigation Follow-Up Note    Contact Type:  Telephone    Subjective:     Objective:     Assistance Needed:     Receptive to Advanced Care Planning / Palliative Care:      Referrals:     Education:     Notes: Navigator met with pt. And mobile meals to be delivered Monday and pt. Willing to pay out of pocket for replaement mattress for hospital bed. Navigator checking on closest hospital supply?     Electronically signed by Amadou Tay RN on 2019 at 3:12 PM

## 2019-05-31 NOTE — PROGRESS NOTES
Patient here for [id number]/Carbo. Patient c/o cough. This is not new per notes. Pt had a temp of 99.1 today. Rest of her vitals were good and labs within treatable parameters. No appt with Daphnie noted, however his note stated she was to be seen in 2 weeks. Which will be next week. I spoke to Sproom. She put her on at 1020am with Dr. Sanchez Coma next week with chemo at 1100. Patient resting quietly with taxol running. Patient tolerated chemo well. Stable and ambulatory at d/c. Dispatch called and transportation was on their way.

## 2019-06-03 ENCOUNTER — APPOINTMENT (OUTPATIENT)
Dept: RADIATION ONCOLOGY | Facility: MEDICAL CENTER | Age: 69
End: 2019-06-03
Attending: RADIOLOGY
Payer: MEDICARE

## 2019-06-03 LAB
CULTURE: NORMAL
DIRECT EXAM: NORMAL
Lab: NORMAL
SPECIMEN DESCRIPTION: NORMAL

## 2019-06-04 ENCOUNTER — TELEPHONE (OUTPATIENT)
Dept: CASE MANAGEMENT | Age: 69
End: 2019-06-04

## 2019-06-04 ENCOUNTER — APPOINTMENT (OUTPATIENT)
Dept: RADIATION ONCOLOGY | Facility: MEDICAL CENTER | Age: 69
End: 2019-06-04
Attending: RADIOLOGY
Payer: MEDICARE

## 2019-06-04 ENCOUNTER — TELEPHONE (OUTPATIENT)
Dept: RADIATION ONCOLOGY | Facility: MEDICAL CENTER | Age: 69
End: 2019-06-04

## 2019-06-04 NOTE — TELEPHONE ENCOUNTER
Patient called RN back. Patient states she is having nausea and Diarrhea more than 10 times since yesterday. Patient denies eating anything out of the ordinary. Patient states she has lower abdominal cramping. She is sipping on water okay and ginger ale. She feels weak, but not dizzy. Patient tried pepto bismul today and is feeling better. Patient encouraged to call us back if she feels pepto bismul is not working. Patient instructed to keep up on her fluids to stay hydrated. Dr. Montgomery Sabianism updated.

## 2019-06-04 NOTE — TELEPHONE ENCOUNTER
Name: Jen April  : 1950  MRN: [id number]    Oncology Navigation Follow-Up Note    Contact Type:  Telephone    Subjective:     Objective:     Assistance Needed:     Receptive to Advanced Care Planning / Palliative Care:      Referrals:     Education:     Notes: Navigator left message for pt. In regards to diarrhea and missing RTX x 2 days. Navigator requesting pt. Return call, concerned pt. May be dehydrated?      Electronically signed by Chace Jenkins RN on 2019 at 1:13 PM

## 2019-06-04 NOTE — TELEPHONE ENCOUNTER
Patient called because she missed treatment yesterday and today. Left msg for patient to call us back to see if we can help manage any symptoms.

## 2019-06-04 NOTE — TELEPHONE ENCOUNTER
Patient called back and said the diarrhea is back. Dr. Brunilda Carcamo notified. Suggestions made to patient were to try OTC immodium and to drink fluids like gatorade or powerade to keep her from getting dehydrated. Patient should keep us up to date on how she is doing.

## 2019-06-05 ENCOUNTER — TELEPHONE (OUTPATIENT)
Dept: RADIATION ONCOLOGY | Facility: MEDICAL CENTER | Age: 69
End: 2019-06-05

## 2019-06-05 ENCOUNTER — HOSPITAL ENCOUNTER (OUTPATIENT)
Dept: RADIATION ONCOLOGY | Facility: MEDICAL CENTER | Age: 69
Discharge: HOME | End: 2019-06-05
Attending: RADIOLOGY
Payer: MEDICARE

## 2019-06-05 DIAGNOSIS — C34.11 MALIGNANT NEOPLASM OF UPPER LOBE OF RIGHT LUNG (HCC): ICD-10-CM

## 2019-06-05 PROCEDURE — 77387 GUIDANCE FOR RADJ TX DLVR: CPT | Performed by: RADIOLOGY

## 2019-06-05 PROCEDURE — 77412 RADIATION TX DELIVERY LVL 3: CPT | Performed by: RADIOLOGY

## 2019-06-05 RX ORDER — HEPARIN SODIUM (PORCINE) LOCK FLUSH IV SOLN 100 UNIT/ML 100 UNIT/ML
500 SOLUTION INTRAVENOUS PRN
Status: CANCELLED | OUTPATIENT
Start: 2019-06-14

## 2019-06-05 RX ORDER — 0.9 % SODIUM CHLORIDE 0.9 %
10 VIAL (ML) INJECTION ONCE
Status: CANCELLED | OUTPATIENT
Start: 2019-06-07

## 2019-06-05 RX ORDER — HEPARIN SODIUM (PORCINE) LOCK FLUSH IV SOLN 100 UNIT/ML 100 UNIT/ML
500 SOLUTION INTRAVENOUS PRN
Status: CANCELLED | OUTPATIENT
Start: 2019-06-07

## 2019-06-05 RX ORDER — MEPERIDINE HYDROCHLORIDE 50 MG/ML
12.5 INJECTION INTRAMUSCULAR; INTRAVENOUS; SUBCUTANEOUS ONCE
Status: CANCELLED | OUTPATIENT
Start: 2019-06-07

## 2019-06-05 RX ORDER — LIDOCAINE HYDROCHLORIDE 20 MG/ML
5-10 SOLUTION OROPHARYNGEAL PRN
Qty: 100 ML | Refills: 1 | Status: SHIPPED | OUTPATIENT
Start: 2019-06-05

## 2019-06-05 RX ORDER — DIPHENHYDRAMINE HYDROCHLORIDE 50 MG/ML
50 INJECTION INTRAMUSCULAR; INTRAVENOUS ONCE
Status: CANCELLED | OUTPATIENT
Start: 2019-06-07

## 2019-06-05 RX ORDER — SODIUM CHLORIDE 0.9 % (FLUSH) 0.9 %
5 SYRINGE (ML) INJECTION PRN
Status: CANCELLED | OUTPATIENT
Start: 2019-06-14

## 2019-06-05 RX ORDER — DIPHENHYDRAMINE HYDROCHLORIDE 50 MG/ML
50 INJECTION INTRAMUSCULAR; INTRAVENOUS ONCE
Status: CANCELLED | OUTPATIENT
Start: 2019-06-14

## 2019-06-05 RX ORDER — 0.9 % SODIUM CHLORIDE 0.9 %
10 VIAL (ML) INJECTION ONCE
Status: CANCELLED | OUTPATIENT
Start: 2019-06-14

## 2019-06-05 RX ORDER — SODIUM CHLORIDE 0.9 % (FLUSH) 0.9 %
10 SYRINGE (ML) INJECTION PRN
Status: CANCELLED | OUTPATIENT
Start: 2019-06-07

## 2019-06-05 RX ORDER — METHYLPREDNISOLONE SODIUM SUCCINATE 125 MG/2ML
125 INJECTION, POWDER, LYOPHILIZED, FOR SOLUTION INTRAMUSCULAR; INTRAVENOUS ONCE
Status: CANCELLED | OUTPATIENT
Start: 2019-06-14

## 2019-06-05 RX ORDER — SODIUM CHLORIDE 0.9 % (FLUSH) 0.9 %
5 SYRINGE (ML) INJECTION PRN
Status: CANCELLED | OUTPATIENT
Start: 2019-06-07

## 2019-06-05 RX ORDER — METHYLPREDNISOLONE SODIUM SUCCINATE 125 MG/2ML
125 INJECTION, POWDER, LYOPHILIZED, FOR SOLUTION INTRAMUSCULAR; INTRAVENOUS ONCE
Status: CANCELLED | OUTPATIENT
Start: 2019-06-07

## 2019-06-05 RX ORDER — MEPERIDINE HYDROCHLORIDE 50 MG/ML
12.5 INJECTION INTRAMUSCULAR; INTRAVENOUS; SUBCUTANEOUS ONCE
Status: CANCELLED | OUTPATIENT
Start: 2019-06-14

## 2019-06-05 RX ORDER — PALONOSETRON 0.05 MG/ML
0.25 INJECTION, SOLUTION INTRAVENOUS ONCE
Status: CANCELLED | OUTPATIENT
Start: 2019-06-14

## 2019-06-05 RX ORDER — SODIUM CHLORIDE 9 MG/ML
INJECTION, SOLUTION INTRAVENOUS CONTINUOUS
Status: CANCELLED | OUTPATIENT
Start: 2019-06-07

## 2019-06-05 RX ORDER — SODIUM CHLORIDE 9 MG/ML
INJECTION, SOLUTION INTRAVENOUS CONTINUOUS
Status: CANCELLED | OUTPATIENT
Start: 2019-06-14

## 2019-06-05 RX ORDER — SODIUM CHLORIDE 9 MG/ML
20 INJECTION, SOLUTION INTRAVENOUS ONCE
Status: CANCELLED | OUTPATIENT
Start: 2019-06-07

## 2019-06-05 RX ORDER — OXYCODONE HYDROCHLORIDE 5 MG/1
5 TABLET ORAL EVERY 6 HOURS PRN
Qty: 56 TABLET | Refills: 0 | Status: SHIPPED | OUTPATIENT
Start: 2019-06-05 | End: 2019-06-19 | Stop reason: SDUPTHER

## 2019-06-05 RX ORDER — SODIUM CHLORIDE 9 MG/ML
20 INJECTION, SOLUTION INTRAVENOUS ONCE
Status: CANCELLED | OUTPATIENT
Start: 2019-06-14

## 2019-06-05 RX ORDER — PALONOSETRON 0.05 MG/ML
0.25 INJECTION, SOLUTION INTRAVENOUS ONCE
Status: CANCELLED | OUTPATIENT
Start: 2019-06-07

## 2019-06-05 RX ORDER — SODIUM CHLORIDE 0.9 % (FLUSH) 0.9 %
10 SYRINGE (ML) INJECTION PRN
Status: CANCELLED | OUTPATIENT
Start: 2019-06-14

## 2019-06-05 NOTE — TELEPHONE ENCOUNTER
Dr Jaret Wright tried to e-prescribe and it failed to go through. RN called it into [facility]. Viscous LIdocaine hcl 2% solution for 5-10mls prn. Patient instructed to take it 5-10mins before meals and when needed, swish in mouth and swallow. 1 refill.

## 2019-06-05 NOTE — PROGRESS NOTES
Diego Gibbs M.D. Ruben Crook. Ximena Saleh, Ph.D., M.D., [place] M.D. Jamal Mello, Ph.D., M.D. Linda Baig M.D.        Date of Service: 2019    Location:  [facility],   [address], [address] WEEKLY PROGRESS NOTE    Patient ID:   Allison Schmitt  : 1950   MRN: [id number]    Diagnosis:  Cancer Staging  Malignant neoplasm of upper lobe of right lung [facility])  Staging form: Lung, AJCC 8th Edition  - Clinical stage from 4/15/2019: Stage IIIA (cT4, cN0, cM0) - Signed by Selene Cabot, MD on 2019  Staging comments: Squamous cell carcinoma      Radiation Treatment Information:   Actual Dose: 1,400 cGy  Total Planned Dose: 6,000 cGy  Treatment Site:right lung    IMAGING:   Image match with port films    CHEMOTHERAPY UPDATE:   Treatment Summary   Plan Name OP Non-Small Cell Lung: PACLItaxel/CARBOplatin with Concurrent Radiation (Induction Course)   Treatment goal Curative   Provider Dean Alford MD   Status Active   Start Date 2019   End Date 2019 (Planned)   Treatment plan weight/height/BSA Weight type: Documented (effective on 5/10/2019), 56.8 kg (entered on 5/10/2019), 160 cm (entered on 2019), BSA 1.59 m2   Most recent weight/height/BSA Weight: Weight: 129 lb 9.6 oz (58.8 kg)    Height: Height: 5' 3\" (1.6 m)    BSA: BSA (Calculated - sq m): 1.58 sq meters      Treatment on Clinical Trial? [This plan is not part of a research study]   Reason for stopping treatment [Plan is still active]   Treatment Plan Medications alteplase (CATHFLO) injection 2 mg, 2 mg, Intracatheter, ONCE, 2 of 7 cycles    palonosetron (ALOXI) injection 0.25 mg, 0.25 mg, Intravenous, ONCE, 2 of 7 cycles  Administration: 0.25 mg (2019), 0.25 mg (2019)    hydrocortisone sodium succinate PF (SOLU-CORTEF) injection 100 mg, 100 mg, Intravenous, ONCE, 2 of 7 cycles    CARBOplatin (PARAPLATIN) 170 mg in sodium chloride 0.9 % 100 mL chemo IVPB, 170 mg, Intravenous, ONCE, 2 of 7 cycles  Dose modification:   (original dose 174 mg, Cycle 2, Reason: Other (See Comments), Comment: SCr rounded to 0.8),   (Cycle 3, Reason: Other (See Comments), Comment: use scr 0.8 or below)  Administration: 170 mg (5/23/2019), 170 mg (5/31/2019)    PACLitaxel (TAXOL) 72 mg in sodium chloride 0.9 % 250 mL chemo infusion, 45 mg/m2 = 72 mg, Intravenous, ONCE, 2 of 7 cycles  Administration: 72 mg (5/23/2019), 72 mg (5/31/2019)    dexamethasone (DECADRON) injection 10 mg, 10 mg, Intravenous, ONCE, 2 of 7 cycles  Administration: 10 mg (5/23/2019), 10 mg (5/31/2019)    methylPREDNISolone sodium (SOLU-MEDROL) injection 125 mg, 125 mg, Intravenous, ONCE, 2 of 7 cycles    famotidine (PEPCID) injection 20 mg, 20 mg, Intravenous, ONCE, 2 of 7 cycles  Administration: 20 mg (5/23/2019), 20 mg (5/31/2019)           SUBJECTIVE: Selinda Peat and taken off several days from her combined modality course of treatment with uncontrolled diarrhea is maintained her appetite and oral nutrition however has lost minimal weight and has control of her movements using Imodium. She is seen medical oncology later this week for further evaluation. She denies any productive cough or shortness of breath, no chest pain, no fevers or chills or progression of her earlier symptoms. She has developed a sore throat on the with no oral mucositis.     OBJECTIVE:     Labs:  WBC   Date Value Ref Range Status   05/31/2019 4.2 3.5 - 11.0 k/uL Final     Segs Absolute   Date Value Ref Range Status   05/31/2019 2.90 1.8 - 7.7 k/uL Final     Hemoglobin   Date Value Ref Range Status   05/31/2019 10.4 (L) 12.0 - 16.0 g/dL Final     Platelets   Date Value Ref Range Status   05/31/2019 163 140 - 450 k/uL Final   Medications:    Current Outpatient Medications:     lidocaine viscous hcl (XYLOCAINE) 2 % SOLN solution, Take 5-10 mLs by mouth as needed for Irritation, Disp: 100 mL, Rfl: 1    oxyCODONE (ROXICODONE) 5 MG immediate release tablet, Take 1 tablet by mouth every 6 hours as needed for Pain for up to 14 days.  Take lowest dose possible to manage pain, Disp: 56 tablet, Rfl: 0    ondansetron (ZOFRAN ODT) 8 MG TBDP disintegrating tablet, Place 1 tablet under the tongue every 8 hours as needed for Nausea or Vomiting, Disp: 30 tablet, Rfl: 3    omeprazole (PRILOSEC) 20 MG delayed release capsule, Take 1 capsule by mouth daily, Disp: 30 capsule, Rfl: 3    LORazepam (ATIVAN) 0.5 MG tablet, Use 1-2 tabs prior to radiation to help with anxiety, Disp: 45 tablet, Rfl: 0    varenicline (CHANTIX USHA) 0.5 MG X 11 & 1 MG X 42 tablet, Take by mouth., Disp: 1 box, Rfl: 0    varenicline (CHANTIX CONTINUING MONTH USHA) 1 MG tablet, Take 1 tablet by mouth 2 times daily, Disp: 60 tablet, Rfl: 3    hydrOXYzine (ATARAX) 25 MG tablet, Take 1 tablet by mouth 2 times daily, Disp: 60 tablet, Rfl: 3    budesonide-formoterol (SYMBICORT) 160-4.5 MCG/ACT AERO, Inhale 2 puffs into the lungs 2 times daily, Disp: 1 Inhaler, Rfl: 3    albuterol sulfate HFA (PROVENTIL HFA) 108 (90 Base) MCG/ACT inhaler, Inhale 2 puffs into the lungs every 6 hours as needed for Wheezing, Disp: 1 Inhaler, Rfl: 3    albuterol (PROVENTIL) (2.5 MG/3ML) 0.083% nebulizer solution, Take 3 mLs by nebulization every 6 hours as needed for Wheezing, Disp: 120 each, Rfl: 5    Oxygen Concentrator, , Disp: , Rfl:     tiotropium (SPIRIVA RESPIMAT) 2.5 MCG/ACT AERS inhaler, Inhale 2 puffs into the lungs daily, Disp: 1 Inhaler, Rfl: 5    Pain Plan:  Patient is satisfied with current level of pain control                 Immunizations/Smoking Status:  Immunization History   Administered Date(s) Administered    Influenza, Quadv, 3 Years and older, IM (Fluzone 3 yrs and older or Afluria 5 yrs and older) 03/21/2019    Influenza, Nick Nam, 3 yrs and older, IM, PF (Fluzone 3 yrs and older or Afluria 5 yrs and older) 02/01/2018    Pneumococcal Polysaccharide ([id number]) 2019       Social History     Tobacco Use   Smoking Status Current Every Day Smoker    Packs/day: 0.25    Years: 52.00    Pack years: 13.00    Types: Cigarettes    Start date: 1967   Smokeless Tobacco Never Used   Tobacco Comment    smoking 5 or so a day. 19     Ready to quit: Not Answered  Counseling given: Not Answered  Comment: smoking 5 or so a day. 19      PHYSICAL FINDINGS:    CHAPERONE: Declined    ECO Symptomatic but completely ambulatory      Vital Signs: There were no vitals taken for this visit. Wt Readings from Last 5 Encounters:   19 129 lb 9.6 oz (58.8 kg)   19 126 lb 8 oz (57.4 kg)   19 128 lb 12.8 oz (58.4 kg)   19 128 lb (58.1 kg)   19 124 lb 9.6 oz (56.5 kg)     There is no skin irritation within the treatment portals, oral mucosa is pink and moist no visible thrush, no lymphadenopathy in the neck. Lungs are clear to auscultation throughout good skin turgor is present heart regular rate and rhythm abdomen is soft, active bowel sounds are heard. ASSESSMENT PLAN:   I encouraged increased protein-calorie intake and added viscous xylocaine to her regimen for sore throat. Treatment will continue as prescribed, monitoring for the potential acute toxicities of chemoradiation including failure to thrive, cytopenia, mucositis and Malachi assay. Electronically signed by Chelly Stein MD on 2019 at [address] [facility].    Drugs Prescribed:  New Prescriptions    LIDOCAINE VISCOUS HCL (XYLOCAINE) 2 % SOLN SOLUTION    Take 5-10 mLs by mouth as needed for Irritation       Other Orders Placed:  No orders of the defined types were placed in this encounter.

## 2019-06-06 ENCOUNTER — HOSPITAL ENCOUNTER (OUTPATIENT)
Dept: RADIATION ONCOLOGY | Facility: MEDICAL CENTER | Age: 69
Discharge: HOME | End: 2019-06-06
Attending: RADIOLOGY
Payer: MEDICARE

## 2019-06-06 ENCOUNTER — HOSPITAL ENCOUNTER (OUTPATIENT)
Facility: MEDICAL CENTER | Age: 69
End: 2019-06-06
Payer: MEDICARE

## 2019-06-06 PROCEDURE — 77412 RADIATION TX DELIVERY LVL 3: CPT | Performed by: RADIOLOGY

## 2019-06-06 PROCEDURE — 77387 GUIDANCE FOR RADJ TX DLVR: CPT | Performed by: RADIOLOGY

## 2019-06-07 ENCOUNTER — TELEPHONE (OUTPATIENT)
Dept: ONCOLOGY | Age: 69
End: 2019-06-07

## 2019-06-07 ENCOUNTER — HOSPITAL ENCOUNTER (OUTPATIENT)
Dept: RADIATION ONCOLOGY | Facility: MEDICAL CENTER | Age: 69
Discharge: HOME | End: 2019-06-07
Attending: RADIOLOGY
Payer: MEDICARE

## 2019-06-07 ENCOUNTER — HOSPITAL ENCOUNTER (OUTPATIENT)
Dept: INFUSION THERAPY | Facility: MEDICAL CENTER | Age: 69
Discharge: HOME | End: 2019-06-07
Payer: MEDICARE

## 2019-06-07 ENCOUNTER — OFFICE VISIT (OUTPATIENT)
Dept: ONCOLOGY | Age: 69
End: 2019-06-07
Payer: MEDICARE

## 2019-06-07 ENCOUNTER — HOSPITAL ENCOUNTER (OUTPATIENT)
Facility: MEDICAL CENTER | Age: 69
Discharge: HOME | End: 2019-06-07
Payer: MEDICARE

## 2019-06-07 VITALS
RESPIRATION RATE: 19 BRPM | BODY MASS INDEX: 22.73 KG/M2 | OXYGEN SATURATION: 3 % | SYSTOLIC BLOOD PRESSURE: 148 MMHG | HEART RATE: 93 BPM | DIASTOLIC BLOOD PRESSURE: 61 MMHG | WEIGHT: 128.3 LBS | TEMPERATURE: 98.2 F

## 2019-06-07 DIAGNOSIS — C34.11 MALIGNANT NEOPLASM OF UPPER LOBE OF RIGHT LUNG (HCC): Primary | ICD-10-CM

## 2019-06-07 DIAGNOSIS — C34.11 MALIGNANT NEOPLASM OF UPPER LOBE OF RIGHT LUNG (HCC): ICD-10-CM

## 2019-06-07 LAB
ABSOLUTE EOS #: 0.03 K/UL (ref 0–0.4)
ABSOLUTE IMMATURE GRANULOCYTE: 0.03 K/UL (ref 0–0.3)
ABSOLUTE LYMPH #: 0.43 K/UL (ref 1–4.8)
ABSOLUTE MONO #: 0.35 K/UL (ref 0.2–0.8)
ALBUMIN SERPL-MCNC: 2.9 G/DL (ref 3.5–5.2)
ALBUMIN/GLOBULIN RATIO: ABNORMAL (ref 1–2.5)
ALP BLD-CCNC: 81 U/L (ref 35–104)
ALT SERPL-CCNC: 18 U/L (ref 5–33)
ANION GAP SERPL CALCULATED.3IONS-SCNC: 9 MMOL/L (ref 9–17)
AST SERPL-CCNC: 27 U/L
BASOPHILS # BLD: 0 %
BASOPHILS ABSOLUTE: 0 K/UL (ref 0–0.2)
BILIRUB SERPL-MCNC: 0.99 MG/DL (ref 0.3–1.2)
BUN BLDV-MCNC: 13 MG/DL (ref 8–23)
BUN/CREAT BLD: 27 (ref 9–20)
CALCIUM SERPL-MCNC: 8.4 MG/DL (ref 8.6–10.4)
CHLORIDE BLD-SCNC: 94 MMOL/L (ref 98–107)
CO2: 25 MMOL/L (ref 20–31)
CREAT SERPL-MCNC: 0.48 MG/DL (ref 0.5–0.9)
DIFFERENTIAL TYPE: ABNORMAL
EOSINOPHILS RELATIVE PERCENT: 1 % (ref 1–4)
GFR AFRICAN AMERICAN: >60 ML/MIN
GFR NON-AFRICAN AMERICAN: >60 ML/MIN
GFR SERPL CREATININE-BSD FRML MDRD: ABNORMAL ML/MIN/{1.73_M2}
GFR SERPL CREATININE-BSD FRML MDRD: ABNORMAL ML/MIN/{1.73_M2}
GLUCOSE BLD-MCNC: 106 MG/DL (ref 70–99)
HCT VFR BLD CALC: 28.4 % (ref 36.3–47.1)
HEMOGLOBIN: 9.7 G/DL (ref 11.9–15.1)
IMMATURE GRANULOCYTES: 1 %
LYMPHOCYTES # BLD: 16 % (ref 24–44)
MCH RBC QN AUTO: 29.4 PG (ref 25.2–33.5)
MCHC RBC AUTO-ENTMCNC: 34.2 G/DL (ref 28.4–34.8)
MCV RBC AUTO: 86.1 FL (ref 82.6–102.9)
MONOCYTES # BLD: 13 % (ref 1–7)
NRBC AUTOMATED: ABNORMAL PER 100 WBC
PDW BLD-RTO: 13.1 % (ref 11.8–14.4)
PLATELET # BLD: 165 K/UL (ref 138–453)
PLATELET ESTIMATE: ABNORMAL
PMV BLD AUTO: 8.5 FL (ref 8.1–13.5)
POTASSIUM SERPL-SCNC: 3.6 MMOL/L (ref 3.7–5.3)
RBC # BLD: 3.3 M/UL (ref 3.95–5.11)
RBC # BLD: ABNORMAL 10*6/UL
SEG NEUTROPHILS: 69 % (ref 36–66)
SEGMENTED NEUTROPHILS ABSOLUTE COUNT: 1.86 K/UL (ref 1.8–7.7)
SODIUM BLD-SCNC: 128 MMOL/L (ref 135–144)
TOTAL PROTEIN: 6.7 G/DL (ref 6.4–8.3)
WBC # BLD: 2.7 K/UL (ref 3.5–11.3)
WBC # BLD: ABNORMAL 10*3/UL

## 2019-06-07 PROCEDURE — 77387 GUIDANCE FOR RADJ TX DLVR: CPT | Performed by: RADIOLOGY

## 2019-06-07 PROCEDURE — G8428 CUR MEDS NOT DOCUMENT: HCPCS | Performed by: INTERNAL MEDICINE

## 2019-06-07 PROCEDURE — 2580000003 HC RX 258: Performed by: INTERNAL MEDICINE

## 2019-06-07 PROCEDURE — 6360000002 HC RX W HCPCS: Performed by: INTERNAL MEDICINE

## 2019-06-07 PROCEDURE — 85025 COMPLETE CBC W/AUTO DIFF WBC: CPT

## 2019-06-07 PROCEDURE — 96360 HYDRATION IV INFUSION INIT: CPT

## 2019-06-07 PROCEDURE — G8420 CALC BMI NORM PARAMETERS: HCPCS | Performed by: INTERNAL MEDICINE

## 2019-06-07 PROCEDURE — 80053 COMPREHEN METABOLIC PANEL: CPT

## 2019-06-07 PROCEDURE — 36591 DRAW BLOOD OFF VENOUS DEVICE: CPT

## 2019-06-07 PROCEDURE — 3017F COLORECTAL CA SCREEN DOC REV: CPT | Performed by: INTERNAL MEDICINE

## 2019-06-07 PROCEDURE — 96375 TX/PRO/DX INJ NEW DRUG ADDON: CPT

## 2019-06-07 PROCEDURE — 99214 OFFICE O/P EST MOD 30 MIN: CPT | Performed by: INTERNAL MEDICINE

## 2019-06-07 PROCEDURE — 2500000003 HC RX 250 WO HCPCS: Performed by: INTERNAL MEDICINE

## 2019-06-07 PROCEDURE — 4004F PT TOBACCO SCREEN RCVD TLK: CPT | Performed by: INTERNAL MEDICINE

## 2019-06-07 PROCEDURE — 77412 RADIATION TX DELIVERY LVL 3: CPT | Performed by: RADIOLOGY

## 2019-06-07 PROCEDURE — 36415 COLL VENOUS BLD VENIPUNCTURE: CPT

## 2019-06-07 PROCEDURE — G8400 PT W/DXA NO RESULTS DOC: HCPCS | Performed by: INTERNAL MEDICINE

## 2019-06-07 PROCEDURE — 99211 OFF/OP EST MAY X REQ PHY/QHP: CPT | Performed by: INTERNAL MEDICINE

## 2019-06-07 PROCEDURE — 96361 HYDRATE IV INFUSION ADD-ON: CPT

## 2019-06-07 PROCEDURE — 4040F PNEUMOC VAC/ADMIN/RCVD: CPT | Performed by: INTERNAL MEDICINE

## 2019-06-07 PROCEDURE — 1123F ACP DISCUSS/DSCN MKR DOCD: CPT | Performed by: INTERNAL MEDICINE

## 2019-06-07 PROCEDURE — 1090F PRES/ABSN URINE INCON ASSESS: CPT | Performed by: INTERNAL MEDICINE

## 2019-06-07 RX ORDER — SODIUM CHLORIDE 9 MG/ML
20 INJECTION, SOLUTION INTRAVENOUS ONCE
Status: COMPLETED | OUTPATIENT
Start: 2019-06-07 | End: 2019-06-07

## 2019-06-07 RX ORDER — DIPHENHYDRAMINE HYDROCHLORIDE 50 MG/ML
50 INJECTION INTRAMUSCULAR; INTRAVENOUS ONCE
Status: COMPLETED | OUTPATIENT
Start: 2019-06-07 | End: 2019-06-07

## 2019-06-07 RX ORDER — 0.9 % SODIUM CHLORIDE 0.9 %
10 VIAL (ML) INJECTION ONCE
Status: DISCONTINUED | OUTPATIENT
Start: 2019-06-07 | End: 2019-06-08 | Stop reason: HOSPADM

## 2019-06-07 RX ORDER — DEXAMETHASONE SODIUM PHOSPHATE 10 MG/ML
10 INJECTION INTRAMUSCULAR; INTRAVENOUS ONCE
Status: COMPLETED | OUTPATIENT
Start: 2019-06-07 | End: 2019-06-07

## 2019-06-07 RX ORDER — HEPARIN SODIUM (PORCINE) LOCK FLUSH IV SOLN 100 UNIT/ML 100 UNIT/ML
500 SOLUTION INTRAVENOUS PRN
Status: DISCONTINUED | OUTPATIENT
Start: 2019-06-07 | End: 2019-06-08 | Stop reason: HOSPADM

## 2019-06-07 RX ORDER — 0.9 % SODIUM CHLORIDE 0.9 %
500 INTRAVENOUS SOLUTION INTRAVENOUS ONCE
Status: COMPLETED | OUTPATIENT
Start: 2019-06-07 | End: 2019-06-07

## 2019-06-07 RX ORDER — SODIUM CHLORIDE 0.9 % (FLUSH) 0.9 %
10 SYRINGE (ML) INJECTION PRN
Status: DISCONTINUED | OUTPATIENT
Start: 2019-06-07 | End: 2019-06-08 | Stop reason: HOSPADM

## 2019-06-07 RX ORDER — PALONOSETRON 0.05 MG/ML
0.25 INJECTION, SOLUTION INTRAVENOUS ONCE
Status: COMPLETED | OUTPATIENT
Start: 2019-06-07 | End: 2019-06-07

## 2019-06-07 RX ADMIN — SODIUM CHLORIDE 20 ML/HR: 9 INJECTION, SOLUTION INTRAVENOUS at 11:20

## 2019-06-07 RX ADMIN — PALONOSETRON HYDROCHLORIDE 0.25 MG: 0.25 INJECTION, SOLUTION INTRAVENOUS at 11:26

## 2019-06-07 RX ADMIN — DEXAMETHASONE SODIUM PHOSPHATE 10 MG: 10 INJECTION INTRAMUSCULAR; INTRAVENOUS at 11:35

## 2019-06-07 RX ADMIN — FAMOTIDINE 20 MG: 10 INJECTION, SOLUTION INTRAVENOUS at 11:28

## 2019-06-07 RX ADMIN — SODIUM CHLORIDE 500 ML: 9 INJECTION, SOLUTION INTRAVENOUS at 12:59

## 2019-06-07 RX ADMIN — Medication 10 ML: at 14:10

## 2019-06-07 RX ADMIN — DIPHENHYDRAMINE HYDROCHLORIDE 50 MG: 50 INJECTION INTRAMUSCULAR; INTRAVENOUS at 11:38

## 2019-06-07 RX ADMIN — SODIUM CHLORIDE, PRESERVATIVE FREE 500 UNITS: 5 INJECTION INTRAVENOUS at 14:10

## 2019-06-07 RX ADMIN — Medication 10 ML: at 11:20

## 2019-06-07 RX ADMIN — Medication 10 ML: at 11:19

## 2019-06-07 ASSESSMENT — PAIN SCALES - GENERAL: PAINLEVEL_OUTOF10: 5

## 2019-06-07 NOTE — PROGRESS NOTES
wash. She denies neuropathy. Her shortness of breath has worsened affecting her stamina and fatigue. She will be completing radiation in about 3 weeks. She has some difficulty swallowing but eats most foods with small bites. PAST MEDICAL HISTORY: has a past medical history of Anxiety and depression, Back pain, Bipolar disorder ([address] Bronchitis, Cancer ([facility] [address] Chronic obstructive pulmonary disease (COPD) ([facility] [address] Cirrhosis ([facility] [address] Cough, Current every day smoker, Fibromyalgia, Hepatitis, History of cervical cancer, Liver disease, Lung mass, MVP (mitral valve prolapse), On supplemental oxygen therapy, Wears dentures, Wears glasses, and Wheezing. PAST SURGICAL HISTORY: has a past surgical history that includes Cervical disc surgery; Hysterectomy; Cholecystectomy; Breast surgery (Left); Carpal tunnel release (Bilateral); Knee arthroscopy (Right); bronchoscopy (04/15/2019); bronchoscopy (N/A, 4/15/2019); bronchoscopy (4/15/2019); bronchoscopy (4/15/2019); TUNNELED CENTRAL VENOUS CATHETER W/ SUBCUTANEOUS PORT (Right, 05/13/2019); and thoracentesis (Right, 05/13/2019). CURRENT MEDICATIONS:  has a current medication list which includes the following prescription(s): lidocaine viscous hcl, oxycodone, ondansetron, omeprazole, lorazepam, varenicline, varenicline, hydroxyzine, budesonide-formoterol, albuterol sulfate hfa, albuterol, oxygen concentrator, tiotropium, and ibuprofen, and the following Facility-Administered Medications: sodium chloride, sodium chloride (pf), sodium chloride flush, heparin flush, PACLitaxel (TAXOL) 72 mg in sodium chloride 0.9 % 200 mL chemo infusion, and CARBOplatin (PARAPLATIN) 170 mg in sodium chloride 0.9 % 100 mL chemo IVPB. ALLERGIES:  has No Known Allergies. FAMILY HISTORY: Negative for any hematological or oncological conditions. SOCIAL HISTORY:  reports that she has been smoking cigarettes. She started smoking about 52 years ago. She has a 13.00 pack-year smoking history. She has never used smokeless tobacco. She reports that she has current or past drug history. Drug: Cocaine. She reports that she does not drink alcohol. REVIEW OF SYSTEMS:   General: No fever or night sweats. Weight stable ++fatigue  ENT: No double or blurred vision, no tinnitus or hearing problem, or sore throat; +mouth sores +dysphagia  Respiratory: No chest pain, +shortness of breath, no cough or hemoptysis. Cardiovascular: Denies chest pain, PND or orthopnea. No L E swelling or palpitations. Gastrointestinal: No nausea or vomiting, or constipation. +diarrhea, dry heaves, abdominal pain  Genitourinary: Denies dysuria, hematuria, frequency, urgency or incontinence. Neurological: Denies headaches, decreased LOC, no sensory or motor focal deficits. Musculoskeletal: No arthralgia no back pain or joint swelling. +right shoulder pain  Skin: There are no rashes or bleeding. Psychiatric: No anxiety, no depression. Endocrine: No diabetes or thyroid disease. Hematologic: No bleeding, no adenopathy. PHYSICAL EXAM: Shows a well appearing 77y.o.-year-old female who is not in pain or distress. Vital Signs: There were no vitals taken for this visit. HEENT: grade 1 redness in the mouth. Normocephalic and atraumatic. Pupils are equal, round, reactive to light and accommodation. Extraocular muscles are intact. Neck: Showed no JVD, no carotid bruit . Lungs: Decreased air entry. Clear to auscultation bilaterally. Heart: Regular without any murmur. Abdomen: Soft, nontender. No hepatosplenomegaly. Extremities: Lower extremities show no edema, clubbing, or cyanosis. Breasts: Examination not done today.  Neuro exam: intact cranial nerves bilaterally no motor or sensory deficit, gait is normal. Lymphatic: no adenopathy appreciated in the supraclavicular, axillary, cervical or inguinal area    REVIEW OF LABORATORY DATA:   Lab Results   Component Value Date    WBC 2.7 (L) 06/07/2019    HGB 9.7 (L) 06/07/2019    HCT 28.4 (L) 06/07/2019    MCV 86.1 06/07/2019     06/07/2019       Lab Results   Component Value Date    NEUTROABS 1.86 06/07/2019           Chemistry        Component Value Date/Time     (L) 06/07/2019 1015    K 3.6 (L) 06/07/2019 1015    CL 94 (L) 06/07/2019 1015    CO2 25 06/07/2019 1015    BUN 13 06/07/2019 1015    CREATININE 0.48 (L) 06/07/2019 1015        Component Value Date/Time    CALCIUM 8.4 (L) 06/07/2019 1015    ALKPHOS 81 06/07/2019 1015    AST 27 06/07/2019 1015    ALT 18 06/07/2019 1015    BILITOT 0.99 06/07/2019 1015            REVIEW OF RADIOLOGICAL RESULTS:     PATHOLOGY:           IMPRESSION:   1. Squamous cell carcinoma, right lung 04/15/2019, clinical stage T3, N0, M0.   2. Liver disease and hepatitis C, severe comorbidity   3. Staging PET showed localized disease, pleural effusion seen; Brain MRI was negative for metastatic disease  4. Pleural effusion with negative cytology. pleurocentesis - negatvie  5. Cocaine and cigarette addiction , quit drugs and quitting smoking with chantix help   6. Oxygent dependent   7. Chemo-radiation 05/23/2019  8. Held cycle #3 due to GI toxicity    PLAN:   1. I reviewed her lab work. 2. I completed toxicity check - GI toxicity. 3. Plan to hold treatment today to allow for recovery from side effects. 4. Plan for hydration today. 5. Return in 1 week for evaluation.

## 2019-06-07 NOTE — PROGRESS NOTES
Kenzie Hyde here for md visit and tx   She complains of diarrhea and mouth soreness  States she is taking immodium for the diarrhea per RT and then they also ordered medication for the mouth soreness  Labs were draw peripherally per patient   Port accessed per policy, good blood return   Pre meds given as ordered   Dr Eliseo Mayo in to see pt, chemo held x 1 week due to diarrhea and mouth pain   IV hydration give over 2hr as ordered   Pt tolerated well and discharged to home
PRICILLA Pereira

## 2019-06-07 NOTE — TELEPHONE ENCOUNTER
Donny Barber MD VISIT & TX  DR SMITH IN TO SEE PATIENT  ORDERS RECEIVED  HOLD TX TODAY HYDRATION ONLY  RV NEXT WEEK FOR MD VISIT & [address]  MD VISIT 6/14/19 @ 10:20AM [address] @ 10AM  AVS PRINTED AND GIVEN TO PATIENT W/ INSTRUCTIONS  PATIENT REMAINS IN [facility]

## 2019-06-10 ENCOUNTER — HOSPITAL ENCOUNTER (OUTPATIENT)
Dept: RADIATION ONCOLOGY | Facility: MEDICAL CENTER | Age: 69
Discharge: HOME | End: 2019-06-10
Attending: RADIOLOGY
Payer: MEDICARE

## 2019-06-10 VITALS
OXYGEN SATURATION: 100 % | WEIGHT: 136.38 LBS | SYSTOLIC BLOOD PRESSURE: 133 MMHG | HEART RATE: 97 BPM | DIASTOLIC BLOOD PRESSURE: 94 MMHG | BODY MASS INDEX: 24.16 KG/M2 | RESPIRATION RATE: 16 BRPM

## 2019-06-10 DIAGNOSIS — C34.11 MALIGNANT NEOPLASM OF UPPER LOBE OF RIGHT LUNG (HCC): Primary | ICD-10-CM

## 2019-06-10 PROCEDURE — 77336 RADIATION PHYSICS CONSULT: CPT | Performed by: RADIOLOGY

## 2019-06-10 PROCEDURE — 77387 GUIDANCE FOR RADJ TX DLVR: CPT | Performed by: RADIOLOGY

## 2019-06-10 PROCEDURE — 77412 RADIATION TX DELIVERY LVL 3: CPT | Performed by: RADIOLOGY

## 2019-06-10 ASSESSMENT — PAIN DESCRIPTION - ONSET: ONSET: ON-GOING

## 2019-06-10 ASSESSMENT — PAIN SCALES - GENERAL: PAINLEVEL_OUTOF10: 8

## 2019-06-10 ASSESSMENT — PAIN DESCRIPTION - PROGRESSION: CLINICAL_PROGRESSION: NOT CHANGED

## 2019-06-10 ASSESSMENT — PAIN DESCRIPTION - PAIN TYPE: TYPE: ACUTE PAIN

## 2019-06-10 ASSESSMENT — PAIN DESCRIPTION - FREQUENCY: FREQUENCY: CONTINUOUS

## 2019-06-10 NOTE — PROGRESS NOTES
Date of Service: 6/10/2019      Location:  [facility] Radiation [facility],   [address], [place], [address]   [address] [facility] WEEKLY PROGRESS NOTE    Patient ID:   Haven Gabriel  : 1950   MRN: [id number]    Diagnosis:  Cancer Staging  Malignant neoplasm of upper lobe of right lung [facility])  Staging form: Lung, AJCC 8th Edition  - Clinical stage from 4/15/2019: Stage IIIA (cT4, cN0, cM0) - Signed by Yolie Hernandes MD on 2019  Staging comments: Squamous cell carcinoma      Radiation Treatment Information:   Actual Dose:  2000 cGy  Total Planned Dose:  6000 cGy  Treatment Site:  Right upper lobe/right hilum    Chemotherapy update:  Concurrent carboplatin and Taxol per Dr. Renny Mead imaging monitoring: Image match with port films    SUBJECTIVE:  The patient is tolerating treatment so far. She has mild dysphasia. She was given the dysphagia cocktail. She denies mouth sores. She denies fevers. She has baseline shortness of breath is unchanged. She wears oxygen at 3 L. She is able to perform her activities at home. Her abdomen and legs are more swollen today related to her chemotherapy and hydration she received 3 days ago. She also has a history of liver disease and has had these symptoms previously. The symptoms are intermittent. The patient has 2-3 bowel movements per day. She denies urinary symptoms.     OBJECTIVE:     Labs:  WBC   Date Value Ref Range Status   2019 2.7 (L) 3.5 - 11.3 k/uL Final     Segs Absolute   Date Value Ref Range Status   2019 1.86 1.8 - 7.7 k/uL Final     Hemoglobin   Date Value Ref Range Status   2019 9.7 (L) 11.9 - 15.1 g/dL Final     Platelets   Date Value Ref Range Status   2019 165 138 - 453 k/uL Final   No results found for: PSA  Medications:    Current Outpatient Medications:     lidocaine viscous hcl (XYLOCAINE) 2 % SOLN solution, Take 5-10 mLs by mouth as needed for Irritation, Disp: 100 mL, Rfl: 1    oxyCODONE (ROXICODONE) 5 MG immediate release tablet, Take 1 tablet by mouth every 6 hours as needed for Pain for up to 14 days.  Take lowest dose possible to manage pain, Disp: 56 tablet, Rfl: 0    ondansetron (ZOFRAN ODT) 8 MG TBDP disintegrating tablet, Place 1 tablet under the tongue every 8 hours as needed for Nausea or Vomiting, Disp: 30 tablet, Rfl: 3    omeprazole (PRILOSEC) 20 MG delayed release capsule, Take 1 capsule by mouth daily, Disp: 30 capsule, Rfl: 3    LORazepam (ATIVAN) 0.5 MG tablet, Use 1-2 tabs prior to radiation to help with anxiety, Disp: 45 tablet, Rfl: 0    varenicline (CHANTIX USHA) 0.5 MG X 11 & 1 MG X 42 tablet, Take by mouth., Disp: 1 box, Rfl: 0    varenicline (CHANTIX CONTINUING MONTH USHA) 1 MG tablet, Take 1 tablet by mouth 2 times daily, Disp: 60 tablet, Rfl: 3    hydrOXYzine (ATARAX) 25 MG tablet, Take 1 tablet by mouth 2 times daily, Disp: 60 tablet, Rfl: 3    budesonide-formoterol (SYMBICORT) 160-4.5 MCG/ACT AERO, Inhale 2 puffs into the lungs 2 times daily, Disp: 1 Inhaler, Rfl: 3    albuterol sulfate HFA (PROVENTIL HFA) 108 (90 Base) MCG/ACT inhaler, Inhale 2 puffs into the lungs every 6 hours as needed for Wheezing, Disp: 1 Inhaler, Rfl: 3    albuterol (PROVENTIL) (2.5 MG/3ML) 0.083% nebulizer solution, Take 3 mLs by nebulization every 6 hours as needed for Wheezing, Disp: 120 each, Rfl: 5    Oxygen Concentrator, , Disp: , Rfl:     tiotropium (SPIRIVA RESPIMAT) 2.5 MCG/ACT AERS inhaler, Inhale 2 puffs into the lungs daily, Disp: 1 Inhaler, Rfl: 5    Pain Plan:  Roxicodone    Pain score: 8      Immunizations/Smoking Status:  Immunization History   Administered Date(s) Administered    Influenza, Quadv, 3 Years and older, IM (Fluzone 3 yrs and older or Afluria 5 yrs and older) 03/21/2019    Influenza, Gin Pearce, 3 yrs and older, IM, PF (Fluzone 3 yrs and older or Afluria 5 yrs and older) 02/01/2018    Pneumococcal Polysaccharide ([id number]) 2019       Social History     Tobacco Use   Smoking Status Current Every Day Smoker    Packs/day: 0.25    Years: 52.00    Pack years: 13.00    Types: Cigarettes    Start date: 1967   Smokeless Tobacco Never Used   Tobacco Comment    smoking 5 or so a day. 19     Ready to quit: Not Answered  Counseling given: Not Answered  Comment: smoking 5 or so a day. 19  the patient declines smoking cessation intervention at this time    PHYSICAL FINDINGS:    CHAPERONE: Not Required    ECO Symptomatic but completely ambulatory    Vital Signs:  BP (!) 133/94   Pulse 97   Resp 16   Wt 136 lb 6 oz (61.9 kg)   SpO2 100%   BMI 24.16 kg/m²   Wt Readings from Last 5 Encounters:   06/10/19 136 lb 6 oz (61.9 kg)   19 128 lb 4.8 oz (58.2 kg)   19 126 lb 6 oz (57.3 kg)   19 129 lb 9.6 oz (58.8 kg)   19 126 lb 8 oz (57.4 kg)     Visual examination of there are cavity and oropharynx is unremarkable. Heart sounds are normal.  The lungs are clear to auscultation. The abdomen is soft. It is mild to moderately distended on deep palpation. There is no rebound tenderness or guarding noted. There are no palpable masses. No suprapubic tenderness is noted. ASSESSMENT PLAN:  The patient will continue with radiation therapy. The patient is swollen in the abdomen and lower extremities. This is likely related to hydration, history of cirrhosis, and chemotherapy. She's had these symptoms previously. She is moving her bowels. She also has increased belching and bloating. The symptoms should resolve within the next few days. If they get worse, she'll let me know. Electronically signed by Kavita Kilgore MD on 6/10/2019 at 10:05 [address] [facility]     Drugs Prescribed:  New Prescriptions    No medications on file       Other Orders Placed:  No orders of the defined types were placed in this encounter.

## 2019-06-11 ENCOUNTER — HOSPITAL ENCOUNTER (OUTPATIENT)
Dept: RADIATION ONCOLOGY | Facility: MEDICAL CENTER | Age: 69
Discharge: HOME | End: 2019-06-11
Attending: RADIOLOGY
Payer: MEDICARE

## 2019-06-11 DIAGNOSIS — C34.11 MALIGNANT NEOPLASM OF UPPER LOBE OF RIGHT LUNG (HCC): Primary | ICD-10-CM

## 2019-06-11 PROCEDURE — 77417 THER RADIOLOGY PORT IMAGE(S): CPT | Performed by: RADIOLOGY

## 2019-06-11 PROCEDURE — 77412 RADIATION TX DELIVERY LVL 3: CPT | Performed by: RADIOLOGY

## 2019-06-12 ENCOUNTER — TELEPHONE (OUTPATIENT)
Dept: ONCOLOGY | Age: 69
End: 2019-06-12

## 2019-06-12 ENCOUNTER — HOSPITAL ENCOUNTER (OUTPATIENT)
Dept: RADIATION ONCOLOGY | Facility: MEDICAL CENTER | Age: 69
Discharge: HOME | End: 2019-06-12
Attending: RADIOLOGY
Payer: MEDICARE

## 2019-06-12 PROCEDURE — 77387 GUIDANCE FOR RADJ TX DLVR: CPT | Performed by: RADIOLOGY

## 2019-06-12 PROCEDURE — 77412 RADIATION TX DELIVERY LVL 3: CPT | Performed by: RADIOLOGY

## 2019-06-12 NOTE — TELEPHONE ENCOUNTER
Pt calling and states swelling to feet and calves. Writer called and spoke with pt and states moderate swelling to lower extremities. Chemo held when seen per md on 6/7/19 and given hydration only. Pt on weekly taxol/carboplatin with concurrent radiation. Pt states saw Dr Malika Carrizales on 6/10/19, who agreed they were swollen, and told pt prob from chemo. Pt notified to keep lower extremities elevated. Pt states has been doing that since noticed swelling on Sunday 6/9/19. Pt states not on any diuretics at this time, and none noted on list.  Message sent to Dr. Maggi Moore.

## 2019-06-13 ENCOUNTER — HOSPITAL ENCOUNTER (OUTPATIENT)
Facility: MEDICAL CENTER | Age: 69
End: 2019-06-13
Payer: MEDICARE

## 2019-06-13 ENCOUNTER — HOSPITAL ENCOUNTER (OUTPATIENT)
Dept: RADIATION ONCOLOGY | Facility: MEDICAL CENTER | Age: 69
Discharge: HOME | End: 2019-06-13
Attending: RADIOLOGY
Payer: MEDICARE

## 2019-06-13 PROCEDURE — 77412 RADIATION TX DELIVERY LVL 3: CPT | Performed by: RADIOLOGY

## 2019-06-13 PROCEDURE — 77387 GUIDANCE FOR RADJ TX DLVR: CPT | Performed by: RADIOLOGY

## 2019-06-14 ENCOUNTER — HOSPITAL ENCOUNTER (OUTPATIENT)
Dept: RADIATION ONCOLOGY | Facility: MEDICAL CENTER | Age: 69
Discharge: HOME | End: 2019-06-14
Attending: RADIOLOGY
Payer: MEDICARE

## 2019-06-14 ENCOUNTER — OFFICE VISIT (OUTPATIENT)
Dept: ONCOLOGY | Age: 69
End: 2019-06-14
Payer: MEDICARE

## 2019-06-14 ENCOUNTER — HOSPITAL ENCOUNTER (OUTPATIENT)
Dept: INFUSION THERAPY | Facility: MEDICAL CENTER | Age: 69
Discharge: HOME | End: 2019-06-14
Payer: MEDICARE

## 2019-06-14 ENCOUNTER — TELEPHONE (OUTPATIENT)
Dept: CASE MANAGEMENT | Age: 69
End: 2019-06-14

## 2019-06-14 ENCOUNTER — TELEPHONE (OUTPATIENT)
Dept: ONCOLOGY | Age: 69
End: 2019-06-14

## 2019-06-14 VITALS
SYSTOLIC BLOOD PRESSURE: 150 MMHG | RESPIRATION RATE: 23 BRPM | HEART RATE: 68 BPM | BODY MASS INDEX: 23.35 KG/M2 | WEIGHT: 131.8 LBS | TEMPERATURE: 98.2 F | DIASTOLIC BLOOD PRESSURE: 81 MMHG | OXYGEN SATURATION: 99 %

## 2019-06-14 DIAGNOSIS — C34.11 MALIGNANT NEOPLASM OF UPPER LOBE OF RIGHT LUNG (HCC): Primary | ICD-10-CM

## 2019-06-14 DIAGNOSIS — C34.11 MALIGNANT NEOPLASM OF UPPER LOBE OF RIGHT LUNG (HCC): ICD-10-CM

## 2019-06-14 LAB
ABSOLUTE EOS #: <0.03 K/UL (ref 0–0.44)
ABSOLUTE IMMATURE GRANULOCYTE: 0.24 K/UL (ref 0–0.3)
ABSOLUTE LYMPH #: 0.77 K/UL (ref 1.1–3.7)
ABSOLUTE MONO #: 1.21 K/UL (ref 0.1–1.2)
ALBUMIN SERPL-MCNC: 2.7 G/DL (ref 3.5–5.2)
ALBUMIN/GLOBULIN RATIO: ABNORMAL (ref 1–2.5)
ALP BLD-CCNC: 114 U/L (ref 35–104)
ALT SERPL-CCNC: 21 U/L (ref 5–33)
ANION GAP SERPL CALCULATED.3IONS-SCNC: 9 MMOL/L (ref 9–17)
AST SERPL-CCNC: 26 U/L
BASOPHILS # BLD: 0 % (ref 0–2)
BASOPHILS ABSOLUTE: <0.03 K/UL (ref 0–0.2)
BILIRUB SERPL-MCNC: 0.73 MG/DL (ref 0.3–1.2)
BUN BLDV-MCNC: 9 MG/DL (ref 8–23)
BUN/CREAT BLD: 20 (ref 9–20)
CALCIUM SERPL-MCNC: 8 MG/DL (ref 8.6–10.4)
CHLORIDE BLD-SCNC: 96 MMOL/L (ref 98–107)
CO2: 23 MMOL/L (ref 20–31)
CREAT SERPL-MCNC: 0.46 MG/DL (ref 0.5–0.9)
DIFFERENTIAL TYPE: ABNORMAL
EOSINOPHILS RELATIVE PERCENT: 0 % (ref 1–4)
GFR AFRICAN AMERICAN: >60 ML/MIN
GFR NON-AFRICAN AMERICAN: >60 ML/MIN
GFR SERPL CREATININE-BSD FRML MDRD: ABNORMAL ML/MIN/{1.73_M2}
GFR SERPL CREATININE-BSD FRML MDRD: ABNORMAL ML/MIN/{1.73_M2}
GLUCOSE BLD-MCNC: 154 MG/DL (ref 70–99)
HCT VFR BLD CALC: 30.2 % (ref 36.3–47.1)
HEMOGLOBIN: 10 G/DL (ref 11.9–15.1)
IMMATURE GRANULOCYTES: 5 %
LYMPHOCYTES # BLD: 15 % (ref 24–43)
MCH RBC QN AUTO: 29.1 PG (ref 25.2–33.5)
MCHC RBC AUTO-ENTMCNC: 33.1 G/DL (ref 28.4–34.8)
MCV RBC AUTO: 87.8 FL (ref 82.6–102.9)
MONOCYTES # BLD: 23 % (ref 3–12)
NRBC AUTOMATED: ABNORMAL PER 100 WBC
PDW BLD-RTO: 14.4 % (ref 11.8–14.4)
PLATELET # BLD: 129 K/UL (ref 138–453)
PLATELET ESTIMATE: ABNORMAL
PMV BLD AUTO: 8.2 FL (ref 8.1–13.5)
POTASSIUM SERPL-SCNC: 3.7 MMOL/L (ref 3.7–5.3)
RBC # BLD: 3.44 M/UL (ref 3.95–5.11)
RBC # BLD: ABNORMAL 10*6/UL
SEG NEUTROPHILS: 57 % (ref 36–65)
SEGMENTED NEUTROPHILS ABSOLUTE COUNT: 3.01 K/UL (ref 1.5–8.1)
SODIUM BLD-SCNC: 128 MMOL/L (ref 135–144)
TOTAL PROTEIN: 6.5 G/DL (ref 6.4–8.3)
WBC # BLD: 5.2 K/UL (ref 3.5–11.3)
WBC # BLD: ABNORMAL 10*3/UL

## 2019-06-14 PROCEDURE — 2580000003 HC RX 258: Performed by: INTERNAL MEDICINE

## 2019-06-14 PROCEDURE — 4004F PT TOBACCO SCREEN RCVD TLK: CPT | Performed by: INTERNAL MEDICINE

## 2019-06-14 PROCEDURE — 1090F PRES/ABSN URINE INCON ASSESS: CPT | Performed by: INTERNAL MEDICINE

## 2019-06-14 PROCEDURE — 99214 OFFICE O/P EST MOD 30 MIN: CPT | Performed by: INTERNAL MEDICINE

## 2019-06-14 PROCEDURE — 85025 COMPLETE CBC W/AUTO DIFF WBC: CPT

## 2019-06-14 PROCEDURE — 77387 GUIDANCE FOR RADJ TX DLVR: CPT | Performed by: RADIOLOGY

## 2019-06-14 PROCEDURE — 36591 DRAW BLOOD OFF VENOUS DEVICE: CPT

## 2019-06-14 PROCEDURE — 80053 COMPREHEN METABOLIC PANEL: CPT

## 2019-06-14 PROCEDURE — G8420 CALC BMI NORM PARAMETERS: HCPCS | Performed by: INTERNAL MEDICINE

## 2019-06-14 PROCEDURE — 77412 RADIATION TX DELIVERY LVL 3: CPT | Performed by: RADIOLOGY

## 2019-06-14 PROCEDURE — G8427 DOCREV CUR MEDS BY ELIG CLIN: HCPCS | Performed by: INTERNAL MEDICINE

## 2019-06-14 PROCEDURE — 4040F PNEUMOC VAC/ADMIN/RCVD: CPT | Performed by: INTERNAL MEDICINE

## 2019-06-14 PROCEDURE — 6360000002 HC RX W HCPCS: Performed by: INTERNAL MEDICINE

## 2019-06-14 PROCEDURE — G8400 PT W/DXA NO RESULTS DOC: HCPCS | Performed by: INTERNAL MEDICINE

## 2019-06-14 PROCEDURE — 96413 CHEMO IV INFUSION 1 HR: CPT

## 2019-06-14 PROCEDURE — 99211 OFF/OP EST MAY X REQ PHY/QHP: CPT | Performed by: INTERNAL MEDICINE

## 2019-06-14 PROCEDURE — 96417 CHEMO IV INFUS EACH ADDL SEQ: CPT

## 2019-06-14 PROCEDURE — 1123F ACP DISCUSS/DSCN MKR DOCD: CPT | Performed by: INTERNAL MEDICINE

## 2019-06-14 PROCEDURE — 3017F COLORECTAL CA SCREEN DOC REV: CPT | Performed by: INTERNAL MEDICINE

## 2019-06-14 PROCEDURE — 96415 CHEMO IV INFUSION ADDL HR: CPT

## 2019-06-14 PROCEDURE — 96375 TX/PRO/DX INJ NEW DRUG ADDON: CPT

## 2019-06-14 PROCEDURE — 2500000003 HC RX 250 WO HCPCS: Performed by: INTERNAL MEDICINE

## 2019-06-14 RX ORDER — METHYLPREDNISOLONE SODIUM SUCCINATE 125 MG/2ML
125 INJECTION, POWDER, LYOPHILIZED, FOR SOLUTION INTRAMUSCULAR; INTRAVENOUS ONCE
Status: CANCELLED | OUTPATIENT
Start: 2019-07-05

## 2019-06-14 RX ORDER — SODIUM CHLORIDE 0.9 % (FLUSH) 0.9 %
10 SYRINGE (ML) INJECTION PRN
Status: CANCELLED | OUTPATIENT
Start: 2019-06-21

## 2019-06-14 RX ORDER — 0.9 % SODIUM CHLORIDE 0.9 %
10 VIAL (ML) INJECTION ONCE
Status: CANCELLED | OUTPATIENT
Start: 2019-06-21

## 2019-06-14 RX ORDER — MEPERIDINE HYDROCHLORIDE 50 MG/ML
12.5 INJECTION INTRAMUSCULAR; INTRAVENOUS; SUBCUTANEOUS ONCE
Status: CANCELLED | OUTPATIENT
Start: 2019-07-05

## 2019-06-14 RX ORDER — SPIRONOLACTONE 25 MG/1
25 TABLET ORAL DAILY
Qty: 30 TABLET | Refills: 3 | Status: SHIPPED | OUTPATIENT
Start: 2019-06-14 | End: 2019-08-14

## 2019-06-14 RX ORDER — 0.9 % SODIUM CHLORIDE 0.9 %
10 VIAL (ML) INJECTION ONCE
Status: CANCELLED | OUTPATIENT
Start: 2019-07-05

## 2019-06-14 RX ORDER — HEPARIN SODIUM (PORCINE) LOCK FLUSH IV SOLN 100 UNIT/ML 100 UNIT/ML
500 SOLUTION INTRAVENOUS PRN
Status: CANCELLED | OUTPATIENT
Start: 2019-06-28

## 2019-06-14 RX ORDER — MEPERIDINE HYDROCHLORIDE 50 MG/ML
12.5 INJECTION INTRAMUSCULAR; INTRAVENOUS; SUBCUTANEOUS ONCE
Status: CANCELLED | OUTPATIENT
Start: 2019-06-21

## 2019-06-14 RX ORDER — DIPHENHYDRAMINE HYDROCHLORIDE 50 MG/ML
50 INJECTION INTRAMUSCULAR; INTRAVENOUS ONCE
Status: COMPLETED | OUTPATIENT
Start: 2019-06-14 | End: 2019-06-14

## 2019-06-14 RX ORDER — SODIUM CHLORIDE 9 MG/ML
20 INJECTION, SOLUTION INTRAVENOUS ONCE
Status: COMPLETED | OUTPATIENT
Start: 2019-06-14 | End: 2019-06-14

## 2019-06-14 RX ORDER — SODIUM CHLORIDE 0.9 % (FLUSH) 0.9 %
5 SYRINGE (ML) INJECTION PRN
Status: CANCELLED | OUTPATIENT
Start: 2019-06-21

## 2019-06-14 RX ORDER — DIPHENHYDRAMINE HYDROCHLORIDE 50 MG/ML
50 INJECTION INTRAMUSCULAR; INTRAVENOUS ONCE
Status: CANCELLED | OUTPATIENT
Start: 2019-07-05

## 2019-06-14 RX ORDER — METHYLPREDNISOLONE SODIUM SUCCINATE 125 MG/2ML
125 INJECTION, POWDER, LYOPHILIZED, FOR SOLUTION INTRAMUSCULAR; INTRAVENOUS ONCE
Status: CANCELLED | OUTPATIENT
Start: 2019-06-21

## 2019-06-14 RX ORDER — SODIUM CHLORIDE 9 MG/ML
INJECTION, SOLUTION INTRAVENOUS CONTINUOUS
Status: CANCELLED | OUTPATIENT
Start: 2019-07-05

## 2019-06-14 RX ORDER — DEXAMETHASONE SODIUM PHOSPHATE 10 MG/ML
10 INJECTION INTRAMUSCULAR; INTRAVENOUS ONCE
Status: COMPLETED | OUTPATIENT
Start: 2019-06-14 | End: 2019-06-14

## 2019-06-14 RX ORDER — SODIUM CHLORIDE 9 MG/ML
INJECTION, SOLUTION INTRAVENOUS CONTINUOUS
Status: CANCELLED | OUTPATIENT
Start: 2019-06-21

## 2019-06-14 RX ORDER — DIPHENHYDRAMINE HYDROCHLORIDE 50 MG/ML
50 INJECTION INTRAMUSCULAR; INTRAVENOUS ONCE
Status: CANCELLED | OUTPATIENT
Start: 2019-06-21

## 2019-06-14 RX ORDER — PALONOSETRON 0.05 MG/ML
0.25 INJECTION, SOLUTION INTRAVENOUS ONCE
Status: CANCELLED | OUTPATIENT
Start: 2019-07-05

## 2019-06-14 RX ORDER — SODIUM CHLORIDE 0.9 % (FLUSH) 0.9 %
5 SYRINGE (ML) INJECTION PRN
Status: CANCELLED | OUTPATIENT
Start: 2019-07-05

## 2019-06-14 RX ORDER — SODIUM CHLORIDE 9 MG/ML
20 INJECTION, SOLUTION INTRAVENOUS ONCE
Status: CANCELLED | OUTPATIENT
Start: 2019-06-28

## 2019-06-14 RX ORDER — SODIUM CHLORIDE 9 MG/ML
20 INJECTION, SOLUTION INTRAVENOUS ONCE
Status: CANCELLED | OUTPATIENT
Start: 2019-06-21

## 2019-06-14 RX ORDER — HEPARIN SODIUM (PORCINE) LOCK FLUSH IV SOLN 100 UNIT/ML 100 UNIT/ML
500 SOLUTION INTRAVENOUS PRN
Status: DISCONTINUED | OUTPATIENT
Start: 2019-06-14 | End: 2019-06-15 | Stop reason: HOSPADM

## 2019-06-14 RX ORDER — 0.9 % SODIUM CHLORIDE 0.9 %
10 VIAL (ML) INJECTION ONCE
Status: COMPLETED | OUTPATIENT
Start: 2019-06-14 | End: 2019-06-14

## 2019-06-14 RX ORDER — HEPARIN SODIUM (PORCINE) LOCK FLUSH IV SOLN 100 UNIT/ML 100 UNIT/ML
500 SOLUTION INTRAVENOUS PRN
Status: CANCELLED | OUTPATIENT
Start: 2019-06-21

## 2019-06-14 RX ORDER — SODIUM CHLORIDE 0.9 % (FLUSH) 0.9 %
10 SYRINGE (ML) INJECTION PRN
Status: CANCELLED | OUTPATIENT
Start: 2019-06-28

## 2019-06-14 RX ORDER — SODIUM CHLORIDE 0.9 % (FLUSH) 0.9 %
10 SYRINGE (ML) INJECTION PRN
Status: DISCONTINUED | OUTPATIENT
Start: 2019-06-14 | End: 2019-06-15 | Stop reason: HOSPADM

## 2019-06-14 RX ORDER — PALONOSETRON 0.05 MG/ML
0.25 INJECTION, SOLUTION INTRAVENOUS ONCE
Status: COMPLETED | OUTPATIENT
Start: 2019-06-14 | End: 2019-06-14

## 2019-06-14 RX ORDER — PALONOSETRON 0.05 MG/ML
0.25 INJECTION, SOLUTION INTRAVENOUS ONCE
Status: CANCELLED | OUTPATIENT
Start: 2019-06-21

## 2019-06-14 RX ADMIN — SODIUM CHLORIDE, PRESERVATIVE FREE 500 UNITS: 5 INJECTION INTRAVENOUS at 14:37

## 2019-06-14 RX ADMIN — PACLITAXEL 72 MG: 6 INJECTION, SOLUTION INTRAVENOUS at 12:10

## 2019-06-14 RX ADMIN — CARBOPLATIN 170 MG: 10 INJECTION INTRAVENOUS at 13:37

## 2019-06-14 RX ADMIN — FAMOTIDINE 20 MG: 10 INJECTION, SOLUTION INTRAVENOUS at 11:35

## 2019-06-14 RX ADMIN — Medication 10 ML: at 14:37

## 2019-06-14 RX ADMIN — PALONOSETRON HYDROCHLORIDE 0.25 MG: 0.25 INJECTION, SOLUTION INTRAVENOUS at 11:34

## 2019-06-14 RX ADMIN — SODIUM CHLORIDE, PRESERVATIVE FREE 10 ML: 5 INJECTION INTRAVENOUS at 14:37

## 2019-06-14 RX ADMIN — DIPHENHYDRAMINE HYDROCHLORIDE 50 MG: 50 INJECTION, SOLUTION INTRAMUSCULAR; INTRAVENOUS at 11:35

## 2019-06-14 RX ADMIN — DEXAMETHASONE SODIUM PHOSPHATE 10 MG: 10 INJECTION INTRAMUSCULAR; INTRAVENOUS at 11:35

## 2019-06-14 RX ADMIN — SODIUM CHLORIDE 20 ML/HR: 9 INJECTION, SOLUTION INTRAVENOUS at 11:33

## 2019-06-14 NOTE — TELEPHONE ENCOUNTER
TOMAS ARRIVES AMBULATORY FOR MD VISIT & TX  DR Epifanio Mccoy IN TO SEE PATIENT  ORDERS RECEIVED  SCRIPT SENT TO PATIENT'S PHARMACY  PROCEED W/ TX AS PLANNED AFTER CHECKING LABS  LAST CHEMO 6/28 UNLESS RADIATION EXTENDS TO 7/9 THAN LAS CHEMO WILL BE 7/5/19  RV 7/3/19 @ 10:40 AM  AVS PRINTED AND GIVEN TO PATIENT W/ INSTRUCTIONS  PATIENT REMAINS IN [facility]

## 2019-06-14 NOTE — PROGRESS NOTES
Joon Iglesias arrives ambulatory with oxygen at [facility] per nasal cannula  Orders reviewed  Lt chest port with quarter sized bruise to port site.   Denies pain to site   [place] accessed per protocol with #20 G 3/4 L Regan needle   Good blood return noted and blood work obtained and sent to lab  Labs reviewed  Dr Bo Valle vs and examined   Order to proceed with chemotherapy  See MAR for pre medications   Tolerated well  Taxol initiated slowly then rate increased to infuse over one hour   Tolerated well  Carboplatin initiated and completed  Iv line flushed for thirty minutes and regan needle flushed and regan needle removed  Band aid applied  Discharged per ambulatory

## 2019-06-14 NOTE — PATIENT INSTRUCTIONS
Continue chemotherapy per orders,  Last tentative day of chemotherapy is 6/28 unless radiation is extended beyond 7/9  If radiation is extended, last radiation will be on 7/5  I will see the patient on 7/3  Labs on chemo days

## 2019-06-14 NOTE — PROGRESS NOTES
history of Anxiety and depression, Back pain, Bipolar disorder ([facility] [address] Bronchitis, Cancer ([facility] [address] Chronic obstructive pulmonary disease (COPD) ([facility] [address] Cirrhosis ([facility] [address] Cough, Current every day smoker, Fibromyalgia, Hepatitis, History of cervical cancer, Liver disease, Lung mass, MVP (mitral valve prolapse), On supplemental oxygen therapy, Wears dentures, Wears glasses, and Wheezing. PAST SURGICAL HISTORY: has a past surgical history that includes Cervical disc surgery; Hysterectomy; Cholecystectomy; Breast surgery (Left); Carpal tunnel release (Bilateral); Knee arthroscopy (Right); bronchoscopy (04/15/2019); bronchoscopy (N/A, 4/15/2019); bronchoscopy (4/15/2019); bronchoscopy (4/15/2019); TUNNELED CENTRAL VENOUS CATHETER W/ SUBCUTANEOUS PORT (Right, 05/13/2019); and thoracentesis (Right, 05/13/2019). CURRENT MEDICATIONS:  has a current medication list which includes the following prescription(s): lidocaine viscous hcl, oxycodone, ondansetron, omeprazole, lorazepam, hydroxyzine, budesonide-formoterol, albuterol sulfate hfa, albuterol, oxygen concentrator, tiotropium, and ibuprofen. ALLERGIES:  has No Known Allergies. FAMILY HISTORY: Negative for any hematological or oncological conditions. SOCIAL HISTORY:  reports that she has been smoking cigarettes. She started smoking about 52 years ago. She has a 13.00 pack-year smoking history. She has never used smokeless tobacco. She reports that she has current or past drug history. Drug: Cocaine. She reports that she does not drink alcohol. REVIEW OF SYSTEMS:   General: No fever or night sweats. Weight stable ++fatigue - improved; good appetite   ENT: No double or blurred vision, no tinnitus or hearing problem, or sore throat; +mouth sores +dysphagia  Respiratory: No chest pain, +shortness of breath - improved, no cough or hemoptysis. Cardiovascular: Denies chest pain, PND or orthopnea. No L E swelling or palpitations.   Gastrointestinal: No nausea or vomiting, or constipation. +diarrhea, dry heaves, abdominal pain  Genitourinary: Denies dysuria, hematuria, frequency, urgency or incontinence. Neurological: Denies headaches, decreased LOC, no sensory or motor focal deficits. Musculoskeletal: No arthralgia no back pain or joint swelling. +right shoulder pain  Skin: There are no rashes or bleeding. Psychiatric: No anxiety, no depression. Endocrine: No diabetes or thyroid disease. Hematologic: No bleeding, no adenopathy. PHYSICAL EXAM: Shows a well appearing 77y.o.-year-old female who is not in pain or distress. Vital Signs: Blood pressure (!) 150/81, pulse 68, temperature 98.2 °F (36.8 °C), temperature source Oral, resp. rate 23, weight 131 lb 12.8 oz (59.8 kg), SpO2 99 %. HEENT: grade 1 redness in the mouth. Normocephalic and atraumatic. Pupils are equal, round, reactive to light and accommodation. Extraocular muscles are intact. Neck: Showed no JVD, no carotid bruit . Lungs: Decreased air entry. Clear to auscultation bilaterally. Heart: Regular without any murmur. Abdomen: Soft, nontender. No hepatosplenomegaly. Extremities: Lower extremities show bilateral edema, clubbing, or cyanosis. Breasts: Examination not done today.  Neuro exam: intact cranial nerves bilaterally no motor or sensory deficit, gait is normal. Lymphatic: no adenopathy appreciated in the supraclavicular, axillary, cervical or inguinal area    REVIEW OF LABORATORY DATA:   Lab Results   Component Value Date    WBC 2.7 (L) 06/07/2019    HGB 9.7 (L) 06/07/2019    HCT 28.4 (L) 06/07/2019    MCV 86.1 06/07/2019     06/07/2019       Lab Results   Component Value Date    NEUTROABS 1.86 06/07/2019           Chemistry        Component Value Date/Time     (L) 06/07/2019 1015    K 3.6 (L) 06/07/2019 1015    CL 94 (L) 06/07/2019 1015    CO2 25 06/07/2019 1015    BUN 13 06/07/2019 1015    CREATININE 0.48 (L) 06/07/2019 1015        Component Value Date/Time    CALCIUM 8.4 (L) 06/07/2019 3257    RNQDWNH 59 06/07/2019 1015    AST 27 06/07/2019 1015    ALT 18 06/07/2019 1015    BILITOT 0.99 06/07/2019 1015            REVIEW OF RADIOLOGICAL RESULTS:     PATHOLOGY:           IMPRESSION:   1. Squamous cell carcinoma, right lung 04/15/2019, clinical stage T3, N0, M0.   2. Liver disease and hepatitis C, severe comorbidity   3. Staging PET showed localized disease, pleural effusion seen; Brain MRI was negative for metastatic disease  4. Pleural effusion with negative cytology. pleurocentesis - negatvie  5. Cocaine and cigarette addiction , quit drugs and quitting smoking with chantix help   6. Oxygent dependent   7. Chemo-radiation 05/23/2019  8. Held cycle #3 due to GI toxicity    PLAN:   1. We reviewed her lab work showing improved counts. 2. I completed toxicity check. 3. Treat today as planned. 4. We discussed ongoing dosing plan. 5. I am ordering Aldactone. 6. Return in 6 weeks.

## 2019-06-14 NOTE — FLOWSHEET NOTE
Situation:   received referral to visit Ms. Camilo Barboza from RN caring for Patient. Assessment:  Ms. Camilo Barboza is 78 y/o female who is receiving treatment for cancer. She was in the treatment cubicle. Ms. Camilo Barboza identified her love for rescuing and caring for animals as a source of strength and as what has given her purpose. She talked about her own pets and how they look after her when she is not feeling well. She stated that she is not giving up because she knows she needs to care for her animals. 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 Ms. Camilo Barboza identified her molly as a source of support. She shared that she used to attend [employer] but now does not. She voiced her belief that God is not confined in a [employer]ast[employer] and that God is with her and helps her. She reflected that she learned about God from other people and from going through her own challenges, including sickness. Ms. Camilo Barboza named her friend, for whom she provides assistance due to his disability, her neighbors, and her spouse (to whom she is legally  but with whom she no longer lives) as the people who support her. She said that her  is her \"best friend,\" and that he has been helping her since she was diagnosed with a liver disease in 2000. She said that she trusts him to make decisions for her if needed. She reported that she received information about healthcare power of  but has not completed the paperwork. Ms. Camilo Barboza reported that her throat was starting to hurt because of talking so much. She was receptive to a copy of \"Guideposts. \"    Intervention:   provided supportive presence and explored Pt's coping and needs.  inquired about Pt's sources of support and strength.  facilitated story telling.  affirmed Pt's strengths and offered words of encouragement.  gave Pt a copy of \"Guideposts. \"    Outcome:  Ms. Camilo Barboza received a copy of \"Guideposts\" and acknowledged 's words of encouragement with thanks. 06/14/19 1533   Encounter Summary   Services provided to: Patient   Referral/Consult From: [facility]   Support System Friends/neighbors   Continue Visiting   (6/14/19)   Complexity of Encounter Moderate   Length of Encounter 30 minutes   Spiritual Assessment Completed Yes   Spiritual/Moravian   Type Spiritual support   Assessment Calm; Approachable; Hopeful;Coping   Intervention Discussed relationship with God;Sustaining presence/ Ministry of presence; Discussed illness/injury and it's impact; Explored coping resources; Explored feelings, thoughts, concerns; Active listening;Provided reading materials/devotional materials   Outcome Hopeful;Encouraged;Coping;Engaged in conversation;Expressed gratitude     Electronically signed by [facility], [address] [facility]  6/14/2019  3:35 PM

## 2019-06-17 ENCOUNTER — HOSPITAL ENCOUNTER (OUTPATIENT)
Dept: RADIATION ONCOLOGY | Facility: MEDICAL CENTER | Age: 69
Discharge: HOME | End: 2019-06-17
Attending: RADIOLOGY
Payer: MEDICARE

## 2019-06-17 VITALS
OXYGEN SATURATION: 100 % | SYSTOLIC BLOOD PRESSURE: 152 MMHG | HEART RATE: 97 BPM | TEMPERATURE: 98.4 F | WEIGHT: 133.38 LBS | DIASTOLIC BLOOD PRESSURE: 84 MMHG | BODY MASS INDEX: 23.63 KG/M2 | RESPIRATION RATE: 16 BRPM

## 2019-06-17 PROCEDURE — 77412 RADIATION TX DELIVERY LVL 3: CPT | Performed by: RADIOLOGY

## 2019-06-17 PROCEDURE — 77336 RADIATION PHYSICS CONSULT: CPT | Performed by: RADIOLOGY

## 2019-06-17 PROCEDURE — 77387 GUIDANCE FOR RADJ TX DLVR: CPT | Performed by: RADIOLOGY

## 2019-06-17 ASSESSMENT — PAIN DESCRIPTION - PAIN TYPE: TYPE: ACUTE PAIN

## 2019-06-17 ASSESSMENT — PAIN DESCRIPTION - ORIENTATION: ORIENTATION: LEFT;RIGHT

## 2019-06-17 ASSESSMENT — PAIN SCALES - GENERAL: PAINLEVEL_OUTOF10: 10

## 2019-06-17 ASSESSMENT — PAIN DESCRIPTION - LOCATION: LOCATION: LEG

## 2019-06-17 NOTE — PROGRESS NOTES
Bettie Boucher M.D.  Eugenie. Donny Phillips, Ph.D., M.D., Pablo Whittington M.D. Eladia Cadena, Ph.D., M.D. Lorna Macdonald M.D.        Date of Service: 2019    Location:  [facility],   [address], [address] WEEKLY PROGRESS NOTE    Patient ID:   Malorie Ackerman  : 1950   MRN: [id number]    Diagnosis:  Cancer Staging  Malignant neoplasm of upper lobe of right lung [facility])  Staging form: Lung, AJCC 8th Edition  - Clinical stage from 4/15/2019: Stage IIIA (cT4, cN0, cM0) - Signed by Manoj Davis MD on 2019  Staging comments: Squamous cell carcinoma      Radiation Treatment Information:   Actual Dose: 3,000 cGy  Total Planned Dose:  6,000 cGy  Treatment Site: right upper lobe lung/right hilum    IMAGING:   Image match with port films    CHEMOTHERAPY UPDATE:   Treatment Summary   Plan Name OP Non-Small Cell Lung: PACLItaxel/CARBOplatin with Concurrent Radiation (Induction Course)   Treatment goal Curative   Provider Everett Plummer MD   Status Active   Start Date 2019   End Date 2019 (Planned)   Treatment plan weight/height/BSA Weight type: Documented (effective on 5/10/2019), 56.8 kg (entered on 5/10/2019), 160 cm (entered on 2019), BSA 1.59 m2   Most recent weight/height/BSA Weight: Weight: 133 lb 6 oz (60.5 kg)    Height: Height: 5' 3\" (1.6 m)    BSA: BSA (Calculated - sq m): 1.58 sq meters      Treatment on Clinical Trial? [This plan is not part of a research study]   Reason for stopping treatment [Plan is still active]   Treatment Plan Medications alteplase (CATHFLO) injection 2 mg, 2 mg, Intracatheter, ONCE, 3 of 5 cycles    palonosetron (ALOXI) injection 0.25 mg, 0.25 mg, Intravenous, ONCE, 3 of 5 cycles  Administration: 0.25 mg (2019), 0.25 mg (2019), 0.25 mg (2019)    hydrocortisone sodium succinate PF (SOLU-CORTEF) injection 100 mg, 100 mg, Intravenous, ONCE, 3 of 5 cycles    CARBOplatin (PARAPLATIN) 170 mg in sodium chloride 0.9 % 100 mL chemo IVPB, 170 mg, Intravenous, ONCE, 3 of 5 cycles  Dose modification:   (original dose 174 mg, Cycle 2, Reason: Other (See Comments), Comment: SCr rounded to 0.8),   (original dose 172 mg, Cycle 4, Reason: Other (See Comments), Comment: use scr 0.8 or below)  Administration: 170 mg (5/23/2019), 170 mg (5/31/2019), 170 mg (6/14/2019)    PACLitaxel (TAXOL) 72 mg in sodium chloride 0.9 % 250 mL chemo infusion, 45 mg/m2 = 72 mg, Intravenous, ONCE, 3 of 5 cycles  Administration: 72 mg (5/23/2019), 72 mg (5/31/2019), 72 mg (6/14/2019)    dexamethasone (DECADRON) injection 10 mg, 10 mg, Intravenous, ONCE, 3 of 5 cycles  Administration: 10 mg (5/23/2019), 10 mg (5/31/2019), 10 mg (6/14/2019)    methylPREDNISolone sodium (SOLU-MEDROL) injection 125 mg, 125 mg, Intravenous, ONCE, 3 of 5 cycles    famotidine (PEPCID) injection 20 mg, 20 mg, Intravenous, ONCE, 3 of 5 cycles  Administration: 20 mg (5/23/2019), 20 mg (5/31/2019), 20 mg (6/14/2019)           SUBJECTIVE: Sherine Adams has been tolerating her chemoradiation with some dysphagia that is well relieved by magic mouthwash. She feels her breathing has improved slowly, though her lower extremity edema is causing pain. Starting diuretics recently, she has noted some slight improvement; bowels are moving well, no dysuria, no chest pain or hemoptysis.     OBJECTIVE:     Labs:  WBC   Date Value Ref Range Status   06/14/2019 5.2 3.5 - 11.3 k/uL Final     Segs Absolute   Date Value Ref Range Status   06/14/2019 3.01 1.50 - 8.10 k/uL Final     Hemoglobin   Date Value Ref Range Status   06/14/2019 10.0 (L) 11.9 - 15.1 g/dL Final     Platelets   Date Value Ref Range Status   06/14/2019 129 (L) 138 - 453 k/uL Final   Medications:    Current Outpatient Medications:     spironolactone (ALDACTONE) 25 MG tablet, Take 1 tablet by mouth daily, Disp: 30 tablet, Rfl: 3    lidocaine viscous Polysaccharide ([id number]) 2019       Social History     Tobacco Use   Smoking Status Current Every Day Smoker    Packs/day: 0.25    Years: 52.00    Pack years: 13.00    Types: Cigarettes    Start date: 1967   Smokeless Tobacco Never Used   Tobacco Comment    smoking 5 or so a day. 19     Ready to quit: Not Answered  Counseling given: Not Answered  Comment: smoking 5 or so a day. 19      PHYSICAL FINDINGS:    CHAPERONE: Declined    ECO Symptomatic but completely ambulatory      Vital Signs:  BP (!) 152/84   Pulse 97   Temp 98.4 °F (36.9 °C) (Oral)   Resp 16   Wt 133 lb 6 oz (60.5 kg)   SpO2 100%   BMI 23.63 kg/m²   Wt Readings from Last 5 Encounters:   19 133 lb 6 oz (60.5 kg)   19 131 lb 12.8 oz (59.8 kg)   06/10/19 136 lb 6 oz (61.9 kg)   19 128 lb 4.8 oz (58.2 kg)   19 126 lb 6 oz (57.3 kg)     No skin irritation is seen within her treatment portals, oral mucosa is pink & moist, heart regular rate and rhythm, abdomen soft, not distended. Her lungs are clear bilaterally. ASSESSMENT PLAN:   Tolerating combined modality treatment fairly well, half way maliha. We will continue as prescribed, monitoring for the acute toxicities of treatment including failure to thrive and cytopenia. Electronically signed by Chencho Comer MD on 2019 at 10:00 300 [facility]     Drugs Prescribed:  New Prescriptions    No medications on file       Other Orders Placed:  No orders of the defined types were placed in this encounter.

## 2019-06-18 ENCOUNTER — HOSPITAL ENCOUNTER (OUTPATIENT)
Dept: RADIATION ONCOLOGY | Facility: MEDICAL CENTER | Age: 69
Discharge: HOME | End: 2019-06-18
Attending: RADIOLOGY
Payer: MEDICARE

## 2019-06-18 ENCOUNTER — APPOINTMENT (OUTPATIENT)
Dept: RADIATION ONCOLOGY | Facility: MEDICAL CENTER | Age: 69
End: 2019-06-18
Attending: RADIOLOGY
Payer: MEDICARE

## 2019-06-18 PROCEDURE — 77417 THER RADIOLOGY PORT IMAGE(S): CPT | Performed by: RADIOLOGY

## 2019-06-18 PROCEDURE — 77412 RADIATION TX DELIVERY LVL 3: CPT | Performed by: RADIOLOGY

## 2019-06-19 ENCOUNTER — HOSPITAL ENCOUNTER (OUTPATIENT)
Dept: RADIATION ONCOLOGY | Facility: MEDICAL CENTER | Age: 69
Discharge: HOME | End: 2019-06-19
Attending: RADIOLOGY
Payer: MEDICARE

## 2019-06-19 ENCOUNTER — HOSPITAL ENCOUNTER (OUTPATIENT)
Facility: MEDICAL CENTER | Age: 69
End: 2019-06-19
Payer: MEDICARE

## 2019-06-19 DIAGNOSIS — C34.11 MALIGNANT NEOPLASM OF UPPER LOBE OF RIGHT LUNG (HCC): ICD-10-CM

## 2019-06-19 PROCEDURE — 77387 GUIDANCE FOR RADJ TX DLVR: CPT | Performed by: RADIOLOGY

## 2019-06-19 PROCEDURE — 77412 RADIATION TX DELIVERY LVL 3: CPT | Performed by: RADIOLOGY

## 2019-06-19 PROCEDURE — 77290 THER RAD SIMULAJ FIELD CPLX: CPT | Performed by: RADIOLOGY

## 2019-06-20 ENCOUNTER — HOSPITAL ENCOUNTER (OUTPATIENT)
Dept: RADIATION ONCOLOGY | Facility: MEDICAL CENTER | Age: 69
Discharge: HOME | End: 2019-06-20
Attending: RADIOLOGY
Payer: MEDICARE

## 2019-06-20 PROCEDURE — 77412 RADIATION TX DELIVERY LVL 3: CPT | Performed by: RADIOLOGY

## 2019-06-20 PROCEDURE — 77387 GUIDANCE FOR RADJ TX DLVR: CPT | Performed by: RADIOLOGY

## 2019-06-20 NOTE — PROGRESS NOTES
Nutrition Brief:  *Wt loss trigger  wt (6/17) 60.5 kg, (6/10) 61.9 kg; +wt loss of 1.4 kg (2.3%) since 6/10. Evaluation:  Severe +wt loss of 1.4 kg (2.3%) x past 1 week. However, it may be more r/t fluid type changes, with reportedly now being on a diuretic medication. Recommendations:  1. Will continue to monitor Pt's wt's. Pt is @ high nutrition risk.     Bouchra Teran, RDN, LD

## 2019-06-21 ENCOUNTER — HOSPITAL ENCOUNTER (OUTPATIENT)
Dept: INFUSION THERAPY | Facility: MEDICAL CENTER | Age: 69
Discharge: HOME | End: 2019-06-21
Payer: MEDICARE

## 2019-06-21 ENCOUNTER — APPOINTMENT (OUTPATIENT)
Dept: RADIATION ONCOLOGY | Facility: MEDICAL CENTER | Age: 69
End: 2019-06-21
Attending: RADIOLOGY
Payer: MEDICARE

## 2019-06-21 ENCOUNTER — HOSPITAL ENCOUNTER (OUTPATIENT)
Dept: RADIATION ONCOLOGY | Facility: MEDICAL CENTER | Age: 69
Discharge: HOME | End: 2019-06-21
Attending: RADIOLOGY
Payer: MEDICARE

## 2019-06-21 VITALS
HEART RATE: 93 BPM | WEIGHT: 125.6 LBS | DIASTOLIC BLOOD PRESSURE: 80 MMHG | SYSTOLIC BLOOD PRESSURE: 135 MMHG | RESPIRATION RATE: 18 BRPM | TEMPERATURE: 98.4 F | BODY MASS INDEX: 22.25 KG/M2

## 2019-06-21 DIAGNOSIS — C34.11 MALIGNANT NEOPLASM OF UPPER LOBE OF RIGHT LUNG (HCC): Primary | ICD-10-CM

## 2019-06-21 LAB
ABSOLUTE EOS #: 0.04 K/UL (ref 0–0.4)
ABSOLUTE IMMATURE GRANULOCYTE: 0 K/UL (ref 0–0.3)
ABSOLUTE LYMPH #: 0.37 K/UL (ref 1–4.8)
ABSOLUTE MONO #: 0.3 K/UL (ref 0.2–0.8)
ALBUMIN SERPL-MCNC: 3 G/DL (ref 3.5–5.2)
ALBUMIN/GLOBULIN RATIO: ABNORMAL (ref 1–2.5)
ALP BLD-CCNC: 94 U/L (ref 35–104)
ALT SERPL-CCNC: 18 U/L (ref 5–33)
ANION GAP SERPL CALCULATED.3IONS-SCNC: 11 MMOL/L (ref 9–17)
AST SERPL-CCNC: 25 U/L
BASOPHILS # BLD: 0 %
BASOPHILS ABSOLUTE: 0 K/UL (ref 0–0.2)
BILIRUB SERPL-MCNC: 0.85 MG/DL (ref 0.3–1.2)
BUN BLDV-MCNC: 11 MG/DL (ref 8–23)
BUN/CREAT BLD: ABNORMAL (ref 9–20)
CALCIUM SERPL-MCNC: 8.3 MG/DL (ref 8.6–10.4)
CHLORIDE BLD-SCNC: 99 MMOL/L (ref 98–107)
CO2: 22 MMOL/L (ref 20–31)
CREAT SERPL-MCNC: <0.4 MG/DL (ref 0.5–0.9)
DIFFERENTIAL TYPE: ABNORMAL
EOSINOPHILS RELATIVE PERCENT: 1 % (ref 1–4)
GFR AFRICAN AMERICAN: ABNORMAL ML/MIN
GFR NON-AFRICAN AMERICAN: ABNORMAL ML/MIN
GFR SERPL CREATININE-BSD FRML MDRD: ABNORMAL ML/MIN/{1.73_M2}
GFR SERPL CREATININE-BSD FRML MDRD: ABNORMAL ML/MIN/{1.73_M2}
GLUCOSE BLD-MCNC: 105 MG/DL (ref 70–99)
HCT VFR BLD CALC: 28.6 % (ref 36.3–47.1)
HEMOGLOBIN: 9.4 G/DL (ref 11.9–15.1)
IMMATURE GRANULOCYTES: 0 %
LYMPHOCYTES # BLD: 10 % (ref 24–44)
MCH RBC QN AUTO: 29.1 PG (ref 25.2–33.5)
MCHC RBC AUTO-ENTMCNC: 32.9 G/DL (ref 28.4–34.8)
MCV RBC AUTO: 88.5 FL (ref 82.6–102.9)
MONOCYTES # BLD: 8 % (ref 1–7)
NRBC AUTOMATED: 0 PER 100 WBC
PDW BLD-RTO: 14.3 % (ref 11.8–14.4)
PLATELET # BLD: 109 K/UL (ref 138–453)
PLATELET ESTIMATE: ABNORMAL
PMV BLD AUTO: 8.6 FL (ref 8.1–13.5)
POTASSIUM SERPL-SCNC: 4.3 MMOL/L (ref 3.7–5.3)
RBC # BLD: 3.23 M/UL (ref 3.95–5.11)
RBC # BLD: ABNORMAL 10*6/UL
SEG NEUTROPHILS: 81 % (ref 36–66)
SEGMENTED NEUTROPHILS ABSOLUTE COUNT: 2.99 K/UL (ref 1.8–7.7)
SODIUM BLD-SCNC: 132 MMOL/L (ref 135–144)
TOTAL PROTEIN: 6.8 G/DL (ref 6.4–8.3)
WBC # BLD: 3.7 K/UL (ref 3.5–11.3)
WBC # BLD: ABNORMAL 10*3/UL

## 2019-06-21 PROCEDURE — 2580000003 HC RX 258: Performed by: INTERNAL MEDICINE

## 2019-06-21 PROCEDURE — 2500000003 HC RX 250 WO HCPCS: Performed by: INTERNAL MEDICINE

## 2019-06-21 PROCEDURE — 80053 COMPREHEN METABOLIC PANEL: CPT

## 2019-06-21 PROCEDURE — 77387 GUIDANCE FOR RADJ TX DLVR: CPT | Performed by: RADIOLOGY

## 2019-06-21 PROCEDURE — 77300 RADIATION THERAPY DOSE PLAN: CPT | Performed by: RADIOLOGY

## 2019-06-21 PROCEDURE — 96413 CHEMO IV INFUSION 1 HR: CPT

## 2019-06-21 PROCEDURE — 77334 RADIATION TREATMENT AID(S): CPT | Performed by: RADIOLOGY

## 2019-06-21 PROCEDURE — 96375 TX/PRO/DX INJ NEW DRUG ADDON: CPT

## 2019-06-21 PROCEDURE — 96417 CHEMO IV INFUS EACH ADDL SEQ: CPT

## 2019-06-21 PROCEDURE — 36591 DRAW BLOOD OFF VENOUS DEVICE: CPT

## 2019-06-21 PROCEDURE — 77295 3-D RADIOTHERAPY PLAN: CPT | Performed by: RADIOLOGY

## 2019-06-21 PROCEDURE — 6360000002 HC RX W HCPCS: Performed by: INTERNAL MEDICINE

## 2019-06-21 PROCEDURE — 77412 RADIATION TX DELIVERY LVL 3: CPT | Performed by: RADIOLOGY

## 2019-06-21 PROCEDURE — 85025 COMPLETE CBC W/AUTO DIFF WBC: CPT

## 2019-06-21 RX ORDER — PALONOSETRON 0.05 MG/ML
0.25 INJECTION, SOLUTION INTRAVENOUS ONCE
Status: COMPLETED | OUTPATIENT
Start: 2019-06-21 | End: 2019-06-21

## 2019-06-21 RX ORDER — SODIUM CHLORIDE 0.9 % (FLUSH) 0.9 %
20 SYRINGE (ML) INJECTION PRN
Status: DISCONTINUED | OUTPATIENT
Start: 2019-06-21 | End: 2019-06-22 | Stop reason: HOSPADM

## 2019-06-21 RX ORDER — OXYCODONE HYDROCHLORIDE 5 MG/1
5 TABLET ORAL EVERY 6 HOURS PRN
Qty: 56 TABLET | Refills: 0 | Status: SHIPPED | OUTPATIENT
Start: 2019-06-21 | End: 2019-07-03 | Stop reason: SDUPTHER

## 2019-06-21 RX ORDER — HEPARIN SODIUM (PORCINE) LOCK FLUSH IV SOLN 100 UNIT/ML 100 UNIT/ML
500 SOLUTION INTRAVENOUS PRN
Status: CANCELLED | OUTPATIENT
Start: 2019-06-21

## 2019-06-21 RX ORDER — 0.9 % SODIUM CHLORIDE 0.9 %
10 VIAL (ML) INJECTION ONCE
Status: COMPLETED | OUTPATIENT
Start: 2019-06-21 | End: 2019-06-21

## 2019-06-21 RX ORDER — HEPARIN SODIUM (PORCINE) LOCK FLUSH IV SOLN 100 UNIT/ML 100 UNIT/ML
500 SOLUTION INTRAVENOUS PRN
Status: DISCONTINUED | OUTPATIENT
Start: 2019-06-21 | End: 2019-06-22 | Stop reason: HOSPADM

## 2019-06-21 RX ORDER — SODIUM CHLORIDE 0.9 % (FLUSH) 0.9 %
10 SYRINGE (ML) INJECTION PRN
Status: CANCELLED | OUTPATIENT
Start: 2019-06-21

## 2019-06-21 RX ORDER — DEXAMETHASONE SODIUM PHOSPHATE 10 MG/ML
10 INJECTION INTRAMUSCULAR; INTRAVENOUS ONCE
Status: COMPLETED | OUTPATIENT
Start: 2019-06-21 | End: 2019-06-21

## 2019-06-21 RX ORDER — SODIUM CHLORIDE 0.9 % (FLUSH) 0.9 %
20 SYRINGE (ML) INJECTION PRN
Status: CANCELLED | OUTPATIENT
Start: 2019-06-21

## 2019-06-21 RX ORDER — DIPHENHYDRAMINE HYDROCHLORIDE 50 MG/ML
50 INJECTION INTRAMUSCULAR; INTRAVENOUS ONCE
Status: COMPLETED | OUTPATIENT
Start: 2019-06-21 | End: 2019-06-21

## 2019-06-21 RX ORDER — SODIUM CHLORIDE 9 MG/ML
20 INJECTION, SOLUTION INTRAVENOUS ONCE
Status: COMPLETED | OUTPATIENT
Start: 2019-06-21 | End: 2019-06-21

## 2019-06-21 RX ADMIN — FAMOTIDINE 20 MG: 10 INJECTION, SOLUTION INTRAVENOUS at 11:32

## 2019-06-21 RX ADMIN — DEXAMETHASONE SODIUM PHOSPHATE 10 MG: 10 INJECTION INTRAMUSCULAR; INTRAVENOUS at 11:34

## 2019-06-21 RX ADMIN — Medication 10 ML: at 11:35

## 2019-06-21 RX ADMIN — Medication 20 ML: at 13:59

## 2019-06-21 RX ADMIN — PACLITAXEL 72 MG: 6 INJECTION, SOLUTION INTRAVENOUS at 12:04

## 2019-06-21 RX ADMIN — Medication 500 UNITS: at 13:59

## 2019-06-21 RX ADMIN — SODIUM CHLORIDE 20 ML/HR: 9 INJECTION, SOLUTION INTRAVENOUS at 11:28

## 2019-06-21 RX ADMIN — PALONOSETRON HYDROCHLORIDE 0.25 MG: 0.25 INJECTION, SOLUTION INTRAVENOUS at 11:28

## 2019-06-21 RX ADMIN — DIPHENHYDRAMINE HYDROCHLORIDE 50 MG: 50 INJECTION, SOLUTION INTRAMUSCULAR; INTRAVENOUS at 11:36

## 2019-06-21 RX ADMIN — CARBOPLATIN 170 MG: 10 INJECTION INTRAVENOUS at 13:19

## 2019-06-21 ASSESSMENT — PAIN SCALES - GENERAL: PAINLEVEL_OUTOF10: 7

## 2019-06-21 ASSESSMENT — PAIN DESCRIPTION - LOCATION: LOCATION: SHOULDER

## 2019-06-21 ASSESSMENT — PAIN DESCRIPTION - ORIENTATION: ORIENTATION: RIGHT

## 2019-06-21 ASSESSMENT — PAIN DESCRIPTION - PAIN TYPE: TYPE: CHRONIC PAIN

## 2019-06-21 NOTE — PROGRESS NOTES
Patient here for treatment. RN asking how patient is feeling. Patient states chest is not as tight. Patient is having trouble swallowing and the viscous lidocaine is currently being used. Dr. Farhana Diop updated and patient given new rx for Carafate.

## 2019-06-21 NOTE — PROGRESS NOTES
Pt arrives per ambulatory per self with home O2 tank and labs and orders reviewed. O2 in use at 2L/NC. Pt states only short of breath with exertion. Just got done with short course of steroid and started z-pack to day per RAD-ONC. Pt tolerated premeds well and NS flushing line before and between and after meds. Blood return present throughout infusion. Pt tolerated chemo well and no reactions or complaints and pt has next appts and discharged per ambulatory with O2 per self.

## 2019-06-24 ENCOUNTER — HOSPITAL ENCOUNTER (OUTPATIENT)
Dept: RADIATION ONCOLOGY | Facility: MEDICAL CENTER | Age: 69
Discharge: HOME | End: 2019-06-24
Attending: RADIOLOGY
Payer: MEDICARE

## 2019-06-24 VITALS
HEART RATE: 101 BPM | TEMPERATURE: 98.2 F | BODY MASS INDEX: 21.46 KG/M2 | SYSTOLIC BLOOD PRESSURE: 135 MMHG | DIASTOLIC BLOOD PRESSURE: 80 MMHG | OXYGEN SATURATION: 100 % | WEIGHT: 121.13 LBS | RESPIRATION RATE: 16 BRPM

## 2019-06-24 PROCEDURE — 77280 THER RAD SIMULAJ FIELD SMPL: CPT | Performed by: RADIOLOGY

## 2019-06-24 PROCEDURE — 77387 GUIDANCE FOR RADJ TX DLVR: CPT | Performed by: RADIOLOGY

## 2019-06-24 PROCEDURE — 77412 RADIATION TX DELIVERY LVL 3: CPT | Performed by: RADIOLOGY

## 2019-06-24 PROCEDURE — 77336 RADIATION PHYSICS CONSULT: CPT | Performed by: RADIOLOGY

## 2019-06-24 ASSESSMENT — PAIN DESCRIPTION - ORIENTATION: ORIENTATION: RIGHT;POSTERIOR

## 2019-06-24 ASSESSMENT — PAIN DESCRIPTION - ONSET: ONSET: ON-GOING

## 2019-06-24 ASSESSMENT — PAIN DESCRIPTION - DESCRIPTORS: DESCRIPTORS: SHARP

## 2019-06-24 ASSESSMENT — PAIN SCALES - GENERAL: PAINLEVEL_OUTOF10: 4

## 2019-06-24 ASSESSMENT — PAIN DESCRIPTION - PROGRESSION: CLINICAL_PROGRESSION: GRADUALLY WORSENING

## 2019-06-24 ASSESSMENT — PAIN DESCRIPTION - LOCATION: LOCATION: SHOULDER

## 2019-06-24 ASSESSMENT — PAIN DESCRIPTION - PAIN TYPE: TYPE: ACUTE PAIN

## 2019-06-24 ASSESSMENT — PAIN DESCRIPTION - FREQUENCY: FREQUENCY: CONTINUOUS

## 2019-06-24 NOTE — PROGRESS NOTES
Tk   6/24/2019  Wt Readings from Last 3 Encounters:   06/24/19 121 lb 2 oz (54.9 kg)   06/21/19 125 lb 9.6 oz (57 kg)   06/17/19 133 lb 6 oz (60.5 kg)     Body mass index is 21.46 kg/m². PT here for OTV. Patient states tightness in lung is better. Patient mucus is white with cough. Treatment Area:lung    Patient was seen today for weekly visit. Comfort Alteration  Fatigue: Severe    Ventilation Alterations  Cough: Yes  Hemoptysis: No  Mucus Color: white  Dyspnea: Yes  O2 Sat: 100%    Nutritional Alteration  Anorexia: Yes  Nausea: No   Vomiting: No     Skin Alteration   Sensation:none    Radiation Dermatitis:  none    Mucous Membrane Alteration  Voice Changes/ Stridor/Larynx: no  Pharynx & Esophagus: yes. $280 for medication, put through prior authorization this am for patient.     Elimination Alterations  Constipation: no  Diarrhea:  no      Emotional  Coping: effective      Injury, potential bleeding or infection: none    Other:none    Lab Results   Component Value Date    WBC 3.7 06/21/2019     (L) 06/21/2019         /80   Pulse 101   Temp 98.2 °F (36.8 °C) (Oral)   Resp 16   Wt 121 lb 2 oz (54.9 kg)   SpO2 100%   BMI 21.46 kg/m²   Patient Currently in Pain: Yes          Sheila Monroy

## 2019-06-24 NOTE — PROGRESS NOTES
100 mg, 100 mg, Intravenous, ONCE, 4 of 5 cycles    CARBOplatin (PARAPLATIN) 170 mg in sodium chloride 0.9 % 100 mL chemo IVPB, 170 mg, Intravenous, ONCE, 4 of 5 cycles  Dose modification:   (original dose 174 mg, Cycle 2, Reason: Other (See Comments), Comment: SCr rounded to 0.8),   (original dose 172 mg, Cycle 4, Reason: Other (See Comments), Comment: use scr 0.8 or below),   (original dose 172 mg, Cycle 5), 170 mg (original dose 172 mg, Cycle 5)  Administration: 170 mg (5/23/2019), 170 mg (5/31/2019), 170 mg (6/14/2019), 170 mg (6/21/2019)    PACLitaxel (TAXOL) 72 mg in sodium chloride 0.9 % 250 mL chemo infusion, 45 mg/m2 = 72 mg, Intravenous, ONCE, 4 of 5 cycles  Administration: 72 mg (5/23/2019), 72 mg (5/31/2019), 72 mg (6/14/2019), 72 mg (6/21/2019)    dexamethasone (DECADRON) injection 10 mg, 10 mg, Intravenous, ONCE, 4 of 5 cycles  Administration: 10 mg (5/23/2019), 10 mg (5/31/2019), 10 mg (6/14/2019), 10 mg (6/21/2019)    methylPREDNISolone sodium (SOLU-MEDROL) injection 125 mg, 125 mg, Intravenous, ONCE, 4 of 5 cycles    famotidine (PEPCID) injection 20 mg, 20 mg, Intravenous, ONCE, 4 of 5 cycles  Administration: 20 mg (5/23/2019), 20 mg (5/31/2019), 20 mg (6/14/2019), 20 mg (6/21/2019)       Treatment Summary   Plan Name PORT AND    Treatment goal [No plan goal]   Provider Frances Muñoz MD   Status Active   Start Date [No treatment day found]   End Date 6/21/2019   Treatment plan weight/height/BSA [Plan weight, height, and BSA not specified]   Most recent weight/height/BSA Weight: Weight: 121 lb 2 oz (54.9 kg)    Height: Height: 5' 3\" (1.6 m)    BSA: BSA (Calculated - sq m): 1.58 sq meters      Treatment on Clinical Trial? [This plan is not part of a research study]   Reason for stopping treatment [Plan is still active]   Treatment Plan Medications alteplase (CATHFLO) injection 2 mg, 2 mg, Intracatheter, ONCE, 1 of 1 cycle           SUBJECTIVE: Jean-Paul Mack is doing better this week noting improvement in her breathing and chest tightness. The Z-Anoop has helped clear her cough now white sputum. Resimulation took place after 3,800 cGy, showing satisfactory response and new CT data set was wired for her boost.  She has lost some weight since last week but this may be secondary to diuretic use and lower extremity edema. She did not fill the prescription for Carafate suspension due to cost; a prescription for sucralfate pills was therein given to her along with instructions and precautions in use. OBJECTIVE:     Labs:  WBC   Date Value Ref Range Status   06/21/2019 3.7 3.5 - 11.3 k/uL Final     Segs Absolute   Date Value Ref Range Status   06/21/2019 2.99 1.8 - 7.7 k/uL Final     Hemoglobin   Date Value Ref Range Status   06/21/2019 9.4 (L) 11.9 - 15.1 g/dL Final     Platelets   Date Value Ref Range Status   06/21/2019 109 (L) 138 - 453 k/uL Final   Medications:    Current Outpatient Medications:     oxyCODONE (ROXICODONE) 5 MG immediate release tablet, Take 1 tablet by mouth every 6 hours as needed for Pain for up to 14 days.  Take lowest dose possible to manage pain, Disp: 56 tablet, Rfl: 0    spironolactone (ALDACTONE) 25 MG tablet, Take 1 tablet by mouth daily, Disp: 30 tablet, Rfl: 3    lidocaine viscous hcl (XYLOCAINE) 2 % SOLN solution, Take 5-10 mLs by mouth as needed for Irritation, Disp: 100 mL, Rfl: 1    ondansetron (ZOFRAN ODT) 8 MG TBDP disintegrating tablet, Place 1 tablet under the tongue every 8 hours as needed for Nausea or Vomiting, Disp: 30 tablet, Rfl: 3    omeprazole (PRILOSEC) 20 MG delayed release capsule, Take 1 capsule by mouth daily, Disp: 30 capsule, Rfl: 3    hydrOXYzine (ATARAX) 25 MG tablet, Take 1 tablet by mouth 2 times daily, Disp: 60 tablet, Rfl: 3    budesonide-formoterol (SYMBICORT) 160-4.5 MCG/ACT AERO, Inhale 2 puffs into the lungs 2 times daily, Disp: 1 Inhaler, Rfl: 3    albuterol sulfate HFA (PROVENTIL HFA) 108 (90 Base) MCG/ACT inhaler, active bowel sounds. She has 1+ nonpitting edema of both lower extremities. ASSESSMENT PLAN:   Tolerating combined modality treatment better, continue as prescribed, monitor for acute toxicities such as cytopenia, failure to thrive, mucositis, esophagitis. Electronically signed by Sebastian Bolden MD on 6/24/2019 at 11:08 300 [facility]ina    Drugs Prescribed:  New Prescriptions    No medications on file       Other Orders Placed:  No orders of the defined types were placed in this encounter.

## 2019-06-25 ENCOUNTER — HOSPITAL ENCOUNTER (OUTPATIENT)
Facility: MEDICAL CENTER | Age: 69
End: 2019-06-25
Payer: MEDICARE

## 2019-06-25 ENCOUNTER — HOSPITAL ENCOUNTER (OUTPATIENT)
Dept: RADIATION ONCOLOGY | Facility: MEDICAL CENTER | Age: 69
Discharge: HOME | End: 2019-06-25
Attending: RADIOLOGY
Payer: MEDICARE

## 2019-06-25 PROCEDURE — 77387 GUIDANCE FOR RADJ TX DLVR: CPT | Performed by: RADIOLOGY

## 2019-06-25 PROCEDURE — 77412 RADIATION TX DELIVERY LVL 3: CPT | Performed by: RADIOLOGY

## 2019-06-26 ENCOUNTER — HOSPITAL ENCOUNTER (OUTPATIENT)
Dept: RADIATION ONCOLOGY | Facility: MEDICAL CENTER | Age: 69
Discharge: HOME | End: 2019-06-26
Attending: RADIOLOGY
Payer: MEDICARE

## 2019-06-26 PROCEDURE — 77412 RADIATION TX DELIVERY LVL 3: CPT | Performed by: RADIOLOGY

## 2019-06-26 PROCEDURE — 77387 GUIDANCE FOR RADJ TX DLVR: CPT | Performed by: RADIOLOGY

## 2019-06-27 ENCOUNTER — TELEPHONE (OUTPATIENT)
Dept: CASE MANAGEMENT | Age: 69
End: 2019-06-27

## 2019-06-27 ENCOUNTER — HOSPITAL ENCOUNTER (OUTPATIENT)
Dept: RADIATION ONCOLOGY | Facility: MEDICAL CENTER | Age: 69
Discharge: HOME | End: 2019-06-27
Attending: RADIOLOGY
Payer: MEDICARE

## 2019-06-27 PROCEDURE — 77387 GUIDANCE FOR RADJ TX DLVR: CPT | Performed by: RADIOLOGY

## 2019-06-27 PROCEDURE — 77412 RADIATION TX DELIVERY LVL 3: CPT | Performed by: RADIOLOGY

## 2019-06-27 NOTE — PROGRESS NOTES
Nutrition F/U:  *Wt loss trigger  wt (6/24) 54.9 kg, (6/17) 60.5 kg; +wt loss of 5.6 kg (9.3%) since 6/17.     >Per Pt (+on 02 therapy/ambulatory):  c/o having +sore throat, +beverages/foods not tasting good, +swallowing problems (new medication helping) & +feeling of foods getting stuck @ times. >Dietary hx:  Following a soft diet, taking smaller/more frequent meals/snacks. Continues to be on [employer] program; which tolerating well per Pt. Including Ensure Plus (strawberry or chocolate) ONS, 1-2/day. Evaluation:  Severe +wt loss of 5.6 kg (9.3%) x past 1 week. Not meeting estimated nutrition needs likely r/t having multiple symptoms. However, maybe in part r/t fluid losses according to Pt (on Aldactone medication). Recommendations:  1. Increase intake of Ensure Plus ONS to x 3/day. 2. Provided additional Ensure Plus (chocolate) x 6. Along with Ensure coupons x 3.  3. Will continue to monitor Pt's wt's.     Reymundo Gunn, Bouchra, RDN, LD

## 2019-06-27 NOTE — TELEPHONE ENCOUNTER
Name: Tk Tolliver  : 1950  MRN: [id number]    Oncology Navigation Follow-Up Note    Contact Type:  Telephone    Subjective:     Objective:     Assistance Needed:     Receptive to Advanced Care Planning / Palliative Care:      Referrals:     Education:     Notes: Navigator spoke with RTX tech today post Pts. Visit and inquiring about status. Pt. \"managing\" Plan to reach out to pt. Post RTX  Today.     Electronically signed by Barbara Fuentes RN on 2019 at 9:46 AM

## 2019-06-28 ENCOUNTER — HOSPITAL ENCOUNTER (OUTPATIENT)
Dept: INFUSION THERAPY | Facility: MEDICAL CENTER | Age: 69
Discharge: HOME | End: 2019-06-28
Payer: MEDICARE

## 2019-06-28 ENCOUNTER — HOSPITAL ENCOUNTER (OUTPATIENT)
Dept: RADIATION ONCOLOGY | Facility: MEDICAL CENTER | Age: 69
Discharge: HOME | End: 2019-06-28
Attending: RADIOLOGY
Payer: MEDICARE

## 2019-06-28 ENCOUNTER — HOSPITAL ENCOUNTER (OUTPATIENT)
Facility: MEDICAL CENTER | Age: 69
End: 2019-06-28
Payer: MEDICARE

## 2019-06-28 VITALS
DIASTOLIC BLOOD PRESSURE: 63 MMHG | SYSTOLIC BLOOD PRESSURE: 120 MMHG | TEMPERATURE: 99 F | RESPIRATION RATE: 18 BRPM | HEART RATE: 96 BPM

## 2019-06-28 DIAGNOSIS — C34.11 MALIGNANT NEOPLASM OF UPPER LOBE OF RIGHT LUNG (HCC): Primary | ICD-10-CM

## 2019-06-28 LAB
ABSOLUTE EOS #: 0.03 K/UL (ref 0–0.4)
ABSOLUTE IMMATURE GRANULOCYTE: 0.05 K/UL (ref 0–0.3)
ABSOLUTE LYMPH #: 0.29 K/UL (ref 1–4.8)
ABSOLUTE MONO #: 0.26 K/UL (ref 0.2–0.8)
ALBUMIN SERPL-MCNC: 2.9 G/DL (ref 3.5–5.2)
ALBUMIN/GLOBULIN RATIO: ABNORMAL (ref 1–2.5)
ALP BLD-CCNC: 66 U/L (ref 35–104)
ALT SERPL-CCNC: 16 U/L (ref 5–33)
ANION GAP SERPL CALCULATED.3IONS-SCNC: 10 MMOL/L (ref 9–17)
AST SERPL-CCNC: 22 U/L
BASOPHILS # BLD: 1 %
BASOPHILS ABSOLUTE: 0.02 K/UL (ref 0–0.2)
BILIRUB SERPL-MCNC: 1.54 MG/DL (ref 0.3–1.2)
BUN BLDV-MCNC: 10 MG/DL (ref 8–23)
BUN/CREAT BLD: 20 (ref 9–20)
CALCIUM SERPL-MCNC: 8 MG/DL (ref 8.6–10.4)
CHLORIDE BLD-SCNC: 99 MMOL/L (ref 98–107)
CO2: 20 MMOL/L (ref 20–31)
CREAT SERPL-MCNC: 0.49 MG/DL (ref 0.5–0.9)
DIFFERENTIAL TYPE: ABNORMAL
EOSINOPHILS RELATIVE PERCENT: 2 % (ref 1–4)
GFR AFRICAN AMERICAN: >60 ML/MIN
GFR NON-AFRICAN AMERICAN: >60 ML/MIN
GFR SERPL CREATININE-BSD FRML MDRD: ABNORMAL ML/MIN/{1.73_M2}
GFR SERPL CREATININE-BSD FRML MDRD: ABNORMAL ML/MIN/{1.73_M2}
GLUCOSE BLD-MCNC: 190 MG/DL (ref 70–99)
HCT VFR BLD CALC: 24.2 % (ref 36.3–47.1)
HEMOGLOBIN: 8 G/DL (ref 11.9–15.1)
IMMATURE GRANULOCYTES: 3 %
LYMPHOCYTES # BLD: 19 % (ref 24–44)
MCH RBC QN AUTO: 29.2 PG (ref 25.2–33.5)
MCHC RBC AUTO-ENTMCNC: 33.1 G/DL (ref 28.4–34.8)
MCV RBC AUTO: 88.3 FL (ref 82.6–102.9)
MONOCYTES # BLD: 17 % (ref 1–7)
MORPHOLOGY: ABNORMAL
NRBC AUTOMATED: 0 PER 100 WBC
NUCLEATED RED BLOOD CELLS: 1 PER 100 WBC
PDW BLD-RTO: 14.8 % (ref 11.8–14.4)
PLATELET # BLD: 82 K/UL (ref 138–453)
PLATELET ESTIMATE: ABNORMAL
PMV BLD AUTO: 9.4 FL (ref 8.1–13.5)
POTASSIUM SERPL-SCNC: 3.8 MMOL/L (ref 3.7–5.3)
RBC # BLD: 2.74 M/UL (ref 3.95–5.11)
RBC # BLD: ABNORMAL 10*6/UL
SEG NEUTROPHILS: 58 % (ref 36–66)
SEGMENTED NEUTROPHILS ABSOLUTE COUNT: 0.85 K/UL (ref 1.8–7.7)
SODIUM BLD-SCNC: 129 MMOL/L (ref 135–144)
TOTAL PROTEIN: 6.4 G/DL (ref 6.4–8.3)
WBC # BLD: 1.5 K/UL (ref 3.5–11.3)
WBC # BLD: ABNORMAL 10*3/UL

## 2019-06-28 PROCEDURE — 2580000003 HC RX 258: Performed by: INTERNAL MEDICINE

## 2019-06-28 PROCEDURE — 85025 COMPLETE CBC W/AUTO DIFF WBC: CPT

## 2019-06-28 PROCEDURE — 77412 RADIATION TX DELIVERY LVL 3: CPT | Performed by: RADIOLOGY

## 2019-06-28 PROCEDURE — 77387 GUIDANCE FOR RADJ TX DLVR: CPT | Performed by: RADIOLOGY

## 2019-06-28 PROCEDURE — 6360000002 HC RX W HCPCS: Performed by: INTERNAL MEDICINE

## 2019-06-28 PROCEDURE — 36591 DRAW BLOOD OFF VENOUS DEVICE: CPT

## 2019-06-28 PROCEDURE — 80053 COMPREHEN METABOLIC PANEL: CPT

## 2019-06-28 RX ORDER — HEPARIN SODIUM (PORCINE) LOCK FLUSH IV SOLN 100 UNIT/ML 100 UNIT/ML
500 SOLUTION INTRAVENOUS PRN
Status: DISCONTINUED | OUTPATIENT
Start: 2019-06-28 | End: 2019-06-29 | Stop reason: HOSPADM

## 2019-06-28 RX ORDER — SODIUM CHLORIDE 0.9 % (FLUSH) 0.9 %
10 SYRINGE (ML) INJECTION PRN
Status: DISCONTINUED | OUTPATIENT
Start: 2019-06-28 | End: 2019-06-29 | Stop reason: HOSPADM

## 2019-06-28 RX ORDER — SODIUM CHLORIDE 9 MG/ML
20 INJECTION, SOLUTION INTRAVENOUS ONCE
Status: COMPLETED | OUTPATIENT
Start: 2019-06-28 | End: 2019-06-28

## 2019-06-28 RX ADMIN — Medication 10 ML: at 12:37

## 2019-06-28 RX ADMIN — Medication 10 ML: at 10:20

## 2019-06-28 RX ADMIN — SODIUM CHLORIDE, PRESERVATIVE FREE 500 UNITS: 5 INJECTION INTRAVENOUS at 12:37

## 2019-06-28 RX ADMIN — SODIUM CHLORIDE 20 ML/HR: 9 INJECTION, SOLUTION INTRAVENOUS at 10:20

## 2019-06-28 ASSESSMENT — PAIN DESCRIPTION - DESCRIPTORS: DESCRIPTORS: ACHING

## 2019-06-28 ASSESSMENT — PAIN DESCRIPTION - PAIN TYPE: TYPE: CHRONIC PAIN

## 2019-06-28 ASSESSMENT — PAIN SCALES - GENERAL: PAINLEVEL_OUTOF10: 4

## 2019-06-28 ASSESSMENT — PAIN DESCRIPTION - PROGRESSION: CLINICAL_PROGRESSION: NOT CHANGED

## 2019-06-28 ASSESSMENT — PAIN DESCRIPTION - ORIENTATION: ORIENTATION: RIGHT

## 2019-06-28 ASSESSMENT — PAIN DESCRIPTION - FREQUENCY: FREQUENCY: INTERMITTENT

## 2019-06-28 ASSESSMENT — PAIN DESCRIPTION - ONSET: ONSET: ON-GOING

## 2019-06-28 ASSESSMENT — PAIN DESCRIPTION - LOCATION: LOCATION: SHOULDER

## 2019-06-28 NOTE — PROGRESS NOTES
1010:  Pt arrives ambulatory on own portable [email or web address] via nasal cannula for RN draw and C6D1. Orders reviewed. VS obtained. Pt just came from radiation treatment. Pt feels well today. Denies fevers/chills/illnesses or infections. 1020:  Pt's port accessed as charted in LDA's. Labs drawn. IVF's hung as per [facility] for med line. 1116:  Labs results reviewed and writer called [facility] and spoke with Emma Huynh who would ask Dr. Monica Alvarado to call writer back since Dr. Tha Genao is out of town and Dr. Bibi Landis group on-call but does not know this patient. 1220:  Dr. Vera Lion calls back and lab results reviewed. T.Bilirubin elevated=1.54 but pt known to have Liver disease/Hep C but has been within normal range with past chemo tx's. Pt has chronic right shoulder pain, mild appetite/wt loss but denies abdominal pain. Had  WBC=1.5, ANC=0.85, platelet YDLPC=95 and Hemoglobin=8.0. Orders received from Dr. Monica Alvarado to hold chemotherapy treatment today and RV on 7/3/19 when Dr. Tha Genao will re-evaluate any further chemotherapy depending on if radiation treatments extended. Kymberly Gunderson PharmD updated on this. Updated pt on same. [id number]:  Writer called [employer] at [phone] for pt's ride home and they'll send someone now. 1237:  Pt's port flushed and heparinized as per MAR. Pt's port de-accessed as charted in LDA's. Pt has next appointment. RV on 7/3/19. Discharged to home on own portable [email or web address] via nasal cannula.

## 2019-06-30 ENCOUNTER — HOSPITAL ENCOUNTER (OUTPATIENT)
Dept: RADIATION ONCOLOGY | Facility: MEDICAL CENTER | Age: 69
Discharge: HOME | End: 2019-06-30
Attending: RADIOLOGY
Payer: MEDICARE

## 2019-06-30 VITALS
DIASTOLIC BLOOD PRESSURE: 67 MMHG | HEART RATE: 98 BPM | BODY MASS INDEX: 21.97 KG/M2 | OXYGEN SATURATION: 97 % | SYSTOLIC BLOOD PRESSURE: 120 MMHG | TEMPERATURE: 98.4 F | WEIGHT: 124 LBS | RESPIRATION RATE: 20 BRPM

## 2019-06-30 DIAGNOSIS — C34.11 MALIGNANT NEOPLASM OF UPPER LOBE OF RIGHT LUNG (HCC): Primary | ICD-10-CM

## 2019-06-30 PROCEDURE — 77412 RADIATION TX DELIVERY LVL 3: CPT | Performed by: RADIOLOGY

## 2019-06-30 PROCEDURE — 77417 THER RADIOLOGY PORT IMAGE(S): CPT | Performed by: RADIOLOGY

## 2019-06-30 PROCEDURE — 77336 RADIATION PHYSICS CONSULT: CPT | Performed by: RADIOLOGY

## 2019-06-30 ASSESSMENT — PAIN DESCRIPTION - PAIN TYPE: TYPE: ACUTE PAIN

## 2019-06-30 ASSESSMENT — PAIN SCALES - GENERAL: PAINLEVEL_OUTOF10: 9

## 2019-06-30 ASSESSMENT — PAIN DESCRIPTION - ORIENTATION: ORIENTATION: RIGHT

## 2019-06-30 ASSESSMENT — PAIN DESCRIPTION - FREQUENCY: FREQUENCY: INTERMITTENT

## 2019-06-30 ASSESSMENT — PAIN DESCRIPTION - LOCATION: LOCATION: SHOULDER

## 2019-06-30 NOTE — PROGRESS NOTES
Date of Service: 2019      Location:  [facility] Radiation Oncology,   [address], [place], [address]   101 [facility] WEEKLY PROGRESS NOTE    Patient ID:   Sanaz Wilcox  : 1950   MRN: [id number]    Diagnosis:  Cancer Staging  Malignant neoplasm of upper lobe of right lung [facility])  Staging form: Lung, AJCC 8th Edition  - Clinical stage from 4/15/2019: Stage IIIA (cT4, cN0, cM0) - Signed by Evita Samuel MD on 2019  Staging comments: Squamous cell carcinoma      Radiation Treatment Information:   Actual Dose: 5000 cGy  Total Planned Dose: 6000 cGy  Treatment Site: right upper lobe/right hilum    Chemotherapy update:  Concurrent carboplatin and Taxol per Dr. Jessy Foss imaging monitoring: Image match with port films    SUBJECTIVE:  The patient continues to have right upper posterior chest wall pain from the tumor. She takes oxycodone as needed for pain control. She has no constipation or drowsiness from this medication. Her appetite is improving. Her weight is increased by 3 pounds compared to last week. She is coughing up more mucous and phlegm. Her lung is reexpanding so she underwent a repeat CT simulation to continue her treatment. She denies nausea, vomiting, or abdominal pain. She denies dysphagia or odynophagia. Her WBC decreased. She is wearing a mask. She denies fevers. Her chemotherapy was held last week. The patient also developed mild edema of the lower extremities and wears stockings. This was from the Taxol.     OBJECTIVE:     Labs:  WBC   Date Value Ref Range Status   2019 1.5 (L) 3.5 - 11.3 k/uL Final     Segs Absolute   Date Value Ref Range Status   2019 0.85 (L) 1.8 - 7.7 k/uL Final     Hemoglobin   Date Value Ref Range Status   2019 8.0 (L) 11.9 - 15.1 g/dL Final     Platelets   Date Value Ref Range Status   2019 82 (L) 138 - 453 k/uL Final   No results found for:

## 2019-06-30 NOTE — PROGRESS NOTES
Elenita Garcia  6/30/2019  Wt Readings from Last 3 Encounters:   06/30/19 124 lb (56.2 kg)   06/24/19 121 lb 2 oz (54.9 kg)   06/21/19 125 lb 9.6 oz (57 kg)     Body mass index is 21.97 kg/m². Pt is bringing up white mucus, been swallowing better. Patient WBC is low and platelets, chemo held Friday, but Dr. Arturo Alejandro okay with having radiation today. Dr. Arturo Alejandro to [facility]. Will order CBC for tomorrow after treatment tomorrow. Treatment Area:lung    Patient was seen today for weekly visit.       Comfort Alteration  Fatigue: Severe    Ventilation Alterations  Cough: No  Hemoptysis: No  Mucus Color: white  Dyspnea: Yes  O2 Sat: 97%    Nutritional Alteration  Anorexia: No  Nausea: No   Vomiting: No     Skin Alteration   Sensation:none    Radiation Dermatitis:  none    Mucous Membrane Alteration  Voice Changes/ Stridor/Larynx: no  Pharynx & Esophagus: intermittent swallowing problems    Elimination Alterations  Constipation: no  Diarrhea:  no      Emotional  Coping: effective      Injury, potential bleeding or infection: none    Other:none    Lab Results   Component Value Date    WBC 1.5 (L) 06/28/2019    PLT 82 (L) 06/28/2019         /67   Pulse 98   Temp 98.4 °F (36.9 °C) (Oral)   Resp 20   Wt 124 lb (56.2 kg)   SpO2 97%   BMI 21.97 kg/m²   Patient Currently in Pain: Yes          Jack Adkins

## 2019-07-01 ENCOUNTER — HOSPITAL ENCOUNTER (OUTPATIENT)
Dept: RADIATION ONCOLOGY | Facility: MEDICAL CENTER | Age: 69
Discharge: HOME | End: 2019-07-01
Attending: RADIOLOGY
Payer: MEDICARE

## 2019-07-01 ENCOUNTER — HOSPITAL ENCOUNTER (OUTPATIENT)
Age: 69
Setting detail: SPECIMEN
Discharge: HOME | End: 2019-07-01
Payer: MEDICARE

## 2019-07-01 DIAGNOSIS — C34.11 MALIGNANT NEOPLASM OF UPPER LOBE OF RIGHT LUNG (HCC): ICD-10-CM

## 2019-07-01 LAB
ABSOLUTE EOS #: 0 K/UL (ref 0–0.4)
ABSOLUTE IMMATURE GRANULOCYTE: 0.05 K/UL (ref 0–0.3)
ABSOLUTE LYMPH #: 0.32 K/UL (ref 1–4.8)
ABSOLUTE MONO #: 0.86 K/UL (ref 0.1–0.8)
BASOPHILS # BLD: 0 % (ref 0–2)
BASOPHILS ABSOLUTE: 0 K/UL (ref 0–0.2)
DIFFERENTIAL TYPE: ABNORMAL
EOSINOPHILS RELATIVE PERCENT: 0 % (ref 1–4)
HCT VFR BLD CALC: 26.1 % (ref 36.3–47.1)
HEMOGLOBIN: 8.4 G/DL (ref 11.9–15.1)
IMMATURE GRANULOCYTES: 2 %
LYMPHOCYTES # BLD: 12 % (ref 24–44)
MCH RBC QN AUTO: 29.3 PG (ref 25.2–33.5)
MCHC RBC AUTO-ENTMCNC: 32.2 G/DL (ref 28.4–34.8)
MCV RBC AUTO: 90.9 FL (ref 82.6–102.9)
MONOCYTES # BLD: 32 % (ref 1–7)
MORPHOLOGY: ABNORMAL
NRBC AUTOMATED: 0 PER 100 WBC
PDW BLD-RTO: 16.5 % (ref 11.8–14.4)
PLATELET # BLD: ABNORMAL K/UL (ref 138–453)
PLATELET ESTIMATE: ABNORMAL
PMV BLD AUTO: ABNORMAL FL (ref 8.1–13.5)
RBC # BLD: 2.87 M/UL (ref 3.95–5.11)
RBC # BLD: ABNORMAL 10*6/UL
SEG NEUTROPHILS: 54 % (ref 36–66)
SEGMENTED NEUTROPHILS ABSOLUTE COUNT: 1.47 K/UL (ref 1.8–7.7)
WBC # BLD: 2.7 K/UL (ref 3.5–11.3)
WBC # BLD: ABNORMAL 10*3/UL

## 2019-07-01 PROCEDURE — 77412 RADIATION TX DELIVERY LVL 3: CPT | Performed by: RADIOLOGY

## 2019-07-01 PROCEDURE — 77387 GUIDANCE FOR RADJ TX DLVR: CPT | Performed by: RADIOLOGY

## 2019-07-02 ENCOUNTER — HOSPITAL ENCOUNTER (OUTPATIENT)
Dept: RADIATION ONCOLOGY | Facility: MEDICAL CENTER | Age: 69
Discharge: HOME | End: 2019-07-02
Attending: RADIOLOGY
Payer: MEDICARE

## 2019-07-02 PROCEDURE — 77387 GUIDANCE FOR RADJ TX DLVR: CPT | Performed by: RADIOLOGY

## 2019-07-02 PROCEDURE — 77412 RADIATION TX DELIVERY LVL 3: CPT | Performed by: RADIOLOGY

## 2019-07-03 ENCOUNTER — TELEPHONE (OUTPATIENT)
Dept: RADIATION ONCOLOGY | Facility: MEDICAL CENTER | Age: 69
End: 2019-07-03

## 2019-07-03 ENCOUNTER — TELEPHONE (OUTPATIENT)
Dept: ONCOLOGY | Age: 69
End: 2019-07-03

## 2019-07-03 ENCOUNTER — HOSPITAL ENCOUNTER (OUTPATIENT)
Facility: MEDICAL CENTER | Age: 69
End: 2019-07-03
Payer: MEDICARE

## 2019-07-03 ENCOUNTER — HOSPITAL ENCOUNTER (OUTPATIENT)
Dept: RADIATION ONCOLOGY | Facility: MEDICAL CENTER | Age: 69
Discharge: HOME | End: 2019-07-03
Attending: RADIOLOGY
Payer: MEDICARE

## 2019-07-03 ENCOUNTER — OFFICE VISIT (OUTPATIENT)
Dept: ONCOLOGY | Age: 69
End: 2019-07-03
Payer: MEDICARE

## 2019-07-03 ENCOUNTER — TELEPHONE (OUTPATIENT)
Dept: CASE MANAGEMENT | Age: 69
End: 2019-07-03

## 2019-07-03 VITALS
BODY MASS INDEX: 22.5 KG/M2 | WEIGHT: 127 LBS | HEART RATE: 70 BPM | DIASTOLIC BLOOD PRESSURE: 68 MMHG | TEMPERATURE: 98.1 F | RESPIRATION RATE: 19 BRPM | SYSTOLIC BLOOD PRESSURE: 117 MMHG

## 2019-07-03 DIAGNOSIS — C34.11 MALIGNANT NEOPLASM OF UPPER LOBE OF RIGHT LUNG (HCC): Primary | ICD-10-CM

## 2019-07-03 PROCEDURE — 77412 RADIATION TX DELIVERY LVL 3: CPT | Performed by: RADIOLOGY

## 2019-07-03 PROCEDURE — G8427 DOCREV CUR MEDS BY ELIG CLIN: HCPCS | Performed by: INTERNAL MEDICINE

## 2019-07-03 PROCEDURE — G8400 PT W/DXA NO RESULTS DOC: HCPCS | Performed by: INTERNAL MEDICINE

## 2019-07-03 PROCEDURE — 4004F PT TOBACCO SCREEN RCVD TLK: CPT | Performed by: INTERNAL MEDICINE

## 2019-07-03 PROCEDURE — 3017F COLORECTAL CA SCREEN DOC REV: CPT | Performed by: INTERNAL MEDICINE

## 2019-07-03 PROCEDURE — 77387 GUIDANCE FOR RADJ TX DLVR: CPT | Performed by: RADIOLOGY

## 2019-07-03 PROCEDURE — G8420 CALC BMI NORM PARAMETERS: HCPCS | Performed by: INTERNAL MEDICINE

## 2019-07-03 PROCEDURE — 99215 OFFICE O/P EST HI 40 MIN: CPT | Performed by: INTERNAL MEDICINE

## 2019-07-03 PROCEDURE — 4040F PNEUMOC VAC/ADMIN/RCVD: CPT | Performed by: INTERNAL MEDICINE

## 2019-07-03 PROCEDURE — 99212 OFFICE O/P EST SF 10 MIN: CPT

## 2019-07-03 PROCEDURE — 1123F ACP DISCUSS/DSCN MKR DOCD: CPT | Performed by: INTERNAL MEDICINE

## 2019-07-03 PROCEDURE — 1090F PRES/ABSN URINE INCON ASSESS: CPT | Performed by: INTERNAL MEDICINE

## 2019-07-03 RX ORDER — HEPARIN SODIUM (PORCINE) LOCK FLUSH IV SOLN 100 UNIT/ML 100 UNIT/ML
500 SOLUTION INTRAVENOUS PRN
Status: CANCELLED | OUTPATIENT
Start: 2019-07-05

## 2019-07-03 RX ORDER — SODIUM CHLORIDE 9 MG/ML
20 INJECTION, SOLUTION INTRAVENOUS ONCE
Status: CANCELLED | OUTPATIENT
Start: 2019-07-05

## 2019-07-03 RX ORDER — OXYCODONE HYDROCHLORIDE 5 MG/1
5 TABLET ORAL EVERY 6 HOURS PRN
Qty: 56 TABLET | Refills: 0 | Status: SHIPPED | OUTPATIENT
Start: 2019-07-03 | End: 2019-07-15 | Stop reason: SDUPTHER

## 2019-07-03 RX ORDER — SODIUM CHLORIDE 0.9 % (FLUSH) 0.9 %
10 SYRINGE (ML) INJECTION PRN
Status: CANCELLED | OUTPATIENT
Start: 2019-07-05

## 2019-07-03 RX ORDER — TRIAMCINOLONE ACETONIDE 0.25 MG/G
CREAM TOPICAL
Qty: 1 TUBE | Refills: 0 | Status: SHIPPED | OUTPATIENT
Start: 2019-07-03 | End: 2019-08-14

## 2019-07-05 ENCOUNTER — TELEPHONE (OUTPATIENT)
Dept: ONCOLOGY | Age: 69
End: 2019-07-05

## 2019-07-05 ENCOUNTER — HOSPITAL ENCOUNTER (OUTPATIENT)
Dept: INFUSION THERAPY | Facility: MEDICAL CENTER | Age: 69
Discharge: HOME | End: 2019-07-05
Payer: MEDICARE

## 2019-07-05 ENCOUNTER — APPOINTMENT (OUTPATIENT)
Dept: RADIATION ONCOLOGY | Facility: MEDICAL CENTER | Age: 69
End: 2019-07-05
Attending: RADIOLOGY
Payer: MEDICARE

## 2019-07-05 VITALS
SYSTOLIC BLOOD PRESSURE: 122 MMHG | HEART RATE: 93 BPM | TEMPERATURE: 98.4 F | RESPIRATION RATE: 18 BRPM | DIASTOLIC BLOOD PRESSURE: 66 MMHG

## 2019-07-05 DIAGNOSIS — C34.11 MALIGNANT NEOPLASM OF UPPER LOBE OF RIGHT LUNG (HCC): Primary | ICD-10-CM

## 2019-07-05 LAB
ABSOLUTE EOS #: 0 K/UL (ref 0–0.4)
ABSOLUTE IMMATURE GRANULOCYTE: 0.1 K/UL (ref 0–0.3)
ABSOLUTE LYMPH #: 0.34 K/UL (ref 1–4.8)
ABSOLUTE MONO #: 0.39 K/UL (ref 0.2–0.8)
ALBUMIN SERPL-MCNC: 2.8 G/DL (ref 3.5–5.2)
ALBUMIN/GLOBULIN RATIO: ABNORMAL (ref 1–2.5)
ALP BLD-CCNC: 77 U/L (ref 35–104)
ALT SERPL-CCNC: 15 U/L (ref 5–33)
ANION GAP SERPL CALCULATED.3IONS-SCNC: 8 MMOL/L (ref 9–17)
AST SERPL-CCNC: 20 U/L
BASOPHILS # BLD: 0 %
BASOPHILS ABSOLUTE: 0 K/UL (ref 0–0.2)
BILIRUB SERPL-MCNC: 0.87 MG/DL (ref 0.3–1.2)
BUN BLDV-MCNC: 8 MG/DL (ref 8–23)
BUN/CREAT BLD: 17 (ref 9–20)
CALCIUM SERPL-MCNC: 8.2 MG/DL (ref 8.6–10.4)
CHLORIDE BLD-SCNC: 99 MMOL/L (ref 98–107)
CO2: 24 MMOL/L (ref 20–31)
CREAT SERPL-MCNC: 0.48 MG/DL (ref 0.5–0.9)
DIFFERENTIAL TYPE: ABNORMAL
EOSINOPHILS RELATIVE PERCENT: 0 % (ref 1–4)
GFR AFRICAN AMERICAN: >60 ML/MIN
GFR NON-AFRICAN AMERICAN: >60 ML/MIN
GFR SERPL CREATININE-BSD FRML MDRD: ABNORMAL ML/MIN/{1.73_M2}
GFR SERPL CREATININE-BSD FRML MDRD: ABNORMAL ML/MIN/{1.73_M2}
GLUCOSE BLD-MCNC: 131 MG/DL (ref 70–99)
HCT VFR BLD CALC: 28.7 % (ref 36.3–47.1)
HEMOGLOBIN: 9.4 G/DL (ref 11.9–15.1)
IMMATURE GRANULOCYTES: 2 %
LYMPHOCYTES # BLD: 7 % (ref 24–44)
MCH RBC QN AUTO: 29 PG (ref 25.2–33.5)
MCHC RBC AUTO-ENTMCNC: 32.8 G/DL (ref 28.4–34.8)
MCV RBC AUTO: 88.6 FL (ref 82.6–102.9)
MONOCYTES # BLD: 8 % (ref 1–7)
MORPHOLOGY: ABNORMAL
NRBC AUTOMATED: ABNORMAL PER 100 WBC
PDW BLD-RTO: 16.6 % (ref 11.8–14.4)
PLATELET # BLD: ABNORMAL K/UL (ref 130–400)
PLATELET ESTIMATE: ABNORMAL
PMV BLD AUTO: ABNORMAL FL (ref 6–12)
POTASSIUM SERPL-SCNC: 3.7 MMOL/L (ref 3.7–5.3)
RBC # BLD: 3.24 M/UL (ref 3.95–5.11)
RBC # BLD: ABNORMAL 10*6/UL
SEG NEUTROPHILS: 83 % (ref 36–66)
SEGMENTED NEUTROPHILS ABSOLUTE COUNT: 4.07 K/UL (ref 1.8–7.7)
SODIUM BLD-SCNC: 131 MMOL/L (ref 135–144)
TOTAL PROTEIN: 6.4 G/DL (ref 6.4–8.3)
WBC # BLD: 4.9 K/UL (ref 3.5–11.3)
WBC # BLD: ABNORMAL 10*3/UL

## 2019-07-05 PROCEDURE — 2580000003 HC RX 258: Performed by: INTERNAL MEDICINE

## 2019-07-05 PROCEDURE — 2500000003 HC RX 250 WO HCPCS: Performed by: INTERNAL MEDICINE

## 2019-07-05 PROCEDURE — 36591 DRAW BLOOD OFF VENOUS DEVICE: CPT

## 2019-07-05 PROCEDURE — 96375 TX/PRO/DX INJ NEW DRUG ADDON: CPT

## 2019-07-05 PROCEDURE — 96417 CHEMO IV INFUS EACH ADDL SEQ: CPT

## 2019-07-05 PROCEDURE — 85025 COMPLETE CBC W/AUTO DIFF WBC: CPT

## 2019-07-05 PROCEDURE — 80053 COMPREHEN METABOLIC PANEL: CPT

## 2019-07-05 PROCEDURE — 6360000002 HC RX W HCPCS: Performed by: INTERNAL MEDICINE

## 2019-07-05 PROCEDURE — 96413 CHEMO IV INFUSION 1 HR: CPT

## 2019-07-05 RX ORDER — DIPHENHYDRAMINE HYDROCHLORIDE 50 MG/ML
50 INJECTION INTRAMUSCULAR; INTRAVENOUS ONCE
Status: COMPLETED | OUTPATIENT
Start: 2019-07-05 | End: 2019-07-05

## 2019-07-05 RX ORDER — HEPARIN SODIUM (PORCINE) LOCK FLUSH IV SOLN 100 UNIT/ML 100 UNIT/ML
500 SOLUTION INTRAVENOUS PRN
Status: DISCONTINUED | OUTPATIENT
Start: 2019-07-05 | End: 2019-07-06 | Stop reason: HOSPADM

## 2019-07-05 RX ORDER — SODIUM CHLORIDE 0.9 % (FLUSH) 0.9 %
10 SYRINGE (ML) INJECTION PRN
Status: DISCONTINUED | OUTPATIENT
Start: 2019-07-05 | End: 2019-07-06 | Stop reason: HOSPADM

## 2019-07-05 RX ORDER — PALONOSETRON 0.05 MG/ML
0.25 INJECTION, SOLUTION INTRAVENOUS ONCE
Status: COMPLETED | OUTPATIENT
Start: 2019-07-05 | End: 2019-07-05

## 2019-07-05 RX ORDER — DEXAMETHASONE SODIUM PHOSPHATE 10 MG/ML
10 INJECTION INTRAMUSCULAR; INTRAVENOUS ONCE
Status: COMPLETED | OUTPATIENT
Start: 2019-07-05 | End: 2019-07-05

## 2019-07-05 RX ORDER — SODIUM CHLORIDE 9 MG/ML
20 INJECTION, SOLUTION INTRAVENOUS ONCE
Status: COMPLETED | OUTPATIENT
Start: 2019-07-05 | End: 2019-07-05

## 2019-07-05 RX ADMIN — DEXAMETHASONE SODIUM PHOSPHATE 10 MG: 10 INJECTION INTRAMUSCULAR; INTRAVENOUS at 10:07

## 2019-07-05 RX ADMIN — Medication 500 UNITS: at 13:06

## 2019-07-05 RX ADMIN — FAMOTIDINE 20 MG: 10 INJECTION, SOLUTION INTRAVENOUS at 10:06

## 2019-07-05 RX ADMIN — SODIUM CHLORIDE 20 ML/HR: 9 INJECTION, SOLUTION INTRAVENOUS at 09:00

## 2019-07-05 RX ADMIN — PALONOSETRON HYDROCHLORIDE 0.25 MG: 0.25 INJECTION, SOLUTION INTRAVENOUS at 10:06

## 2019-07-05 RX ADMIN — PACLITAXEL 72 MG: 6 INJECTION, SOLUTION INTRAVENOUS at 10:45

## 2019-07-05 RX ADMIN — Medication 10 ML: at 13:06

## 2019-07-05 RX ADMIN — DIPHENHYDRAMINE HYDROCHLORIDE 50 MG: 50 INJECTION, SOLUTION INTRAMUSCULAR; INTRAVENOUS at 10:06

## 2019-07-05 RX ADMIN — Medication 10 ML: at 10:06

## 2019-07-05 RX ADMIN — CARBOPLATIN 170 MG: 10 INJECTION INTRAVENOUS at 12:08

## 2019-07-05 ASSESSMENT — PAIN SCALES - GENERAL: PAINLEVEL_OUTOF10: 2

## 2019-07-05 ASSESSMENT — PAIN DESCRIPTION - LOCATION: LOCATION: SHOULDER

## 2019-07-05 ASSESSMENT — PAIN DESCRIPTION - FREQUENCY: FREQUENCY: INTERMITTENT

## 2019-07-05 ASSESSMENT — PAIN DESCRIPTION - ORIENTATION: ORIENTATION: RIGHT

## 2019-07-05 ASSESSMENT — PAIN DESCRIPTION - PAIN TYPE: TYPE: ACUTE PAIN

## 2019-07-05 NOTE — PROGRESS NOTES
Luann Starkey arrive ambulatory for cycle 6 day 1 treatment. Denies complaints or concerns. Carrying home O2 in case needed; but breathing well today and states currently not necessary. Vitals as charted. Port accessed without difficulty. Labs and order reviewed. Call to Dr. Massimo Lopez to discuss platelet count of 51. Informed him that 7/1/19 platelet count was reported as 53; then corrected to state \"platelet clumps present, count appears adequate\". Physician states will proceed with treatment however writer to contact lab to determine if platelet clumps present today and then to report back to physician. Spoke with Steven Delatorre in laboratory she is investigating and will return call when answer available. Patient premedicated. Taxol infusion begin slowly with no adverse reaction; so increased to infuse at hourly rate; line flushed with normal saline. Received notification from laboratory; \"platelet clumps present, count appears adequate\"; call and left message of same with Dr. Massimo Lopez. Informed physician next blood draw scheduled for 8/5 and if he needed something sooner to please advise with return phone call. Carbo infused with no sign of adverse reaction; line flushed with normal saline. Port flushed and heparinized with intact regan needle removed per protocol. Patient ambulate off unit per self at discharge. Patient to return to clinic 8/5/19 as no return call was received from Dr. Massimo Lopez today regarding need for labs.

## 2019-07-08 ENCOUNTER — HOSPITAL ENCOUNTER (OUTPATIENT)
Dept: RADIATION ONCOLOGY | Facility: MEDICAL CENTER | Age: 69
Discharge: HOME | End: 2019-07-08
Attending: RADIOLOGY
Payer: MEDICARE

## 2019-07-08 VITALS
SYSTOLIC BLOOD PRESSURE: 129 MMHG | BODY MASS INDEX: 22.14 KG/M2 | WEIGHT: 125 LBS | DIASTOLIC BLOOD PRESSURE: 74 MMHG | OXYGEN SATURATION: 100 % | HEART RATE: 90 BPM | RESPIRATION RATE: 16 BRPM | TEMPERATURE: 98.1 F

## 2019-07-08 PROCEDURE — 77387 GUIDANCE FOR RADJ TX DLVR: CPT | Performed by: RADIOLOGY

## 2019-07-08 PROCEDURE — 77412 RADIATION TX DELIVERY LVL 3: CPT | Performed by: RADIOLOGY

## 2019-07-08 ASSESSMENT — PAIN DESCRIPTION - PAIN TYPE: TYPE: ACUTE PAIN

## 2019-07-08 ASSESSMENT — PAIN SCALES - GENERAL: PAINLEVEL_OUTOF10: 3

## 2019-07-08 ASSESSMENT — PAIN DESCRIPTION - ORIENTATION: ORIENTATION: RIGHT;POSTERIOR

## 2019-07-08 ASSESSMENT — PAIN DESCRIPTION - LOCATION: LOCATION: SHOULDER

## 2019-07-08 NOTE — PROGRESS NOTES
Ema Still M.D. Kristofer Byrnes. Gina Foreman, Ph.D., M.D., Lisa Sharif M.D. Willian Dorado, Ph.D., M.D. Laureen Sher M.D.        Date of Service: 2019    Location:  [facility] Radiation Oncology,   [address], [address] WEEKLY PROGRESS NOTE    Patient ID:   Adriana Pace  : 1950   MRN: [id number]    Diagnosis:  Cancer Staging  Malignant neoplasm of upper lobe of right lung [facility])  Staging form: Lung, AJCC 8th Edition  - Clinical stage from 4/15/2019: Stage IIIA (cT4, cN0, cM0) - Signed by Baljinder Lorenzana MD on 2019  Staging comments: Squamous cell carcinoma      Radiation Treatment Information:   Actual Dose: 5800 cGy  Total Planned Dose: 6,000 cGy  Treatment Site: right lung    IMAGING:   Image match with port films    CHEMOTHERAPY UPDATE:   Treatment Summary   Plan Name OP Non-Small Cell Lung: PACLItaxel/CARBOplatin with Concurrent Radiation (Induction Course)   Treatment goal Curative   Provider Dulce Hunter MD   Status Active   Start Date 2019   End Date 2019   Treatment plan weight/height/BSA Weight type: Documented (effective on 5/10/2019), 56.8 kg (entered on 5/10/2019), 160 cm (entered on 2019), BSA 1.59 m2   Most recent weight/height/BSA Weight: Weight: 127 lb (57.6 kg)    Height: Height: 5' 3\" (1.6 m)    BSA: BSA (Calculated - sq m): 1.58 sq meters      Treatment on Clinical Trial? [This plan is not part of a research study]   Reason for stopping treatment [Plan is still active]   Treatment Plan Medications alteplase (CATHFLO) injection 2 mg, 2 mg, Intracatheter, ONCE, 5 of 5 cycles    palonosetron (ALOXI) injection 0.25 mg, 0.25 mg, Intravenous, ONCE, 5 of 5 cycles  Administration: 0.25 mg (2019), 0.25 mg (2019), 0.25 mg (2019), 0.25 mg (2019), 0.25 mg (2019)    hydrocortisone sodium succinate PF (SOLU-CORTEF) injection 100 mg, 100 mg,

## 2019-07-08 NOTE — PROGRESS NOTES
Intravenous, ONCE, 5 of 5 cycles    CARBOplatin (PARAPLATIN) 170 mg in sodium chloride 0.9 % 100 mL chemo IVPB, 170 mg, Intravenous, ONCE, 5 of 5 cycles  Dose modification:   (original dose 174 mg, Cycle 2, Reason: Other (See Comments), Comment: SCr rounded to 0.8),   (original dose 172 mg, Cycle 4, Reason: Other (See Comments), Comment: use scr 0.8 or below),   (original dose 172 mg, Cycle 5), 170 mg (original dose 172 mg, Cycle 5)  Administration: 170 mg (5/23/2019), 170 mg (5/31/2019), 170 mg (6/14/2019), 170 mg (6/21/2019), 170 mg (7/5/2019)    PACLitaxel (TAXOL) 72 mg in sodium chloride 0.9 % 250 mL chemo infusion, 45 mg/m2 = 72 mg, Intravenous, ONCE, 5 of 5 cycles  Administration: 72 mg (5/23/2019), 72 mg (5/31/2019), 72 mg (6/14/2019), 72 mg (6/21/2019), 72 mg (7/5/2019)    dexamethasone (DECADRON) injection 10 mg, 10 mg, Intravenous, ONCE, 5 of 5 cycles  Administration: 10 mg (5/23/2019), 10 mg (5/31/2019), 10 mg (6/14/2019), 10 mg (6/21/2019), 10 mg (7/5/2019)    methylPREDNISolone sodium (SOLU-MEDROL) injection 125 mg, 125 mg, Intravenous, ONCE, 5 of 5 cycles    famotidine (PEPCID) injection 20 mg, 20 mg, Intravenous, ONCE, 5 of 5 cycles  Administration: 20 mg (5/23/2019), 20 mg (5/31/2019), 20 mg (6/14/2019), 20 mg (6/21/2019), 20 mg (7/5/2019)       Treatment Summary   Plan Name PORT AND    Treatment goal [No plan goal]   Provider Gaurav Magaña MD   Status Active   Start Date [No treatment day found]   End Date 6/21/2019   Treatment plan weight/height/BSA [Plan weight, height, and BSA not specified]   Most recent weight/height/BSA Weight: Weight: 125 lb (56.7 kg)    Height: Height: 5' 3\" (1.6 m)    BSA: BSA (Calculated - sq m): 1.58 sq meters      Treatment on Clinical Trial? [This plan is not part of a research study]   Reason for stopping treatment [Plan is still active]   Treatment Plan Medications alteplase (CATHFLO) injection 2 mg, 2 mg, Intracatheter, ONCE, 1 of 1 cycle Inhaler, Rfl: 3    albuterol (PROVENTIL) (2.5 MG/3ML) 0.083% nebulizer solution, Take 3 mLs by nebulization every 6 hours as needed for Wheezing, Disp: 120 each, Rfl: 5    Oxygen Concentrator, , Disp: , Rfl:     tiotropium (SPIRIVA RESPIMAT) 2.5 MCG/ACT AERS inhaler, Inhale 2 puffs into the lungs daily, Disp: 1 Inhaler, Rfl: 5    Pain Plan:  None needed    Pain Level: 3(3 now. roxycodone 0.5mgs.)  Patient Currently in Pain: Yes  Pain Assessment: 0-10      Immunizations/Smoking Status:  Immunization History   Administered Date(s) Administered    Influenza, Quadv, 3 Years and older, IM (Fluzone 3 yrs and older or Afluria 5 yrs and older) 2019    Influenza, Jeni Band, 3 yrs and older, IM, PF (Fluzone 3 yrs and older or Afluria 5 yrs and older) 2018    Pneumococcal Polysaccharide ([id number]) 2019       Social History     Tobacco Use   Smoking Status Current Every Day Smoker    Packs/day: 0.25    Years: 52.00    Pack years: 13.00    Types: Cigarettes    Start date: 1967   Smokeless Tobacco Never Used   Tobacco Comment    smoking 5 or so a day. 19     Ready to quit: Not Answered  Counseling given: Not Answered  Comment: smoking 5 or so a day. 19      PHYSICAL FINDINGS:    CHAPERONE: Declined    ECO Asymptomatic      Vital Signs:  /74   Pulse 90   Temp 98.1 °F (36.7 °C) (Oral)   Resp 16   Wt 125 lb (56.7 kg)   SpO2 100%   BMI 22.14 kg/m²   Wt Readings from Last 5 Encounters:   19 125 lb (56.7 kg)   19 127 lb (57.6 kg)   19 124 lb (56.2 kg)   19 121 lb 2 oz (54.9 kg)   19 125 lb 9.6 oz (57 kg)     Lungs are clear, good skin turgor, heart regular oralia and rhythm, no skin desquamation. ASSESSMENT PLAN:   Completes radiation tomorrow with satisfactory clinical response, follow up instructions provided to her with return in four months or earlier should new problems arise as well as one month nursing call per protocol.     Electronically

## 2019-07-09 ENCOUNTER — HOSPITAL ENCOUNTER (OUTPATIENT)
Dept: RADIATION ONCOLOGY | Facility: MEDICAL CENTER | Age: 69
Discharge: HOME | End: 2019-07-09
Attending: RADIOLOGY
Payer: MEDICARE

## 2019-07-09 PROCEDURE — 77336 RADIATION PHYSICS CONSULT: CPT | Performed by: RADIOLOGY

## 2019-07-09 PROCEDURE — 77387 GUIDANCE FOR RADJ TX DLVR: CPT | Performed by: RADIOLOGY

## 2019-07-09 PROCEDURE — 77412 RADIATION TX DELIVERY LVL 3: CPT | Performed by: RADIOLOGY

## 2019-07-15 DIAGNOSIS — C34.11 MALIGNANT NEOPLASM OF UPPER LOBE OF RIGHT LUNG (HCC): ICD-10-CM

## 2019-07-15 RX ORDER — OXYCODONE HYDROCHLORIDE 5 MG/1
5 TABLET ORAL EVERY 6 HOURS PRN
Qty: 56 TABLET | Refills: 0 | Status: SHIPPED | OUTPATIENT
Start: 2019-07-15 | End: 2019-08-01 | Stop reason: SDUPTHER

## 2019-07-19 ENCOUNTER — TELEPHONE (OUTPATIENT)
Dept: INFUSION THERAPY | Facility: MEDICAL CENTER | Age: 69
End: 2019-07-19

## 2019-07-24 ENCOUNTER — TELEPHONE (OUTPATIENT)
Dept: CASE MANAGEMENT | Age: 69
End: 2019-07-24

## 2019-07-24 ENCOUNTER — TELEPHONE (OUTPATIENT)
Dept: PULMONOLOGY | Age: 69
End: 2019-07-24

## 2019-07-24 NOTE — TELEPHONE ENCOUNTER
Name: Nadege Cohen  : 1950  MRN: [id number]    Oncology Navigation Follow-Up Note    Contact Type:  Telephone    Subjective:     Objective:     Assistance Needed:     Receptive to Advanced Care Planning / Palliative Care:      Referrals:     Education:     Notes: navigation completed, letter to go out to pt.  Pt. May call as needed for future assistance    Electronically signed by Tequila Puri RN on 2019 at 4:00 PM

## 2019-07-29 DIAGNOSIS — C34.11 MALIGNANT NEOPLASM OF UPPER LOBE OF RIGHT LUNG (HCC): ICD-10-CM

## 2019-07-30 ENCOUNTER — HOSPITAL ENCOUNTER (OUTPATIENT)
Facility: MEDICAL CENTER | Age: 69
End: 2019-07-30
Payer: MEDICARE

## 2019-08-01 RX ORDER — OXYCODONE HYDROCHLORIDE 5 MG/1
5 TABLET ORAL EVERY 6 HOURS PRN
Qty: 56 TABLET | Refills: 0 | Status: SHIPPED | OUTPATIENT
Start: 2019-08-01 | End: 2019-08-14

## 2019-08-05 ENCOUNTER — HOSPITAL ENCOUNTER (OUTPATIENT)
Dept: INFUSION THERAPY | Facility: MEDICAL CENTER | Age: 69
Discharge: HOME | End: 2019-08-05
Payer: MEDICARE

## 2019-08-05 ENCOUNTER — HOSPITAL ENCOUNTER (OUTPATIENT)
Dept: CT IMAGING | Age: 69
Discharge: HOME | End: 2019-08-07
Payer: MEDICARE

## 2019-08-05 VITALS
HEART RATE: 87 BPM | DIASTOLIC BLOOD PRESSURE: 64 MMHG | SYSTOLIC BLOOD PRESSURE: 117 MMHG | RESPIRATION RATE: 16 BRPM | TEMPERATURE: 98.2 F

## 2019-08-05 DIAGNOSIS — C34.11 MALIGNANT NEOPLASM OF UPPER LOBE OF RIGHT LUNG (HCC): ICD-10-CM

## 2019-08-05 LAB
ABSOLUTE EOS #: 0.12 K/UL (ref 0–0.44)
ABSOLUTE IMMATURE GRANULOCYTE: 0.02 K/UL (ref 0–0.3)
ABSOLUTE LYMPH #: 0.49 K/UL (ref 1.1–3.7)
ABSOLUTE MONO #: 0.46 K/UL (ref 0.1–1.2)
ALBUMIN SERPL-MCNC: 2.7 G/DL (ref 3.5–5.2)
ALBUMIN/GLOBULIN RATIO: ABNORMAL (ref 1–2.5)
ALP BLD-CCNC: 97 U/L (ref 35–104)
ALT SERPL-CCNC: 14 U/L (ref 5–33)
ANION GAP SERPL CALCULATED.3IONS-SCNC: 8 MMOL/L (ref 9–17)
AST SERPL-CCNC: 31 U/L
BASOPHILS # BLD: 1 % (ref 0–2)
BASOPHILS ABSOLUTE: 0.03 K/UL (ref 0–0.2)
BILIRUB SERPL-MCNC: 0.57 MG/DL (ref 0.3–1.2)
BUN BLDV-MCNC: 8 MG/DL (ref 8–23)
BUN/CREAT BLD: 15 (ref 9–20)
CALCIUM SERPL-MCNC: 8.3 MG/DL (ref 8.6–10.4)
CHLORIDE BLD-SCNC: 105 MMOL/L (ref 98–107)
CO2: 24 MMOL/L (ref 20–31)
CREAT SERPL-MCNC: 0.54 MG/DL (ref 0.5–0.9)
DIFFERENTIAL TYPE: ABNORMAL
EOSINOPHILS RELATIVE PERCENT: 3 % (ref 1–4)
GFR AFRICAN AMERICAN: >60 ML/MIN
GFR NON-AFRICAN AMERICAN: >60 ML/MIN
GFR SERPL CREATININE-BSD FRML MDRD: ABNORMAL ML/MIN/{1.73_M2}
GFR SERPL CREATININE-BSD FRML MDRD: ABNORMAL ML/MIN/{1.73_M2}
GLUCOSE BLD-MCNC: 162 MG/DL (ref 70–99)
HCT VFR BLD CALC: 29.9 % (ref 36.3–47.1)
HEMOGLOBIN: 9.5 G/DL (ref 11.9–15.1)
IMMATURE GRANULOCYTES: 1 %
LYMPHOCYTES # BLD: 13 % (ref 24–43)
MCH RBC QN AUTO: 29.4 PG (ref 25.2–33.5)
MCHC RBC AUTO-ENTMCNC: 31.8 G/DL (ref 28.4–34.8)
MCV RBC AUTO: 92.6 FL (ref 82.6–102.9)
MONOCYTES # BLD: 12 % (ref 3–12)
NRBC AUTOMATED: 0 PER 100 WBC
PDW BLD-RTO: 17.4 % (ref 11.8–14.4)
PLATELET # BLD: 81 K/UL (ref 138–453)
PLATELET ESTIMATE: ABNORMAL
PMV BLD AUTO: 9.9 FL (ref 8.1–13.5)
POTASSIUM SERPL-SCNC: 3.9 MMOL/L (ref 3.7–5.3)
RBC # BLD: 3.23 M/UL (ref 3.95–5.11)
RBC # BLD: ABNORMAL 10*6/UL
SEG NEUTROPHILS: 70 % (ref 36–65)
SEGMENTED NEUTROPHILS ABSOLUTE COUNT: 2.6 K/UL (ref 1.5–8.1)
SODIUM BLD-SCNC: 137 MMOL/L (ref 135–144)
TOTAL PROTEIN: 6.6 G/DL (ref 6.4–8.3)
WBC # BLD: 3.7 K/UL (ref 3.5–11.3)
WBC # BLD: ABNORMAL 10*3/UL

## 2019-08-05 PROCEDURE — 80053 COMPREHEN METABOLIC PANEL: CPT

## 2019-08-05 PROCEDURE — 85025 COMPLETE CBC W/AUTO DIFF WBC: CPT

## 2019-08-05 PROCEDURE — 6360000004 HC RX CONTRAST MEDICATION: Performed by: INTERNAL MEDICINE

## 2019-08-05 PROCEDURE — 2580000003 HC RX 258: Performed by: INTERNAL MEDICINE

## 2019-08-05 PROCEDURE — 71260 CT THORAX DX C+: CPT

## 2019-08-05 PROCEDURE — 36591 DRAW BLOOD OFF VENOUS DEVICE: CPT

## 2019-08-05 PROCEDURE — 6360000002 HC RX W HCPCS: Performed by: INTERNAL MEDICINE

## 2019-08-05 RX ORDER — 0.9 % SODIUM CHLORIDE 0.9 %
80 INTRAVENOUS SOLUTION INTRAVENOUS ONCE
Status: COMPLETED | OUTPATIENT
Start: 2019-08-05 | End: 2019-08-05

## 2019-08-05 RX ORDER — SODIUM CHLORIDE 0.9 % (FLUSH) 0.9 %
10 SYRINGE (ML) INJECTION
Status: COMPLETED | OUTPATIENT
Start: 2019-08-05 | End: 2019-08-05

## 2019-08-05 RX ORDER — SODIUM CHLORIDE 0.9 % (FLUSH) 0.9 %
10 SYRINGE (ML) INJECTION PRN
Status: DISCONTINUED | OUTPATIENT
Start: 2019-08-05 | End: 2019-08-05 | Stop reason: HOSPADM

## 2019-08-05 RX ORDER — HEPARIN SODIUM (PORCINE) LOCK FLUSH IV SOLN 100 UNIT/ML 100 UNIT/ML
500 SOLUTION INTRAVENOUS PRN
Status: DISCONTINUED | OUTPATIENT
Start: 2019-08-05 | End: 2019-08-05 | Stop reason: HOSPADM

## 2019-08-05 RX ADMIN — SODIUM CHLORIDE 80 ML: 9 INJECTION, SOLUTION INTRAVENOUS at 09:05

## 2019-08-05 RX ADMIN — Medication 10 ML: at 09:09

## 2019-08-05 RX ADMIN — IOPAMIDOL 75 ML: 755 INJECTION, SOLUTION INTRAVENOUS at 09:04

## 2019-08-05 RX ADMIN — Medication 10 ML: at 09:05

## 2019-08-05 RX ADMIN — Medication 500 UNITS: at 09:09

## 2019-08-05 ASSESSMENT — PAIN DESCRIPTION - LOCATION: LOCATION: SHOULDER

## 2019-08-05 ASSESSMENT — PAIN SCALES - GENERAL: PAINLEVEL_OUTOF10: 3

## 2019-08-05 ASSESSMENT — PAIN DESCRIPTION - PAIN TYPE: TYPE: CHRONIC PAIN

## 2019-08-05 NOTE — PROGRESS NOTES
Patient arrive ambulatory for port access for CT and lab draw. Port accessed; specimen sent. Port flushed and capped. Patient to CT for testing; instructed to return to unit for needle removal.  Patient return; port flushed and heparinized with intact regan needle removed per protocol. Copy of labs to patient. Patient ambulate off unit per self at discharge; has return appointment for  visit on her calendar.

## 2019-08-09 ENCOUNTER — HOSPITAL ENCOUNTER (OUTPATIENT)
Facility: MEDICAL CENTER | Age: 69
End: 2019-08-09
Payer: MEDICARE

## 2019-08-14 ENCOUNTER — TELEPHONE (OUTPATIENT)
Dept: ONCOLOGY | Age: 69
End: 2019-08-14

## 2019-08-14 ENCOUNTER — OFFICE VISIT (OUTPATIENT)
Dept: ONCOLOGY | Age: 69
End: 2019-08-14
Payer: MEDICARE

## 2019-08-14 VITALS
TEMPERATURE: 98 F | SYSTOLIC BLOOD PRESSURE: 135 MMHG | WEIGHT: 112.9 LBS | BODY MASS INDEX: 20 KG/M2 | HEART RATE: 89 BPM | RESPIRATION RATE: 20 BRPM | DIASTOLIC BLOOD PRESSURE: 78 MMHG

## 2019-08-14 DIAGNOSIS — B18.2 CHRONIC HEPATITIS C WITHOUT HEPATIC COMA (HCC): Primary | ICD-10-CM

## 2019-08-14 DIAGNOSIS — C34.11 MALIGNANT NEOPLASM OF UPPER LOBE OF RIGHT LUNG (HCC): ICD-10-CM

## 2019-08-14 DIAGNOSIS — R53.83 FATIGUE, UNSPECIFIED TYPE: ICD-10-CM

## 2019-08-14 PROCEDURE — G8400 PT W/DXA NO RESULTS DOC: HCPCS | Performed by: INTERNAL MEDICINE

## 2019-08-14 PROCEDURE — G8427 DOCREV CUR MEDS BY ELIG CLIN: HCPCS | Performed by: INTERNAL MEDICINE

## 2019-08-14 PROCEDURE — 4040F PNEUMOC VAC/ADMIN/RCVD: CPT | Performed by: INTERNAL MEDICINE

## 2019-08-14 PROCEDURE — G8420 CALC BMI NORM PARAMETERS: HCPCS | Performed by: INTERNAL MEDICINE

## 2019-08-14 PROCEDURE — 3017F COLORECTAL CA SCREEN DOC REV: CPT | Performed by: INTERNAL MEDICINE

## 2019-08-14 PROCEDURE — 1123F ACP DISCUSS/DSCN MKR DOCD: CPT | Performed by: INTERNAL MEDICINE

## 2019-08-14 PROCEDURE — 99212 OFFICE O/P EST SF 10 MIN: CPT | Performed by: INTERNAL MEDICINE

## 2019-08-14 PROCEDURE — 99214 OFFICE O/P EST MOD 30 MIN: CPT | Performed by: INTERNAL MEDICINE

## 2019-08-14 PROCEDURE — 1090F PRES/ABSN URINE INCON ASSESS: CPT | Performed by: INTERNAL MEDICINE

## 2019-08-14 PROCEDURE — 4004F PT TOBACCO SCREEN RCVD TLK: CPT | Performed by: INTERNAL MEDICINE

## 2019-08-14 RX ORDER — EPINEPHRINE 1 MG/ML
0.3 INJECTION, SOLUTION, CONCENTRATE INTRAVENOUS PRN
Status: CANCELLED | OUTPATIENT
Start: 2019-09-23

## 2019-08-14 RX ORDER — DIPHENHYDRAMINE HYDROCHLORIDE 50 MG/ML
50 INJECTION INTRAMUSCULAR; INTRAVENOUS ONCE
Status: CANCELLED | OUTPATIENT
Start: 2019-09-23

## 2019-08-14 RX ORDER — METHYLPREDNISOLONE SODIUM SUCCINATE 125 MG/2ML
125 INJECTION, POWDER, LYOPHILIZED, FOR SOLUTION INTRAMUSCULAR; INTRAVENOUS ONCE
Status: CANCELLED | OUTPATIENT
Start: 2019-08-26

## 2019-08-14 RX ORDER — 0.9 % SODIUM CHLORIDE 0.9 %
10 VIAL (ML) INJECTION ONCE
Status: CANCELLED | OUTPATIENT
Start: 2019-09-09

## 2019-08-14 RX ORDER — SODIUM CHLORIDE 9 MG/ML
INJECTION, SOLUTION INTRAVENOUS CONTINUOUS
Status: CANCELLED | OUTPATIENT
Start: 2019-08-26

## 2019-08-14 RX ORDER — SODIUM CHLORIDE 0.9 % (FLUSH) 0.9 %
10 SYRINGE (ML) INJECTION PRN
Status: CANCELLED | OUTPATIENT
Start: 2019-08-26

## 2019-08-14 RX ORDER — DIPHENHYDRAMINE HYDROCHLORIDE 50 MG/ML
50 INJECTION INTRAMUSCULAR; INTRAVENOUS ONCE
Status: CANCELLED | OUTPATIENT
Start: 2019-09-09

## 2019-08-14 RX ORDER — HEPARIN SODIUM (PORCINE) LOCK FLUSH IV SOLN 100 UNIT/ML 100 UNIT/ML
500 SOLUTION INTRAVENOUS PRN
Status: CANCELLED | OUTPATIENT
Start: 2019-09-23

## 2019-08-14 RX ORDER — 0.9 % SODIUM CHLORIDE 0.9 %
10 VIAL (ML) INJECTION ONCE
Status: CANCELLED | OUTPATIENT
Start: 2019-09-23

## 2019-08-14 RX ORDER — SODIUM CHLORIDE 0.9 % (FLUSH) 0.9 %
10 SYRINGE (ML) INJECTION PRN
Status: CANCELLED | OUTPATIENT
Start: 2019-09-09

## 2019-08-14 RX ORDER — METHYLPREDNISOLONE SODIUM SUCCINATE 125 MG/2ML
125 INJECTION, POWDER, LYOPHILIZED, FOR SOLUTION INTRAMUSCULAR; INTRAVENOUS ONCE
Status: CANCELLED | OUTPATIENT
Start: 2019-09-23

## 2019-08-14 RX ORDER — SODIUM CHLORIDE 0.9 % (FLUSH) 0.9 %
5 SYRINGE (ML) INJECTION PRN
Status: CANCELLED | OUTPATIENT
Start: 2019-08-26

## 2019-08-14 RX ORDER — SODIUM CHLORIDE 0.9 % (FLUSH) 0.9 %
5 SYRINGE (ML) INJECTION PRN
Status: CANCELLED | OUTPATIENT
Start: 2019-09-23

## 2019-08-14 RX ORDER — EPINEPHRINE 1 MG/ML
0.3 INJECTION, SOLUTION, CONCENTRATE INTRAVENOUS PRN
Status: CANCELLED | OUTPATIENT
Start: 2019-08-26

## 2019-08-14 RX ORDER — SODIUM CHLORIDE 9 MG/ML
INJECTION, SOLUTION INTRAVENOUS CONTINUOUS
Status: CANCELLED | OUTPATIENT
Start: 2019-09-09

## 2019-08-14 RX ORDER — EPINEPHRINE 1 MG/ML
0.3 INJECTION, SOLUTION, CONCENTRATE INTRAVENOUS PRN
Status: CANCELLED | OUTPATIENT
Start: 2019-09-09

## 2019-08-14 RX ORDER — OXYCODONE HYDROCHLORIDE 5 MG/1
5 TABLET ORAL EVERY 6 HOURS PRN
Qty: 56 TABLET | Refills: 0 | Status: SHIPPED | OUTPATIENT
Start: 2019-08-14 | End: 2019-08-26 | Stop reason: SDUPTHER

## 2019-08-14 RX ORDER — HEPARIN SODIUM (PORCINE) LOCK FLUSH IV SOLN 100 UNIT/ML 100 UNIT/ML
500 SOLUTION INTRAVENOUS PRN
Status: CANCELLED | OUTPATIENT
Start: 2019-08-26

## 2019-08-14 RX ORDER — SODIUM CHLORIDE 9 MG/ML
20 INJECTION, SOLUTION INTRAVENOUS ONCE
Status: CANCELLED | OUTPATIENT
Start: 2019-09-23

## 2019-08-14 RX ORDER — SODIUM CHLORIDE 9 MG/ML
20 INJECTION, SOLUTION INTRAVENOUS ONCE
Status: CANCELLED | OUTPATIENT
Start: 2019-09-09

## 2019-08-14 RX ORDER — SODIUM CHLORIDE 9 MG/ML
20 INJECTION, SOLUTION INTRAVENOUS ONCE
Status: CANCELLED | OUTPATIENT
Start: 2019-08-26

## 2019-08-14 RX ORDER — METHYLPREDNISOLONE SODIUM SUCCINATE 125 MG/2ML
125 INJECTION, POWDER, LYOPHILIZED, FOR SOLUTION INTRAMUSCULAR; INTRAVENOUS ONCE
Status: CANCELLED | OUTPATIENT
Start: 2019-09-09

## 2019-08-14 RX ORDER — SODIUM CHLORIDE 9 MG/ML
INJECTION, SOLUTION INTRAVENOUS CONTINUOUS
Status: CANCELLED | OUTPATIENT
Start: 2019-09-23

## 2019-08-14 RX ORDER — HEPARIN SODIUM (PORCINE) LOCK FLUSH IV SOLN 100 UNIT/ML 100 UNIT/ML
500 SOLUTION INTRAVENOUS PRN
Status: CANCELLED | OUTPATIENT
Start: 2019-09-09

## 2019-08-14 RX ORDER — SODIUM CHLORIDE 0.9 % (FLUSH) 0.9 %
10 SYRINGE (ML) INJECTION PRN
Status: CANCELLED | OUTPATIENT
Start: 2019-09-23

## 2019-08-14 RX ORDER — DIPHENHYDRAMINE HYDROCHLORIDE 50 MG/ML
50 INJECTION INTRAMUSCULAR; INTRAVENOUS ONCE
Status: CANCELLED | OUTPATIENT
Start: 2019-08-26

## 2019-08-14 RX ORDER — 0.9 % SODIUM CHLORIDE 0.9 %
10 VIAL (ML) INJECTION ONCE
Status: CANCELLED | OUTPATIENT
Start: 2019-08-26

## 2019-08-14 RX ORDER — SODIUM CHLORIDE 0.9 % (FLUSH) 0.9 %
5 SYRINGE (ML) INJECTION PRN
Status: CANCELLED | OUTPATIENT
Start: 2019-09-09

## 2019-08-19 ENCOUNTER — HOSPITAL ENCOUNTER (OUTPATIENT)
Dept: GENERAL RADIOLOGY | Age: 69
Discharge: HOME | End: 2019-08-21
Payer: MEDICARE

## 2019-08-19 ENCOUNTER — HOSPITAL ENCOUNTER (OUTPATIENT)
Dept: INTERVENTIONAL RADIOLOGY/VASCULAR | Age: 69
Discharge: HOME | End: 2019-08-21
Payer: MEDICARE

## 2019-08-19 VITALS
DIASTOLIC BLOOD PRESSURE: 74 MMHG | SYSTOLIC BLOOD PRESSURE: 148 MMHG | HEART RATE: 80 BPM | WEIGHT: 113.5 LBS | OXYGEN SATURATION: 100 % | TEMPERATURE: 97.5 F | RESPIRATION RATE: 18 BRPM | HEIGHT: 63 IN | BODY MASS INDEX: 20.11 KG/M2

## 2019-08-19 DIAGNOSIS — C34.11 MALIGNANT NEOPLASM OF BRONCHUS OF RIGHT UPPER LOBE (HCC): ICD-10-CM

## 2019-08-19 LAB
INR BLD: 1.2
PARTIAL THROMBOPLASTIN TIME: 28.1 SEC (ref 23–31)
PLATELET # BLD: 91 K/UL (ref 138–453)
PROTHROMBIN TIME: 12.1 SEC (ref 9.7–11.6)

## 2019-08-19 PROCEDURE — 85610 PROTHROMBIN TIME: CPT

## 2019-08-19 PROCEDURE — 6370000000 HC RX 637 (ALT 250 FOR IP): Performed by: RADIOLOGY

## 2019-08-19 PROCEDURE — 7100000010 HC PHASE II RECOVERY - FIRST 15 MIN

## 2019-08-19 PROCEDURE — C1729 CATH, DRAINAGE: HCPCS

## 2019-08-19 PROCEDURE — 85730 THROMBOPLASTIN TIME PARTIAL: CPT

## 2019-08-19 PROCEDURE — 71045 X-RAY EXAM CHEST 1 VIEW: CPT

## 2019-08-19 PROCEDURE — 85049 AUTOMATED PLATELET COUNT: CPT

## 2019-08-19 PROCEDURE — 7100000011 HC PHASE II RECOVERY - ADDTL 15 MIN

## 2019-08-19 PROCEDURE — 36415 COLL VENOUS BLD VENIPUNCTURE: CPT

## 2019-08-19 RX ORDER — ACETAMINOPHEN 325 MG/1
650 TABLET ORAL EVERY 4 HOURS PRN
Status: DISCONTINUED | OUTPATIENT
Start: 2019-08-19 | End: 2019-08-22 | Stop reason: HOSPADM

## 2019-08-19 RX ADMIN — ACETAMINOPHEN 650 MG: 325 TABLET ORAL at 14:11

## 2019-08-19 ASSESSMENT — PAIN SCALES - GENERAL
PAINLEVEL_OUTOF10: 4
PAINLEVEL_OUTOF10: 4
PAINLEVEL_OUTOF10: 5
PAINLEVEL_OUTOF10: 2
PAINLEVEL_OUTOF10: 4
PAINLEVEL_OUTOF10: 3

## 2019-08-19 ASSESSMENT — PAIN DESCRIPTION - LOCATION: LOCATION: CHEST

## 2019-08-19 ASSESSMENT — PAIN DESCRIPTION - DESCRIPTORS: DESCRIPTORS: DISCOMFORT

## 2019-08-19 ASSESSMENT — PAIN - FUNCTIONAL ASSESSMENT: PAIN_FUNCTIONAL_ASSESSMENT: 0-10

## 2019-08-19 ASSESSMENT — PAIN DESCRIPTION - FREQUENCY: FREQUENCY: INTERMITTENT

## 2019-08-19 ASSESSMENT — PAIN DESCRIPTION - ORIENTATION: ORIENTATION: RIGHT;POSTERIOR

## 2019-08-19 NOTE — H&P
Take 1 tablet by mouth every 6 hours as needed for Pain 6/20/16 1/31/18  Rodolfo Gilliland MD         This is a 77 y.o. female who is pleasant, cooperative, alert and oriented x3, in no acute distress. Heart: Heart sounds are normal.  HR regular rate and rhythm without murmur, gallop or rub. Lungs: Normal respiratory effort with good air exchange, unlabored and clear to auscultation without wheezes or rales bilaterally   Abdomen: soft, nontender, nondistended with bowel sounds AUDIBLE X 4   PEDAL PULSES + 2 BILATERALLY  . Labs:  Recent Labs     08/05/19  0835   HGB 9.5*   HCT 29.9*   WBC 3.7   MCV 92.6   PLT 81*      K 3.9      CO2 24   BUN 8   CREATININE 0.54   GLUCOSE 162*   AST 31   ALT 14   LABALBU 2.7*   LABS ARE PENDING . [address]  Electronically signed 8/19/2019 at 11:38 AM           Date:  8/14/2019          Related encounter: Office Visit from 8/14/2019 in [address] [place] [address] all  [x]Manual[x]Template[x]Copied    Added by:  [x]Kizzy Skelton MD    [][facility] for details          DIAGNOSIS:   1. Squamous cell carcinoma, right lung 04/15/2019 with right main stem bronchus invasion (T3N0M0)   2. PET showed localized disease; brain MRI negative for metastases,   3. HX Liver disease and hepatitis C     CURRENT THERAPY:  1. S/P bronchoscopy establishing the diagnosis   2. Chemoradiation with taxol and carboplatin - started 05/23/2019, completed 07/09/2019  3. Plan for maintenance Imfinzi     BRIEF CASE HISTORY: Conda Halsted is a very pleasant 77 y.o. female who is referred to us for consultation and management of recently diagnosed lung cancer. She initially presented with flu like illness and shortness of breath 11/2018 and was evaluated by PCP and was started on antibiotics, X-ray done at the time showed suspicious findings.  Follow up CT showed mass lesion within the right hilum either intervening or originating from

## 2019-08-19 NOTE — PROGRESS NOTES
Patient tolerated thoracentesis without distress. 900 ml natalie fluid removed. bandaid to site. Discharge instructions given, no questions at this time.

## 2019-08-21 ENCOUNTER — HOSPITAL ENCOUNTER (OUTPATIENT)
Facility: MEDICAL CENTER | Age: 69
End: 2019-08-21
Payer: MEDICARE

## 2019-08-26 ENCOUNTER — HOSPITAL ENCOUNTER (OUTPATIENT)
Dept: INFUSION THERAPY | Facility: MEDICAL CENTER | Age: 69
Discharge: HOME | End: 2019-08-26
Payer: MEDICARE

## 2019-08-26 ENCOUNTER — TELEPHONE (OUTPATIENT)
Dept: ONCOLOGY | Age: 69
End: 2019-08-26

## 2019-08-26 ENCOUNTER — OFFICE VISIT (OUTPATIENT)
Dept: ONCOLOGY | Age: 69
End: 2019-08-26
Payer: MEDICARE

## 2019-08-26 VITALS
DIASTOLIC BLOOD PRESSURE: 66 MMHG | RESPIRATION RATE: 20 BRPM | TEMPERATURE: 97.9 F | BODY MASS INDEX: 20.97 KG/M2 | HEART RATE: 85 BPM | SYSTOLIC BLOOD PRESSURE: 118 MMHG | WEIGHT: 118.4 LBS

## 2019-08-26 VITALS
WEIGHT: 118.4 LBS | TEMPERATURE: 97.9 F | DIASTOLIC BLOOD PRESSURE: 66 MMHG | SYSTOLIC BLOOD PRESSURE: 118 MMHG | HEART RATE: 85 BPM | BODY MASS INDEX: 20.97 KG/M2

## 2019-08-26 DIAGNOSIS — C34.11 MALIGNANT NEOPLASM OF UPPER LOBE OF RIGHT LUNG (HCC): ICD-10-CM

## 2019-08-26 DIAGNOSIS — C34.11 MALIGNANT NEOPLASM OF UPPER LOBE OF RIGHT LUNG (HCC): Primary | ICD-10-CM

## 2019-08-26 DIAGNOSIS — R53.83 FATIGUE, UNSPECIFIED TYPE: ICD-10-CM

## 2019-08-26 DIAGNOSIS — B18.2 CHRONIC HEPATITIS C WITHOUT HEPATIC COMA (HCC): ICD-10-CM

## 2019-08-26 LAB
ABSOLUTE EOS #: 0.11 K/UL (ref 0–0.44)
ABSOLUTE IMMATURE GRANULOCYTE: 0 K/UL (ref 0–0.3)
ABSOLUTE LYMPH #: 0.44 K/UL (ref 1.1–3.7)
ABSOLUTE MONO #: 0.3 K/UL (ref 0.1–1.2)
ALBUMIN SERPL-MCNC: 2.9 G/DL (ref 3.5–5.2)
ALBUMIN/GLOBULIN RATIO: ABNORMAL (ref 1–2.5)
ALP BLD-CCNC: 112 U/L (ref 35–104)
ALT SERPL-CCNC: 18 U/L (ref 5–33)
AMYLASE: 41 U/L (ref 28–100)
ANION GAP SERPL CALCULATED.3IONS-SCNC: 9 MMOL/L (ref 9–17)
AST SERPL-CCNC: 39 U/L
BASOPHILS # BLD: 1 % (ref 0–2)
BASOPHILS ABSOLUTE: 0.04 K/UL (ref 0–0.2)
BILIRUB SERPL-MCNC: 0.44 MG/DL (ref 0.3–1.2)
BUN BLDV-MCNC: 11 MG/DL (ref 8–23)
BUN/CREAT BLD: 19 (ref 9–20)
CALCIUM SERPL-MCNC: 8.5 MG/DL (ref 8.6–10.4)
CHLORIDE BLD-SCNC: 105 MMOL/L (ref 98–107)
CO2: 22 MMOL/L (ref 20–31)
CORTISOL COLLECTION INFO: NORMAL
CORTISOL: 7.1 UG/DL (ref 2.7–18.4)
CREAT SERPL-MCNC: 0.59 MG/DL (ref 0.5–0.9)
DIFFERENTIAL TYPE: ABNORMAL
EOSINOPHILS RELATIVE PERCENT: 3 % (ref 1–4)
GFR AFRICAN AMERICAN: >60 ML/MIN
GFR NON-AFRICAN AMERICAN: >60 ML/MIN
GFR SERPL CREATININE-BSD FRML MDRD: ABNORMAL ML/MIN/{1.73_M2}
GFR SERPL CREATININE-BSD FRML MDRD: ABNORMAL ML/MIN/{1.73_M2}
GLUCOSE BLD-MCNC: 199 MG/DL (ref 70–99)
HCT VFR BLD CALC: 34.5 % (ref 36.3–47.1)
HEMOGLOBIN: 10.8 G/DL (ref 11.9–15.1)
IMMATURE GRANULOCYTES: 0 %
LIPASE: 31 U/L (ref 13–60)
LYMPHOCYTES # BLD: 12 % (ref 24–43)
MCH RBC QN AUTO: 29.1 PG (ref 25.2–33.5)
MCHC RBC AUTO-ENTMCNC: 31.3 G/DL (ref 28.4–34.8)
MCV RBC AUTO: 93 FL (ref 82.6–102.9)
MONOCYTES # BLD: 8 % (ref 3–12)
NRBC AUTOMATED: 0 PER 100 WBC
PDW BLD-RTO: 15.9 % (ref 11.8–14.4)
PLATELET # BLD: 85 K/UL (ref 138–453)
PLATELET ESTIMATE: ABNORMAL
PMV BLD AUTO: 10.4 FL (ref 8.1–13.5)
POTASSIUM SERPL-SCNC: 3.9 MMOL/L (ref 3.7–5.3)
RBC # BLD: 3.71 M/UL (ref 3.95–5.11)
RBC # BLD: ABNORMAL 10*6/UL
SEG NEUTROPHILS: 76 % (ref 36–65)
SEGMENTED NEUTROPHILS ABSOLUTE COUNT: 2.81 K/UL (ref 1.5–8.1)
SODIUM BLD-SCNC: 136 MMOL/L (ref 135–144)
TOTAL PROTEIN: 7 G/DL (ref 6.4–8.3)
TSH SERPL DL<=0.05 MIU/L-ACNC: 2.01 MIU/L (ref 0.3–5)
WBC # BLD: 3.7 K/UL (ref 3.5–11.3)
WBC # BLD: ABNORMAL 10*3/UL

## 2019-08-26 PROCEDURE — 99214 OFFICE O/P EST MOD 30 MIN: CPT | Performed by: INTERNAL MEDICINE

## 2019-08-26 PROCEDURE — G8427 DOCREV CUR MEDS BY ELIG CLIN: HCPCS | Performed by: INTERNAL MEDICINE

## 2019-08-26 PROCEDURE — 96413 CHEMO IV INFUSION 1 HR: CPT

## 2019-08-26 PROCEDURE — 82150 ASSAY OF AMYLASE: CPT

## 2019-08-26 PROCEDURE — 96417 CHEMO IV INFUS EACH ADDL SEQ: CPT

## 2019-08-26 PROCEDURE — G8420 CALC BMI NORM PARAMETERS: HCPCS | Performed by: INTERNAL MEDICINE

## 2019-08-26 PROCEDURE — 4040F PNEUMOC VAC/ADMIN/RCVD: CPT | Performed by: INTERNAL MEDICINE

## 2019-08-26 PROCEDURE — 3017F COLORECTAL CA SCREEN DOC REV: CPT | Performed by: INTERNAL MEDICINE

## 2019-08-26 PROCEDURE — 84443 ASSAY THYROID STIM HORMONE: CPT

## 2019-08-26 PROCEDURE — 1090F PRES/ABSN URINE INCON ASSESS: CPT | Performed by: INTERNAL MEDICINE

## 2019-08-26 PROCEDURE — 85025 COMPLETE CBC W/AUTO DIFF WBC: CPT

## 2019-08-26 PROCEDURE — 4004F PT TOBACCO SCREEN RCVD TLK: CPT | Performed by: INTERNAL MEDICINE

## 2019-08-26 PROCEDURE — 2580000003 HC RX 258: Performed by: INTERNAL MEDICINE

## 2019-08-26 PROCEDURE — 83690 ASSAY OF LIPASE: CPT

## 2019-08-26 PROCEDURE — 1123F ACP DISCUSS/DSCN MKR DOCD: CPT | Performed by: INTERNAL MEDICINE

## 2019-08-26 PROCEDURE — 6360000002 HC RX W HCPCS: Performed by: INTERNAL MEDICINE

## 2019-08-26 PROCEDURE — 82533 TOTAL CORTISOL: CPT

## 2019-08-26 PROCEDURE — 80053 COMPREHEN METABOLIC PANEL: CPT

## 2019-08-26 PROCEDURE — G8400 PT W/DXA NO RESULTS DOC: HCPCS | Performed by: INTERNAL MEDICINE

## 2019-08-26 PROCEDURE — 36591 DRAW BLOOD OFF VENOUS DEVICE: CPT

## 2019-08-26 PROCEDURE — 99211 OFF/OP EST MAY X REQ PHY/QHP: CPT

## 2019-08-26 RX ORDER — HEPARIN SODIUM (PORCINE) LOCK FLUSH IV SOLN 100 UNIT/ML 100 UNIT/ML
500 SOLUTION INTRAVENOUS PRN
Status: DISCONTINUED | OUTPATIENT
Start: 2019-08-26 | End: 2019-08-27 | Stop reason: HOSPADM

## 2019-08-26 RX ORDER — SODIUM CHLORIDE 0.9 % (FLUSH) 0.9 %
10 SYRINGE (ML) INJECTION PRN
Status: DISCONTINUED | OUTPATIENT
Start: 2019-08-26 | End: 2019-08-27 | Stop reason: HOSPADM

## 2019-08-26 RX ORDER — SODIUM CHLORIDE 9 MG/ML
20 INJECTION, SOLUTION INTRAVENOUS ONCE
Status: COMPLETED | OUTPATIENT
Start: 2019-08-26 | End: 2019-08-26

## 2019-08-26 RX ORDER — OXYCODONE HYDROCHLORIDE 5 MG/1
5 TABLET ORAL EVERY 6 HOURS PRN
Qty: 56 TABLET | Refills: 0 | Status: SHIPPED | OUTPATIENT
Start: 2019-08-26 | End: 2019-09-09 | Stop reason: SDUPTHER

## 2019-08-26 RX ORDER — SODIUM CHLORIDE 0.9 % (FLUSH) 0.9 %
5 SYRINGE (ML) INJECTION PRN
Status: DISCONTINUED | OUTPATIENT
Start: 2019-08-26 | End: 2019-08-27 | Stop reason: HOSPADM

## 2019-08-26 RX ADMIN — Medication 10 ML: at 11:05

## 2019-08-26 RX ADMIN — Medication 10 ML: at 14:28

## 2019-08-26 RX ADMIN — SODIUM CHLORIDE 500 MG: 9 INJECTION, SOLUTION INTRAVENOUS at 12:34

## 2019-08-26 RX ADMIN — SODIUM CHLORIDE 20 ML/HR: 9 INJECTION, SOLUTION INTRAVENOUS at 12:04

## 2019-08-26 RX ADMIN — Medication 500 UNITS: at 14:28

## 2019-08-26 NOTE — PROGRESS NOTES
Alok New here per ambulatory for cycle 1 day 1 of new treatment for squamous cell carcinoma. Accessed port per policy, excellent blood return noted. Reviewed lab work. Educational sheet on Imfiniz. Dr. Rafaela Schmidt also reviewed possible side effects prior to treatment. Infinzi given over one hour as ordered. Observation over 30 minutes, normal saline over 30 minutes. No signs or symptoms of reaction. Flushed and heparinized.

## 2019-08-26 NOTE — PROGRESS NOTES
DIAGNOSIS:   1. Squamous cell carcinoma, right lung 04/15/2019 with right main stem bronchus invasion (T3N0M0)   2. PET showed localized disease; brain MRI negative for metastases,   3. HX Liver disease and hepatitis C    CURRENT THERAPY:  1. S/P bronchoscopy establishing the diagnosis   2. Chemoradiation with taxol and carboplatin - started 05/23/2019, completed 07/09/2019  3. Tena Joannae started 08/26/2019    BRIEF CASE HISTORY: Piter Perez is a very pleasant 77 y.o. female who is referred to us for consultation and management of recently diagnosed lung cancer. She initially presented with flu like illness and shortness of breath 11/2018 and was evaluated by PCP and was started on antibiotics, X-ray done at the time showed suspicious findings. Follow up CT showed mass lesion within the right hilum either intervening or originating from the right mainstem bronchus and narrowing of multiple segmental pulmonary arterial branches. Referred to Dr. Xavier Krabbe for bronchoscopy and right main stem bronchus mass was appreciated, bronchial washings were positive for squamous cell carcinoma. She reports she has liver disease, Hep C and assumed her recent weight loss and cachexia was related to that. She has right shoulder and back pain. She has not been treated for Hep C. She smokes cigarettes and cocaine. She has COPD and is oxygen dependent. Staging PET showed localized disease, pleural effusion seen; brain MRI was negative for metastatic disease. She will need pleurocentesis with cytological evaluation of the fluid, assuming it is negative , and since she is not surgical candidate. Thoracentesis was done and fluid was negative for malignancy. Chemoradiation Carbo/Taxol -started 05/23/2019, completed 07/09/2019. Follow up CT showed good response to treatment with some pleural effusion and liver lesion likely due to cirrhosis. We will plan for centesis and maintenance Imfinzi.     INTERIM HISTORY: The patient presents for follow up for lung cancer and to commence with Imfinzi. She has completed chemo teach and feels ready to start treatment. She notes some congestion with chest pain and shortness of breath following radiation. She denies nausea and vomiting. PAST MEDICAL HISTORY: has a past medical history of Anxiety and depression, Back pain, Bipolar disorder ([address] Bronchitis, Cancer ([facility], [address] Chronic obstructive pulmonary disease (COPD) ([facility], [address] Cirrhosis ([facility], [address] Cough, Current every day smoker, Fibromyalgia, Hepatitis, History of cervical cancer, Liver disease, Lung mass, MVP (mitral valve prolapse), On supplemental oxygen therapy, Wears dentures, Wears glasses, and Wheezing. PAST SURGICAL HISTORY: has a past surgical history that includes Cervical disc surgery; Hysterectomy; Cholecystectomy; Breast surgery (Left); Carpal tunnel release (Bilateral); Knee arthroscopy (Right); bronchoscopy (04/15/2019); bronchoscopy (N/A, 4/15/2019); bronchoscopy (4/15/2019); bronchoscopy (4/15/2019); TUNNELED CENTRAL VENOUS CATHETER W/ SUBCUTANEOUS PORT (Right, 05/13/2019); thoracentesis (Right, 05/13/2019); and thoracentesis (Right, 08/19/2019). CURRENT MEDICATIONS:  has a current medication list which includes the following prescription(s): oxycodone, lidocaine viscous hcl, hydroxyzine, budesonide-formoterol, albuterol sulfate hfa, albuterol, oxygen concentrator, tiotropium, and ibuprofen, and the following Facility-Administered Medications: sodium chloride flush, sodium chloride flush, and heparin flush. ALLERGIES:  has No Known Allergies. FAMILY HISTORY: Negative for any hematological or oncological conditions. SOCIAL HISTORY:  reports that she has been smoking cigarettes. She started smoking about 52 years ago. She has a 13.00 pack-year smoking history. She has never used smokeless tobacco. She reports that she has current or past drug history. Drug: Cocaine. She reports that she does not drink alcohol.     REVIEW OF (L) 08/26/2019    MCV 93.0 08/26/2019    PLT 85 (L) 08/26/2019       Lab Results   Component Value Date    NEUTROABS PENDING 08/26/2019           Chemistry        Component Value Date/Time     08/26/2019 1050    K 3.9 08/26/2019 1050     08/26/2019 1050    CO2 22 08/26/2019 1050    BUN 11 08/26/2019 1050    CREATININE 0.59 08/26/2019 1050        Component Value Date/Time    CALCIUM 8.5 (L) 08/26/2019 1050    ALKPHOS 112 (H) 08/26/2019 1050    AST 39 (H) 08/26/2019 1050    ALT 18 08/26/2019 1050    BILITOT 0.44 08/26/2019 1050            REVIEW OF RADIOLOGICAL RESULTS:       PATHOLOGY:       IMPRESSION:   1. Squamous cell carcinoma, right lung 04/15/2019, clinical stage T3, N0, M0.   2. Liver disease and hepatitis C, severe comorbidity   3. Staging PET showed localized disease, pleural effusion seen; Brain MRI was negative for metastatic disease  4. Cocaine and cigarette addiction, quit drugs and quitting smoking with chantix  5. Chemo-radiation 05/23/2019, completed 07/09/2019  6. Pleural effusion - plan for centesis  7. Imfinzi - started 08/26/2019    PLAN:   1. We discussed treatment plan with Imfinzi. 2. She is mostly recovered from radiation with some residual congestion and dyspnea. 3. Commence with treatment today as planned. 4. Return in 2 weeks.

## 2019-09-03 ENCOUNTER — OFFICE VISIT (OUTPATIENT)
Dept: PRIMARY CARE CLINIC | Age: 69
End: 2019-09-03
Payer: MEDICARE

## 2019-09-03 ENCOUNTER — TELEPHONE (OUTPATIENT)
Dept: ONCOLOGY | Age: 69
End: 2019-09-03

## 2019-09-03 ENCOUNTER — TELEPHONE (OUTPATIENT)
Dept: CASE MANAGEMENT | Age: 69
End: 2019-09-03

## 2019-09-03 VITALS
OXYGEN SATURATION: 98 % | WEIGHT: 120 LBS | SYSTOLIC BLOOD PRESSURE: 105 MMHG | DIASTOLIC BLOOD PRESSURE: 65 MMHG | HEART RATE: 86 BPM | BODY MASS INDEX: 21.26 KG/M2

## 2019-09-03 DIAGNOSIS — D69.6 THROMBOCYTOPENIA (HCC): ICD-10-CM

## 2019-09-03 DIAGNOSIS — Z71.6 TOBACCO ABUSE COUNSELING: ICD-10-CM

## 2019-09-03 DIAGNOSIS — F41.9 ANXIETY: Primary | ICD-10-CM

## 2019-09-03 DIAGNOSIS — Z76.0 MEDICATION REFILL: ICD-10-CM

## 2019-09-03 DIAGNOSIS — Z72.0 TOBACCO ABUSE: ICD-10-CM

## 2019-09-03 DIAGNOSIS — J44.9 CHRONIC OBSTRUCTIVE PULMONARY DISEASE, UNSPECIFIED COPD TYPE (HCC): ICD-10-CM

## 2019-09-03 DIAGNOSIS — C34.11 MALIGNANT NEOPLASM OF UPPER LOBE OF RIGHT LUNG (HCC): ICD-10-CM

## 2019-09-03 PROCEDURE — 1123F ACP DISCUSS/DSCN MKR DOCD: CPT | Performed by: PHYSICIAN ASSISTANT

## 2019-09-03 PROCEDURE — G8400 PT W/DXA NO RESULTS DOC: HCPCS | Performed by: PHYSICIAN ASSISTANT

## 2019-09-03 PROCEDURE — G8926 SPIRO NO PERF OR DOC: HCPCS | Performed by: PHYSICIAN ASSISTANT

## 2019-09-03 PROCEDURE — 99214 OFFICE O/P EST MOD 30 MIN: CPT | Performed by: PHYSICIAN ASSISTANT

## 2019-09-03 PROCEDURE — 1090F PRES/ABSN URINE INCON ASSESS: CPT | Performed by: PHYSICIAN ASSISTANT

## 2019-09-03 PROCEDURE — 3017F COLORECTAL CA SCREEN DOC REV: CPT | Performed by: PHYSICIAN ASSISTANT

## 2019-09-03 PROCEDURE — G8427 DOCREV CUR MEDS BY ELIG CLIN: HCPCS | Performed by: PHYSICIAN ASSISTANT

## 2019-09-03 PROCEDURE — G8420 CALC BMI NORM PARAMETERS: HCPCS | Performed by: PHYSICIAN ASSISTANT

## 2019-09-03 PROCEDURE — 4040F PNEUMOC VAC/ADMIN/RCVD: CPT | Performed by: PHYSICIAN ASSISTANT

## 2019-09-03 PROCEDURE — 3023F SPIROM DOC REV: CPT | Performed by: PHYSICIAN ASSISTANT

## 2019-09-03 PROCEDURE — 4004F PT TOBACCO SCREEN RCVD TLK: CPT | Performed by: PHYSICIAN ASSISTANT

## 2019-09-03 RX ORDER — VARENICLINE TARTRATE 1 MG/1
1 TABLET, FILM COATED ORAL 2 TIMES DAILY
Qty: 60 TABLET | Refills: 2 | Status: SHIPPED | OUTPATIENT
Start: 2019-09-03 | End: 2019-01-01 | Stop reason: SDUPTHER

## 2019-09-03 RX ORDER — BUDESONIDE AND FORMOTEROL FUMARATE DIHYDRATE 160; 4.5 UG/1; UG/1
2 AEROSOL RESPIRATORY (INHALATION) 2 TIMES DAILY
Qty: 1 INHALER | Refills: 3 | Status: SHIPPED | OUTPATIENT
Start: 2019-09-03 | End: 2019-09-25 | Stop reason: SDUPTHER

## 2019-09-03 RX ORDER — BENZONATATE 100 MG/1
100 CAPSULE ORAL 3 TIMES DAILY
Qty: 30 CAPSULE | Refills: 0 | Status: SHIPPED | OUTPATIENT
Start: 2019-09-03 | End: 2019-09-13

## 2019-09-03 RX ORDER — HYDROXYZINE 50 MG/1
50 TABLET, FILM COATED ORAL 2 TIMES DAILY
Qty: 60 TABLET | Refills: 2 | Status: SHIPPED | OUTPATIENT
Start: 2019-09-03 | End: 2019-01-01 | Stop reason: SDUPTHER

## 2019-09-03 ASSESSMENT — ENCOUNTER SYMPTOMS
COUGH: 1
BACK PAIN: 1
WHEEZING: 1
CONSTIPATION: 1
SHORTNESS OF BREATH: 1

## 2019-09-03 NOTE — PROGRESS NOTES
MCG/ACT AERS inhaler Inhale 2 puffs into the lungs daily 1 Inhaler 5    oxyCODONE (ROXICODONE) 5 MG immediate release tablet Take 1 tablet by mouth every 6 hours as needed for Pain for up to 14 days. 56 tablet 0    omeprazole (PRILOSEC) 40 MG delayed release capsule Take 1 capsule by mouth every morning (before breakfast) 30 capsule 3     No current facility-administered medications for this visit. Social History     Tobacco Use    Smoking status: Current Every Day Smoker     Packs/day: 0.25     Years: 52.00     Pack years: 13.00     Types: Cigarettes     Start date: 1/1/1967    Smokeless tobacco: Never Used    Tobacco comment: smoking 5 or so a day. 5-16-19   Substance Use Topics    Alcohol use: No    Drug use: Yes     Types: Cocaine     Comment: 4/25/19       Family History   Problem Relation Age of Onset    Heart Attack Father     Cancer Paternal Grandmother     Lung Cancer Paternal Grandfather     Emphysema Mother         REVIEW OFSYSTEMS:  Review of Systems   Constitutional: Negative for chills and fever. HENT: Positive for congestion (chest). Respiratory: Positive for cough (productive), shortness of breath and wheezing (mild). Cardiovascular: Positive for chest pain (d/t coughing). Gastrointestinal: Positive for constipation (d/t med). Negative for diarrhea, nausea and vomiting. Genitourinary: Negative for dysuria, frequency and urgency. Musculoskeletal: Positive for back pain. Negative for myalgias and neck pain. Neurological: Negative for headaches. PHYSICAL EXAM:  Vitals:    09/03/19 1158   BP: 105/65   Site: Left Upper Arm   Position: Sitting   Cuff Size: Medium Adult   Pulse: 86   SpO2: 98%   Weight: 120 lb (54.4 kg)     BP Readings from Last 3 Encounters:   09/09/19 114/66   09/09/19 114/66   09/03/19 105/65        Physical Exam   Constitutional: She is oriented to person, place, and time. Vital signs are normal. She appears well-developed and well-nourished.  She days      FOLLOW UP AND INSTRUCTIONS:  Return in about 2 months (around 11/3/2019) for AMW. · Lizabeth Neely received counseling on the following healthy behaviors:tobacco cessation    · Discussed use, benefit, and side effects of prescribed medications. Barriers to medication compliance addressed. All patient questions answered. Pt voiced understanding. · Patient given educational materials - see patient instructions    Electronically signed by Andre Hassan PA-C on 9/3/19 at 12:07 PM    This note is created with the assistance of a speech-recognition program. While intendingto generate a document that actually reflects the content of the visit, the document can still have some mistakes which may not have been identified and corrected by editing.

## 2019-09-05 ENCOUNTER — HOSPITAL ENCOUNTER (OUTPATIENT)
Facility: MEDICAL CENTER | Age: 69
End: 2019-09-05
Payer: MEDICARE

## 2019-09-09 ENCOUNTER — TELEPHONE (OUTPATIENT)
Dept: ONCOLOGY | Age: 69
End: 2019-09-09

## 2019-09-09 ENCOUNTER — OFFICE VISIT (OUTPATIENT)
Dept: ONCOLOGY | Age: 69
End: 2019-09-09
Payer: MEDICARE

## 2019-09-09 ENCOUNTER — HOSPITAL ENCOUNTER (OUTPATIENT)
Dept: INFUSION THERAPY | Facility: MEDICAL CENTER | Age: 69
Discharge: HOME | End: 2019-09-09
Payer: MEDICARE

## 2019-09-09 VITALS
RESPIRATION RATE: 18 BRPM | WEIGHT: 120 LBS | HEART RATE: 85 BPM | DIASTOLIC BLOOD PRESSURE: 66 MMHG | TEMPERATURE: 98 F | BODY MASS INDEX: 21.26 KG/M2 | SYSTOLIC BLOOD PRESSURE: 114 MMHG

## 2019-09-09 VITALS
BODY MASS INDEX: 21.26 KG/M2 | WEIGHT: 120 LBS | HEART RATE: 85 BPM | DIASTOLIC BLOOD PRESSURE: 66 MMHG | SYSTOLIC BLOOD PRESSURE: 114 MMHG | TEMPERATURE: 98 F | RESPIRATION RATE: 18 BRPM

## 2019-09-09 DIAGNOSIS — B18.2 CHRONIC HEPATITIS C WITHOUT HEPATIC COMA (HCC): ICD-10-CM

## 2019-09-09 DIAGNOSIS — C34.11 MALIGNANT NEOPLASM OF UPPER LOBE OF RIGHT LUNG (HCC): ICD-10-CM

## 2019-09-09 DIAGNOSIS — R53.83 FATIGUE, UNSPECIFIED TYPE: ICD-10-CM

## 2019-09-09 DIAGNOSIS — C34.11 MALIGNANT NEOPLASM OF UPPER LOBE OF RIGHT LUNG (HCC): Primary | ICD-10-CM

## 2019-09-09 LAB
ABSOLUTE EOS #: 0.17 K/UL (ref 0–0.44)
ABSOLUTE IMMATURE GRANULOCYTE: 0.01 K/UL (ref 0–0.3)
ABSOLUTE LYMPH #: 0.56 K/UL (ref 1.1–3.7)
ABSOLUTE MONO #: 0.39 K/UL (ref 0.1–1.2)
ALBUMIN SERPL-MCNC: 2.9 G/DL (ref 3.5–5.2)
ALBUMIN/GLOBULIN RATIO: ABNORMAL (ref 1–2.5)
ALP BLD-CCNC: 133 U/L (ref 35–104)
ALT SERPL-CCNC: 63 U/L (ref 5–33)
AMYLASE: 36 U/L (ref 28–100)
ANION GAP SERPL CALCULATED.3IONS-SCNC: 10 MMOL/L (ref 9–17)
AST SERPL-CCNC: 134 U/L
BASOPHILS # BLD: 1 % (ref 0–2)
BASOPHILS ABSOLUTE: 0.04 K/UL (ref 0–0.2)
BILIRUB SERPL-MCNC: 0.68 MG/DL (ref 0.3–1.2)
BUN BLDV-MCNC: 11 MG/DL (ref 8–23)
BUN/CREAT BLD: 19 (ref 9–20)
CALCIUM SERPL-MCNC: 8.3 MG/DL (ref 8.6–10.4)
CHLORIDE BLD-SCNC: 102 MMOL/L (ref 98–107)
CO2: 21 MMOL/L (ref 20–31)
CORTISOL COLLECTION INFO: NORMAL
CORTISOL: 2.9 UG/DL (ref 2.7–18.4)
CREAT SERPL-MCNC: 0.59 MG/DL (ref 0.5–0.9)
DIFFERENTIAL TYPE: ABNORMAL
EOSINOPHILS RELATIVE PERCENT: 4 % (ref 1–4)
GFR AFRICAN AMERICAN: >60 ML/MIN
GFR NON-AFRICAN AMERICAN: >60 ML/MIN
GFR SERPL CREATININE-BSD FRML MDRD: ABNORMAL ML/MIN/{1.73_M2}
GFR SERPL CREATININE-BSD FRML MDRD: ABNORMAL ML/MIN/{1.73_M2}
GLUCOSE BLD-MCNC: 218 MG/DL (ref 70–99)
HCT VFR BLD CALC: 33.8 % (ref 36.3–47.1)
HEMOGLOBIN: 10.6 G/DL (ref 11.9–15.1)
IMMATURE GRANULOCYTES: 0 %
LIPASE: 16 U/L (ref 13–60)
LYMPHOCYTES # BLD: 14 % (ref 24–43)
MCH RBC QN AUTO: 28.6 PG (ref 25.2–33.5)
MCHC RBC AUTO-ENTMCNC: 31.4 G/DL (ref 28.4–34.8)
MCV RBC AUTO: 91.1 FL (ref 82.6–102.9)
MONOCYTES # BLD: 10 % (ref 3–12)
NRBC AUTOMATED: 0 PER 100 WBC
PDW BLD-RTO: 15.4 % (ref 11.8–14.4)
PLATELET # BLD: 66 K/UL (ref 138–453)
PLATELET ESTIMATE: ABNORMAL
PMV BLD AUTO: 12 FL (ref 8.1–13.5)
POTASSIUM SERPL-SCNC: 4 MMOL/L (ref 3.7–5.3)
RBC # BLD: 3.71 M/UL (ref 3.95–5.11)
RBC # BLD: ABNORMAL 10*6/UL
SEG NEUTROPHILS: 70 % (ref 36–65)
SEGMENTED NEUTROPHILS ABSOLUTE COUNT: 2.71 K/UL (ref 1.5–8.1)
SODIUM BLD-SCNC: 133 MMOL/L (ref 135–144)
TOTAL PROTEIN: 7.3 G/DL (ref 6.4–8.3)
TSH SERPL DL<=0.05 MIU/L-ACNC: 4.22 MIU/L (ref 0.3–5)
WBC # BLD: 3.9 K/UL (ref 3.5–11.3)
WBC # BLD: ABNORMAL 10*3/UL

## 2019-09-09 PROCEDURE — 96413 CHEMO IV INFUSION 1 HR: CPT

## 2019-09-09 PROCEDURE — 82150 ASSAY OF AMYLASE: CPT

## 2019-09-09 PROCEDURE — 84443 ASSAY THYROID STIM HORMONE: CPT

## 2019-09-09 PROCEDURE — G8400 PT W/DXA NO RESULTS DOC: HCPCS | Performed by: INTERNAL MEDICINE

## 2019-09-09 PROCEDURE — 2580000003 HC RX 258: Performed by: INTERNAL MEDICINE

## 2019-09-09 PROCEDURE — 6360000002 HC RX W HCPCS: Performed by: INTERNAL MEDICINE

## 2019-09-09 PROCEDURE — 83690 ASSAY OF LIPASE: CPT

## 2019-09-09 PROCEDURE — G8420 CALC BMI NORM PARAMETERS: HCPCS | Performed by: INTERNAL MEDICINE

## 2019-09-09 PROCEDURE — 1090F PRES/ABSN URINE INCON ASSESS: CPT | Performed by: INTERNAL MEDICINE

## 2019-09-09 PROCEDURE — 36591 DRAW BLOOD OFF VENOUS DEVICE: CPT

## 2019-09-09 PROCEDURE — 85025 COMPLETE CBC W/AUTO DIFF WBC: CPT

## 2019-09-09 PROCEDURE — 99211 OFF/OP EST MAY X REQ PHY/QHP: CPT

## 2019-09-09 PROCEDURE — 4040F PNEUMOC VAC/ADMIN/RCVD: CPT | Performed by: INTERNAL MEDICINE

## 2019-09-09 PROCEDURE — 4004F PT TOBACCO SCREEN RCVD TLK: CPT | Performed by: INTERNAL MEDICINE

## 2019-09-09 PROCEDURE — 99214 OFFICE O/P EST MOD 30 MIN: CPT | Performed by: INTERNAL MEDICINE

## 2019-09-09 PROCEDURE — 82533 TOTAL CORTISOL: CPT

## 2019-09-09 PROCEDURE — 80053 COMPREHEN METABOLIC PANEL: CPT

## 2019-09-09 PROCEDURE — 1123F ACP DISCUSS/DSCN MKR DOCD: CPT | Performed by: INTERNAL MEDICINE

## 2019-09-09 PROCEDURE — 3017F COLORECTAL CA SCREEN DOC REV: CPT | Performed by: INTERNAL MEDICINE

## 2019-09-09 PROCEDURE — G8427 DOCREV CUR MEDS BY ELIG CLIN: HCPCS | Performed by: INTERNAL MEDICINE

## 2019-09-09 RX ORDER — SODIUM CHLORIDE 9 MG/ML
INJECTION, SOLUTION INTRAVENOUS CONTINUOUS
Status: CANCELLED | OUTPATIENT
Start: 2019-10-07

## 2019-09-09 RX ORDER — METHYLPREDNISOLONE SODIUM SUCCINATE 125 MG/2ML
125 INJECTION, POWDER, LYOPHILIZED, FOR SOLUTION INTRAMUSCULAR; INTRAVENOUS ONCE
Status: CANCELLED | OUTPATIENT
Start: 2019-10-07

## 2019-09-09 RX ORDER — OMEPRAZOLE 40 MG/1
40 CAPSULE, DELAYED RELEASE ORAL
Qty: 30 CAPSULE | Refills: 3 | Status: SHIPPED | OUTPATIENT
Start: 2019-09-09 | End: 2020-01-01 | Stop reason: DRUGHIGH

## 2019-09-09 RX ORDER — SODIUM CHLORIDE 0.9 % (FLUSH) 0.9 %
10 SYRINGE (ML) INJECTION PRN
Status: CANCELLED | OUTPATIENT
Start: 2019-10-07

## 2019-09-09 RX ORDER — HEPARIN SODIUM (PORCINE) LOCK FLUSH IV SOLN 100 UNIT/ML 100 UNIT/ML
500 SOLUTION INTRAVENOUS PRN
Status: DISCONTINUED | OUTPATIENT
Start: 2019-09-09 | End: 2019-09-10 | Stop reason: HOSPADM

## 2019-09-09 RX ORDER — SODIUM CHLORIDE 9 MG/ML
20 INJECTION, SOLUTION INTRAVENOUS ONCE
Status: CANCELLED | OUTPATIENT
Start: 2019-10-07

## 2019-09-09 RX ORDER — HEPARIN SODIUM (PORCINE) LOCK FLUSH IV SOLN 100 UNIT/ML 100 UNIT/ML
500 SOLUTION INTRAVENOUS PRN
Status: CANCELLED | OUTPATIENT
Start: 2019-10-07

## 2019-09-09 RX ORDER — 0.9 % SODIUM CHLORIDE 0.9 %
10 VIAL (ML) INJECTION ONCE
Status: CANCELLED | OUTPATIENT
Start: 2019-10-07

## 2019-09-09 RX ORDER — EPINEPHRINE 1 MG/ML
0.3 INJECTION, SOLUTION, CONCENTRATE INTRAVENOUS PRN
Status: CANCELLED | OUTPATIENT
Start: 2019-10-07

## 2019-09-09 RX ORDER — DIPHENHYDRAMINE HYDROCHLORIDE 50 MG/ML
50 INJECTION INTRAMUSCULAR; INTRAVENOUS ONCE
Status: CANCELLED | OUTPATIENT
Start: 2019-10-07

## 2019-09-09 RX ORDER — SODIUM CHLORIDE 9 MG/ML
20 INJECTION, SOLUTION INTRAVENOUS ONCE
Status: COMPLETED | OUTPATIENT
Start: 2019-09-09 | End: 2019-09-09

## 2019-09-09 RX ORDER — OXYCODONE HYDROCHLORIDE 5 MG/1
5 TABLET ORAL EVERY 6 HOURS PRN
Qty: 56 TABLET | Refills: 0 | Status: SHIPPED | OUTPATIENT
Start: 2019-09-09 | End: 2019-09-23 | Stop reason: SDUPTHER

## 2019-09-09 RX ORDER — SODIUM CHLORIDE 0.9 % (FLUSH) 0.9 %
5 SYRINGE (ML) INJECTION PRN
Status: CANCELLED | OUTPATIENT
Start: 2019-10-07

## 2019-09-09 RX ORDER — SODIUM CHLORIDE 0.9 % (FLUSH) 0.9 %
10 SYRINGE (ML) INJECTION PRN
Status: DISCONTINUED | OUTPATIENT
Start: 2019-09-09 | End: 2019-09-10 | Stop reason: HOSPADM

## 2019-09-09 RX ADMIN — SODIUM CHLORIDE 20 ML/HR: 9 INJECTION, SOLUTION INTRAVENOUS at 13:17

## 2019-09-09 RX ADMIN — Medication 500 UNITS: at 16:11

## 2019-09-09 RX ADMIN — Medication 10 ML: at 16:11

## 2019-09-09 RX ADMIN — SODIUM CHLORIDE 500 MG: 9 INJECTION, SOLUTION INTRAVENOUS at 14:55

## 2019-09-09 ASSESSMENT — PAIN DESCRIPTION - PAIN TYPE: TYPE: ACUTE PAIN

## 2019-09-09 ASSESSMENT — PAIN DESCRIPTION - PROGRESSION: CLINICAL_PROGRESSION: NOT CHANGED

## 2019-09-09 ASSESSMENT — PAIN DESCRIPTION - LOCATION: LOCATION: CHEST

## 2019-09-09 ASSESSMENT — PAIN DESCRIPTION - ONSET: ONSET: ON-GOING

## 2019-09-09 ASSESSMENT — PAIN DESCRIPTION - ORIENTATION: ORIENTATION: RIGHT;LEFT

## 2019-09-09 ASSESSMENT — PAIN SCALES - GENERAL: PAINLEVEL_OUTOF10: 0

## 2019-09-09 ASSESSMENT — PAIN DESCRIPTION - FREQUENCY: FREQUENCY: INTERMITTENT

## 2019-09-09 ASSESSMENT — PAIN DESCRIPTION - DESCRIPTORS: DESCRIPTORS: SORE

## 2019-09-09 NOTE — PROGRESS NOTES
right shoulder and chest wall has excoriation marks, radiation rash    REVIEW OF LABORATORY DATA:   Lab Results   Component Value Date    WBC 3.9 09/09/2019    HGB 10.6 (L) 09/09/2019    HCT 33.8 (L) 09/09/2019    MCV 91.1 09/09/2019    PLT 66 (L) 09/09/2019       Lab Results   Component Value Date    NEUTROABS 2.71 09/09/2019           Chemistry        Component Value Date/Time     (L) 09/09/2019 1317    K 4.0 09/09/2019 1317     09/09/2019 1317    CO2 21 09/09/2019 1317    BUN 11 09/09/2019 1317    CREATININE 0.59 09/09/2019 1317        Component Value Date/Time    CALCIUM 8.3 (L) 09/09/2019 1317    ALKPHOS 133 (H) 09/09/2019 1317     (H) 09/09/2019 1317    ALT 63 (H) 09/09/2019 1317    BILITOT 0.68 09/09/2019 1317            REVIEW OF RADIOLOGICAL RESULTS:       PATHOLOGY:       IMPRESSION:   1. Squamous cell carcinoma, right lung 04/15/2019, clinical stage T3, N0, M0.   2. Liver disease and hepatitis C, severe comorbidity   3. Staging PET showed localized disease, pleural effusion seen; Brain MRI was negative for metastatic disease  4. Cocaine and cigarette addiction, quit drugs and quitting smoking with chantix  5. Chemo-radiation 05/23/2019, completed 07/09/2019  6. Pleural effusion - plan for centesis  7. Imfinzi - started 08/26/2019    PLAN:   1. Her lab work was reviewed and discussed, her amylase and lipase are in range, her liver enzymes are elevated. The patient already underwent cholecystectomy. 2. I think her pain is might be related to biliary tract pain, however, this could be a gastritis component. Autoimmune hepatitis is less likely based on the mild elevation of liver enzymes. 3. Anemia and thrombocytopenia are related to her chemoradiation. 4. I completed toxicity check. 5. Treat today as planned. 6. I am ordering Prilosec for her biliary pain, we will consider referral to GI if pain persists with next visit. 7. Return in  2 weeks.

## 2019-09-09 NOTE — PROGRESS NOTES
1307:  Pt arrives ambulatory for RN draw, MD visit and C2D1. Orders reviewed. VS and weight obtained. Pt feels well today. Denies fevers/chills/illnesses or infections. 1317:  Pt's port accessed as charted in LDA's. Labs drawn/sent to lab pending. IVF's hung as per [facility] for med line. Lab results reviewed and copy of labs given to the patient as requested. [id number]:  Dr. Maureen Bolanos in treatment area to see the patient. Labs reviewed. MD visit note orders reviewed. Okay to proceed with today's treatment as planned. No pre meds ordered. Imfinzi hung as per [facility]. Pt tolerated Imfinzi well without complaints. No adverse reactions noted. Pt's port flushed and heparinized as per [facility] and then de-accessed as charted in LDA's. Pt sent to checkout desk to  her AVS.  RTC on 9/23/19. Discharged to home.

## 2019-09-11 ENCOUNTER — TELEPHONE (OUTPATIENT)
Dept: ONCOLOGY | Age: 69
End: 2019-09-11

## 2019-09-18 ENCOUNTER — HOSPITAL ENCOUNTER (OUTPATIENT)
Facility: MEDICAL CENTER | Age: 69
End: 2019-09-18
Payer: MEDICARE

## 2019-09-18 ASSESSMENT — ENCOUNTER SYMPTOMS
NAUSEA: 0
DIARRHEA: 0
VOMITING: 0

## 2019-09-23 ENCOUNTER — HOSPITAL ENCOUNTER (OUTPATIENT)
Dept: INFUSION THERAPY | Facility: MEDICAL CENTER | Age: 69
Discharge: HOME | End: 2019-09-23
Payer: MEDICARE

## 2019-09-23 VITALS
TEMPERATURE: 97.6 F | OXYGEN SATURATION: 99 % | RESPIRATION RATE: 18 BRPM | DIASTOLIC BLOOD PRESSURE: 88 MMHG | WEIGHT: 119.9 LBS | HEART RATE: 87 BPM | SYSTOLIC BLOOD PRESSURE: 148 MMHG | BODY MASS INDEX: 21.24 KG/M2

## 2019-09-23 DIAGNOSIS — R53.83 FATIGUE, UNSPECIFIED TYPE: ICD-10-CM

## 2019-09-23 DIAGNOSIS — C34.11 MALIGNANT NEOPLASM OF UPPER LOBE OF RIGHT LUNG (HCC): ICD-10-CM

## 2019-09-23 DIAGNOSIS — C34.11 MALIGNANT NEOPLASM OF UPPER LOBE OF RIGHT LUNG (HCC): Primary | ICD-10-CM

## 2019-09-23 DIAGNOSIS — B18.2 CHRONIC HEPATITIS C WITHOUT HEPATIC COMA (HCC): ICD-10-CM

## 2019-09-23 LAB
ABSOLUTE EOS #: 0.09 K/UL (ref 0–0.44)
ABSOLUTE IMMATURE GRANULOCYTE: 0.02 K/UL (ref 0–0.3)
ABSOLUTE LYMPH #: 0.52 K/UL (ref 1.1–3.7)
ABSOLUTE MONO #: 0.48 K/UL (ref 0.1–1.2)
ALBUMIN SERPL-MCNC: 3 G/DL (ref 3.5–5.2)
ALBUMIN/GLOBULIN RATIO: ABNORMAL (ref 1–2.5)
ALP BLD-CCNC: 133 U/L (ref 35–104)
ALT SERPL-CCNC: 38 U/L (ref 5–33)
AMYLASE: 52 U/L (ref 28–100)
ANION GAP SERPL CALCULATED.3IONS-SCNC: 10 MMOL/L (ref 9–17)
AST SERPL-CCNC: 75 U/L
BASOPHILS # BLD: 1 % (ref 0–2)
BASOPHILS ABSOLUTE: 0.04 K/UL (ref 0–0.2)
BILIRUB SERPL-MCNC: 0.97 MG/DL (ref 0.3–1.2)
BUN BLDV-MCNC: 10 MG/DL (ref 8–23)
BUN/CREAT BLD: 24 (ref 9–20)
CALCIUM SERPL-MCNC: 8.7 MG/DL (ref 8.6–10.4)
CHLORIDE BLD-SCNC: 100 MMOL/L (ref 98–107)
CO2: 24 MMOL/L (ref 20–31)
CORTISOL COLLECTION INFO: NORMAL
CORTISOL: 10.2 UG/DL (ref 2.7–18.4)
CREAT SERPL-MCNC: 0.42 MG/DL (ref 0.5–0.9)
DIFFERENTIAL TYPE: ABNORMAL
EOSINOPHILS RELATIVE PERCENT: 2 % (ref 1–4)
GFR AFRICAN AMERICAN: >60 ML/MIN
GFR NON-AFRICAN AMERICAN: >60 ML/MIN
GFR SERPL CREATININE-BSD FRML MDRD: ABNORMAL ML/MIN/{1.73_M2}
GFR SERPL CREATININE-BSD FRML MDRD: ABNORMAL ML/MIN/{1.73_M2}
GLUCOSE BLD-MCNC: 114 MG/DL (ref 70–99)
HCT VFR BLD CALC: 35.8 % (ref 36.3–47.1)
HEMOGLOBIN: 11.7 G/DL (ref 11.9–15.1)
IMMATURE GRANULOCYTES: 0 %
LIPASE: 14 U/L (ref 13–60)
LYMPHOCYTES # BLD: 10 % (ref 24–43)
MCH RBC QN AUTO: 29.1 PG (ref 25.2–33.5)
MCHC RBC AUTO-ENTMCNC: 32.7 G/DL (ref 28.4–34.8)
MCV RBC AUTO: 89.1 FL (ref 82.6–102.9)
MONOCYTES # BLD: 9 % (ref 3–12)
NRBC AUTOMATED: 0 PER 100 WBC
PDW BLD-RTO: 15.1 % (ref 11.8–14.4)
PLATELET # BLD: 60 K/UL (ref 138–453)
PLATELET ESTIMATE: ABNORMAL
PMV BLD AUTO: 9.5 FL (ref 8.1–13.5)
POTASSIUM SERPL-SCNC: 4 MMOL/L (ref 3.7–5.3)
RBC # BLD: 4.02 M/UL (ref 3.95–5.11)
RBC # BLD: ABNORMAL 10*6/UL
SEG NEUTROPHILS: 79 % (ref 36–65)
SEGMENTED NEUTROPHILS ABSOLUTE COUNT: 4.31 K/UL (ref 1.5–8.1)
SODIUM BLD-SCNC: 134 MMOL/L (ref 135–144)
TOTAL PROTEIN: 7.8 G/DL (ref 6.4–8.3)
TSH SERPL DL<=0.05 MIU/L-ACNC: 1.14 MIU/L (ref 0.3–5)
WBC # BLD: 5.5 K/UL (ref 3.5–11.3)
WBC # BLD: ABNORMAL 10*3/UL

## 2019-09-23 PROCEDURE — 82150 ASSAY OF AMYLASE: CPT

## 2019-09-23 PROCEDURE — 36591 DRAW BLOOD OFF VENOUS DEVICE: CPT

## 2019-09-23 PROCEDURE — 84443 ASSAY THYROID STIM HORMONE: CPT

## 2019-09-23 PROCEDURE — 2580000003 HC RX 258: Performed by: INTERNAL MEDICINE

## 2019-09-23 PROCEDURE — 96413 CHEMO IV INFUSION 1 HR: CPT

## 2019-09-23 PROCEDURE — 82533 TOTAL CORTISOL: CPT

## 2019-09-23 PROCEDURE — 85025 COMPLETE CBC W/AUTO DIFF WBC: CPT

## 2019-09-23 PROCEDURE — 83690 ASSAY OF LIPASE: CPT

## 2019-09-23 PROCEDURE — 6360000002 HC RX W HCPCS: Performed by: INTERNAL MEDICINE

## 2019-09-23 PROCEDURE — 80053 COMPREHEN METABOLIC PANEL: CPT

## 2019-09-23 RX ORDER — HEPARIN SODIUM (PORCINE) LOCK FLUSH IV SOLN 100 UNIT/ML 100 UNIT/ML
500 SOLUTION INTRAVENOUS PRN
Status: DISCONTINUED | OUTPATIENT
Start: 2019-09-23 | End: 2019-09-24 | Stop reason: HOSPADM

## 2019-09-23 RX ORDER — LEVOFLOXACIN 500 MG/1
500 TABLET, FILM COATED ORAL DAILY
Qty: 7 TABLET | Refills: 0 | Status: CANCELLED | OUTPATIENT
Start: 2019-09-23 | End: 2019-09-30

## 2019-09-23 RX ORDER — LEVOFLOXACIN 500 MG/1
500 TABLET, FILM COATED ORAL DAILY
Qty: 7 TABLET | Refills: 0 | Status: SHIPPED | OUTPATIENT
Start: 2019-09-23 | End: 2019-09-30

## 2019-09-23 RX ORDER — SODIUM CHLORIDE 0.9 % (FLUSH) 0.9 %
10 SYRINGE (ML) INJECTION PRN
Status: DISCONTINUED | OUTPATIENT
Start: 2019-09-23 | End: 2019-09-24 | Stop reason: HOSPADM

## 2019-09-23 RX ORDER — OXYCODONE HYDROCHLORIDE 5 MG/1
5 TABLET ORAL EVERY 6 HOURS PRN
Qty: 56 TABLET | Refills: 0 | Status: CANCELLED | OUTPATIENT
Start: 2019-09-23 | End: 2019-10-07

## 2019-09-23 RX ORDER — SODIUM CHLORIDE 9 MG/ML
20 INJECTION, SOLUTION INTRAVENOUS ONCE
Status: COMPLETED | OUTPATIENT
Start: 2019-09-23 | End: 2019-09-23

## 2019-09-23 RX ORDER — OXYCODONE HYDROCHLORIDE 5 MG/1
5 TABLET ORAL EVERY 6 HOURS PRN
Qty: 56 TABLET | Refills: 0 | Status: SHIPPED | OUTPATIENT
Start: 2019-09-23 | End: 2019-10-07 | Stop reason: SDUPTHER

## 2019-09-23 RX ADMIN — SODIUM CHLORIDE 500 MG: 9 INJECTION, SOLUTION INTRAVENOUS at 15:25

## 2019-09-23 RX ADMIN — Medication 10 ML: at 16:40

## 2019-09-23 RX ADMIN — SODIUM CHLORIDE 20 ML/HR: 9 INJECTION, SOLUTION INTRAVENOUS at 15:00

## 2019-09-23 RX ADMIN — Medication 500 UNITS: at 16:40

## 2019-09-23 ASSESSMENT — PAIN DESCRIPTION - ONSET: ONSET: ON-GOING

## 2019-09-23 ASSESSMENT — PAIN DESCRIPTION - ORIENTATION: ORIENTATION: RIGHT;LEFT

## 2019-09-23 ASSESSMENT — PAIN DESCRIPTION - PAIN TYPE: TYPE: ACUTE PAIN

## 2019-09-23 ASSESSMENT — PAIN DESCRIPTION - LOCATION: LOCATION: CHEST

## 2019-09-23 ASSESSMENT — PAIN DESCRIPTION - FREQUENCY: FREQUENCY: CONTINUOUS

## 2019-09-23 ASSESSMENT — PAIN DESCRIPTION - DESCRIPTORS: DESCRIPTORS: SORE

## 2019-09-25 ENCOUNTER — OFFICE VISIT (OUTPATIENT)
Dept: PULMONOLOGY | Age: 69
End: 2019-09-25
Payer: MEDICARE

## 2019-09-25 VITALS
RESPIRATION RATE: 16 BRPM | WEIGHT: 123 LBS | HEIGHT: 63 IN | HEART RATE: 92 BPM | SYSTOLIC BLOOD PRESSURE: 124 MMHG | DIASTOLIC BLOOD PRESSURE: 76 MMHG | OXYGEN SATURATION: 98 % | BODY MASS INDEX: 21.79 KG/M2

## 2019-09-25 DIAGNOSIS — J44.1 COPD WITH ACUTE EXACERBATION (HCC): ICD-10-CM

## 2019-09-25 DIAGNOSIS — J90 PLEURAL EFFUSION ON RIGHT: ICD-10-CM

## 2019-09-25 DIAGNOSIS — J98.11 ATELECTASIS: ICD-10-CM

## 2019-09-25 DIAGNOSIS — J44.9 CHRONIC OBSTRUCTIVE PULMONARY DISEASE, UNSPECIFIED COPD TYPE (HCC): Primary | ICD-10-CM

## 2019-09-25 DIAGNOSIS — C34.01 MALIGNANT NEOPLASM OF HILUS OF RIGHT LUNG (HCC): ICD-10-CM

## 2019-09-25 DIAGNOSIS — J96.11 CHRONIC RESPIRATORY FAILURE WITH HYPOXIA (HCC): ICD-10-CM

## 2019-09-25 PROCEDURE — G8926 SPIRO NO PERF OR DOC: HCPCS | Performed by: INTERNAL MEDICINE

## 2019-09-25 PROCEDURE — 3023F SPIROM DOC REV: CPT | Performed by: INTERNAL MEDICINE

## 2019-09-25 PROCEDURE — 1090F PRES/ABSN URINE INCON ASSESS: CPT | Performed by: INTERNAL MEDICINE

## 2019-09-25 PROCEDURE — 3017F COLORECTAL CA SCREEN DOC REV: CPT | Performed by: INTERNAL MEDICINE

## 2019-09-25 PROCEDURE — 4040F PNEUMOC VAC/ADMIN/RCVD: CPT | Performed by: INTERNAL MEDICINE

## 2019-09-25 PROCEDURE — G8420 CALC BMI NORM PARAMETERS: HCPCS | Performed by: INTERNAL MEDICINE

## 2019-09-25 PROCEDURE — 4004F PT TOBACCO SCREEN RCVD TLK: CPT | Performed by: INTERNAL MEDICINE

## 2019-09-25 PROCEDURE — G8400 PT W/DXA NO RESULTS DOC: HCPCS | Performed by: INTERNAL MEDICINE

## 2019-09-25 PROCEDURE — 1123F ACP DISCUSS/DSCN MKR DOCD: CPT | Performed by: INTERNAL MEDICINE

## 2019-09-25 PROCEDURE — 99214 OFFICE O/P EST MOD 30 MIN: CPT | Performed by: INTERNAL MEDICINE

## 2019-09-25 PROCEDURE — G8427 DOCREV CUR MEDS BY ELIG CLIN: HCPCS | Performed by: INTERNAL MEDICINE

## 2019-09-25 RX ORDER — ALBUTEROL SULFATE 2.5 MG/3ML
2.5 SOLUTION RESPIRATORY (INHALATION) EVERY 6 HOURS PRN
Qty: 120 EACH | Refills: 5 | Status: SHIPPED | OUTPATIENT
Start: 2019-09-25

## 2019-09-25 RX ORDER — PREDNISONE 20 MG/1
40 TABLET ORAL DAILY
Qty: 10 TABLET | Refills: 0 | Status: SHIPPED | OUTPATIENT
Start: 2019-09-25 | End: 2019-09-30

## 2019-09-25 RX ORDER — BUDESONIDE AND FORMOTEROL FUMARATE DIHYDRATE 160; 4.5 UG/1; UG/1
2 AEROSOL RESPIRATORY (INHALATION) 2 TIMES DAILY
Qty: 1 INHALER | Refills: 5 | Status: SHIPPED | OUTPATIENT
Start: 2019-09-25 | End: 2020-01-01

## 2019-09-25 NOTE — PROGRESS NOTES
cyanosis  Skin - normal coloration and turgor, no rashes, no suspicious skin lesions noted       LABS:    CBC:   WBC   Date Value Ref Range Status   09/23/2019 5.5 3.5 - 11.3 k/uL Final   09/09/2019 3.9 3.5 - 11.3 k/uL Final   08/26/2019 3.7 3.5 - 11.3 k/uL Final     Hemoglobin   Date Value Ref Range Status   09/23/2019 11.7 (L) 11.9 - 15.1 g/dL Final   09/09/2019 10.6 (L) 11.9 - 15.1 g/dL Final   08/26/2019 10.8 (L) 11.9 - 15.1 g/dL Final     Platelets   Date Value Ref Range Status   09/23/2019 60 (L) 138 - 453 k/uL Final   09/09/2019 66 (L) 138 - 453 k/uL Final   08/26/2019 85 (L) 138 - 453 k/uL Final     BMP:   Sodium   Date Value Ref Range Status   09/23/2019 134 (L) 135 - 144 mmol/L Final   09/09/2019 133 (L) 135 - 144 mmol/L Final   08/26/2019 136 135 - 144 mmol/L Final     Potassium   Date Value Ref Range Status   09/23/2019 4.0 3.7 - 5.3 mmol/L Final   09/09/2019 4.0 3.7 - 5.3 mmol/L Final   08/26/2019 3.9 3.7 - 5.3 mmol/L Final     Chloride   Date Value Ref Range Status   09/23/2019 100 98 - 107 mmol/L Final   09/09/2019 102 98 - 107 mmol/L Final   08/26/2019 105 98 - 107 mmol/L Final     CO2   Date Value Ref Range Status   09/23/2019 24 20 - 31 mmol/L Final   09/09/2019 21 20 - 31 mmol/L Final   08/26/2019 22 20 - 31 mmol/L Final     BUN   Date Value Ref Range Status   09/23/2019 10 8 - 23 mg/dL Final   09/09/2019 11 8 - 23 mg/dL Final   08/26/2019 11 8 - 23 mg/dL Final     CREATININE   Date Value Ref Range Status   09/23/2019 0.42 (L) 0.50 - 0.90 mg/dL Final   09/09/2019 0.59 0.50 - 0.90 mg/dL Final   08/26/2019 0.59 0.50 - 0.90 mg/dL Final     Glucose   Date Value Ref Range Status   09/23/2019 114 (H) 70 - 99 mg/dL Final   09/09/2019 218 (H) 70 - 99 mg/dL Final   08/26/2019 199 (H) 70 - 99 mg/dL Final     Hepatic:   AST   Date Value Ref Range Status   09/23/2019 75 (H) <32 U/L Final   09/09/2019 134 (H) <32 U/L Final   08/26/2019 39 (H) <32 U/L Final     ALT   Date Value Ref Range Status   09/23/2019 38 or myocardial infarction. 05/13/2019 1.3  Final     Comment:           Therapeutic Range: Moderate Anticoagulant Intensity:     INR = 2.0-3.0   High Anticoagulant Intensity:     INR = 2.5-3.5        High anticoagulant intensity for patients with a mechanical prosthetic heart valve,   thrombosis and antiphospholipid syndrome, or myocardial infarction. 04/04/2019 1.2  Final     Comment:           Therapeutic Range: Moderate Anticoagulant Intensity:     INR = 2.0-3.0   High Anticoagulant Intensity:     INR = 2.5-3.5             Thyroid:   TSH   Date Value Ref Range Status   09/23/2019 1.14 0.30 - 5.00 mIU/L Final     Urinalysis:     Cultures:-  -----------------------------------------------------------------    ABGs: No results found for: PHART, PO2ART, RGA9EBV    Pulmonary Functions Testing Results:    04/02/2019  Spirometry shows FVC is 1.45 53% predicted, FEV1 is 1.00 46% predicted consistent with severe obstructive ventilatory impairment, FEV1 FVC is 87% predicted consistent with obstructive disease. Flow volume loop is suggestive of obstructive ventilatory impairment  Lung volume shows RV is 2.44 188% predicted, TLC is 5.09 110% predicted. Diffusion capacity is 6.43 36% predicted consistent with severe reduction in diffusion capacity which could be secondary to emphysema, pulmonary vascular disease and/or anemia and clinical correlation is recommended. Impression: This pulmonary function test is consistent with severe obstructive ventilatory impairment. Lung volume shows increase in his volume consistent with hyperinflation and airway trapping. There is severe reduction in diffusion capacity which is likely secondary to emphysema and concomitant pulmonary vascular disease. Clinical correlation is recommended. No results found for: FEV1, FVC, RTJ9FFN, TLC, DLCO    CXR  03/14/2019  Right upper lobe/suprahilar mass present. CT Scans  08/05/2019  1.  Interval decrease in size of the presumed Division Normal mucosa  Left Upper Lobe Bronchus, Lingula  Normal mucosa  Left Lower Lobe Bronchus (including the Superior segment)  Normal mucosa       PATHOLOGY 04/15/19  Right main/marvel lung, biopsy:   Squamous cell carcinoma, at least carcinoma in situ. Note: Biopsy shows fragments of squamous cell carcinoma.  Invasion   into subepithelium and/or lung parenchyma is not demonstrated in the   biopsy.  Clinical and radiographic correlation is necessary. Assessment and Plan       ICD-10-CM    1. Chronic obstructive pulmonary disease, unspecified COPD type ([address] J44.9    2. Chronic respiratory failure with hypoxia (HCC) J96.11    3. Atelectasis J98.11    4. COPD with acute exacerbation ([address] J44.1    5. Malignant neoplasm of hilus of right lung (HCC) C34.01    6. Pleural effusion on right J90         CL (cirrhosis of liver) (HCC)    Chronic hepatitis C without hepatic coma (HCC)    Thrombocytopenia (HCC)    Anxiety     Assessment    I have reviewed the CT scan of the chest which is done in early August I have also reviewed the chest x-ray which was done on 08/19/2019 presumably after thoracentesis which shows atelectasis of right lung with decreased volume and diffusion as she has significant atelectasis from obstructing mass she will likely have persistent right pleural effusion and decreased volume on the right side. She is having COPD exacerbation likely other etiology like radiation-induced pneumonitis could be the possibility, according to patient her symptoms got worse since she is started on Imfinzi although she is feeling better on Levaquin, she is going to follow-up with oncology next week for evaluation. We will give her a short course of prednisone and advised to continue with Levaquin.     Plan     Continue Levaquin and finish the course  Prednisone short course of 5 days   Continue symbicort  Albuterol aerosol 3-4 times per day  Continue spiriva Respimat   Prescription refills

## 2019-10-02 ENCOUNTER — HOSPITAL ENCOUNTER (OUTPATIENT)
Facility: MEDICAL CENTER | Age: 69
End: 2019-10-02
Payer: MEDICARE

## 2019-10-07 ENCOUNTER — OFFICE VISIT (OUTPATIENT)
Dept: ONCOLOGY | Age: 69
End: 2019-10-07
Payer: MEDICARE

## 2019-10-07 ENCOUNTER — TELEPHONE (OUTPATIENT)
Dept: ONCOLOGY | Age: 69
End: 2019-10-07

## 2019-10-07 ENCOUNTER — HOSPITAL ENCOUNTER (OUTPATIENT)
Dept: INFUSION THERAPY | Facility: MEDICAL CENTER | Age: 69
Discharge: HOME | End: 2019-10-07
Payer: MEDICARE

## 2019-10-07 VITALS
DIASTOLIC BLOOD PRESSURE: 80 MMHG | HEART RATE: 96 BPM | SYSTOLIC BLOOD PRESSURE: 128 MMHG | WEIGHT: 123.7 LBS | BODY MASS INDEX: 21.91 KG/M2 | TEMPERATURE: 98.3 F

## 2019-10-07 VITALS — BODY MASS INDEX: 21.89 KG/M2 | WEIGHT: 123.6 LBS

## 2019-10-07 DIAGNOSIS — C34.11 MALIGNANT NEOPLASM OF UPPER LOBE OF RIGHT LUNG (HCC): Primary | ICD-10-CM

## 2019-10-07 DIAGNOSIS — B18.2 CHRONIC HEPATITIS C WITHOUT HEPATIC COMA (HCC): ICD-10-CM

## 2019-10-07 DIAGNOSIS — C34.11 MALIGNANT NEOPLASM OF UPPER LOBE OF RIGHT LUNG (HCC): ICD-10-CM

## 2019-10-07 DIAGNOSIS — R53.83 FATIGUE, UNSPECIFIED TYPE: ICD-10-CM

## 2019-10-07 LAB
ABSOLUTE EOS #: 0.16 K/UL (ref 0–0.44)
ABSOLUTE IMMATURE GRANULOCYTE: 0.04 K/UL (ref 0–0.3)
ABSOLUTE LYMPH #: 0.88 K/UL (ref 1.1–3.7)
ABSOLUTE MONO #: 0.83 K/UL (ref 0.1–1.2)
ALBUMIN SERPL-MCNC: 3.3 G/DL (ref 3.5–5.2)
ALBUMIN/GLOBULIN RATIO: ABNORMAL (ref 1–2.5)
ALP BLD-CCNC: 146 U/L (ref 35–104)
ALT SERPL-CCNC: 31 U/L (ref 5–33)
ANION GAP SERPL CALCULATED.3IONS-SCNC: 9 MMOL/L (ref 9–17)
AST SERPL-CCNC: 63 U/L
BASOPHILS # BLD: 0 % (ref 0–2)
BASOPHILS ABSOLUTE: <0.03 K/UL (ref 0–0.2)
BILIRUB SERPL-MCNC: 0.77 MG/DL (ref 0.3–1.2)
BUN BLDV-MCNC: 18 MG/DL (ref 8–23)
BUN/CREAT BLD: 27 (ref 9–20)
CALCIUM SERPL-MCNC: 8.4 MG/DL (ref 8.6–10.4)
CHLORIDE BLD-SCNC: 100 MMOL/L (ref 98–107)
CO2: 24 MMOL/L (ref 20–31)
CORTISOL COLLECTION INFO: NORMAL
CORTISOL: 6.9 UG/DL (ref 2.7–18.4)
CREAT SERPL-MCNC: 0.66 MG/DL (ref 0.5–0.9)
DIFFERENTIAL TYPE: ABNORMAL
EOSINOPHILS RELATIVE PERCENT: 2 % (ref 1–4)
GFR AFRICAN AMERICAN: >60 ML/MIN
GFR NON-AFRICAN AMERICAN: >60 ML/MIN
GFR SERPL CREATININE-BSD FRML MDRD: ABNORMAL ML/MIN/{1.73_M2}
GFR SERPL CREATININE-BSD FRML MDRD: ABNORMAL ML/MIN/{1.73_M2}
GLUCOSE BLD-MCNC: 159 MG/DL (ref 70–99)
HCT VFR BLD CALC: 35.8 % (ref 36.3–47.1)
HEMOGLOBIN: 11.5 G/DL (ref 11.9–15.1)
IMMATURE GRANULOCYTES: 1 %
LIPASE: 17 U/L (ref 13–60)
LYMPHOCYTES # BLD: 13 % (ref 24–43)
MCH RBC QN AUTO: 28.6 PG (ref 25.2–33.5)
MCHC RBC AUTO-ENTMCNC: 32.1 G/DL (ref 28.4–34.8)
MCV RBC AUTO: 89.1 FL (ref 82.6–102.9)
MONOCYTES # BLD: 12 % (ref 3–12)
NRBC AUTOMATED: ABNORMAL PER 100 WBC
PDW BLD-RTO: 14.2 % (ref 11.8–14.4)
PLATELET # BLD: 95 K/UL (ref 138–453)
PLATELET ESTIMATE: ABNORMAL
PMV BLD AUTO: 8.3 FL (ref 8.1–13.5)
POTASSIUM SERPL-SCNC: 4.7 MMOL/L (ref 3.7–5.3)
RBC # BLD: 4.02 M/UL (ref 3.95–5.11)
RBC # BLD: ABNORMAL 10*6/UL
SEG NEUTROPHILS: 73 % (ref 36–65)
SEGMENTED NEUTROPHILS ABSOLUTE COUNT: 5.13 K/UL (ref 1.5–8.1)
SODIUM BLD-SCNC: 133 MMOL/L (ref 135–144)
TOTAL PROTEIN: 7.7 G/DL (ref 6.4–8.3)
TSH SERPL DL<=0.05 MIU/L-ACNC: 2.85 MIU/L (ref 0.3–5)
WBC # BLD: 7.1 K/UL (ref 3.5–11.3)
WBC # BLD: ABNORMAL 10*3/UL

## 2019-10-07 PROCEDURE — 6360000002 HC RX W HCPCS: Performed by: INTERNAL MEDICINE

## 2019-10-07 PROCEDURE — 83690 ASSAY OF LIPASE: CPT

## 2019-10-07 PROCEDURE — 99211 OFF/OP EST MAY X REQ PHY/QHP: CPT

## 2019-10-07 PROCEDURE — 85025 COMPLETE CBC W/AUTO DIFF WBC: CPT

## 2019-10-07 PROCEDURE — 2580000003 HC RX 258: Performed by: INTERNAL MEDICINE

## 2019-10-07 PROCEDURE — 1090F PRES/ABSN URINE INCON ASSESS: CPT | Performed by: INTERNAL MEDICINE

## 2019-10-07 PROCEDURE — 4040F PNEUMOC VAC/ADMIN/RCVD: CPT | Performed by: INTERNAL MEDICINE

## 2019-10-07 PROCEDURE — 3017F COLORECTAL CA SCREEN DOC REV: CPT | Performed by: INTERNAL MEDICINE

## 2019-10-07 PROCEDURE — 99214 OFFICE O/P EST MOD 30 MIN: CPT | Performed by: INTERNAL MEDICINE

## 2019-10-07 PROCEDURE — G8427 DOCREV CUR MEDS BY ELIG CLIN: HCPCS | Performed by: INTERNAL MEDICINE

## 2019-10-07 PROCEDURE — 36591 DRAW BLOOD OFF VENOUS DEVICE: CPT

## 2019-10-07 PROCEDURE — 84443 ASSAY THYROID STIM HORMONE: CPT

## 2019-10-07 PROCEDURE — 80053 COMPREHEN METABOLIC PANEL: CPT

## 2019-10-07 PROCEDURE — 96413 CHEMO IV INFUSION 1 HR: CPT

## 2019-10-07 PROCEDURE — G8420 CALC BMI NORM PARAMETERS: HCPCS | Performed by: INTERNAL MEDICINE

## 2019-10-07 PROCEDURE — 4004F PT TOBACCO SCREEN RCVD TLK: CPT | Performed by: INTERNAL MEDICINE

## 2019-10-07 PROCEDURE — 1123F ACP DISCUSS/DSCN MKR DOCD: CPT | Performed by: INTERNAL MEDICINE

## 2019-10-07 PROCEDURE — 82533 TOTAL CORTISOL: CPT

## 2019-10-07 PROCEDURE — G8484 FLU IMMUNIZE NO ADMIN: HCPCS | Performed by: INTERNAL MEDICINE

## 2019-10-07 PROCEDURE — G8400 PT W/DXA NO RESULTS DOC: HCPCS | Performed by: INTERNAL MEDICINE

## 2019-10-07 RX ORDER — OXYCODONE HYDROCHLORIDE 5 MG/1
10 TABLET ORAL EVERY 6 HOURS PRN
Qty: 90 TABLET | Refills: 0 | Status: SHIPPED | OUTPATIENT
Start: 2019-10-07 | End: 2019-01-01 | Stop reason: SDUPTHER

## 2019-10-07 RX ORDER — SODIUM CHLORIDE 0.9 % (FLUSH) 0.9 %
5 SYRINGE (ML) INJECTION PRN
Status: DISCONTINUED | OUTPATIENT
Start: 2019-10-07 | End: 2019-10-08 | Stop reason: HOSPADM

## 2019-10-07 RX ORDER — AZITHROMYCIN 500 MG/1
500 TABLET, FILM COATED ORAL DAILY
Qty: 5 TABLET | Refills: 0 | Status: SHIPPED | OUTPATIENT
Start: 2019-10-07 | End: 2019-10-12

## 2019-10-07 RX ORDER — SODIUM CHLORIDE 0.9 % (FLUSH) 0.9 %
10 SYRINGE (ML) INJECTION PRN
Status: DISCONTINUED | OUTPATIENT
Start: 2019-10-07 | End: 2019-10-08 | Stop reason: HOSPADM

## 2019-10-07 RX ORDER — HEPARIN SODIUM (PORCINE) LOCK FLUSH IV SOLN 100 UNIT/ML 100 UNIT/ML
500 SOLUTION INTRAVENOUS PRN
Status: DISCONTINUED | OUTPATIENT
Start: 2019-10-07 | End: 2019-10-08 | Stop reason: HOSPADM

## 2019-10-07 RX ORDER — METHYLPREDNISOLONE 4 MG/1
TABLET ORAL
Qty: 1 KIT | Refills: 0 | Status: SHIPPED | OUTPATIENT
Start: 2019-10-07 | End: 2019-01-01 | Stop reason: ALTCHOICE

## 2019-10-07 RX ORDER — SODIUM CHLORIDE 9 MG/ML
20 INJECTION, SOLUTION INTRAVENOUS ONCE
Status: DISCONTINUED | OUTPATIENT
Start: 2019-10-07 | End: 2019-10-08 | Stop reason: HOSPADM

## 2019-10-07 RX ADMIN — Medication 10 ML: at 16:19

## 2019-10-07 RX ADMIN — SODIUM CHLORIDE 500 MG: 9 INJECTION, SOLUTION INTRAVENOUS at 15:07

## 2019-10-07 RX ADMIN — Medication 500 UNITS: at 16:20

## 2019-10-07 RX ADMIN — SODIUM CHLORIDE 20 ML/HR: 9 INJECTION, SOLUTION INTRAVENOUS at 15:06

## 2019-10-07 RX ADMIN — Medication 10 ML: at 13:26

## 2019-10-07 NOTE — PROGRESS NOTES
Paloma Nolasco here per ambulatory with cold symptoms, pt has been on antibiotics and seen her lung specialist.   Pt states she has pain all over due to excessive coughing. Accessed port and sent blood work to lab. Results back,copy of results to pt. Waited for MD to see pt prior to ordering her imfinzi. Pt verbalized understanding. [facility] over one hour without difficulty. Flushed line then heparinized ort.

## 2019-10-08 ENCOUNTER — TELEPHONE (OUTPATIENT)
Dept: PULMONOLOGY | Age: 69
End: 2019-10-08

## 2019-10-11 ENCOUNTER — TELEPHONE (OUTPATIENT)
Dept: RADIATION ONCOLOGY | Facility: MEDICAL CENTER | Age: 69
End: 2019-10-11

## 2019-10-16 ENCOUNTER — HOSPITAL ENCOUNTER (OUTPATIENT)
Facility: MEDICAL CENTER | Age: 69
End: 2019-10-16
Payer: MEDICARE

## 2019-10-21 NOTE — PROGRESS NOTES
Pt arrives per ambulatory per self and seen per md and labs and orders reviewed. Pt tolerated imfinzi well and no reactions or complaints and blood return present throughout infusion. NS flushing line before and after med. Pt given AVS from  and pt discharged per ambulatory per self and no further questions voiced.

## 2019-11-04 NOTE — FLOWSHEET NOTE
greeted Patient while rounding the unit. Ms. Roula Gallegos smiled and greeted . Pt shared that she was going to take a nap.  offered words of support and encouragement. Pt expressed gratitude.        11/04/19 1322   Encounter Summary   Services provided to: Patient   Referral/Consult From: [facility] Friends/neighbors   Continue Visiting   (11/4/19)   Complexity of Encounter Low   Length of Encounter 15 minutes   Routine   Type Follow up   Assessment Approachable   Intervention Sustaining presence/ Ministry of presence   Outcome Refused/declined     Electronically signed by Claudia Castellanos, Oncology Outpatient [address], [address] [facility] Radiation Oncology  11/4/2019  1:24 PM

## 2019-11-04 NOTE — PROGRESS NOTES
Kat Signs arrives ambulatory. Orders for C6D1 reviewed. Right port accessed per policy with 00H 4/7Q Lynne needle and flushed. Good blood return noted. Blood drawn and sent to lab. Labs reviewed and Dr Sandra Perez visited. Order to proceed with chemotherapy given. Imfinzi initiated and completed with no reaction noted. IV line flushed. Port flushed and heparin locked. Lynne needle removed with needle intact. Band aid applied. Patient discharged ambulatory.

## 2019-11-18 NOTE — FLOWSHEET NOTE
Situation:   encountered Ms. Bassem Craven when rounding the infusion clinic. Assessment:  Ms. Bassem Craven was eating lunch. She shared that she was feeling better and talked about some of the symptoms she recently had. Ms. Bassem Craven expressed thanks that her prayers were answered when she \"couldn't breathe. \" She shared that her \"estranged \" with whom she lives, her two step grandsons, and her pets have been supportive of her. She smiled as she talked about her pets and how they help her. Ms. Bassem Craven accessed her sense of humor and exhibited resilience as she talked about her experience. When asked if she has learned anything about herself, she replied, \"that I am stronger than I thought I was. \" She noted the importance of having a sense of humor when going through something like she is, stating that those who do not will be \"miserable. \"     Intervention:   provided supportive presence and explored Pt's coping and needs; inquired about Pt's sources of support and strength; affirmed Pt's strengths; offered words of encouragement and support; gave a copy of \"Guideposts. \"    Outcome:  Ms. Bassem Craven thanked  for the visit and support. 11/18/19 1314   Encounter Summary   Services provided to: Patient   Referral/Consult From: [facility] [address]; Family members   Continue Visiting   (11/18/19)   Complexity of Encounter Low   Length of Encounter 30 minutes   Spiritual Assessment Completed Yes   Spiritual/Orthodoxy   Type Spiritual support   Assessment Calm; Approachable; Hopeful;Coping   Intervention Active listening;Explored feelings, thoughts, concerns;Explored coping resources;Provided reading materials/devotional materials;Sustaining presence/ Ministry of presence; Discussed relationship with God;Discussed meaning/purpose;Discussed illness/injury and it's impact   Outcome Expressed gratitude;Engaged in conversation;Expressed feelings/needs/concerns;Coping;Encouraged; Hopeful;Receptive     Electronically signed by Alex So, Oncology Outpatient [facility], [address] [facility] Radiation Oncology  11/18/2019  1:15 PM

## 2019-12-02 NOTE — PROGRESS NOTES
Carlos Enrique Cris arrive ambulatory per self for cycle 8 day 1 treatment and physician visit. Continues to wear 2L oxygen per nasal canula; states if not wearing she gets quite SOB quickly. Port accessed; specimen sent. Physician met with patient; labs reviewed, ok to proceed with treatment. Imfinzi infused with no sign of adverse reaction; line flushed. Port flushed and heparinized with intact regan needle removed per protocol.   Patient ambulate off unit per self at discharge; instructed to stop at  for AVS.

## 2019-12-16 NOTE — PROGRESS NOTES
Pt arrives per ambulatory per self and O2 at 2 L/NC. Labs and orders reviewed and pt seen per md and ok to proceed after reviewing labs with Adin Toro pharmacist.  NS flushing line and then pt tolerated imfinzi well and no reactions or complaints and blood return present throughout infusion. Pt discharged per ambulatory with home O2 with AVS with next appts. and no further concerns voiced.

## 2020-01-01 ENCOUNTER — HOSPITAL ENCOUNTER (OUTPATIENT)
Facility: MEDICAL CENTER | Age: 70
End: 2020-01-01
Payer: MEDICARE

## 2020-01-01 ENCOUNTER — HOSPITAL ENCOUNTER (OUTPATIENT)
Dept: INFUSION THERAPY | Facility: MEDICAL CENTER | Age: 70
Discharge: HOME | End: 2020-05-18
Payer: MEDICARE

## 2020-01-01 ENCOUNTER — TELEPHONE (OUTPATIENT)
Dept: ONCOLOGY | Age: 70
End: 2020-01-01

## 2020-01-01 ENCOUNTER — HOSPITAL ENCOUNTER (OUTPATIENT)
Dept: INFUSION THERAPY | Facility: MEDICAL CENTER | Age: 70
Discharge: HOME | End: 2020-04-06
Payer: MEDICARE

## 2020-01-01 ENCOUNTER — TELEPHONE (OUTPATIENT)
Dept: PULMONOLOGY | Age: 70
End: 2020-01-01

## 2020-01-01 ENCOUNTER — TELEPHONE (OUTPATIENT)
Dept: PRIMARY CARE CLINIC | Age: 70
End: 2020-01-01

## 2020-01-01 ENCOUNTER — HOSPITAL ENCOUNTER (OUTPATIENT)
Dept: CT IMAGING | Age: 70
Discharge: HOME | End: 2020-10-07
Payer: MEDICARE

## 2020-01-01 ENCOUNTER — HOSPITAL ENCOUNTER (INPATIENT)
Age: 70
LOS: 3 days | DRG: 085 | End: 2020-10-19
Attending: INTERNAL MEDICINE | Admitting: INTERNAL MEDICINE
Payer: COMMERCIAL

## 2020-01-01 ENCOUNTER — OFFICE VISIT (OUTPATIENT)
Dept: ONCOLOGY | Age: 70
End: 2020-01-01
Payer: MEDICARE

## 2020-01-01 ENCOUNTER — HOSPITAL ENCOUNTER (OUTPATIENT)
Dept: MRI IMAGING | Age: 70
Discharge: HOME | End: 2020-02-29
Payer: MEDICARE

## 2020-01-01 ENCOUNTER — HOSPITAL ENCOUNTER (OUTPATIENT)
Age: 70
Discharge: HOME | End: 2020-10-02
Payer: MEDICARE

## 2020-01-01 ENCOUNTER — HOSPITAL ENCOUNTER (OUTPATIENT)
Dept: ULTRASOUND IMAGING | Age: 70
Discharge: HOME | DRG: 085 | End: 2020-10-17
Payer: COMMERCIAL

## 2020-01-01 ENCOUNTER — CARE COORDINATION (OUTPATIENT)
Dept: CASE MANAGEMENT | Age: 70
End: 2020-01-01

## 2020-01-01 ENCOUNTER — OFFICE VISIT (OUTPATIENT)
Dept: PULMONOLOGY | Age: 70
End: 2020-01-01
Payer: MEDICARE

## 2020-01-01 ENCOUNTER — APPOINTMENT (OUTPATIENT)
Dept: CT IMAGING | Age: 70
DRG: 085 | End: 2020-01-01
Attending: INTERNAL MEDICINE
Payer: COMMERCIAL

## 2020-01-01 ENCOUNTER — HOSPITAL ENCOUNTER (OUTPATIENT)
Dept: NUCLEAR MEDICINE | Age: 70
Discharge: HOME | End: 2020-03-12
Payer: MEDICARE

## 2020-01-01 ENCOUNTER — HOSPITAL ENCOUNTER (OUTPATIENT)
Dept: INFUSION THERAPY | Facility: MEDICAL CENTER | Age: 70
Discharge: HOME | End: 2020-08-10
Payer: MEDICARE

## 2020-01-01 ENCOUNTER — HOSPITAL ENCOUNTER (OUTPATIENT)
Dept: MRI IMAGING | Age: 70
Discharge: HOME | End: 2020-06-26
Payer: MEDICARE

## 2020-01-01 ENCOUNTER — APPOINTMENT (OUTPATIENT)
Dept: CT IMAGING | Age: 70
DRG: 640 | End: 2020-01-01
Payer: MEDICARE

## 2020-01-01 ENCOUNTER — APPOINTMENT (OUTPATIENT)
Dept: CT IMAGING | Age: 70
DRG: 085 | End: 2020-01-01
Payer: COMMERCIAL

## 2020-01-01 ENCOUNTER — VIRTUAL VISIT (OUTPATIENT)
Dept: PRIMARY CARE CLINIC | Age: 70
End: 2020-01-01
Payer: MEDICARE

## 2020-01-01 ENCOUNTER — APPOINTMENT (OUTPATIENT)
Dept: ULTRASOUND IMAGING | Age: 70
DRG: 640 | End: 2020-01-01
Payer: MEDICARE

## 2020-01-01 ENCOUNTER — HOSPITAL ENCOUNTER (OUTPATIENT)
Dept: RADIATION ONCOLOGY | Facility: MEDICAL CENTER | Age: 70
Discharge: HOME | End: 2020-01-03
Payer: MEDICARE

## 2020-01-01 ENCOUNTER — HOSPITAL ENCOUNTER (OUTPATIENT)
Dept: RADIATION ONCOLOGY | Facility: MEDICAL CENTER | Age: 70
Discharge: HOME | End: 2020-07-02
Payer: MEDICARE

## 2020-01-01 ENCOUNTER — HOSPITAL ENCOUNTER (OUTPATIENT)
Age: 70
Discharge: HOME | End: 2020-03-31
Payer: MEDICARE

## 2020-01-01 ENCOUNTER — OFFICE VISIT (OUTPATIENT)
Dept: PRIMARY CARE CLINIC | Age: 70
End: 2020-01-01
Payer: MEDICARE

## 2020-01-01 ENCOUNTER — HOSPITAL ENCOUNTER (OUTPATIENT)
Facility: MEDICAL CENTER | Age: 70
Discharge: HOME | End: 2020-10-09
Payer: MEDICARE

## 2020-01-01 ENCOUNTER — HOSPITAL ENCOUNTER (INPATIENT)
Age: 70
LOS: 5 days | Discharge: HOME | DRG: 392 | End: 2020-05-27
Attending: SURGERY | Admitting: SURGERY
Payer: MEDICARE

## 2020-01-01 ENCOUNTER — TELEPHONE (OUTPATIENT)
Dept: RADIATION ONCOLOGY | Facility: MEDICAL CENTER | Age: 70
End: 2020-01-01

## 2020-01-01 ENCOUNTER — HOSPITAL ENCOUNTER (INPATIENT)
Age: 70
LOS: 4 days | Discharge: NURSING, REHAB OR LONG-TERM CARE | DRG: 640 | End: 2020-08-28
Attending: EMERGENCY MEDICINE | Admitting: INTERNAL MEDICINE
Payer: MEDICARE

## 2020-01-01 ENCOUNTER — HOSPITAL ENCOUNTER (OUTPATIENT)
Dept: INFUSION THERAPY | Facility: MEDICAL CENTER | Age: 70
Discharge: HOME | End: 2020-05-04
Payer: MEDICARE

## 2020-01-01 ENCOUNTER — HOSPITAL ENCOUNTER (OUTPATIENT)
Dept: INFUSION THERAPY | Facility: MEDICAL CENTER | Age: 70
Discharge: HOME | End: 2020-03-02
Payer: MEDICARE

## 2020-01-01 ENCOUNTER — HOSPITAL ENCOUNTER (OUTPATIENT)
Facility: MEDICAL CENTER | Age: 70
End: 2020-01-01

## 2020-01-01 ENCOUNTER — HOSPITAL ENCOUNTER (OUTPATIENT)
Dept: INFUSION THERAPY | Facility: MEDICAL CENTER | Age: 70
Discharge: HOME | End: 2020-01-31
Payer: MEDICARE

## 2020-01-01 ENCOUNTER — HOSPITAL ENCOUNTER (OUTPATIENT)
Dept: INFUSION THERAPY | Facility: MEDICAL CENTER | Age: 70
Discharge: HOME | End: 2020-01-20
Payer: MEDICARE

## 2020-01-01 ENCOUNTER — HOSPITAL ENCOUNTER (OUTPATIENT)
Dept: INFUSION THERAPY | Facility: MEDICAL CENTER | Age: 70
Discharge: HOME | End: 2020-02-03
Payer: MEDICARE

## 2020-01-01 ENCOUNTER — TELEPHONE (OUTPATIENT)
Dept: INFUSION THERAPY | Facility: MEDICAL CENTER | Age: 70
End: 2020-01-01

## 2020-01-01 ENCOUNTER — APPOINTMENT (OUTPATIENT)
Dept: GENERAL RADIOLOGY | Age: 70
DRG: 085 | End: 2020-01-01
Payer: COMMERCIAL

## 2020-01-01 ENCOUNTER — HOSPITAL ENCOUNTER (OUTPATIENT)
Age: 70
Setting detail: SPECIMEN
Discharge: HOME | End: 2020-10-09
Payer: MEDICARE

## 2020-01-01 ENCOUNTER — APPOINTMENT (OUTPATIENT)
Dept: CT IMAGING | Age: 70
DRG: 392 | End: 2020-01-01
Attending: SURGERY
Payer: MEDICARE

## 2020-01-01 ENCOUNTER — PREP FOR PROCEDURE (OUTPATIENT)
Dept: GENERAL RADIOLOGY | Age: 70
End: 2020-01-01

## 2020-01-01 ENCOUNTER — HOSPITAL ENCOUNTER (OUTPATIENT)
Age: 70
Setting detail: OBSERVATION
Discharge: STILL ADMITTED OR ADMINISTRATIVE | DRG: 085 | End: 2020-10-16
Attending: EMERGENCY MEDICINE
Payer: COMMERCIAL

## 2020-01-01 ENCOUNTER — HOSPITAL ENCOUNTER (OUTPATIENT)
Dept: INFUSION THERAPY | Facility: MEDICAL CENTER | Age: 70
Discharge: HOME | End: 2020-07-13
Payer: MEDICARE

## 2020-01-01 ENCOUNTER — HOSPITAL ENCOUNTER (OUTPATIENT)
Dept: INFUSION THERAPY | Facility: MEDICAL CENTER | Age: 70
End: 2020-01-01
Payer: MEDICARE

## 2020-01-01 ENCOUNTER — CLINICAL DOCUMENTATION (OUTPATIENT)
Dept: GENERAL RADIOLOGY | Age: 70
End: 2020-01-01

## 2020-01-01 ENCOUNTER — HOSPITAL ENCOUNTER (OUTPATIENT)
Dept: INFUSION THERAPY | Facility: MEDICAL CENTER | Age: 70
Discharge: HOME | End: 2020-06-15
Payer: MEDICARE

## 2020-01-01 ENCOUNTER — HOSPITAL ENCOUNTER (OUTPATIENT)
Facility: MEDICAL CENTER | Age: 70
End: 2020-01-01
Payer: MEDICAID

## 2020-01-01 ENCOUNTER — HOSPITAL ENCOUNTER (OUTPATIENT)
Dept: NON INVASIVE DIAGNOSTICS | Age: 70
Discharge: HOME | End: 2020-01-23
Payer: MEDICARE

## 2020-01-01 ENCOUNTER — TELEPHONE (OUTPATIENT)
Dept: INTERVENTIONAL RADIOLOGY/VASCULAR | Age: 70
End: 2020-01-01

## 2020-01-01 ENCOUNTER — HOSPITAL ENCOUNTER (OUTPATIENT)
Dept: CT IMAGING | Age: 70
Discharge: HOME | End: 2020-02-02
Payer: MEDICARE

## 2020-01-01 ENCOUNTER — APPOINTMENT (OUTPATIENT)
Dept: GENERAL RADIOLOGY | Age: 70
DRG: 640 | End: 2020-01-01
Payer: MEDICARE

## 2020-01-01 ENCOUNTER — HOSPITAL ENCOUNTER (OUTPATIENT)
Dept: ULTRASOUND IMAGING | Age: 70
Discharge: HOME | End: 2020-10-04
Payer: MEDICARE

## 2020-01-01 ENCOUNTER — HOSPITAL ENCOUNTER (OUTPATIENT)
Dept: INFUSION THERAPY | Facility: MEDICAL CENTER | Age: 70
Discharge: HOME | End: 2020-03-16
Payer: MEDICARE

## 2020-01-01 ENCOUNTER — HOSPITAL ENCOUNTER (OUTPATIENT)
Dept: INFUSION THERAPY | Facility: MEDICAL CENTER | Age: 70
Discharge: HOME | End: 2020-02-17
Payer: MEDICARE

## 2020-01-01 ENCOUNTER — HOSPITAL ENCOUNTER (OUTPATIENT)
Dept: INTERVENTIONAL RADIOLOGY/VASCULAR | Age: 70
Discharge: HOME | End: 2020-03-01
Payer: MEDICARE

## 2020-01-01 ENCOUNTER — HOSPITAL ENCOUNTER (OUTPATIENT)
Facility: MEDICAL CENTER | Age: 70
Discharge: HOME | End: 2020-06-01
Payer: MEDICARE

## 2020-01-01 ENCOUNTER — HOSPITAL ENCOUNTER (OUTPATIENT)
Dept: NUCLEAR MEDICINE | Age: 70
Discharge: HOME | End: 2020-03-26
Payer: MEDICARE

## 2020-01-01 ENCOUNTER — HOSPITAL ENCOUNTER (OUTPATIENT)
Dept: INTERVENTIONAL RADIOLOGY/VASCULAR | Age: 70
Discharge: HOME | End: 2020-03-12
Payer: MEDICARE

## 2020-01-01 ENCOUNTER — HOSPITAL ENCOUNTER (OUTPATIENT)
Dept: INFUSION THERAPY | Facility: MEDICAL CENTER | Age: 70
Discharge: HOME | End: 2020-06-29
Payer: MEDICARE

## 2020-01-01 ENCOUNTER — HOSPITAL ENCOUNTER (OUTPATIENT)
Dept: ULTRASOUND IMAGING | Age: 70
Discharge: HOME | End: 2020-01-25
Payer: MEDICARE

## 2020-01-01 ENCOUNTER — HOSPITAL ENCOUNTER (OUTPATIENT)
Dept: CT IMAGING | Age: 70
Discharge: HOME | End: 2020-05-22
Payer: MEDICARE

## 2020-01-01 ENCOUNTER — HOSPITAL ENCOUNTER (OUTPATIENT)
Dept: INTERVENTIONAL RADIOLOGY/VASCULAR | Age: 70
Discharge: HOME | End: 2020-03-26
Payer: MEDICARE

## 2020-01-01 VITALS
BODY MASS INDEX: 20.05 KG/M2 | TEMPERATURE: 98.5 F | HEART RATE: 111 BPM | SYSTOLIC BLOOD PRESSURE: 119 MMHG | DIASTOLIC BLOOD PRESSURE: 85 MMHG | WEIGHT: 113.2 LBS

## 2020-01-01 VITALS
WEIGHT: 102 LBS | DIASTOLIC BLOOD PRESSURE: 13 MMHG | OXYGEN SATURATION: 93 % | HEART RATE: 121 BPM | TEMPERATURE: 96.8 F | BODY MASS INDEX: 18.07 KG/M2 | HEIGHT: 63 IN | RESPIRATION RATE: 16 BRPM | SYSTOLIC BLOOD PRESSURE: 35 MMHG

## 2020-01-01 VITALS
TEMPERATURE: 98.3 F | WEIGHT: 121 LBS | DIASTOLIC BLOOD PRESSURE: 63 MMHG | BODY MASS INDEX: 21.44 KG/M2 | OXYGEN SATURATION: 96 % | HEART RATE: 80 BPM | WEIGHT: 125.3 LBS | TEMPERATURE: 97.7 F | SYSTOLIC BLOOD PRESSURE: 134 MMHG | DIASTOLIC BLOOD PRESSURE: 76 MMHG | SYSTOLIC BLOOD PRESSURE: 100 MMHG | BODY MASS INDEX: 22.2 KG/M2 | HEIGHT: 63 IN | HEART RATE: 93 BPM

## 2020-01-01 VITALS
RESPIRATION RATE: 18 BRPM | SYSTOLIC BLOOD PRESSURE: 134 MMHG | DIASTOLIC BLOOD PRESSURE: 88 MMHG | TEMPERATURE: 98.1 F | HEART RATE: 101 BPM

## 2020-01-01 VITALS
RESPIRATION RATE: 20 BRPM | SYSTOLIC BLOOD PRESSURE: 131 MMHG | BODY MASS INDEX: 20.02 KG/M2 | OXYGEN SATURATION: 97 % | DIASTOLIC BLOOD PRESSURE: 86 MMHG | HEART RATE: 117 BPM | WEIGHT: 113 LBS | HEIGHT: 63 IN

## 2020-01-01 VITALS
DIASTOLIC BLOOD PRESSURE: 79 MMHG | TEMPERATURE: 98.3 F | RESPIRATION RATE: 18 BRPM | WEIGHT: 119.71 LBS | SYSTOLIC BLOOD PRESSURE: 121 MMHG | BODY MASS INDEX: 21.21 KG/M2 | HEART RATE: 103 BPM

## 2020-01-01 VITALS
RESPIRATION RATE: 17 BRPM | OXYGEN SATURATION: 99 % | HEART RATE: 115 BPM | BODY MASS INDEX: 18.61 KG/M2 | SYSTOLIC BLOOD PRESSURE: 136 MMHG | HEIGHT: 63 IN | WEIGHT: 105 LBS | DIASTOLIC BLOOD PRESSURE: 76 MMHG | TEMPERATURE: 98.4 F

## 2020-01-01 VITALS
HEART RATE: 103 BPM | WEIGHT: 119.7 LBS | SYSTOLIC BLOOD PRESSURE: 121 MMHG | BODY MASS INDEX: 21.2 KG/M2 | RESPIRATION RATE: 18 BRPM | TEMPERATURE: 98.3 F | DIASTOLIC BLOOD PRESSURE: 79 MMHG

## 2020-01-01 VITALS
WEIGHT: 121 LBS | OXYGEN SATURATION: 100 % | SYSTOLIC BLOOD PRESSURE: 146 MMHG | RESPIRATION RATE: 23 BRPM | TEMPERATURE: 97.7 F | HEIGHT: 63 IN | WEIGHT: 120 LBS | OXYGEN SATURATION: 100 % | BODY MASS INDEX: 21.44 KG/M2 | DIASTOLIC BLOOD PRESSURE: 72 MMHG | BODY MASS INDEX: 21.26 KG/M2 | TEMPERATURE: 97.2 F | HEIGHT: 63 IN | SYSTOLIC BLOOD PRESSURE: 121 MMHG | HEART RATE: 84 BPM | HEART RATE: 98 BPM | DIASTOLIC BLOOD PRESSURE: 79 MMHG

## 2020-01-01 VITALS
HEART RATE: 100 BPM | SYSTOLIC BLOOD PRESSURE: 121 MMHG | RESPIRATION RATE: 18 BRPM | DIASTOLIC BLOOD PRESSURE: 80 MMHG | TEMPERATURE: 97.3 F

## 2020-01-01 VITALS
BODY MASS INDEX: 16.72 KG/M2 | OXYGEN SATURATION: 97 % | HEIGHT: 63 IN | WEIGHT: 94.36 LBS | TEMPERATURE: 98.2 F | SYSTOLIC BLOOD PRESSURE: 116 MMHG | RESPIRATION RATE: 20 BRPM | HEART RATE: 106 BPM | DIASTOLIC BLOOD PRESSURE: 73 MMHG

## 2020-01-01 VITALS
SYSTOLIC BLOOD PRESSURE: 132 MMHG | BODY MASS INDEX: 22.16 KG/M2 | TEMPERATURE: 98.2 F | SYSTOLIC BLOOD PRESSURE: 122 MMHG | DIASTOLIC BLOOD PRESSURE: 43 MMHG | SYSTOLIC BLOOD PRESSURE: 113 MMHG | HEART RATE: 84 BPM | RESPIRATION RATE: 20 BRPM | RESPIRATION RATE: 16 BRPM | WEIGHT: 125.1 LBS | TEMPERATURE: 96.5 F | BODY MASS INDEX: 19.98 KG/M2 | RESPIRATION RATE: 16 BRPM | DIASTOLIC BLOOD PRESSURE: 76 MMHG | WEIGHT: 112.8 LBS | HEART RATE: 105 BPM | DIASTOLIC BLOOD PRESSURE: 58 MMHG | TEMPERATURE: 98.2 F | HEART RATE: 87 BPM

## 2020-01-01 VITALS
BODY MASS INDEX: 21.44 KG/M2 | SYSTOLIC BLOOD PRESSURE: 147 MMHG | HEIGHT: 63 IN | OXYGEN SATURATION: 99 % | DIASTOLIC BLOOD PRESSURE: 80 MMHG | WEIGHT: 121 LBS | HEART RATE: 78 BPM | RESPIRATION RATE: 20 BRPM | TEMPERATURE: 98.1 F

## 2020-01-01 VITALS
SYSTOLIC BLOOD PRESSURE: 167 MMHG | WEIGHT: 124 LBS | RESPIRATION RATE: 15 BRPM | HEART RATE: 83 BPM | BODY MASS INDEX: 21.97 KG/M2 | HEIGHT: 63 IN | TEMPERATURE: 97.9 F | OXYGEN SATURATION: 99 % | DIASTOLIC BLOOD PRESSURE: 80 MMHG

## 2020-01-01 VITALS
WEIGHT: 125.3 LBS | HEART RATE: 80 BPM | BODY MASS INDEX: 22.2 KG/M2 | TEMPERATURE: 98.3 F | DIASTOLIC BLOOD PRESSURE: 76 MMHG | SYSTOLIC BLOOD PRESSURE: 134 MMHG

## 2020-01-01 VITALS — SYSTOLIC BLOOD PRESSURE: 126 MMHG | DIASTOLIC BLOOD PRESSURE: 68 MMHG

## 2020-01-01 VITALS
WEIGHT: 122.4 LBS | RESPIRATION RATE: 22 BRPM | SYSTOLIC BLOOD PRESSURE: 122 MMHG | TEMPERATURE: 97.7 F | BODY MASS INDEX: 21.69 KG/M2 | HEART RATE: 87 BPM | DIASTOLIC BLOOD PRESSURE: 57 MMHG

## 2020-01-01 VITALS
RESPIRATION RATE: 16 BRPM | WEIGHT: 112.8 LBS | OXYGEN SATURATION: 97 % | DIASTOLIC BLOOD PRESSURE: 70 MMHG | BODY MASS INDEX: 19.98 KG/M2 | TEMPERATURE: 98 F | HEART RATE: 97 BPM | SYSTOLIC BLOOD PRESSURE: 120 MMHG

## 2020-01-01 VITALS
WEIGHT: 121 LBS | SYSTOLIC BLOOD PRESSURE: 116 MMHG | DIASTOLIC BLOOD PRESSURE: 72 MMHG | HEART RATE: 104 BPM | OXYGEN SATURATION: 95 % | RESPIRATION RATE: 20 BRPM | TEMPERATURE: 97.4 F | BODY MASS INDEX: 21.44 KG/M2 | HEIGHT: 63 IN

## 2020-01-01 VITALS
TEMPERATURE: 98 F | BODY MASS INDEX: 22.99 KG/M2 | SYSTOLIC BLOOD PRESSURE: 135 MMHG | DIASTOLIC BLOOD PRESSURE: 81 MMHG | WEIGHT: 129.8 LBS | HEART RATE: 95 BPM

## 2020-01-01 VITALS
DIASTOLIC BLOOD PRESSURE: 88 MMHG | TEMPERATURE: 98.1 F | RESPIRATION RATE: 18 BRPM | HEART RATE: 101 BPM | SYSTOLIC BLOOD PRESSURE: 134 MMHG

## 2020-01-01 VITALS
TEMPERATURE: 98.1 F | DIASTOLIC BLOOD PRESSURE: 77 MMHG | RESPIRATION RATE: 18 BRPM | HEART RATE: 93 BPM | BODY MASS INDEX: 20.03 KG/M2 | WEIGHT: 113.1 LBS | SYSTOLIC BLOOD PRESSURE: 131 MMHG

## 2020-01-01 VITALS
WEIGHT: 122 LBS | OXYGEN SATURATION: 96 % | DIASTOLIC BLOOD PRESSURE: 55 MMHG | BODY MASS INDEX: 21.61 KG/M2 | TEMPERATURE: 98.6 F | SYSTOLIC BLOOD PRESSURE: 125 MMHG | RESPIRATION RATE: 16 BRPM | HEART RATE: 98 BPM

## 2020-01-01 VITALS
WEIGHT: 118.6 LBS | SYSTOLIC BLOOD PRESSURE: 129 MMHG | DIASTOLIC BLOOD PRESSURE: 69 MMHG | BODY MASS INDEX: 21.01 KG/M2 | HEART RATE: 98 BPM | TEMPERATURE: 97.6 F

## 2020-01-01 VITALS
RESPIRATION RATE: 20 BRPM | DIASTOLIC BLOOD PRESSURE: 77 MMHG | OXYGEN SATURATION: 100 % | SYSTOLIC BLOOD PRESSURE: 133 MMHG

## 2020-01-01 DIAGNOSIS — R73.9 HYPERGLYCEMIA: ICD-10-CM

## 2020-01-01 DIAGNOSIS — D69.6 THROMBOCYTOPENIA (HCC): ICD-10-CM

## 2020-01-01 DIAGNOSIS — B18.2 CHRONIC HEPATITIS C WITHOUT HEPATIC COMA (HCC): ICD-10-CM

## 2020-01-01 DIAGNOSIS — C34.11 MALIGNANT NEOPLASM OF UPPER LOBE OF RIGHT LUNG (HCC): ICD-10-CM

## 2020-01-01 DIAGNOSIS — R53.83 FATIGUE, UNSPECIFIED TYPE: ICD-10-CM

## 2020-01-01 DIAGNOSIS — C34.11 MALIGNANT NEOPLASM OF UPPER LOBE OF RIGHT LUNG (HCC): Primary | ICD-10-CM

## 2020-01-01 DIAGNOSIS — C22.9 MALIGNANT NEOPLASM OF LIVER, UNSPECIFIED LIVER MALIGNANCY TYPE (HCC): Primary | ICD-10-CM

## 2020-01-01 DIAGNOSIS — R16.0 LIVER MASS: ICD-10-CM

## 2020-01-01 DIAGNOSIS — R53.83 FATIGUE, UNSPECIFIED TYPE: Primary | ICD-10-CM

## 2020-01-01 DIAGNOSIS — C22.0 HEPATOCELLULAR CARCINOMA (HCC): ICD-10-CM

## 2020-01-01 LAB
-: ABNORMAL
-: NORMAL
ABSOLUTE BANDS #: 0.13 K/UL (ref 0–1)
ABSOLUTE EOS #: 0 K/UL (ref 0–0.4)
ABSOLUTE EOS #: 0.05 K/UL (ref 0–0.44)
ABSOLUTE EOS #: 0.07 K/UL (ref 0–0.4)
ABSOLUTE EOS #: 0.07 K/UL (ref 0–0.4)
ABSOLUTE EOS #: 0.07 K/UL (ref 0–0.44)
ABSOLUTE EOS #: 0.09 K/UL (ref 0–0.4)
ABSOLUTE EOS #: 0.1 K/UL (ref 0–0.4)
ABSOLUTE EOS #: 0.1 K/UL (ref 0–0.44)
ABSOLUTE EOS #: 0.11 K/UL (ref 0–0.44)
ABSOLUTE EOS #: 0.11 K/UL (ref 0–0.44)
ABSOLUTE EOS #: 0.12 K/UL (ref 0–0.44)
ABSOLUTE EOS #: 0.14 K/UL (ref 0–0.4)
ABSOLUTE IMMATURE GRANULOCYTE: 0 K/UL (ref 0–0.3)
ABSOLUTE IMMATURE GRANULOCYTE: 0.05 K/UL (ref 0–0.3)
ABSOLUTE IMMATURE GRANULOCYTE: 0.06 K/UL (ref 0–0.3)
ABSOLUTE IMMATURE GRANULOCYTE: 0.07 K/UL (ref 0–0.3)
ABSOLUTE IMMATURE GRANULOCYTE: 0.07 K/UL (ref 0–0.3)
ABSOLUTE IMMATURE GRANULOCYTE: 0.08 K/UL (ref 0–0.3)
ABSOLUTE IMMATURE GRANULOCYTE: ABNORMAL K/UL (ref 0–0.3)
ABSOLUTE LYMPH #: 0.15 K/UL (ref 1–4.8)
ABSOLUTE LYMPH #: 0.33 K/UL (ref 1–4.8)
ABSOLUTE LYMPH #: 0.4 K/UL (ref 1.1–3.7)
ABSOLUTE LYMPH #: 0.4 K/UL (ref 1.1–3.7)
ABSOLUTE LYMPH #: 0.43 K/UL (ref 1.1–3.7)
ABSOLUTE LYMPH #: 0.43 K/UL (ref 1–4.8)
ABSOLUTE LYMPH #: 0.5 K/UL (ref 1.1–3.7)
ABSOLUTE LYMPH #: 0.52 K/UL (ref 1.1–3.7)
ABSOLUTE LYMPH #: 0.54 K/UL (ref 1–4.8)
ABSOLUTE LYMPH #: 0.55 K/UL (ref 1–4.8)
ABSOLUTE LYMPH #: 0.56 K/UL (ref 1.1–3.7)
ABSOLUTE LYMPH #: 0.56 K/UL (ref 1–4.8)
ABSOLUTE LYMPH #: 0.63 K/UL (ref 1.1–3.7)
ABSOLUTE LYMPH #: 0.83 K/UL (ref 1.1–3.7)
ABSOLUTE LYMPH #: 0.87 K/UL (ref 1.1–3.7)
ABSOLUTE MONO #: 0.26 K/UL (ref 0.1–1.3)
ABSOLUTE MONO #: 0.29 K/UL (ref 0.1–1.2)
ABSOLUTE MONO #: 0.41 K/UL (ref 0.1–1.2)
ABSOLUTE MONO #: 0.42 K/UL (ref 0.1–1.2)
ABSOLUTE MONO #: 0.44 K/UL (ref 0.2–0.8)
ABSOLUTE MONO #: 0.46 K/UL (ref 0.1–1.2)
ABSOLUTE MONO #: 0.46 K/UL (ref 0.2–0.8)
ABSOLUTE MONO #: 0.46 K/UL (ref 0.2–0.8)
ABSOLUTE MONO #: 0.54 K/UL (ref 0.2–0.8)
ABSOLUTE MONO #: 0.57 K/UL (ref 0.1–1.2)
ABSOLUTE MONO #: 0.57 K/UL (ref 0.1–1.2)
ABSOLUTE MONO #: 0.58 K/UL (ref 0.2–0.8)
ABSOLUTE MONO #: 0.7 K/UL (ref 0.1–1.2)
ABSOLUTE MONO #: 0.74 K/UL (ref 0.1–1.2)
ABSOLUTE MONO #: 0.89 K/UL (ref 0.1–1.2)
ACETAMINOPHEN LEVEL: <5 UG/ML (ref 10–30)
AFP: 273.7 UG/L
AFP: 76.5 UG/L
ALBUMIN SERPL-MCNC: 2 G/DL (ref 3.5–5.2)
ALBUMIN SERPL-MCNC: 2.1 G/DL (ref 3.5–5.2)
ALBUMIN SERPL-MCNC: 2.1 G/DL (ref 3.5–5.2)
ALBUMIN SERPL-MCNC: 2.2 G/DL (ref 3.5–5.2)
ALBUMIN SERPL-MCNC: 2.3 G/DL (ref 3.5–5.2)
ALBUMIN SERPL-MCNC: 2.4 G/DL (ref 3.5–5.2)
ALBUMIN SERPL-MCNC: 2.7 G/DL (ref 3.5–5.2)
ALBUMIN SERPL-MCNC: 2.8 G/DL (ref 3.5–5.2)
ALBUMIN SERPL-MCNC: 2.9 G/DL (ref 3.5–5.2)
ALBUMIN SERPL-MCNC: 2.9 G/DL (ref 3.5–5.2)
ALBUMIN SERPL-MCNC: 3.1 G/DL (ref 3.5–5.2)
ALBUMIN SERPL-MCNC: 3.2 G/DL (ref 3.5–5.2)
ALBUMIN SERPL-MCNC: 3.3 G/DL (ref 3.5–5.2)
ALBUMIN SERPL-MCNC: 3.4 G/DL (ref 3.5–5.2)
ALBUMIN/GLOBULIN RATIO: 0.4 (ref 1–2.5)
ALBUMIN/GLOBULIN RATIO: 0.5 (ref 1–2.5)
ALBUMIN/GLOBULIN RATIO: 0.6 (ref 1–2.5)
ALBUMIN/GLOBULIN RATIO: ABNORMAL (ref 1–2.5)
ALLEN TEST: ABNORMAL
ALLEN TEST: POSITIVE
ALP BLD-CCNC: 100 U/L (ref 35–104)
ALP BLD-CCNC: 102 U/L (ref 35–104)
ALP BLD-CCNC: 104 U/L (ref 35–104)
ALP BLD-CCNC: 105 U/L (ref 35–104)
ALP BLD-CCNC: 106 U/L (ref 35–104)
ALP BLD-CCNC: 107 U/L (ref 35–104)
ALP BLD-CCNC: 110 U/L (ref 35–104)
ALP BLD-CCNC: 113 U/L (ref 35–104)
ALP BLD-CCNC: 114 U/L (ref 35–104)
ALP BLD-CCNC: 115 U/L (ref 35–104)
ALP BLD-CCNC: 115 U/L (ref 35–104)
ALP BLD-CCNC: 117 U/L (ref 35–104)
ALP BLD-CCNC: 117 U/L (ref 35–104)
ALP BLD-CCNC: 118 U/L (ref 35–104)
ALP BLD-CCNC: 119 U/L (ref 35–104)
ALP BLD-CCNC: 122 U/L (ref 35–104)
ALP BLD-CCNC: 123 U/L (ref 35–104)
ALP BLD-CCNC: 126 U/L (ref 35–104)
ALP BLD-CCNC: 130 U/L (ref 35–104)
ALP BLD-CCNC: 143 U/L (ref 35–104)
ALP BLD-CCNC: 92 U/L (ref 35–104)
ALP BLD-CCNC: 97 U/L (ref 35–104)
ALT SERPL-CCNC: 10 U/L (ref 5–33)
ALT SERPL-CCNC: 12 U/L (ref 5–33)
ALT SERPL-CCNC: 13 U/L (ref 5–33)
ALT SERPL-CCNC: 14 U/L (ref 5–33)
ALT SERPL-CCNC: 18 U/L (ref 5–33)
ALT SERPL-CCNC: 18 U/L (ref 5–33)
ALT SERPL-CCNC: 19 U/L (ref 5–33)
ALT SERPL-CCNC: 20 U/L (ref 5–33)
ALT SERPL-CCNC: 22 U/L (ref 5–33)
ALT SERPL-CCNC: 22 U/L (ref 5–33)
ALT SERPL-CCNC: 23 U/L (ref 5–33)
ALT SERPL-CCNC: 24 U/L (ref 5–33)
ALT SERPL-CCNC: 25 U/L (ref 5–33)
ALT SERPL-CCNC: 26 U/L (ref 5–33)
ALT SERPL-CCNC: 27 U/L (ref 5–33)
ALT SERPL-CCNC: 42 U/L (ref 5–33)
ALT SERPL-CCNC: 46 U/L (ref 5–33)
ALT SERPL-CCNC: 50 U/L (ref 5–33)
ALT SERPL-CCNC: 59 U/L (ref 5–33)
ALT SERPL-CCNC: 61 U/L (ref 5–33)
AMMONIA: 102 UMOL/L (ref 11–51)
AMMONIA: 86 UMOL/L (ref 11–51)
AMORPHOUS: ABNORMAL
AMPHETAMINE SCREEN URINE: NEGATIVE
AMYLASE: 118 U/L (ref 28–100)
AMYLASE: 22 U/L (ref 28–100)
AMYLASE: 25 U/L (ref 28–100)
AMYLASE: 25 U/L (ref 28–100)
AMYLASE: 26 U/L (ref 28–100)
AMYLASE: 27 U/L (ref 28–100)
AMYLASE: 30 U/L (ref 28–100)
AMYLASE: 32 U/L (ref 28–100)
AMYLASE: 34 U/L (ref 28–100)
AMYLASE: 38 U/L (ref 28–100)
AMYLASE: 49 U/L (ref 28–100)
AMYLASE: 63 U/L (ref 28–100)
ANION GAP SERPL CALCULATED.3IONS-SCNC: 10 MMOL/L (ref 9–17)
ANION GAP SERPL CALCULATED.3IONS-SCNC: 11 MMOL/L (ref 9–17)
ANION GAP SERPL CALCULATED.3IONS-SCNC: 12 MMOL/L (ref 9–17)
ANION GAP SERPL CALCULATED.3IONS-SCNC: 17 MMOL/L (ref 9–17)
ANION GAP SERPL CALCULATED.3IONS-SCNC: 4 MMOL/L (ref 9–17)
ANION GAP SERPL CALCULATED.3IONS-SCNC: 5 MMOL/L (ref 9–17)
ANION GAP SERPL CALCULATED.3IONS-SCNC: 6 MMOL/L (ref 9–17)
ANION GAP SERPL CALCULATED.3IONS-SCNC: 7 MMOL/L (ref 9–17)
ANION GAP SERPL CALCULATED.3IONS-SCNC: 8 MMOL/L (ref 9–17)
ANION GAP SERPL CALCULATED.3IONS-SCNC: 9 MMOL/L (ref 9–17)
ANION GAP: 12 MMOL/L (ref 7–16)
ANION GAP: 13 MMOL/L (ref 7–16)
AST SERPL-CCNC: 23 U/L
AST SERPL-CCNC: 24 U/L
AST SERPL-CCNC: 26 U/L
AST SERPL-CCNC: 27 U/L
AST SERPL-CCNC: 29 U/L
AST SERPL-CCNC: 30 U/L
AST SERPL-CCNC: 30 U/L
AST SERPL-CCNC: 33 U/L
AST SERPL-CCNC: 36 U/L
AST SERPL-CCNC: 38 U/L
AST SERPL-CCNC: 41 U/L
AST SERPL-CCNC: 42 U/L
AST SERPL-CCNC: 44 U/L
AST SERPL-CCNC: 44 U/L
AST SERPL-CCNC: 46 U/L
AST SERPL-CCNC: 46 U/L
AST SERPL-CCNC: 55 U/L
AST SERPL-CCNC: 57 U/L
AST SERPL-CCNC: 57 U/L
AST SERPL-CCNC: 66 U/L
AST SERPL-CCNC: 68 U/L
AST SERPL-CCNC: 77 U/L
BACTERIA: ABNORMAL
BANDS: 6 % (ref 0–10)
BARBITURATE SCREEN URINE: NEGATIVE
BASOPHILS # BLD: 0 % (ref 0–2)
BASOPHILS # BLD: 1 %
BASOPHILS # BLD: 1 % (ref 0–2)
BASOPHILS # BLD: 2 %
BASOPHILS ABSOLUTE: 0 K/UL (ref 0–0.2)
BASOPHILS ABSOLUTE: 0 K/UL (ref 0–0.2)
BASOPHILS ABSOLUTE: 0.03 K/UL (ref 0–0.2)
BASOPHILS ABSOLUTE: 0.03 K/UL (ref 0–0.2)
BASOPHILS ABSOLUTE: 0.05 K/UL (ref 0–0.2)
BASOPHILS ABSOLUTE: 0.06 K/UL (ref 0–0.2)
BASOPHILS ABSOLUTE: 0.07 K/UL (ref 0–0.2)
BASOPHILS ABSOLUTE: 0.11 K/UL (ref 0–0.2)
BENZODIAZEPINE SCREEN, URINE: NEGATIVE
BILIRUB SERPL-MCNC: 0.46 MG/DL (ref 0.3–1.2)
BILIRUB SERPL-MCNC: 0.47 MG/DL (ref 0.3–1.2)
BILIRUB SERPL-MCNC: 0.5 MG/DL (ref 0.3–1.2)
BILIRUB SERPL-MCNC: 0.51 MG/DL (ref 0.3–1.2)
BILIRUB SERPL-MCNC: 0.54 MG/DL (ref 0.3–1.2)
BILIRUB SERPL-MCNC: 0.55 MG/DL (ref 0.3–1.2)
BILIRUB SERPL-MCNC: 0.57 MG/DL (ref 0.3–1.2)
BILIRUB SERPL-MCNC: 0.59 MG/DL (ref 0.3–1.2)
BILIRUB SERPL-MCNC: 0.64 MG/DL (ref 0.3–1.2)
BILIRUB SERPL-MCNC: 0.65 MG/DL (ref 0.3–1.2)
BILIRUB SERPL-MCNC: 0.66 MG/DL (ref 0.3–1.2)
BILIRUB SERPL-MCNC: 0.82 MG/DL (ref 0.3–1.2)
BILIRUB SERPL-MCNC: 0.87 MG/DL (ref 0.3–1.2)
BILIRUB SERPL-MCNC: 0.88 MG/DL (ref 0.3–1.2)
BILIRUB SERPL-MCNC: 1.28 MG/DL (ref 0.3–1.2)
BILIRUB SERPL-MCNC: 1.28 MG/DL (ref 0.3–1.2)
BILIRUB SERPL-MCNC: 1.68 MG/DL (ref 0.3–1.2)
BILIRUB SERPL-MCNC: 1.82 MG/DL (ref 0.3–1.2)
BILIRUB SERPL-MCNC: 1.9 MG/DL (ref 0.3–1.2)
BILIRUB SERPL-MCNC: 2.76 MG/DL (ref 0.3–1.2)
BILIRUB SERPL-MCNC: 2.9 MG/DL (ref 0.3–1.2)
BILIRUB SERPL-MCNC: 4.02 MG/DL (ref 0.3–1.2)
BILIRUBIN DIRECT: 0.25 MG/DL
BILIRUBIN DIRECT: 0.32 MG/DL
BILIRUBIN DIRECT: 1.56 MG/DL
BILIRUBIN URINE: NEGATIVE
BILIRUBIN URINE: NEGATIVE
BILIRUBIN, INDIRECT: 0.32 MG/DL (ref 0–1)
BILIRUBIN, INDIRECT: 0.5 MG/DL (ref 0–1)
BILIRUBIN, INDIRECT: 2.46 MG/DL (ref 0–1)
BNP INTERPRETATION: ABNORMAL
BUN BLDV-MCNC: 10 MG/DL (ref 8–23)
BUN BLDV-MCNC: 11 MG/DL (ref 8–23)
BUN BLDV-MCNC: 12 MG/DL (ref 8–23)
BUN BLDV-MCNC: 12 MG/DL (ref 8–23)
BUN BLDV-MCNC: 13 MG/DL (ref 8–23)
BUN BLDV-MCNC: 13 MG/DL (ref 8–23)
BUN BLDV-MCNC: 15 MG/DL (ref 8–23)
BUN BLDV-MCNC: 15 MG/DL (ref 8–23)
BUN BLDV-MCNC: 23 MG/DL (ref 8–23)
BUN BLDV-MCNC: 3 MG/DL (ref 8–23)
BUN BLDV-MCNC: 3 MG/DL (ref 8–23)
BUN BLDV-MCNC: 4 MG/DL (ref 8–23)
BUN BLDV-MCNC: 6 MG/DL (ref 8–23)
BUN BLDV-MCNC: 7 MG/DL (ref 8–23)
BUN BLDV-MCNC: 8 MG/DL (ref 8–23)
BUN BLDV-MCNC: 9 MG/DL (ref 8–23)
BUN/CREAT BLD: 14 (ref 9–20)
BUN/CREAT BLD: 17 (ref 9–20)
BUN/CREAT BLD: 18 (ref 9–20)
BUN/CREAT BLD: 19 (ref 9–20)
BUN/CREAT BLD: 20 (ref 9–20)
BUN/CREAT BLD: 22 (ref 9–20)
BUN/CREAT BLD: 23 (ref 9–20)
BUN/CREAT BLD: 25 (ref 9–20)
BUN/CREAT BLD: 28 (ref 9–20)
BUN/CREAT BLD: 6 (ref 9–20)
BUN/CREAT BLD: 8 (ref 9–20)
BUN/CREAT BLD: 9 (ref 9–20)
BUN/CREAT BLD: ABNORMAL (ref 9–20)
BUPRENORPHINE URINE: NORMAL
CALCIUM IONIZED: 1.16 MMOL/L (ref 1.13–1.33)
CALCIUM IONIZED: 1.17 MMOL/L (ref 1.13–1.33)
CALCIUM IONIZED: 1.17 MMOL/L (ref 1.13–1.33)
CALCIUM IONIZED: 1.18 MMOL/L (ref 1.13–1.33)
CALCIUM IONIZED: 1.2 MMOL/L (ref 1.13–1.33)
CALCIUM SERPL-MCNC: 7.3 MG/DL (ref 8.6–10.4)
CALCIUM SERPL-MCNC: 7.5 MG/DL (ref 8.6–10.4)
CALCIUM SERPL-MCNC: 7.6 MG/DL (ref 8.6–10.4)
CALCIUM SERPL-MCNC: 7.7 MG/DL (ref 8.6–10.4)
CALCIUM SERPL-MCNC: 7.7 MG/DL (ref 8.6–10.4)
CALCIUM SERPL-MCNC: 7.9 MG/DL (ref 8.6–10.4)
CALCIUM SERPL-MCNC: 8 MG/DL (ref 8.6–10.4)
CALCIUM SERPL-MCNC: 8 MG/DL (ref 8.6–10.4)
CALCIUM SERPL-MCNC: 8.1 MG/DL (ref 8.6–10.4)
CALCIUM SERPL-MCNC: 8.1 MG/DL (ref 8.6–10.4)
CALCIUM SERPL-MCNC: 8.2 MG/DL (ref 8.6–10.4)
CALCIUM SERPL-MCNC: 8.3 MG/DL (ref 8.6–10.4)
CALCIUM SERPL-MCNC: 8.4 MG/DL (ref 8.6–10.4)
CALCIUM SERPL-MCNC: 8.5 MG/DL (ref 8.6–10.4)
CALCIUM SERPL-MCNC: 8.6 MG/DL (ref 8.6–10.4)
CALCIUM SERPL-MCNC: 8.7 MG/DL (ref 8.6–10.4)
CALCIUM SERPL-MCNC: 8.8 MG/DL (ref 8.6–10.4)
CANNABINOID SCREEN URINE: NEGATIVE
CARBOXYHEMOGLOBIN: 1.7 % (ref 0–5)
CASE NUMBER:: NORMAL
CASTS UA: ABNORMAL /LPF (ref 0–2)
CHLORIDE BLD-SCNC: 100 MMOL/L (ref 98–107)
CHLORIDE BLD-SCNC: 101 MMOL/L (ref 98–107)
CHLORIDE BLD-SCNC: 101 MMOL/L (ref 98–107)
CHLORIDE BLD-SCNC: 102 MMOL/L (ref 98–107)
CHLORIDE BLD-SCNC: 103 MMOL/L (ref 98–107)
CHLORIDE BLD-SCNC: 104 MMOL/L (ref 98–107)
CHLORIDE BLD-SCNC: 105 MMOL/L (ref 98–107)
CHLORIDE BLD-SCNC: 107 MMOL/L (ref 98–107)
CHLORIDE BLD-SCNC: 108 MMOL/L (ref 98–107)
CHLORIDE BLD-SCNC: 108 MMOL/L (ref 98–107)
CHLORIDE BLD-SCNC: 109 MMOL/L (ref 98–107)
CHLORIDE BLD-SCNC: 97 MMOL/L (ref 98–107)
CHLORIDE BLD-SCNC: 98 MMOL/L (ref 98–107)
CHLORIDE BLD-SCNC: 99 MMOL/L (ref 98–107)
CHLORIDE, UR: 45 MMOL/L
CO2: 17 MMOL/L (ref 20–31)
CO2: 20 MMOL/L (ref 20–31)
CO2: 20 MMOL/L (ref 20–31)
CO2: 21 MMOL/L (ref 20–31)
CO2: 22 MMOL/L (ref 20–31)
CO2: 23 MMOL/L (ref 20–31)
CO2: 24 MMOL/L (ref 20–31)
CO2: 25 MMOL/L (ref 20–31)
CO2: 26 MMOL/L (ref 20–31)
CO2: 27 MMOL/L (ref 20–31)
CO2: 28 MMOL/L (ref 20–31)
COCAINE METABOLITE, URINE: NEGATIVE
COLOR: YELLOW
COLOR: YELLOW
COMMENT UA: ABNORMAL
CORTISOL COLLECTION INFO: ABNORMAL
CORTISOL COLLECTION INFO: ABNORMAL
CORTISOL COLLECTION INFO: NORMAL
CORTISOL: 19.8 UG/DL (ref 2.7–18.4)
CORTISOL: 2.3 UG/DL (ref 2.7–18.4)
CORTISOL: 4.9 UG/DL (ref 2.7–18.4)
CORTISOL: 5.2 UG/DL (ref 2.7–18.4)
CORTISOL: 5.3 UG/DL (ref 2.7–18.4)
CORTISOL: 5.4 UG/DL (ref 2.7–18.4)
CORTISOL: 5.6 UG/DL (ref 2.7–18.4)
CORTISOL: 6.8 UG/DL (ref 2.7–18.4)
CORTISOL: 9.2 UG/DL (ref 2.7–18.4)
CORTISOL: 9.6 UG/DL (ref 2.7–18.4)
CREAT SERPL-MCNC: 0.35 MG/DL (ref 0.5–0.9)
CREAT SERPL-MCNC: 0.46 MG/DL (ref 0.5–0.9)
CREAT SERPL-MCNC: 0.48 MG/DL (ref 0.5–0.9)
CREAT SERPL-MCNC: 0.5 MG/DL (ref 0.5–0.9)
CREAT SERPL-MCNC: 0.51 MG/DL (ref 0.5–0.9)
CREAT SERPL-MCNC: 0.52 MG/DL (ref 0.5–0.9)
CREAT SERPL-MCNC: 0.53 MG/DL (ref 0.5–0.9)
CREAT SERPL-MCNC: 0.53 MG/DL (ref 0.5–0.9)
CREAT SERPL-MCNC: 0.54 MG/DL (ref 0.5–0.9)
CREAT SERPL-MCNC: 0.55 MG/DL (ref 0.5–0.9)
CREAT SERPL-MCNC: 0.55 MG/DL (ref 0.5–0.9)
CREAT SERPL-MCNC: 0.56 MG/DL (ref 0.5–0.9)
CREAT SERPL-MCNC: 0.56 MG/DL (ref 0.5–0.9)
CREAT SERPL-MCNC: 0.57 MG/DL (ref 0.5–0.9)
CREAT SERPL-MCNC: 0.57 MG/DL (ref 0.5–0.9)
CREAT SERPL-MCNC: 0.59 MG/DL (ref 0.5–0.9)
CREAT SERPL-MCNC: 0.59 MG/DL (ref 0.5–0.9)
CREAT SERPL-MCNC: 0.61 MG/DL (ref 0.5–0.9)
CREAT SERPL-MCNC: 0.65 MG/DL (ref 0.5–0.9)
CREAT SERPL-MCNC: 0.66 MG/DL (ref 0.5–0.9)
CREAT SERPL-MCNC: 0.67 MG/DL (ref 0.5–0.9)
CREAT SERPL-MCNC: 0.69 MG/DL (ref 0.5–0.9)
CREAT SERPL-MCNC: 0.93 MG/DL (ref 0.5–0.9)
CREAT SERPL-MCNC: 1.29 MG/DL (ref 0.5–0.9)
CREATININE URINE: 63 MG/DL (ref 28–217)
CRYSTALS, UA: ABNORMAL /HPF
CULTURE: NORMAL
D-DIMER QUANTITATIVE: 7.25 MG/L FEU
DIFFERENTIAL TYPE: ABNORMAL
DIRECT EXAM: NORMAL
EKG ATRIAL RATE: 85 BPM
EKG ATRIAL RATE: 94 BPM
EKG P AXIS: 66 DEGREES
EKG P-R INTERVAL: 142 MS
EKG P-R INTERVAL: 148 MS
EKG Q-T INTERVAL: 426 MS
EKG Q-T INTERVAL: 472 MS
EKG QRS DURATION: 82 MS
EKG QRS DURATION: 90 MS
EKG QTC CALCULATION (BAZETT): 532 MS
EKG QTC CALCULATION (BAZETT): 561 MS
EKG R AXIS: -15 DEGREES
EKG R AXIS: -5 DEGREES
EKG T AXIS: -143 DEGREES
EKG T AXIS: -51 DEGREES
EKG VENTRICULAR RATE: 85 BPM
EKG VENTRICULAR RATE: 94 BPM
EOSINOPHILS RELATIVE PERCENT: 0 % (ref 1–4)
EOSINOPHILS RELATIVE PERCENT: 1 % (ref 1–4)
EOSINOPHILS RELATIVE PERCENT: 2 % (ref 1–4)
EOSINOPHILS RELATIVE PERCENT: 3 % (ref 0–4)
EPITHELIAL CELLS UA: ABNORMAL /HPF (ref 0–5)
ESTIMATED AVERAGE GLUCOSE: 105 MG/DL
ETHANOL PERCENT: <0.01 %
ETHANOL: <10 MG/DL
FERRITIN: 621 UG/L (ref 13–150)
FIO2: ABNORMAL
FIO2: ABNORMAL
FOLATE: 4.2 NG/ML
GFR AFRICAN AMERICAN: 50 ML/MIN
GFR AFRICAN AMERICAN: >60 ML/MIN
GFR NON-AFRICAN AMERICAN: 41 ML/MIN
GFR NON-AFRICAN AMERICAN: 60 ML/MIN
GFR NON-AFRICAN AMERICAN: >60 ML/MIN
GFR SERPL CREATININE-BSD FRML MDRD: >60 ML/MIN
GFR SERPL CREATININE-BSD FRML MDRD: >60 ML/MIN
GFR SERPL CREATININE-BSD FRML MDRD: ABNORMAL ML/MIN/{1.73_M2}
GFR SERPL CREATININE-BSD FRML MDRD: NORMAL ML/MIN/{1.73_M2}
GFR SERPL CREATININE-BSD FRML MDRD: NORMAL ML/MIN/{1.73_M2}
GLOBULIN: ABNORMAL G/DL (ref 1.5–3.8)
GLUCOSE BLD-MCNC: 100 MG/DL (ref 65–105)
GLUCOSE BLD-MCNC: 103 MG/DL (ref 70–99)
GLUCOSE BLD-MCNC: 106 MG/DL (ref 65–105)
GLUCOSE BLD-MCNC: 109 MG/DL (ref 70–99)
GLUCOSE BLD-MCNC: 109 MG/DL (ref 70–99)
GLUCOSE BLD-MCNC: 113 MG/DL (ref 70–99)
GLUCOSE BLD-MCNC: 121 MG/DL (ref 65–105)
GLUCOSE BLD-MCNC: 122 MG/DL (ref 74–100)
GLUCOSE BLD-MCNC: 123 MG/DL (ref 70–99)
GLUCOSE BLD-MCNC: 125 MG/DL (ref 65–105)
GLUCOSE BLD-MCNC: 125 MG/DL (ref 70–99)
GLUCOSE BLD-MCNC: 127 MG/DL (ref 70–99)
GLUCOSE BLD-MCNC: 128 MG/DL (ref 65–105)
GLUCOSE BLD-MCNC: 128 MG/DL (ref 70–99)
GLUCOSE BLD-MCNC: 131 MG/DL (ref 65–105)
GLUCOSE BLD-MCNC: 131 MG/DL (ref 65–105)
GLUCOSE BLD-MCNC: 132 MG/DL (ref 65–105)
GLUCOSE BLD-MCNC: 133 MG/DL (ref 65–105)
GLUCOSE BLD-MCNC: 135 MG/DL (ref 65–105)
GLUCOSE BLD-MCNC: 139 MG/DL (ref 65–105)
GLUCOSE BLD-MCNC: 140 MG/DL (ref 65–105)
GLUCOSE BLD-MCNC: 140 MG/DL (ref 65–105)
GLUCOSE BLD-MCNC: 142 MG/DL (ref 65–105)
GLUCOSE BLD-MCNC: 142 MG/DL (ref 70–99)
GLUCOSE BLD-MCNC: 145 MG/DL (ref 70–99)
GLUCOSE BLD-MCNC: 146 MG/DL (ref 65–105)
GLUCOSE BLD-MCNC: 146 MG/DL (ref 65–105)
GLUCOSE BLD-MCNC: 146 MG/DL (ref 70–99)
GLUCOSE BLD-MCNC: 149 MG/DL (ref 70–99)
GLUCOSE BLD-MCNC: 151 MG/DL (ref 70–99)
GLUCOSE BLD-MCNC: 151 MG/DL (ref 70–99)
GLUCOSE BLD-MCNC: 152 MG/DL (ref 70–99)
GLUCOSE BLD-MCNC: 152 MG/DL (ref 74–100)
GLUCOSE BLD-MCNC: 153 MG/DL (ref 70–99)
GLUCOSE BLD-MCNC: 154 MG/DL (ref 70–99)
GLUCOSE BLD-MCNC: 158 MG/DL (ref 65–105)
GLUCOSE BLD-MCNC: 158 MG/DL (ref 70–99)
GLUCOSE BLD-MCNC: 159 MG/DL (ref 65–105)
GLUCOSE BLD-MCNC: 161 MG/DL (ref 70–99)
GLUCOSE BLD-MCNC: 162 MG/DL (ref 65–105)
GLUCOSE BLD-MCNC: 162 MG/DL (ref 65–105)
GLUCOSE BLD-MCNC: 162 MG/DL (ref 70–99)
GLUCOSE BLD-MCNC: 163 MG/DL (ref 70–99)
GLUCOSE BLD-MCNC: 164 MG/DL (ref 65–105)
GLUCOSE BLD-MCNC: 166 MG/DL (ref 70–99)
GLUCOSE BLD-MCNC: 167 MG/DL (ref 70–99)
GLUCOSE BLD-MCNC: 169 MG/DL (ref 65–105)
GLUCOSE BLD-MCNC: 174 MG/DL (ref 65–105)
GLUCOSE BLD-MCNC: 174 MG/DL (ref 65–105)
GLUCOSE BLD-MCNC: 182 MG/DL (ref 70–99)
GLUCOSE BLD-MCNC: 199 MG/DL (ref 65–105)
GLUCOSE BLD-MCNC: 199 MG/DL (ref 65–105)
GLUCOSE BLD-MCNC: 216 MG/DL (ref 65–105)
GLUCOSE BLD-MCNC: 235 MG/DL (ref 65–105)
GLUCOSE BLD-MCNC: 243 MG/DL (ref 65–105)
GLUCOSE BLD-MCNC: 244 MG/DL (ref 70–99)
GLUCOSE BLD-MCNC: 245 MG/DL (ref 70–99)
GLUCOSE BLD-MCNC: 92 MG/DL (ref 70–99)
GLUCOSE BLD-MCNC: 98 MG/DL (ref 70–99)
GLUCOSE URINE: NEGATIVE
GLUCOSE URINE: NEGATIVE
HBA1C MFR BLD: 5.3 % (ref 4–6)
HBA1C MFR BLD: 5.4 %
HCO3 VENOUS: 25.5 MMOL/L (ref 22–29)
HCT VFR BLD CALC: 27.9 % (ref 36.3–47.1)
HCT VFR BLD CALC: 29 % (ref 36–46)
HCT VFR BLD CALC: 29.2 % (ref 36.3–47.1)
HCT VFR BLD CALC: 29.9 % (ref 36.3–47.1)
HCT VFR BLD CALC: 30 % (ref 36–46)
HCT VFR BLD CALC: 30.7 % (ref 36.3–47.1)
HCT VFR BLD CALC: 32.4 % (ref 36.3–47.1)
HCT VFR BLD CALC: 32.5 % (ref 36–46)
HCT VFR BLD CALC: 32.6 % (ref 36.3–47.1)
HCT VFR BLD CALC: 33.9 % (ref 36.3–47.1)
HCT VFR BLD CALC: 33.9 % (ref 36.3–47.1)
HCT VFR BLD CALC: 33.9 % (ref 36–46)
HCT VFR BLD CALC: 34.3 % (ref 36–46)
HCT VFR BLD CALC: 34.9 % (ref 36.3–47.1)
HCT VFR BLD CALC: 35 % (ref 36.3–47.1)
HCT VFR BLD CALC: 35 % (ref 36.3–47.1)
HCT VFR BLD CALC: 35.3 % (ref 36.3–47.1)
HCT VFR BLD CALC: 35.4 % (ref 36.3–47.1)
HCT VFR BLD CALC: 35.5 % (ref 36.3–47.1)
HCT VFR BLD CALC: 35.5 % (ref 36.3–47.1)
HCT VFR BLD CALC: 36.9 % (ref 36.3–47.1)
HCT VFR BLD CALC: 40.2 % (ref 36.3–47.1)
HEMOGLOBIN: 10 G/DL (ref 11.9–15.1)
HEMOGLOBIN: 10 G/DL (ref 11.9–15.1)
HEMOGLOBIN: 10.5 G/DL (ref 12–16)
HEMOGLOBIN: 10.6 G/DL (ref 11.9–15.1)
HEMOGLOBIN: 10.7 G/DL (ref 11.9–15.1)
HEMOGLOBIN: 10.8 G/DL (ref 11.9–15.1)
HEMOGLOBIN: 10.9 G/DL (ref 11.9–15.1)
HEMOGLOBIN: 11 G/DL (ref 11.9–15.1)
HEMOGLOBIN: 11 G/DL (ref 12–16)
HEMOGLOBIN: 11.1 G/DL (ref 11.9–15.1)
HEMOGLOBIN: 11.1 G/DL (ref 11.9–15.1)
HEMOGLOBIN: 11.1 G/DL (ref 12–16)
HEMOGLOBIN: 11.3 G/DL (ref 11.9–15.1)
HEMOGLOBIN: 11.4 G/DL (ref 11.9–15.1)
HEMOGLOBIN: 11.6 G/DL (ref 11.9–15.1)
HEMOGLOBIN: 11.7 G/DL (ref 11.9–15.1)
HEMOGLOBIN: 11.8 G/DL (ref 11.9–15.1)
HEMOGLOBIN: 12.6 G/DL (ref 11.9–15.1)
HEMOGLOBIN: 9.5 G/DL (ref 11.9–15.1)
HEMOGLOBIN: 9.7 G/DL (ref 12–16)
HEMOGLOBIN: 9.9 G/DL (ref 11.9–15.1)
HEMOGLOBIN: 9.9 G/DL (ref 12–16)
IMMATURE GRANULOCYTES: 0 %
IMMATURE GRANULOCYTES: 1 %
IMMATURE GRANULOCYTES: ABNORMAL %
INR BLD: 1.3
INR BLD: 1.3
INR BLD: 1.4
INR BLD: 1.5
INR BLD: 1.6
INR BLD: 1.7
INR BLD: 2.1
KETONES, URINE: NEGATIVE
KETONES, URINE: NEGATIVE
LACTIC ACID, SEPSIS WHOLE BLOOD: 2.5 MMOL/L (ref 0.5–1.9)
LACTIC ACID, SEPSIS WHOLE BLOOD: 2.9 MMOL/L (ref 0.5–1.9)
LACTIC ACID, SEPSIS WHOLE BLOOD: 3.5 MMOL/L (ref 0.5–1.9)
LACTIC ACID, SEPSIS WHOLE BLOOD: NORMAL MMOL/L (ref 0.5–1.9)
LACTIC ACID, SEPSIS: 1.7 MMOL/L (ref 0.5–1.9)
LACTIC ACID, SEPSIS: ABNORMAL MMOL/L (ref 0.5–1.9)
LACTIC ACID, WHOLE BLOOD: 4.4 MMOL/L (ref 0.7–2.1)
LACTIC ACID: ABNORMAL MMOL/L
LEUKOCYTE ESTERASE, URINE: ABNORMAL
LEUKOCYTE ESTERASE, URINE: NEGATIVE
LIPASE: 150 U/L (ref 13–60)
LIPASE: 16 U/L (ref 13–60)
LIPASE: 16 U/L (ref 13–60)
LIPASE: 17 U/L (ref 13–60)
LIPASE: 23 U/L (ref 13–60)
LIPASE: 25 U/L (ref 13–60)
LIPASE: 26 U/L (ref 13–60)
LIPASE: 33 U/L (ref 13–60)
LIPASE: 34 U/L (ref 13–60)
LIPASE: 44 U/L (ref 13–60)
LIPASE: 73 U/L (ref 13–60)
LIPASE: 99 U/L (ref 13–60)
LV EF: 62 %
LVEF MODALITY: NORMAL
LYMPHOCYTES # BLD: 11 % (ref 24–43)
LYMPHOCYTES # BLD: 11 % (ref 24–44)
LYMPHOCYTES # BLD: 12 % (ref 24–44)
LYMPHOCYTES # BLD: 14 % (ref 24–43)
LYMPHOCYTES # BLD: 6 % (ref 24–44)
LYMPHOCYTES # BLD: 6 % (ref 24–44)
LYMPHOCYTES # BLD: 7 % (ref 24–43)
LYMPHOCYTES # BLD: 7 % (ref 24–44)
LYMPHOCYTES # BLD: 8 % (ref 24–43)
LYMPHOCYTES # BLD: 8 % (ref 24–44)
Lab: NORMAL
Lab: NORMAL
MAGNESIUM: 1.5 MG/DL (ref 1.6–2.6)
MAGNESIUM: 1.5 MG/DL (ref 1.6–2.6)
MAGNESIUM: 1.6 MG/DL (ref 1.6–2.6)
MAGNESIUM: 2 MG/DL (ref 1.6–2.6)
MAGNESIUM: 2.3 MG/DL (ref 1.6–2.6)
MCH RBC QN AUTO: 27.7 PG (ref 26–34)
MCH RBC QN AUTO: 27.7 PG (ref 26–34)
MCH RBC QN AUTO: 27.8 PG (ref 26–34)
MCH RBC QN AUTO: 28 PG (ref 26–34)
MCH RBC QN AUTO: 28.2 PG (ref 25.2–33.5)
MCH RBC QN AUTO: 28.3 PG (ref 25.2–33.5)
MCH RBC QN AUTO: 28.4 PG (ref 25.2–33.5)
MCH RBC QN AUTO: 28.5 PG (ref 25.2–33.5)
MCH RBC QN AUTO: 28.5 PG (ref 25.2–33.5)
MCH RBC QN AUTO: 28.6 PG (ref 25.2–33.5)
MCH RBC QN AUTO: 28.8 PG (ref 25.2–33.5)
MCH RBC QN AUTO: 28.9 PG (ref 25.2–33.5)
MCH RBC QN AUTO: 29.1 PG (ref 26–34)
MCH RBC QN AUTO: 29.2 PG (ref 25.2–33.5)
MCH RBC QN AUTO: 29.2 PG (ref 25.2–33.5)
MCH RBC QN AUTO: 29.4 PG (ref 25.2–33.5)
MCH RBC QN AUTO: 29.6 PG (ref 25.2–33.5)
MCH RBC QN AUTO: 30 PG (ref 25.2–33.5)
MCH RBC QN AUTO: 30.2 PG (ref 25.2–33.5)
MCH RBC QN AUTO: 30.2 PG (ref 25.2–33.5)
MCHC RBC AUTO-ENTMCNC: 29.4 G/DL (ref 28.4–34.8)
MCHC RBC AUTO-ENTMCNC: 31 G/DL (ref 28.4–34.8)
MCHC RBC AUTO-ENTMCNC: 31.7 G/DL (ref 28.4–34.8)
MCHC RBC AUTO-ENTMCNC: 31.8 G/DL (ref 28.4–34.8)
MCHC RBC AUTO-ENTMCNC: 31.8 G/DL (ref 28.4–34.8)
MCHC RBC AUTO-ENTMCNC: 31.9 G/DL (ref 28.4–34.8)
MCHC RBC AUTO-ENTMCNC: 31.9 G/DL (ref 28.4–34.8)
MCHC RBC AUTO-ENTMCNC: 32.2 G/DL (ref 28.4–34.8)
MCHC RBC AUTO-ENTMCNC: 32.3 G/DL (ref 28.4–34.8)
MCHC RBC AUTO-ENTMCNC: 32.4 G/DL (ref 28.4–34.8)
MCHC RBC AUTO-ENTMCNC: 32.4 G/DL (ref 31–37)
MCHC RBC AUTO-ENTMCNC: 32.4 G/DL (ref 31–37)
MCHC RBC AUTO-ENTMCNC: 32.5 G/DL (ref 28.4–34.8)
MCHC RBC AUTO-ENTMCNC: 32.6 G/DL (ref 28.4–34.8)
MCHC RBC AUTO-ENTMCNC: 32.6 G/DL (ref 28.4–34.8)
MCHC RBC AUTO-ENTMCNC: 32.6 G/DL (ref 31–37)
MCHC RBC AUTO-ENTMCNC: 32.7 G/DL (ref 28.4–34.8)
MCHC RBC AUTO-ENTMCNC: 32.8 G/DL (ref 31–37)
MCHC RBC AUTO-ENTMCNC: 33 G/DL (ref 28.4–34.8)
MCHC RBC AUTO-ENTMCNC: 33.3 G/DL (ref 31–37)
MCHC RBC AUTO-ENTMCNC: 33.4 G/DL (ref 28.4–34.8)
MCHC RBC AUTO-ENTMCNC: 33.9 G/DL (ref 28.4–34.8)
MCHC RBC AUTO-ENTMCNC: 34.1 G/DL (ref 28.4–34.8)
MCHC RBC AUTO-ENTMCNC: 35.7 G/DL (ref 28.4–34.8)
MCV RBC AUTO: 102.3 FL (ref 82.6–102.9)
MCV RBC AUTO: 81.7 FL (ref 82.6–102.9)
MCV RBC AUTO: 85.3 FL (ref 80–100)
MCV RBC AUTO: 85.3 FL (ref 80–100)
MCV RBC AUTO: 85.5 FL (ref 80–100)
MCV RBC AUTO: 85.6 FL (ref 80–100)
MCV RBC AUTO: 85.8 FL (ref 82.6–102.9)
MCV RBC AUTO: 87.4 FL (ref 80–100)
MCV RBC AUTO: 87.4 FL (ref 82.6–102.9)
MCV RBC AUTO: 88.1 FL (ref 82.6–102.9)
MCV RBC AUTO: 88.8 FL (ref 82.6–102.9)
MCV RBC AUTO: 88.9 FL (ref 82.6–102.9)
MCV RBC AUTO: 89.1 FL (ref 82.6–102.9)
MCV RBC AUTO: 89.5 FL (ref 82.6–102.9)
MCV RBC AUTO: 89.7 FL (ref 82.6–102.9)
MCV RBC AUTO: 90.3 FL (ref 82.6–102.9)
MCV RBC AUTO: 90.6 FL (ref 82.6–102.9)
MCV RBC AUTO: 90.9 FL (ref 82.6–102.9)
MCV RBC AUTO: 91 FL (ref 82.6–102.9)
MCV RBC AUTO: 91.5 FL (ref 82.6–102.9)
MCV RBC AUTO: 92.2 FL (ref 82.6–102.9)
MCV RBC AUTO: 94.2 FL (ref 82.6–102.9)
MDMA URINE: NORMAL
METHADONE SCREEN, URINE: NEGATIVE
METHAMPHETAMINE, URINE: NORMAL
METHEMOGLOBIN: 0.5 % (ref 0–1.5)
MODE: ABNORMAL
MODE: ABNORMAL
MONOCYTES # BLD: 10 % (ref 1–7)
MONOCYTES # BLD: 10 % (ref 3–12)
MONOCYTES # BLD: 10 % (ref 3–12)
MONOCYTES # BLD: 12 % (ref 1–7)
MONOCYTES # BLD: 12 % (ref 3–12)
MONOCYTES # BLD: 4 % (ref 3–12)
MONOCYTES # BLD: 8 % (ref 1–7)
MONOCYTES # BLD: 8 % (ref 3–12)
MONOCYTES # BLD: 9 % (ref 1–7)
MONOCYTES # BLD: 9 % (ref 3–12)
MORPHOLOGY: ABNORMAL
MUCUS: ABNORMAL
MYOGLOBIN: 106 NG/ML (ref 25–58)
MYOGLOBIN: 116 NG/ML (ref 25–58)
NEGATIVE BASE EXCESS, ART: 1 (ref 0–2)
NEGATIVE BASE EXCESS, VEN: ABNORMAL (ref 0–2)
NITRITE, URINE: NEGATIVE
NITRITE, URINE: NEGATIVE
NRBC AUTOMATED: 0 PER 100 WBC
NRBC AUTOMATED: 0.2 PER 100 WBC
NRBC AUTOMATED: 0.3 PER 100 WBC
NRBC AUTOMATED: 0.5 PER 100 WBC
NRBC AUTOMATED: 0.8 PER 100 WBC
NRBC AUTOMATED: ABNORMAL PER 100 WBC
O2 DEVICE/FLOW/%: ABNORMAL
O2 DEVICE/FLOW/%: ABNORMAL
O2 SAT, VEN: 14 % (ref 60–85)
OPIATES, URINE: NEGATIVE
OTHER OBSERVATIONS UA: ABNORMAL
OXYCODONE SCREEN URINE: NEGATIVE
PARTIAL THROMBOPLASTIN TIME: 28.6 SEC (ref 20.5–30.5)
PARTIAL THROMBOPLASTIN TIME: 28.7 SEC (ref 23–31)
PARTIAL THROMBOPLASTIN TIME: 29 SEC (ref 20.5–30.5)
PARTIAL THROMBOPLASTIN TIME: 29.6 SEC (ref 20.5–30.5)
PARTIAL THROMBOPLASTIN TIME: 30 SEC (ref 20.5–30.5)
PARTIAL THROMBOPLASTIN TIME: >120 SEC (ref 20.5–30.5)
PATIENT TEMP: ABNORMAL
PATIENT TEMP: ABNORMAL
PCO2, VEN: 33 MM HG (ref 41–51)
PDW BLD-RTO: 13.5 % (ref 11.8–14.4)
PDW BLD-RTO: 13.6 % (ref 11.8–14.4)
PDW BLD-RTO: 13.9 % (ref 11.8–14.4)
PDW BLD-RTO: 14 % (ref 11.8–14.4)
PDW BLD-RTO: 14.5 % (ref 11.8–14.4)
PDW BLD-RTO: 14.6 % (ref 11.8–14.4)
PDW BLD-RTO: 14.7 % (ref 11.8–14.4)
PDW BLD-RTO: 14.8 % (ref 11.8–14.4)
PDW BLD-RTO: 15.2 % (ref 11.8–14.4)
PDW BLD-RTO: 15.4 % (ref 11.8–14.4)
PDW BLD-RTO: 15.5 % (ref 11.5–14.9)
PDW BLD-RTO: 15.5 % (ref 11.8–14.4)
PDW BLD-RTO: 15.7 % (ref 11.5–14.9)
PDW BLD-RTO: 15.7 % (ref 11.5–14.9)
PDW BLD-RTO: 15.9 % (ref 11.5–14.9)
PDW BLD-RTO: 15.9 % (ref 11.5–14.9)
PDW BLD-RTO: 15.9 % (ref 11.8–14.4)
PDW BLD-RTO: 15.9 % (ref 11.8–14.4)
PDW BLD-RTO: 16.1 % (ref 11.8–14.4)
PDW BLD-RTO: 16.2 % (ref 11.8–14.4)
PDW BLD-RTO: 16.4 % (ref 11.8–14.4)
PDW BLD-RTO: 16.5 % (ref 11.8–14.4)
PDW BLD-RTO: 16.9 % (ref 11.8–14.4)
PDW BLD-RTO: 17.8 % (ref 11.8–14.4)
PH UA: 7 (ref 5–8)
PH UA: 7.5 (ref 5–8)
PH VENOUS: 7.5 (ref 7.32–7.43)
PHENCYCLIDINE, URINE: NEGATIVE
PHOSPHORUS: 1.2 MG/DL (ref 2.6–4.5)
PHOSPHORUS: 1.5 MG/DL (ref 2.6–4.5)
PLATELET # BLD: 100 K/UL (ref 138–453)
PLATELET # BLD: 102 K/UL (ref 138–453)
PLATELET # BLD: 106 K/UL (ref 138–453)
PLATELET # BLD: 113 K/UL (ref 138–453)
PLATELET # BLD: 118 K/UL (ref 138–453)
PLATELET # BLD: 162 K/UL (ref 138–453)
PLATELET # BLD: 34 K/UL (ref 150–450)
PLATELET # BLD: 44 K/UL (ref 150–450)
PLATELET # BLD: 58 K/UL (ref 138–453)
PLATELET # BLD: 62 K/UL (ref 150–450)
PLATELET # BLD: 62 K/UL (ref 150–450)
PLATELET # BLD: 63 K/UL (ref 150–450)
PLATELET # BLD: 64 K/UL (ref 138–453)
PLATELET # BLD: 69 K/UL (ref 138–453)
PLATELET # BLD: 69 K/UL (ref 138–453)
PLATELET # BLD: 71 K/UL (ref 138–453)
PLATELET # BLD: 71 K/UL (ref 138–453)
PLATELET # BLD: 72 K/UL (ref 138–453)
PLATELET # BLD: 72 K/UL (ref 138–453)
PLATELET # BLD: 74 K/UL (ref 138–453)
PLATELET # BLD: 76 K/UL (ref 138–453)
PLATELET # BLD: 84 K/UL (ref 138–453)
PLATELET # BLD: 85 K/UL (ref 138–453)
PLATELET # BLD: 93 K/UL (ref 138–453)
PLATELET # BLD: 96 K/UL (ref 138–453)
PLATELET # BLD: 98 K/UL (ref 138–453)
PLATELET # BLD: NORMAL K/UL (ref 138–453)
PLATELET ESTIMATE: ABNORMAL
PLATELET, FLUORESCENCE: 68 K/UL (ref 138–453)
PLATELET, IMMATURE FRACTION: 2.2 % (ref 1.1–10.3)
PMV BLD AUTO: 10.3 FL (ref 8.1–13.5)
PMV BLD AUTO: 11.4 FL (ref 8.1–13.5)
PMV BLD AUTO: 6.9 FL (ref 6–12)
PMV BLD AUTO: 7 FL (ref 6–12)
PMV BLD AUTO: 7.3 FL (ref 6–12)
PMV BLD AUTO: 7.4 FL (ref 6–12)
PMV BLD AUTO: 7.4 FL (ref 6–12)
PMV BLD AUTO: 8.2 FL (ref 8.1–13.5)
PMV BLD AUTO: 8.6 FL (ref 8.1–13.5)
PMV BLD AUTO: 8.7 FL (ref 8.1–13.5)
PMV BLD AUTO: 8.8 FL (ref 8.1–13.5)
PMV BLD AUTO: 8.8 FL (ref 8.1–13.5)
PMV BLD AUTO: 8.9 FL (ref 8.1–13.5)
PMV BLD AUTO: 8.9 FL (ref 8.1–13.5)
PMV BLD AUTO: 9 FL (ref 8.1–13.5)
PMV BLD AUTO: 9.1 FL (ref 8.1–13.5)
PMV BLD AUTO: 9.2 FL (ref 8.1–13.5)
PMV BLD AUTO: 9.5 FL (ref 8.1–13.5)
PMV BLD AUTO: 9.5 FL (ref 8.1–13.5)
PMV BLD AUTO: 9.7 FL (ref 8.1–13.5)
PMV BLD AUTO: 9.8 FL (ref 8.1–13.5)
PMV BLD AUTO: 9.8 FL (ref 8.1–13.5)
PO2, VEN: 11.1 MM HG (ref 30–50)
POC CHLORIDE: 103 MMOL/L (ref 98–107)
POC CHLORIDE: 107 MMOL/L (ref 98–107)
POC CREATININE: 0.63 MG/DL (ref 0.51–1.19)
POC CREATININE: 0.84 MG/DL (ref 0.51–1.19)
POC HCO3: 21.8 MMOL/L (ref 21–28)
POC HEMATOCRIT: 29 % (ref 36–46)
POC HEMATOCRIT: 36 % (ref 36–46)
POC HEMOGLOBIN: 12.3 G/DL (ref 12–16)
POC HEMOGLOBIN: 9.7 G/DL (ref 12–16)
POC IONIZED CALCIUM: 1.15 MMOL/L (ref 1.15–1.33)
POC IONIZED CALCIUM: 1.16 MMOL/L (ref 1.15–1.33)
POC LACTIC ACID: 2.41 MMOL/L (ref 0.56–1.39)
POC LACTIC ACID: 3.82 MMOL/L (ref 0.56–1.39)
POC O2 SATURATION: 98 % (ref 94–98)
POC PCO2 TEMP: ABNORMAL MM HG
POC PCO2 TEMP: ABNORMAL MM HG
POC PCO2: 27.8 MM HG (ref 35–48)
POC PH TEMP: ABNORMAL
POC PH TEMP: ABNORMAL
POC PH: 7.5 (ref 7.35–7.45)
POC PO2 TEMP: ABNORMAL MM HG
POC PO2 TEMP: ABNORMAL MM HG
POC PO2: 87.5 MM HG (ref 83–108)
POC POTASSIUM: 2.2 MMOL/L (ref 3.5–4.5)
POC POTASSIUM: 2.3 MMOL/L (ref 3.5–4.5)
POC SODIUM: 141 MMOL/L (ref 138–146)
POC SODIUM: 141 MMOL/L (ref 138–146)
POSITIVE BASE EXCESS, ART: ABNORMAL (ref 0–3)
POSITIVE BASE EXCESS, VEN: 3 (ref 0–3)
POTASSIUM SERPL-SCNC: 2.1 MMOL/L (ref 3.7–5.3)
POTASSIUM SERPL-SCNC: 2.4 MMOL/L (ref 3.7–5.3)
POTASSIUM SERPL-SCNC: 2.8 MMOL/L (ref 3.7–5.3)
POTASSIUM SERPL-SCNC: 2.9 MMOL/L (ref 3.7–5.3)
POTASSIUM SERPL-SCNC: 2.9 MMOL/L (ref 3.7–5.3)
POTASSIUM SERPL-SCNC: 3 MMOL/L (ref 3.7–5.3)
POTASSIUM SERPL-SCNC: 3.3 MMOL/L (ref 3.7–5.3)
POTASSIUM SERPL-SCNC: 3.4 MMOL/L (ref 3.7–5.3)
POTASSIUM SERPL-SCNC: 3.5 MMOL/L (ref 3.7–5.3)
POTASSIUM SERPL-SCNC: 3.5 MMOL/L (ref 3.7–5.3)
POTASSIUM SERPL-SCNC: 3.6 MMOL/L (ref 3.7–5.3)
POTASSIUM SERPL-SCNC: 3.7 MMOL/L (ref 3.7–5.3)
POTASSIUM SERPL-SCNC: 3.8 MMOL/L (ref 3.7–5.3)
POTASSIUM SERPL-SCNC: 3.9 MMOL/L (ref 3.7–5.3)
POTASSIUM SERPL-SCNC: 4 MMOL/L (ref 3.7–5.3)
POTASSIUM SERPL-SCNC: 4.1 MMOL/L (ref 3.7–5.3)
POTASSIUM SERPL-SCNC: 4.2 MMOL/L (ref 3.7–5.3)
POTASSIUM SERPL-SCNC: 4.3 MMOL/L (ref 3.7–5.3)
POTASSIUM SERPL-SCNC: 4.3 MMOL/L (ref 3.7–5.3)
POTASSIUM SERPL-SCNC: 4.5 MMOL/L (ref 3.7–5.3)
POTASSIUM SERPL-SCNC: 4.6 MMOL/L (ref 3.7–5.3)
POTASSIUM SERPL-SCNC: 4.6 MMOL/L (ref 3.7–5.3)
POTASSIUM SERPL-SCNC: 4.7 MMOL/L (ref 3.7–5.3)
POTASSIUM SERPL-SCNC: 5.1 MMOL/L (ref 3.7–5.3)
POTASSIUM, UR: 15.4 MMOL/L
PREALBUMIN: 5.5 MG/DL (ref 20–40)
PRO-BNP: 611 PG/ML
PROPOXYPHENE, URINE: NORMAL
PROTEIN UA: ABNORMAL
PROTEIN UA: NEGATIVE
PROTHROMBIN TIME: 13.1 SEC (ref 9–12)
PROTHROMBIN TIME: 13.2 SEC (ref 9.7–11.6)
PROTHROMBIN TIME: 14.7 SEC (ref 9–12)
PROTHROMBIN TIME: 16.7 SEC (ref 9–12)
PROTHROMBIN TIME: 17.7 SEC (ref 9–12)
PROTHROMBIN TIME: 18.2 SEC (ref 11.8–14.6)
PROTHROMBIN TIME: 21.5 SEC (ref 9–12)
RBC # BLD: 3.25 M/UL (ref 3.95–5.11)
RBC # BLD: 3.31 M/UL (ref 3.95–5.11)
RBC # BLD: 3.32 M/UL (ref 4–5.2)
RBC # BLD: 3.33 M/UL (ref 3.95–5.11)
RBC # BLD: 3.34 M/UL (ref 3.95–5.11)
RBC # BLD: 3.51 M/UL (ref 4–5.2)
RBC # BLD: 3.54 M/UL (ref 3.95–5.11)
RBC # BLD: 3.6 M/UL (ref 3.95–5.11)
RBC # BLD: 3.7 M/UL (ref 3.95–5.11)
RBC # BLD: 3.78 M/UL (ref 3.95–5.11)
RBC # BLD: 3.8 M/UL (ref 4–5.2)
RBC # BLD: 3.85 M/UL (ref 3.95–5.11)
RBC # BLD: 3.85 M/UL (ref 3.95–5.11)
RBC # BLD: 3.9 M/UL (ref 3.95–5.11)
RBC # BLD: 3.9 M/UL (ref 3.95–5.11)
RBC # BLD: 3.93 M/UL (ref 3.95–5.11)
RBC # BLD: 3.94 M/UL (ref 3.95–5.11)
RBC # BLD: 3.96 M/UL (ref 3.95–5.11)
RBC # BLD: 3.97 M/UL (ref 4–5.2)
RBC # BLD: 3.98 M/UL (ref 3.95–5.11)
RBC # BLD: 4.02 M/UL (ref 4–5.2)
RBC # BLD: 4.03 M/UL (ref 3.95–5.11)
RBC # BLD: 4.14 M/UL (ref 3.95–5.11)
RBC # BLD: 4.32 M/UL (ref 3.95–5.11)
RBC # BLD: ABNORMAL 10*6/UL
RBC UA: ABNORMAL /HPF (ref 0–2)
REASON FOR REJECTION: NORMAL
RENAL EPITHELIAL, UA: ABNORMAL /HPF
SALICYLATE LEVEL: <1 MG/DL (ref 3–10)
SAMPLE SITE: ABNORMAL
SAMPLE SITE: ABNORMAL
SEG NEUTROPHILS: 72 % (ref 36–66)
SEG NEUTROPHILS: 74 % (ref 36–66)
SEG NEUTROPHILS: 75 % (ref 36–65)
SEG NEUTROPHILS: 76 % (ref 36–66)
SEG NEUTROPHILS: 77 % (ref 36–65)
SEG NEUTROPHILS: 78 % (ref 36–65)
SEG NEUTROPHILS: 78 % (ref 36–65)
SEG NEUTROPHILS: 79 % (ref 36–65)
SEG NEUTROPHILS: 81 % (ref 36–66)
SEG NEUTROPHILS: 82 % (ref 36–65)
SEG NEUTROPHILS: 83 % (ref 36–66)
SEG NEUTROPHILS: 84 % (ref 36–66)
SEG NEUTROPHILS: 86 % (ref 36–65)
SEGMENTED NEUTROPHILS ABSOLUTE COUNT: 1.59 K/UL (ref 1.3–9.1)
SEGMENTED NEUTROPHILS ABSOLUTE COUNT: 3.36 K/UL (ref 1.5–8.1)
SEGMENTED NEUTROPHILS ABSOLUTE COUNT: 3.4 K/UL (ref 1.8–7.7)
SEGMENTED NEUTROPHILS ABSOLUTE COUNT: 3.66 K/UL (ref 1.5–8.1)
SEGMENTED NEUTROPHILS ABSOLUTE COUNT: 3.88 K/UL (ref 1.8–7.7)
SEGMENTED NEUTROPHILS ABSOLUTE COUNT: 3.98 K/UL (ref 1.5–8.1)
SEGMENTED NEUTROPHILS ABSOLUTE COUNT: 4.5 K/UL (ref 1.5–8.1)
SEGMENTED NEUTROPHILS ABSOLUTE COUNT: 4.5 K/UL (ref 1.5–8.1)
SEGMENTED NEUTROPHILS ABSOLUTE COUNT: 4.62 K/UL (ref 1.8–7.7)
SEGMENTED NEUTROPHILS ABSOLUTE COUNT: 4.79 K/UL (ref 1.5–8.1)
SEGMENTED NEUTROPHILS ABSOLUTE COUNT: 5.51 K/UL (ref 1.8–7.7)
SEGMENTED NEUTROPHILS ABSOLUTE COUNT: 5.98 K/UL (ref 1.8–7.7)
SEGMENTED NEUTROPHILS ABSOLUTE COUNT: 6.2 K/UL (ref 1.5–8.1)
SEGMENTED NEUTROPHILS ABSOLUTE COUNT: 6.22 K/UL (ref 1.5–8.1)
SEGMENTED NEUTROPHILS ABSOLUTE COUNT: 9.04 K/UL (ref 1.5–8.1)
SODIUM BLD-SCNC: 130 MMOL/L (ref 135–144)
SODIUM BLD-SCNC: 132 MMOL/L (ref 135–144)
SODIUM BLD-SCNC: 132 MMOL/L (ref 135–144)
SODIUM BLD-SCNC: 133 MMOL/L (ref 135–144)
SODIUM BLD-SCNC: 134 MMOL/L (ref 135–144)
SODIUM BLD-SCNC: 134 MMOL/L (ref 135–144)
SODIUM BLD-SCNC: 135 MMOL/L (ref 135–144)
SODIUM BLD-SCNC: 136 MMOL/L (ref 135–144)
SODIUM BLD-SCNC: 137 MMOL/L (ref 135–144)
SODIUM BLD-SCNC: 138 MMOL/L (ref 135–144)
SODIUM BLD-SCNC: 138 MMOL/L (ref 135–144)
SODIUM BLD-SCNC: 140 MMOL/L (ref 135–144)
SODIUM BLD-SCNC: 140 MMOL/L (ref 135–144)
SODIUM,UR: <20 MMOL/L
SPECIFIC GRAVITY UA: 1.02 (ref 1–1.03)
SPECIFIC GRAVITY UA: 1.04 (ref 1–1.03)
SPECIMEN DESCRIPTION: NORMAL
SURGICAL PATHOLOGY REPORT: NORMAL
TCO2 (CALC), ART: 23 MMOL/L (ref 22–29)
TEST INFORMATION: NORMAL
TOTAL CK: 61 U/L (ref 26–192)
TOTAL CO2, VENOUS: 27 MMOL/L (ref 23–30)
TOTAL PROTEIN: 5.5 G/DL (ref 6.4–8.3)
TOTAL PROTEIN: 6 G/DL (ref 6.4–8.3)
TOTAL PROTEIN: 6.6 G/DL (ref 6.4–8.3)
TOTAL PROTEIN: 6.6 G/DL (ref 6.4–8.3)
TOTAL PROTEIN: 6.7 G/DL (ref 6.4–8.3)
TOTAL PROTEIN: 6.8 G/DL (ref 6.4–8.3)
TOTAL PROTEIN: 7 G/DL (ref 6.4–8.3)
TOTAL PROTEIN: 7 G/DL (ref 6.4–8.3)
TOTAL PROTEIN: 7.1 G/DL (ref 6.4–8.3)
TOTAL PROTEIN: 7.1 G/DL (ref 6.4–8.3)
TOTAL PROTEIN: 7.4 G/DL (ref 6.4–8.3)
TOTAL PROTEIN: 7.5 G/DL (ref 6.4–8.3)
TOTAL PROTEIN: 7.5 G/DL (ref 6.4–8.3)
TOTAL PROTEIN: 7.6 G/DL (ref 6.4–8.3)
TOTAL PROTEIN: 7.8 G/DL (ref 6.4–8.3)
TOTAL PROTEIN: 7.8 G/DL (ref 6.4–8.3)
TOTAL PROTEIN: 8.1 G/DL (ref 6.4–8.3)
TOXIC TRICYCLIC SC,BLOOD: NEGATIVE
TRICHOMONAS: ABNORMAL
TRICYCLIC ANTIDEPRESSANTS, UR: NORMAL
TROPONIN INTERP: ABNORMAL
TROPONIN INTERP: ABNORMAL
TROPONIN T: ABNORMAL NG/ML
TROPONIN T: ABNORMAL NG/ML
TROPONIN, HIGH SENSITIVITY: 17 NG/L (ref 0–14)
TROPONIN, HIGH SENSITIVITY: 20 NG/L (ref 0–14)
TSH SERPL DL<=0.05 MIU/L-ACNC: 1.31 MIU/L (ref 0.3–5)
TSH SERPL DL<=0.05 MIU/L-ACNC: 1.41 MIU/L (ref 0.3–5)
TSH SERPL DL<=0.05 MIU/L-ACNC: 1.53 MIU/L (ref 0.3–5)
TSH SERPL DL<=0.05 MIU/L-ACNC: 1.77 MIU/L (ref 0.3–5)
TSH SERPL DL<=0.05 MIU/L-ACNC: 1.81 MIU/L (ref 0.3–5)
TSH SERPL DL<=0.05 MIU/L-ACNC: 1.89 MIU/L (ref 0.3–5)
TSH SERPL DL<=0.05 MIU/L-ACNC: 2.09 MIU/L (ref 0.3–5)
TSH SERPL DL<=0.05 MIU/L-ACNC: 2.13 MIU/L (ref 0.3–5)
TSH SERPL DL<=0.05 MIU/L-ACNC: 2.58 MIU/L (ref 0.3–5)
TSH SERPL DL<=0.05 MIU/L-ACNC: 2.63 MIU/L (ref 0.3–5)
TSH SERPL DL<=0.05 MIU/L-ACNC: 5.67 MIU/L (ref 0.3–5)
TURBIDITY: ABNORMAL
TURBIDITY: CLEAR
URINE HGB: NEGATIVE
URINE HGB: NEGATIVE
UROBILINOGEN, URINE: NORMAL
UROBILINOGEN, URINE: NORMAL
VITAMIN B-12: 1889 PG/ML (ref 232–1245)
WBC # BLD: 10.1 K/UL (ref 3.5–11.3)
WBC # BLD: 10.8 K/UL (ref 3.5–11.3)
WBC # BLD: 10.9 K/UL (ref 3.5–11.3)
WBC # BLD: 14.8 K/UL (ref 3.5–11.3)
WBC # BLD: 2.2 K/UL (ref 3.5–11)
WBC # BLD: 2.5 K/UL (ref 3.5–11)
WBC # BLD: 2.7 K/UL (ref 3.5–11)
WBC # BLD: 3.2 K/UL (ref 3.5–11)
WBC # BLD: 3.3 K/UL (ref 3.5–11)
WBC # BLD: 4.5 K/UL (ref 3.5–11.3)
WBC # BLD: 4.6 K/UL (ref 3.5–11.3)
WBC # BLD: 4.7 K/UL (ref 3.5–11.3)
WBC # BLD: 5.1 K/UL (ref 3.5–11.3)
WBC # BLD: 5.1 K/UL (ref 3.5–11.3)
WBC # BLD: 5.5 K/UL (ref 3.5–11.3)
WBC # BLD: 5.7 K/UL (ref 3.5–11.3)
WBC # BLD: 5.7 K/UL (ref 3.5–11.3)
WBC # BLD: 6.2 K/UL (ref 3.5–11.3)
WBC # BLD: 6.8 K/UL (ref 3.5–11.3)
WBC # BLD: 7.1 K/UL (ref 3.5–11.3)
WBC # BLD: 7.2 K/UL (ref 3.5–11.3)
WBC # BLD: 7.2 K/UL (ref 3.5–11.3)
WBC # BLD: 7.9 K/UL (ref 3.5–11.3)
WBC # BLD: 8.5 K/UL (ref 3.5–11.3)
WBC # BLD: ABNORMAL 10*3/UL
WBC UA: ABNORMAL /HPF (ref 0–5)
YEAST: ABNORMAL
ZZ NTE CLEAN UP: ORDERED TEST: NORMAL
ZZ NTE WITH NAME CLEAN UP: SPECIMEN SOURCE: NORMAL

## 2020-01-01 PROCEDURE — G8400 PT W/DXA NO RESULTS DOC: HCPCS | Performed by: INTERNAL MEDICINE

## 2020-01-01 PROCEDURE — 1123F ACP DISCUSS/DSCN MKR DOCD: CPT | Performed by: INTERNAL MEDICINE

## 2020-01-01 PROCEDURE — 85027 COMPLETE CBC AUTOMATED: CPT

## 2020-01-01 PROCEDURE — 6370000000 HC RX 637 (ALT 250 FOR IP): Performed by: INTERNAL MEDICINE

## 2020-01-01 PROCEDURE — 80053 COMPREHEN METABOLIC PANEL: CPT

## 2020-01-01 PROCEDURE — 6360000002 HC RX W HCPCS: Performed by: INTERNAL MEDICINE

## 2020-01-01 PROCEDURE — 82330 ASSAY OF CALCIUM: CPT

## 2020-01-01 PROCEDURE — 85025 COMPLETE CBC W/AUTO DIFF WBC: CPT

## 2020-01-01 PROCEDURE — 4040F PNEUMOC VAC/ADMIN/RCVD: CPT | Performed by: INTERNAL MEDICINE

## 2020-01-01 PROCEDURE — 6360000002 HC RX W HCPCS: Performed by: RADIOLOGY

## 2020-01-01 PROCEDURE — 99211 OFF/OP EST MAY X REQ PHY/QHP: CPT

## 2020-01-01 PROCEDURE — G8420 CALC BMI NORM PARAMETERS: HCPCS | Performed by: INTERNAL MEDICINE

## 2020-01-01 PROCEDURE — 2500000003 HC RX 250 WO HCPCS: Performed by: SURGERY

## 2020-01-01 PROCEDURE — 83874 ASSAY OF MYOGLOBIN: CPT

## 2020-01-01 PROCEDURE — 70450 CT HEAD/BRAIN W/O DYE: CPT

## 2020-01-01 PROCEDURE — 4004F PT TOBACCO SCREEN RCVD TLK: CPT | Performed by: INTERNAL MEDICINE

## 2020-01-01 PROCEDURE — 94640 AIRWAY INHALATION TREATMENT: CPT

## 2020-01-01 PROCEDURE — 1200000000 HC SEMI PRIVATE

## 2020-01-01 PROCEDURE — 99215 OFFICE O/P EST HI 40 MIN: CPT | Performed by: INTERNAL MEDICINE

## 2020-01-01 PROCEDURE — 82150 ASSAY OF AMYLASE: CPT

## 2020-01-01 PROCEDURE — 1090F PRES/ABSN URINE INCON ASSESS: CPT | Performed by: INTERNAL MEDICINE

## 2020-01-01 PROCEDURE — 1123F ACP DISCUSS/DSCN MKR DOCD: CPT | Performed by: NURSE PRACTITIONER

## 2020-01-01 PROCEDURE — 75726 ARTERY X-RAYS ABDOMEN: CPT | Performed by: RADIOLOGY

## 2020-01-01 PROCEDURE — 82436 ASSAY OF URINE CHLORIDE: CPT

## 2020-01-01 PROCEDURE — 80076 HEPATIC FUNCTION PANEL: CPT

## 2020-01-01 PROCEDURE — 3017F COLORECTAL CA SCREEN DOC REV: CPT | Performed by: INTERNAL MEDICINE

## 2020-01-01 PROCEDURE — 82533 TOTAL CORTISOL: CPT

## 2020-01-01 PROCEDURE — 75774 ARTERY X-RAY EACH VESSEL: CPT | Performed by: RADIOLOGY

## 2020-01-01 PROCEDURE — 82105 ALPHA-FETOPROTEIN SERUM: CPT

## 2020-01-01 PROCEDURE — 80048 BASIC METABOLIC PNL TOTAL CA: CPT

## 2020-01-01 PROCEDURE — G8428 CUR MEDS NOT DOCUMENT: HCPCS | Performed by: INTERNAL MEDICINE

## 2020-01-01 PROCEDURE — 6370000000 HC RX 637 (ALT 250 FOR IP): Performed by: SURGERY

## 2020-01-01 PROCEDURE — 6360000004 HC RX CONTRAST MEDICATION: Performed by: STUDENT IN AN ORGANIZED HEALTH CARE EDUCATION/TRAINING PROGRAM

## 2020-01-01 PROCEDURE — 82140 ASSAY OF AMMONIA: CPT

## 2020-01-01 PROCEDURE — 2709999900 HC NON-CHARGEABLE SUPPLY

## 2020-01-01 PROCEDURE — 99215 OFFICE O/P EST HI 40 MIN: CPT | Performed by: PHYSICIAN ASSISTANT

## 2020-01-01 PROCEDURE — 99232 SBSQ HOSP IP/OBS MODERATE 35: CPT | Performed by: INTERNAL MEDICINE

## 2020-01-01 PROCEDURE — 76882 US LMTD JT/FCL EVL NVASC XTR: CPT

## 2020-01-01 PROCEDURE — 2580000003 HC RX 258: Performed by: INTERNAL MEDICINE

## 2020-01-01 PROCEDURE — G0480 DRUG TEST DEF 1-7 CLASSES: HCPCS

## 2020-01-01 PROCEDURE — G8482 FLU IMMUNIZE ORDER/ADMIN: HCPCS | Performed by: PHYSICIAN ASSISTANT

## 2020-01-01 PROCEDURE — 79445 NUCLEAR RX INTRA-ARTERIAL: CPT | Performed by: RADIOLOGY

## 2020-01-01 PROCEDURE — 6360000002 HC RX W HCPCS: Performed by: SURGERY

## 2020-01-01 PROCEDURE — 6360000002 HC RX W HCPCS: Performed by: NURSE PRACTITIONER

## 2020-01-01 PROCEDURE — 87641 MR-STAPH DNA AMP PROBE: CPT

## 2020-01-01 PROCEDURE — G8926 SPIRO NO PERF OR DOC: HCPCS | Performed by: INTERNAL MEDICINE

## 2020-01-01 PROCEDURE — 6360000002 HC RX W HCPCS: Performed by: STUDENT IN AN ORGANIZED HEALTH CARE EDUCATION/TRAINING PROGRAM

## 2020-01-01 PROCEDURE — 1123F ACP DISCUSS/DSCN MKR DOCD: CPT | Performed by: PHYSICIAN ASSISTANT

## 2020-01-01 PROCEDURE — 84443 ASSAY THYROID STIM HORMONE: CPT

## 2020-01-01 PROCEDURE — 82565 ASSAY OF CREATININE: CPT

## 2020-01-01 PROCEDURE — G8427 DOCREV CUR MEDS BY ELIG CLIN: HCPCS | Performed by: INTERNAL MEDICINE

## 2020-01-01 PROCEDURE — 2060000000 HC ICU INTERMEDIATE R&B

## 2020-01-01 PROCEDURE — 83735 ASSAY OF MAGNESIUM: CPT

## 2020-01-01 PROCEDURE — 1111F DSCHRG MED/CURRENT MED MERGE: CPT | Performed by: INTERNAL MEDICINE

## 2020-01-01 PROCEDURE — 82947 ASSAY GLUCOSE BLOOD QUANT: CPT

## 2020-01-01 PROCEDURE — A9579 GAD-BASE MR CONTRAST NOS,1ML: HCPCS | Performed by: INTERNAL MEDICINE

## 2020-01-01 PROCEDURE — 83690 ASSAY OF LIPASE: CPT

## 2020-01-01 PROCEDURE — 2580000003 HC RX 258: Performed by: RADIOLOGY

## 2020-01-01 PROCEDURE — 99221 1ST HOSP IP/OBS SF/LOW 40: CPT | Performed by: NURSE PRACTITIONER

## 2020-01-01 PROCEDURE — 82728 ASSAY OF FERRITIN: CPT

## 2020-01-01 PROCEDURE — 1090F PRES/ABSN URINE INCON ASSESS: CPT | Performed by: PHYSICIAN ASSISTANT

## 2020-01-01 PROCEDURE — 82803 BLOOD GASES ANY COMBINATION: CPT

## 2020-01-01 PROCEDURE — G8482 FLU IMMUNIZE ORDER/ADMIN: HCPCS | Performed by: INTERNAL MEDICINE

## 2020-01-01 PROCEDURE — 36415 COLL VENOUS BLD VENIPUNCTURE: CPT

## 2020-01-01 PROCEDURE — 99203 OFFICE O/P NEW LOW 30 MIN: CPT | Performed by: NEUROLOGICAL SURGERY

## 2020-01-01 PROCEDURE — 80307 DRUG TEST PRSMV CHEM ANLYZR: CPT

## 2020-01-01 PROCEDURE — 3017F COLORECTAL CA SCREEN DOC REV: CPT | Performed by: NURSE PRACTITIONER

## 2020-01-01 PROCEDURE — 99211 OFF/OP EST MAY X REQ PHY/QHP: CPT | Performed by: INTERNAL MEDICINE

## 2020-01-01 PROCEDURE — 82375 ASSAY CARBOXYHB QUANT: CPT

## 2020-01-01 PROCEDURE — G8420 CALC BMI NORM PARAMETERS: HCPCS | Performed by: PHYSICIAN ASSISTANT

## 2020-01-01 PROCEDURE — 36591 DRAW BLOOD OFF VENOUS DEVICE: CPT

## 2020-01-01 PROCEDURE — 88305 TISSUE EXAM BY PATHOLOGIST: CPT

## 2020-01-01 PROCEDURE — 85610 PROTHROMBIN TIME: CPT

## 2020-01-01 PROCEDURE — 96413 CHEMO IV INFUSION 1 HR: CPT

## 2020-01-01 PROCEDURE — C1769 GUIDE WIRE: HCPCS

## 2020-01-01 PROCEDURE — 7100000010 HC PHASE II RECOVERY - FIRST 15 MIN

## 2020-01-01 PROCEDURE — A9576 INJ PROHANCE MULTIPACK: HCPCS | Performed by: RADIOLOGY

## 2020-01-01 PROCEDURE — 36247 INS CATH ABD/L-EXT ART 3RD: CPT | Performed by: RADIOLOGY

## 2020-01-01 PROCEDURE — 6370000000 HC RX 637 (ALT 250 FOR IP): Performed by: STUDENT IN AN ORGANIZED HEALTH CARE EDUCATION/TRAINING PROGRAM

## 2020-01-01 PROCEDURE — 3017F COLORECTAL CA SCREEN DOC REV: CPT | Performed by: PHYSICIAN ASSISTANT

## 2020-01-01 PROCEDURE — 97129 THER IVNTJ 1ST 15 MIN: CPT

## 2020-01-01 PROCEDURE — 82435 ASSAY OF BLOOD CHLORIDE: CPT

## 2020-01-01 PROCEDURE — 74183 MRI ABD W/O CNTR FLWD CNTR: CPT

## 2020-01-01 PROCEDURE — 96365 THER/PROPH/DIAG IV INF INIT: CPT

## 2020-01-01 PROCEDURE — 82570 ASSAY OF URINE CREATININE: CPT

## 2020-01-01 PROCEDURE — 84134 ASSAY OF PREALBUMIN: CPT

## 2020-01-01 PROCEDURE — 96523 IRRIG DRUG DELIVERY DEVICE: CPT

## 2020-01-01 PROCEDURE — 85379 FIBRIN DEGRADATION QUANT: CPT

## 2020-01-01 PROCEDURE — G8926 SPIRO NO PERF OR DOC: HCPCS | Performed by: PHYSICIAN ASSISTANT

## 2020-01-01 PROCEDURE — 6360000002 HC RX W HCPCS: Performed by: GENERAL PRACTICE

## 2020-01-01 PROCEDURE — 0W9G3ZZ DRAINAGE OF PERITONEAL CAVITY, PERCUTANEOUS APPROACH: ICD-10-PCS | Performed by: RADIOLOGY

## 2020-01-01 PROCEDURE — 7100000011 HC PHASE II RECOVERY - ADDTL 15 MIN

## 2020-01-01 PROCEDURE — 85730 THROMBOPLASTIN TIME PARTIAL: CPT

## 2020-01-01 PROCEDURE — 99214 OFFICE O/P EST MOD 30 MIN: CPT | Performed by: INTERNAL MEDICINE

## 2020-01-01 PROCEDURE — G0269 OCCLUSIVE DEVICE IN VEIN ART: HCPCS | Performed by: RADIOLOGY

## 2020-01-01 PROCEDURE — 37243 VASC EMBOLIZE/OCCLUDE ORGAN: CPT | Performed by: RADIOLOGY

## 2020-01-01 PROCEDURE — 94761 N-INVAS EAR/PLS OXIMETRY MLT: CPT

## 2020-01-01 PROCEDURE — 83605 ASSAY OF LACTIC ACID: CPT

## 2020-01-01 PROCEDURE — 88112 CYTOPATH CELL ENHANCE TECH: CPT

## 2020-01-01 PROCEDURE — 99291 CRITICAL CARE FIRST HOUR: CPT | Performed by: INTERNAL MEDICINE

## 2020-01-01 PROCEDURE — 6370000000 HC RX 637 (ALT 250 FOR IP): Performed by: NURSE PRACTITIONER

## 2020-01-01 PROCEDURE — G8420 CALC BMI NORM PARAMETERS: HCPCS | Performed by: NURSE PRACTITIONER

## 2020-01-01 PROCEDURE — 96366 THER/PROPH/DIAG IV INF ADDON: CPT

## 2020-01-01 PROCEDURE — G8427 DOCREV CUR MEDS BY ELIG CLIN: HCPCS | Performed by: NURSE PRACTITIONER

## 2020-01-01 PROCEDURE — 99213 OFFICE O/P EST LOW 20 MIN: CPT | Performed by: RADIOLOGY

## 2020-01-01 PROCEDURE — 2580000003 HC RX 258: Performed by: STUDENT IN AN ORGANIZED HEALTH CARE EDUCATION/TRAINING PROGRAM

## 2020-01-01 PROCEDURE — 97530 THERAPEUTIC ACTIVITIES: CPT

## 2020-01-01 PROCEDURE — 92507 TX SP LANG VOICE COMM INDIV: CPT

## 2020-01-01 PROCEDURE — 3023F SPIROM DOC REV: CPT | Performed by: PHYSICIAN ASSISTANT

## 2020-01-01 PROCEDURE — 93970 EXTREMITY STUDY: CPT

## 2020-01-01 PROCEDURE — 83050 HGB METHEMOGLOBIN QUAN: CPT

## 2020-01-01 PROCEDURE — C1725 CATH, TRANSLUMIN NON-LASER: HCPCS

## 2020-01-01 PROCEDURE — 97162 PT EVAL MOD COMPLEX 30 MIN: CPT

## 2020-01-01 PROCEDURE — 6360000004 HC RX CONTRAST MEDICATION: Performed by: INTERNAL MEDICINE

## 2020-01-01 PROCEDURE — 2580000003 HC RX 258: Performed by: SURGERY

## 2020-01-01 PROCEDURE — 99217 PR OBSERVATION CARE DISCHARGE MANAGEMENT: CPT | Performed by: INTERNAL MEDICINE

## 2020-01-01 PROCEDURE — 99223 1ST HOSP IP/OBS HIGH 75: CPT | Performed by: INTERNAL MEDICINE

## 2020-01-01 PROCEDURE — 97166 OT EVAL MOD COMPLEX 45 MIN: CPT

## 2020-01-01 PROCEDURE — 99285 EMERGENCY DEPT VISIT HI MDM: CPT

## 2020-01-01 PROCEDURE — 1250000000 HC SEMI PRIVATE HOSPICE R&B

## 2020-01-01 PROCEDURE — 97116 GAIT TRAINING THERAPY: CPT

## 2020-01-01 PROCEDURE — G8482 FLU IMMUNIZE ORDER/ADMIN: HCPCS | Performed by: NURSE PRACTITIONER

## 2020-01-01 PROCEDURE — 84132 ASSAY OF SERUM POTASSIUM: CPT

## 2020-01-01 PROCEDURE — 96376 TX/PRO/DX INJ SAME DRUG ADON: CPT

## 2020-01-01 PROCEDURE — 2700000000 HC OXYGEN THERAPY PER DAY

## 2020-01-01 PROCEDURE — 2000000000 HC ICU R&B

## 2020-01-01 PROCEDURE — 99212 OFFICE O/P EST SF 10 MIN: CPT

## 2020-01-01 PROCEDURE — 76937 US GUIDE VASCULAR ACCESS: CPT

## 2020-01-01 PROCEDURE — 99222 1ST HOSP IP/OBS MODERATE 55: CPT | Performed by: INTERNAL MEDICINE

## 2020-01-01 PROCEDURE — 96375 TX/PRO/DX INJ NEW DRUG ADDON: CPT

## 2020-01-01 PROCEDURE — 92523 SPEECH SOUND LANG COMPREHEN: CPT

## 2020-01-01 PROCEDURE — 84100 ASSAY OF PHOSPHORUS: CPT

## 2020-01-01 PROCEDURE — 99222 1ST HOSP IP/OBS MODERATE 55: CPT | Performed by: NURSE PRACTITIONER

## 2020-01-01 PROCEDURE — 6370000000 HC RX 637 (ALT 250 FOR IP): Performed by: RADIOLOGY

## 2020-01-01 PROCEDURE — G0378 HOSPITAL OBSERVATION PER HR: HCPCS

## 2020-01-01 PROCEDURE — C1760 CLOSURE DEV, VASC: HCPCS

## 2020-01-01 PROCEDURE — 99211 OFF/OP EST MAY X REQ PHY/QHP: CPT | Performed by: RADIOLOGY

## 2020-01-01 PROCEDURE — 97161 PT EVAL LOW COMPLEX 20 MIN: CPT

## 2020-01-01 PROCEDURE — C1729 CATH, DRAINAGE: HCPCS

## 2020-01-01 PROCEDURE — 49083 ABD PARACENTESIS W/IMAGING: CPT

## 2020-01-01 PROCEDURE — 97535 SELF CARE MNGMENT TRAINING: CPT

## 2020-01-01 PROCEDURE — 74177 CT ABD & PELVIS W/CONTRAST: CPT

## 2020-01-01 PROCEDURE — 1090F PRES/ABSN URINE INCON ASSESS: CPT | Performed by: NURSE PRACTITIONER

## 2020-01-01 PROCEDURE — 6360000004 HC RX CONTRAST MEDICATION: Performed by: RADIOLOGY

## 2020-01-01 PROCEDURE — 99214 OFFICE O/P EST MOD 30 MIN: CPT | Performed by: PHYSICIAN ASSISTANT

## 2020-01-01 PROCEDURE — G8400 PT W/DXA NO RESULTS DOC: HCPCS | Performed by: NURSE PRACTITIONER

## 2020-01-01 PROCEDURE — 76705 ECHO EXAM OF ABDOMEN: CPT

## 2020-01-01 PROCEDURE — 99231 SBSQ HOSP IP/OBS SF/LOW 25: CPT | Performed by: INTERNAL MEDICINE

## 2020-01-01 PROCEDURE — 71045 X-RAY EXAM CHEST 1 VIEW: CPT

## 2020-01-01 PROCEDURE — 83880 ASSAY OF NATRIURETIC PEPTIDE: CPT

## 2020-01-01 PROCEDURE — 4004F PT TOBACCO SCREEN RCVD TLK: CPT | Performed by: PHYSICIAN ASSISTANT

## 2020-01-01 PROCEDURE — 99152 MOD SED SAME PHYS/QHP 5/>YRS: CPT | Performed by: RADIOLOGY

## 2020-01-01 PROCEDURE — 93005 ELECTROCARDIOGRAM TRACING: CPT | Performed by: NURSE PRACTITIONER

## 2020-01-01 PROCEDURE — C2616 BRACHYTX, NON-STR,YTTRIUM-90: HCPCS | Performed by: RADIOLOGY

## 2020-01-01 PROCEDURE — 71260 CT THORAX DX C+: CPT

## 2020-01-01 PROCEDURE — 3023F SPIROM DOC REV: CPT | Performed by: NURSE PRACTITIONER

## 2020-01-01 PROCEDURE — 6360000004 HC RX CONTRAST MEDICATION: Performed by: SURGERY

## 2020-01-01 PROCEDURE — G8926 SPIRO NO PERF OR DOC: HCPCS | Performed by: NURSE PRACTITIONER

## 2020-01-01 PROCEDURE — 4040F PNEUMOC VAC/ADMIN/RCVD: CPT | Performed by: PHYSICIAN ASSISTANT

## 2020-01-01 PROCEDURE — 3430000000 HC RX DIAGNOSTIC RADIOPHARMACEUTICAL: Performed by: RADIOLOGY

## 2020-01-01 PROCEDURE — 82550 ASSAY OF CK (CPK): CPT

## 2020-01-01 PROCEDURE — 99233 SBSQ HOSP IP/OBS HIGH 50: CPT | Performed by: INTERNAL MEDICINE

## 2020-01-01 PROCEDURE — 36600 WITHDRAWAL OF ARTERIAL BLOOD: CPT

## 2020-01-01 PROCEDURE — 82248 BILIRUBIN DIRECT: CPT

## 2020-01-01 PROCEDURE — C1887 CATHETER, GUIDING: HCPCS

## 2020-01-01 PROCEDURE — 70460 CT HEAD/BRAIN W/DYE: CPT

## 2020-01-01 PROCEDURE — 90471 IMMUNIZATION ADMIN: CPT | Performed by: GENERAL PRACTICE

## 2020-01-01 PROCEDURE — 83036 HEMOGLOBIN GLYCOSYLATED A1C: CPT

## 2020-01-01 PROCEDURE — G8484 FLU IMMUNIZE NO ADMIN: HCPCS | Performed by: INTERNAL MEDICINE

## 2020-01-01 PROCEDURE — 90715 TDAP VACCINE 7 YRS/> IM: CPT | Performed by: GENERAL PRACTICE

## 2020-01-01 PROCEDURE — 78803 RP LOCLZJ TUM SPECT 1 AREA: CPT

## 2020-01-01 PROCEDURE — 2500000003 HC RX 250 WO HCPCS: Performed by: STUDENT IN AN ORGANIZED HEALTH CARE EDUCATION/TRAINING PROGRAM

## 2020-01-01 PROCEDURE — 3023F SPIROM DOC REV: CPT | Performed by: INTERNAL MEDICINE

## 2020-01-01 PROCEDURE — 96417 CHEMO IV INFUS EACH ADDL SEQ: CPT

## 2020-01-01 PROCEDURE — A9540 TC99M MAA: HCPCS | Performed by: RADIOLOGY

## 2020-01-01 PROCEDURE — APPSS15 APP SPLIT SHARED TIME 0-15 MINUTES: Performed by: NURSE PRACTITIONER

## 2020-01-01 PROCEDURE — 96374 THER/PROPH/DIAG INJ IV PUSH: CPT

## 2020-01-01 PROCEDURE — 84295 ASSAY OF SERUM SODIUM: CPT

## 2020-01-01 PROCEDURE — 99442 PR PHYS/QHP TELEPHONE EVALUATION 11-20 MIN: CPT | Performed by: PHYSICIAN ASSISTANT

## 2020-01-01 PROCEDURE — 93005 ELECTROCARDIOGRAM TRACING: CPT | Performed by: STUDENT IN AN ORGANIZED HEALTH CARE EDUCATION/TRAINING PROGRAM

## 2020-01-01 PROCEDURE — 96368 THER/DIAG CONCURRENT INF: CPT

## 2020-01-01 PROCEDURE — 82607 VITAMIN B-12: CPT

## 2020-01-01 PROCEDURE — 85049 AUTOMATED PLATELET COUNT: CPT

## 2020-01-01 PROCEDURE — 99239 HOSP IP/OBS DSCHRG MGMT >30: CPT | Performed by: INTERNAL MEDICINE

## 2020-01-01 PROCEDURE — 96360 HYDRATION IV INFUSION INIT: CPT

## 2020-01-01 PROCEDURE — 99212 OFFICE O/P EST SF 10 MIN: CPT | Performed by: RADIOLOGY

## 2020-01-01 PROCEDURE — C9113 INJ PANTOPRAZOLE SODIUM, VIA: HCPCS | Performed by: RADIOLOGY

## 2020-01-01 PROCEDURE — 87040 BLOOD CULTURE FOR BACTERIA: CPT

## 2020-01-01 PROCEDURE — 85014 HEMATOCRIT: CPT

## 2020-01-01 PROCEDURE — 72125 CT NECK SPINE W/O DYE: CPT

## 2020-01-01 PROCEDURE — 84300 ASSAY OF URINE SODIUM: CPT

## 2020-01-01 PROCEDURE — 76380 CAT SCAN FOLLOW-UP STUDY: CPT

## 2020-01-01 PROCEDURE — G8400 PT W/DXA NO RESULTS DOC: HCPCS | Performed by: PHYSICIAN ASSISTANT

## 2020-01-01 PROCEDURE — 2580000003 HC RX 258: Performed by: NURSE PRACTITIONER

## 2020-01-01 PROCEDURE — 99219 PR INITIAL OBSERVATION CARE/DAY 50 MINUTES: CPT | Performed by: INTERNAL MEDICINE

## 2020-01-01 PROCEDURE — 2709999900 US GUIDED PARACENTESIS

## 2020-01-01 PROCEDURE — 99231 SBSQ HOSP IP/OBS SF/LOW 25: CPT | Performed by: NURSE PRACTITIONER

## 2020-01-01 PROCEDURE — G8427 DOCREV CUR MEDS BY ELIG CLIN: HCPCS | Performed by: PHYSICIAN ASSISTANT

## 2020-01-01 PROCEDURE — 84133 ASSAY OF URINE POTASSIUM: CPT

## 2020-01-01 PROCEDURE — 99232 SBSQ HOSP IP/OBS MODERATE 35: CPT | Performed by: NURSE PRACTITIONER

## 2020-01-01 PROCEDURE — 81003 URINALYSIS AUTO W/O SCOPE: CPT

## 2020-01-01 PROCEDURE — 76377 3D RENDER W/INTRP POSTPROCES: CPT | Performed by: RADIOLOGY

## 2020-01-01 PROCEDURE — 99205 OFFICE O/P NEW HI 60 MIN: CPT | Performed by: PSYCHIATRY & NEUROLOGY

## 2020-01-01 PROCEDURE — 99153 MOD SED SAME PHYS/QHP EA: CPT | Performed by: RADIOLOGY

## 2020-01-01 PROCEDURE — 82746 ASSAY OF FOLIC ACID SERUM: CPT

## 2020-01-01 PROCEDURE — 84484 ASSAY OF TROPONIN QUANT: CPT

## 2020-01-01 PROCEDURE — 81001 URINALYSIS AUTO W/SCOPE: CPT

## 2020-01-01 PROCEDURE — 4040F PNEUMOC VAC/ADMIN/RCVD: CPT | Performed by: NURSE PRACTITIONER

## 2020-01-01 PROCEDURE — 96367 TX/PROPH/DG ADDL SEQ IV INF: CPT

## 2020-01-01 PROCEDURE — 93306 TTE W/DOPPLER COMPLETE: CPT

## 2020-01-01 PROCEDURE — 85055 RETICULATED PLATELET ASSAY: CPT

## 2020-01-01 PROCEDURE — C1894 INTRO/SHEATH, NON-LASER: HCPCS

## 2020-01-01 PROCEDURE — 4004F PT TOBACCO SCREEN RCVD TLK: CPT | Performed by: NURSE PRACTITIONER

## 2020-01-01 PROCEDURE — 99213 OFFICE O/P EST LOW 20 MIN: CPT | Performed by: NURSE PRACTITIONER

## 2020-01-01 RX ORDER — DIPHENHYDRAMINE HYDROCHLORIDE 50 MG/ML
50 INJECTION INTRAMUSCULAR; INTRAVENOUS ONCE
Status: CANCELLED | OUTPATIENT
Start: 2020-01-01

## 2020-01-01 RX ORDER — SODIUM CHLORIDE 9 MG/ML
20 INJECTION, SOLUTION INTRAVENOUS ONCE
Status: CANCELLED | OUTPATIENT
Start: 2020-01-01

## 2020-01-01 RX ORDER — FENTANYL CITRATE 50 UG/ML
INJECTION, SOLUTION INTRAMUSCULAR; INTRAVENOUS
Status: COMPLETED | OUTPATIENT
Start: 2020-01-01 | End: 2020-01-01

## 2020-01-01 RX ORDER — ACETAMINOPHEN 325 MG/1
650 TABLET ORAL EVERY 6 HOURS PRN
Status: CANCELLED | OUTPATIENT
Start: 2020-01-01

## 2020-01-01 RX ORDER — DEXTROSE AND SODIUM CHLORIDE 5; .9 G/100ML; G/100ML
INJECTION, SOLUTION INTRAVENOUS CONTINUOUS
Status: DISCONTINUED | OUTPATIENT
Start: 2020-01-01 | End: 2020-01-01 | Stop reason: HOSPADM

## 2020-01-01 RX ORDER — HEPARIN SODIUM (PORCINE) LOCK FLUSH IV SOLN 100 UNIT/ML 100 UNIT/ML
500 SOLUTION INTRAVENOUS PRN
Status: DISCONTINUED | OUTPATIENT
Start: 2020-01-01 | End: 2020-01-01 | Stop reason: HOSPADM

## 2020-01-01 RX ORDER — TRAZODONE HYDROCHLORIDE 300 MG/1
300 TABLET ORAL NIGHTLY
Qty: 90 TABLET | Refills: 0 | Status: SHIPPED | OUTPATIENT
Start: 2020-01-01

## 2020-01-01 RX ORDER — 0.9 % SODIUM CHLORIDE 0.9 %
80 INTRAVENOUS SOLUTION INTRAVENOUS ONCE
Status: COMPLETED | OUTPATIENT
Start: 2020-01-01 | End: 2020-01-01

## 2020-01-01 RX ORDER — OMEPRAZOLE 40 MG/1
40 CAPSULE, DELAYED RELEASE ORAL DAILY
Qty: 30 CAPSULE | Refills: 2 | Status: SHIPPED | OUTPATIENT
Start: 2020-01-01 | End: 2020-01-01

## 2020-01-01 RX ORDER — IPRATROPIUM BROMIDE AND ALBUTEROL SULFATE 2.5; .5 MG/3ML; MG/3ML
3 SOLUTION RESPIRATORY (INHALATION) EVERY 4 HOURS PRN
Status: CANCELLED | OUTPATIENT
Start: 2020-01-01

## 2020-01-01 RX ORDER — OXYCODONE HYDROCHLORIDE 10 MG/1
TABLET ORAL
Qty: 120 TABLET | Refills: 0 | Status: SHIPPED | OUTPATIENT
Start: 2020-01-01 | End: 2020-01-01

## 2020-01-01 RX ORDER — MORPHINE SULFATE 4 MG/ML
4 INJECTION, SOLUTION INTRAMUSCULAR; INTRAVENOUS
Status: DISCONTINUED | OUTPATIENT
Start: 2020-01-01 | End: 2020-01-01 | Stop reason: HOSPADM

## 2020-01-01 RX ORDER — CITALOPRAM 10 MG/1
10 TABLET ORAL NIGHTLY
Status: DISCONTINUED | OUTPATIENT
Start: 2020-01-01 | End: 2020-01-01 | Stop reason: HOSPADM

## 2020-01-01 RX ORDER — EPINEPHRINE 1 MG/ML
0.3 INJECTION, SOLUTION, CONCENTRATE INTRAVENOUS PRN
Status: CANCELLED | OUTPATIENT
Start: 2020-01-01

## 2020-01-01 RX ORDER — SODIUM CHLORIDE 9 MG/ML
INJECTION, SOLUTION INTRAVENOUS CONTINUOUS
Status: CANCELLED | OUTPATIENT
Start: 2020-01-01

## 2020-01-01 RX ORDER — SODIUM CHLORIDE 0.9 % (FLUSH) 0.9 %
10 SYRINGE (ML) INJECTION PRN
Status: DISCONTINUED | OUTPATIENT
Start: 2020-01-01 | End: 2020-01-01 | Stop reason: HOSPADM

## 2020-01-01 RX ORDER — SCOLOPAMINE TRANSDERMAL SYSTEM 1 MG/1
1 PATCH, EXTENDED RELEASE TRANSDERMAL
Status: CANCELLED | OUTPATIENT
Start: 2020-01-01

## 2020-01-01 RX ORDER — METHYLPREDNISOLONE SODIUM SUCCINATE 125 MG/2ML
125 INJECTION, POWDER, LYOPHILIZED, FOR SOLUTION INTRAMUSCULAR; INTRAVENOUS ONCE
Status: CANCELLED | OUTPATIENT
Start: 2020-01-01

## 2020-01-01 RX ORDER — POLYETHYLENE GLYCOL 3350 17 G/17G
17 POWDER, FOR SOLUTION ORAL DAILY PRN
Status: DISCONTINUED | OUTPATIENT
Start: 2020-01-01 | End: 2020-01-01 | Stop reason: HOSPADM

## 2020-01-01 RX ORDER — SODIUM CHLORIDE 9 MG/ML
20 INJECTION, SOLUTION INTRAVENOUS ONCE
Status: COMPLETED | OUTPATIENT
Start: 2020-01-01 | End: 2020-01-01

## 2020-01-01 RX ORDER — LACTULOSE 10 G/15ML
20 SOLUTION ORAL 3 TIMES DAILY
Status: DISCONTINUED | OUTPATIENT
Start: 2020-01-01 | End: 2020-01-01 | Stop reason: HOSPADM

## 2020-01-01 RX ORDER — POTASSIUM CHLORIDE 20 MEQ/1
40 TABLET, EXTENDED RELEASE ORAL PRN
Status: DISCONTINUED | OUTPATIENT
Start: 2020-01-01 | End: 2020-01-01 | Stop reason: HOSPADM

## 2020-01-01 RX ORDER — 0.9 % SODIUM CHLORIDE 0.9 %
10 VIAL (ML) INJECTION ONCE
Status: CANCELLED | OUTPATIENT
Start: 2020-01-01

## 2020-01-01 RX ORDER — HEPARIN SODIUM (PORCINE) LOCK FLUSH IV SOLN 100 UNIT/ML 100 UNIT/ML
500 SOLUTION INTRAVENOUS PRN
Status: CANCELLED | OUTPATIENT
Start: 2020-01-01

## 2020-01-01 RX ORDER — OXYCODONE HYDROCHLORIDE 5 MG/1
10 TABLET ORAL EVERY 4 HOURS PRN
Status: DISCONTINUED | OUTPATIENT
Start: 2020-01-01 | End: 2020-01-01

## 2020-01-01 RX ORDER — ALBUTEROL SULFATE 2.5 MG/3ML
2.5 SOLUTION RESPIRATORY (INHALATION) EVERY 6 HOURS PRN
Status: DISCONTINUED | OUTPATIENT
Start: 2020-01-01 | End: 2020-01-01 | Stop reason: HOSPADM

## 2020-01-01 RX ORDER — PREDNISONE 20 MG/1
20 TABLET ORAL DAILY
Qty: 10 TABLET | Refills: 0 | Status: SHIPPED | OUTPATIENT
Start: 2020-01-01 | End: 2020-01-01

## 2020-01-01 RX ORDER — POTASSIUM CHLORIDE 20 MEQ/1
20 TABLET, EXTENDED RELEASE ORAL DAILY
Qty: 30 TABLET | Refills: 0 | Status: SHIPPED | OUTPATIENT
Start: 2020-01-01 | End: 2020-01-01

## 2020-01-01 RX ORDER — KETOROLAC TROMETHAMINE 30 MG/ML
30 INJECTION, SOLUTION INTRAMUSCULAR; INTRAVENOUS ONCE
Status: CANCELLED | OUTPATIENT
Start: 2020-01-01 | End: 2020-01-01

## 2020-01-01 RX ORDER — ONDANSETRON 2 MG/ML
4 INJECTION INTRAMUSCULAR; INTRAVENOUS EVERY 6 HOURS PRN
Status: DISCONTINUED | OUTPATIENT
Start: 2020-01-01 | End: 2020-01-01 | Stop reason: HOSPADM

## 2020-01-01 RX ORDER — MIDAZOLAM HYDROCHLORIDE 1 MG/ML
INJECTION INTRAMUSCULAR; INTRAVENOUS
Status: COMPLETED | OUTPATIENT
Start: 2020-01-01 | End: 2020-01-01

## 2020-01-01 RX ORDER — SODIUM CHLORIDE 0.9 % (FLUSH) 0.9 %
20 SYRINGE (ML) INJECTION PRN
Status: DISCONTINUED | OUTPATIENT
Start: 2020-01-01 | End: 2020-01-01 | Stop reason: HOSPADM

## 2020-01-01 RX ORDER — MAGNESIUM SULFATE 1 G/100ML
1 INJECTION INTRAVENOUS PRN
Status: DISCONTINUED | OUTPATIENT
Start: 2020-01-01 | End: 2020-01-01 | Stop reason: HOSPADM

## 2020-01-01 RX ORDER — OXYCODONE HYDROCHLORIDE 5 MG/1
10 TABLET ORAL EVERY 6 HOURS PRN
Status: DISCONTINUED | OUTPATIENT
Start: 2020-01-01 | End: 2020-01-01

## 2020-01-01 RX ORDER — SODIUM CHLORIDE 0.9 % (FLUSH) 0.9 %
10 SYRINGE (ML) INJECTION PRN
Status: CANCELLED | OUTPATIENT
Start: 2020-01-01

## 2020-01-01 RX ORDER — ACETAMINOPHEN 650 MG/1
650 SUPPOSITORY RECTAL EVERY 6 HOURS PRN
Status: DISCONTINUED | OUTPATIENT
Start: 2020-01-01 | End: 2020-10-20 | Stop reason: HOSPADM

## 2020-01-01 RX ORDER — MORPHINE SULFATE 4 MG/ML
4 INJECTION, SOLUTION INTRAMUSCULAR; INTRAVENOUS ONCE
Status: COMPLETED | OUTPATIENT
Start: 2020-01-01 | End: 2020-01-01

## 2020-01-01 RX ORDER — TRAZODONE HYDROCHLORIDE 100 MG/1
300 TABLET ORAL NIGHTLY
Status: DISCONTINUED | OUTPATIENT
Start: 2020-01-01 | End: 2020-01-01 | Stop reason: HOSPADM

## 2020-01-01 RX ORDER — SODIUM CHLORIDE 9 MG/ML
20 INJECTION, SOLUTION INTRAVENOUS ONCE
Status: DISCONTINUED | OUTPATIENT
Start: 2020-01-01 | End: 2020-01-01 | Stop reason: HOSPADM

## 2020-01-01 RX ORDER — MORPHINE SULFATE 4 MG/ML
4 INJECTION, SOLUTION INTRAMUSCULAR; INTRAVENOUS
Status: CANCELLED | OUTPATIENT
Start: 2020-01-01

## 2020-01-01 RX ORDER — ACETAMINOPHEN 650 MG/1
650 SUPPOSITORY RECTAL EVERY 6 HOURS PRN
Status: DISCONTINUED | OUTPATIENT
Start: 2020-01-01 | End: 2020-01-01 | Stop reason: HOSPADM

## 2020-01-01 RX ORDER — OXYCODONE HYDROCHLORIDE 10 MG/1
10 TABLET ORAL EVERY 6 HOURS PRN
Qty: 60 TABLET | Refills: 0 | Status: SHIPPED | OUTPATIENT
Start: 2020-01-01 | End: 2020-01-01 | Stop reason: SDUPTHER

## 2020-01-01 RX ORDER — HYDROXYZINE 50 MG/1
50 TABLET, FILM COATED ORAL DAILY
Qty: 30 TABLET | Refills: 3 | Status: SHIPPED | OUTPATIENT
Start: 2020-01-01 | End: 2020-01-01 | Stop reason: SDUPTHER

## 2020-01-01 RX ORDER — HYDROXYZINE HYDROCHLORIDE 25 MG/1
50 TABLET, FILM COATED ORAL DAILY
Status: DISCONTINUED | OUTPATIENT
Start: 2020-01-01 | End: 2020-01-01 | Stop reason: HOSPADM

## 2020-01-01 RX ORDER — ONDANSETRON 2 MG/ML
4 INJECTION INTRAMUSCULAR; INTRAVENOUS ONCE
Status: COMPLETED | OUTPATIENT
Start: 2020-01-01 | End: 2020-01-01

## 2020-01-01 RX ORDER — LORAZEPAM 2 MG/ML
0.5 INJECTION INTRAMUSCULAR EVERY 4 HOURS PRN
Status: CANCELLED | OUTPATIENT
Start: 2020-01-01

## 2020-01-01 RX ORDER — OMEPRAZOLE 40 MG/1
CAPSULE, DELAYED RELEASE ORAL
Qty: 90 CAPSULE | Refills: 0 | Status: SHIPPED | OUTPATIENT
Start: 2020-01-01 | End: 2020-01-01 | Stop reason: SDUPTHER

## 2020-01-01 RX ORDER — ACETAMINOPHEN 325 MG/1
650 TABLET ORAL EVERY 4 HOURS PRN
Status: DISCONTINUED | OUTPATIENT
Start: 2020-01-01 | End: 2020-01-01 | Stop reason: HOSPADM

## 2020-01-01 RX ORDER — SODIUM CHLORIDE 0.9 % (FLUSH) 0.9 %
10 SYRINGE (ML) INJECTION
Status: COMPLETED | OUTPATIENT
Start: 2020-01-01 | End: 2020-01-01

## 2020-01-01 RX ORDER — GABAPENTIN 100 MG/1
100 CAPSULE ORAL 3 TIMES DAILY
Qty: 90 CAPSULE | Refills: 3 | Status: SHIPPED | OUTPATIENT
Start: 2020-01-01 | End: 2020-01-01

## 2020-01-01 RX ORDER — FLUTICASONE FUROATE, UMECLIDINIUM BROMIDE AND VILANTEROL TRIFENATATE 100; 62.5; 25 UG/1; UG/1; UG/1
1 POWDER RESPIRATORY (INHALATION) DAILY
Qty: 1 EACH | Refills: 11 | Status: SHIPPED | OUTPATIENT
Start: 2020-01-01

## 2020-01-01 RX ORDER — ACETAMINOPHEN 325 MG/1
650 TABLET ORAL EVERY 6 HOURS PRN
Status: DISCONTINUED | OUTPATIENT
Start: 2020-01-01 | End: 2020-01-01 | Stop reason: HOSPADM

## 2020-01-01 RX ORDER — DEXTROSE, SODIUM CHLORIDE, AND POTASSIUM CHLORIDE 5; .45; .15 G/100ML; G/100ML; G/100ML
INJECTION INTRAVENOUS CONTINUOUS
Status: DISCONTINUED | OUTPATIENT
Start: 2020-01-01 | End: 2020-01-01 | Stop reason: HOSPADM

## 2020-01-01 RX ORDER — PREDNISONE 20 MG/1
20 TABLET ORAL ONCE
Status: COMPLETED | OUTPATIENT
Start: 2020-01-01 | End: 2020-01-01

## 2020-01-01 RX ORDER — OXYCODONE HYDROCHLORIDE 10 MG/1
TABLET ORAL
Qty: 60 TABLET | Refills: 0 | Status: SHIPPED | OUTPATIENT
Start: 2020-01-01 | End: 2020-01-01 | Stop reason: SDUPTHER

## 2020-01-01 RX ORDER — LIDOCAINE AND PRILOCAINE 25; 25 MG/G; MG/G
CREAM TOPICAL
Qty: 1 TUBE | Refills: 1 | Status: SHIPPED | OUTPATIENT
Start: 2020-01-01

## 2020-01-01 RX ORDER — ONDANSETRON 2 MG/ML
4 INJECTION INTRAMUSCULAR; INTRAVENOUS EVERY 8 HOURS PRN
Status: DISCONTINUED | OUTPATIENT
Start: 2020-01-01 | End: 2020-01-01 | Stop reason: HOSPADM

## 2020-01-01 RX ORDER — POTASSIUM CHLORIDE 20 MEQ/1
40 TABLET, EXTENDED RELEASE ORAL ONCE
Status: DISCONTINUED | OUTPATIENT
Start: 2020-01-01 | End: 2020-01-01 | Stop reason: CLARIF

## 2020-01-01 RX ORDER — PREDNISONE 20 MG/1
20 TABLET ORAL DAILY
Qty: 14 TABLET | Refills: 0 | Status: SHIPPED | OUTPATIENT
Start: 2020-01-01 | End: 2020-01-01 | Stop reason: ALTCHOICE

## 2020-01-01 RX ORDER — OXYCODONE HYDROCHLORIDE 5 MG/1
5 TABLET ORAL EVERY 4 HOURS PRN
Status: DISCONTINUED | OUTPATIENT
Start: 2020-01-01 | End: 2020-01-01 | Stop reason: HOSPADM

## 2020-01-01 RX ORDER — ACETAMINOPHEN 650 MG/1
650 SUPPOSITORY RECTAL EVERY 6 HOURS PRN
Status: CANCELLED | OUTPATIENT
Start: 2020-01-01

## 2020-01-01 RX ORDER — GABAPENTIN 100 MG/1
100 CAPSULE ORAL 3 TIMES DAILY
Status: DISCONTINUED | OUTPATIENT
Start: 2020-01-01 | End: 2020-01-01 | Stop reason: HOSPADM

## 2020-01-01 RX ORDER — MAGNESIUM SULFATE IN WATER 40 MG/ML
2 INJECTION, SOLUTION INTRAVENOUS ONCE
Status: COMPLETED | OUTPATIENT
Start: 2020-01-01 | End: 2020-01-01

## 2020-01-01 RX ORDER — 0.9 % SODIUM CHLORIDE 0.9 %
500 INTRAVENOUS SOLUTION INTRAVENOUS ONCE
Status: COMPLETED | OUTPATIENT
Start: 2020-01-01 | End: 2020-01-01

## 2020-01-01 RX ORDER — MORPHINE SULFATE 2 MG/ML
2 INJECTION, SOLUTION INTRAMUSCULAR; INTRAVENOUS
Status: DISCONTINUED | OUTPATIENT
Start: 2020-01-01 | End: 2020-01-01 | Stop reason: HOSPADM

## 2020-01-01 RX ORDER — CITALOPRAM 10 MG/1
10 TABLET ORAL NIGHTLY
Qty: 30 TABLET | Refills: 3 | Status: SHIPPED | OUTPATIENT
Start: 2020-01-01

## 2020-01-01 RX ORDER — NICOTINE 21 MG/24HR
1 PATCH, TRANSDERMAL 24 HOURS TRANSDERMAL DAILY PRN
Status: DISCONTINUED | OUTPATIENT
Start: 2020-01-01 | End: 2020-01-01 | Stop reason: HOSPADM

## 2020-01-01 RX ORDER — SODIUM CHLORIDE 9 MG/ML
INJECTION, SOLUTION INTRAVENOUS CONTINUOUS
Status: DISCONTINUED | OUTPATIENT
Start: 2020-01-01 | End: 2020-01-01 | Stop reason: HOSPADM

## 2020-01-01 RX ORDER — 0.9 % SODIUM CHLORIDE 0.9 %
1000 INTRAVENOUS SOLUTION INTRAVENOUS ONCE
Status: COMPLETED | OUTPATIENT
Start: 2020-01-01 | End: 2020-01-01

## 2020-01-01 RX ORDER — POTASSIUM CHLORIDE 7.45 MG/ML
10 INJECTION INTRAVENOUS
Status: DISPENSED | OUTPATIENT
Start: 2020-01-01 | End: 2020-01-01

## 2020-01-01 RX ORDER — NICOTINE POLACRILEX 4 MG
15 LOZENGE BUCCAL PRN
Status: DISCONTINUED | OUTPATIENT
Start: 2020-01-01 | End: 2020-01-01 | Stop reason: HOSPADM

## 2020-01-01 RX ORDER — SODIUM CHLORIDE 0.9 % (FLUSH) 0.9 %
5 SYRINGE (ML) INJECTION PRN
Status: CANCELLED | OUTPATIENT
Start: 2020-01-01

## 2020-01-01 RX ORDER — LACTULOSE 10 G/15ML
20 SOLUTION ORAL 3 TIMES DAILY
Qty: 15 ML | Refills: 1 | Status: SHIPPED | OUTPATIENT
Start: 2020-01-01

## 2020-01-01 RX ORDER — LACTULOSE 10 G/15ML
10 SOLUTION ORAL 2 TIMES DAILY
Status: CANCELLED | OUTPATIENT
Start: 2020-01-01

## 2020-01-01 RX ORDER — CIPROFLOXACIN 500 MG/1
500 TABLET, FILM COATED ORAL 2 TIMES DAILY
Qty: 20 TABLET | Refills: 0 | Status: SHIPPED | OUTPATIENT
Start: 2020-01-01 | End: 2020-01-01

## 2020-01-01 RX ORDER — LIDOCAINE AND PRILOCAINE 25; 25 MG/G; MG/G
CREAM TOPICAL PRN
Status: DISCONTINUED | OUTPATIENT
Start: 2020-01-01 | End: 2020-01-01 | Stop reason: HOSPADM

## 2020-01-01 RX ORDER — SODIUM CHLORIDE 0.9 % (FLUSH) 0.9 %
10 SYRINGE (ML) INJECTION 2 TIMES DAILY
Status: DISCONTINUED | OUTPATIENT
Start: 2020-01-01 | End: 2020-01-01 | Stop reason: HOSPADM

## 2020-01-01 RX ORDER — METHYLPREDNISOLONE 4 MG/1
TABLET ORAL
Qty: 1 KIT | Refills: 0 | Status: SHIPPED | OUTPATIENT
Start: 2020-01-01 | End: 2020-01-01 | Stop reason: ALTCHOICE

## 2020-01-01 RX ORDER — POTASSIUM CHLORIDE 20 MEQ/1
TABLET, EXTENDED RELEASE ORAL
Qty: 30 TABLET | Refills: 0 | Status: SHIPPED | OUTPATIENT
Start: 2020-01-01 | End: 2020-01-01

## 2020-01-01 RX ORDER — CITALOPRAM 10 MG/1
10 TABLET ORAL NIGHTLY
Qty: 30 TABLET | Refills: 2 | Status: SHIPPED | OUTPATIENT
Start: 2020-01-01 | End: 2020-01-01 | Stop reason: SDUPTHER

## 2020-01-01 RX ORDER — SODIUM CHLORIDE 0.9 % (FLUSH) 0.9 %
10 SYRINGE (ML) INJECTION ONCE
Status: DISCONTINUED | OUTPATIENT
Start: 2020-01-01 | End: 2020-01-01 | Stop reason: HOSPADM

## 2020-01-01 RX ORDER — KETOROLAC TROMETHAMINE 30 MG/ML
30 INJECTION, SOLUTION INTRAMUSCULAR; INTRAVENOUS ONCE
Status: COMPLETED | OUTPATIENT
Start: 2020-01-01 | End: 2020-01-01

## 2020-01-01 RX ORDER — SCOLOPAMINE TRANSDERMAL SYSTEM 1 MG/1
1 PATCH, EXTENDED RELEASE TRANSDERMAL
Status: DISCONTINUED | OUTPATIENT
Start: 2020-01-01 | End: 2020-10-20 | Stop reason: HOSPADM

## 2020-01-01 RX ORDER — SODIUM CHLORIDE 0.9 % (FLUSH) 0.9 %
10 SYRINGE (ML) INJECTION EVERY 12 HOURS SCHEDULED
Status: DISCONTINUED | OUTPATIENT
Start: 2020-01-01 | End: 2020-01-01 | Stop reason: HOSPADM

## 2020-01-01 RX ORDER — BLOOD PRESSURE TEST KIT
KIT MISCELLANEOUS
Qty: 100 EACH | Refills: 5 | Status: SHIPPED | OUTPATIENT
Start: 2020-01-01

## 2020-01-01 RX ORDER — POTASSIUM CHLORIDE 20 MEQ/1
20 TABLET, EXTENDED RELEASE ORAL
Status: DISCONTINUED | OUTPATIENT
Start: 2020-01-01 | End: 2020-01-01

## 2020-01-01 RX ORDER — CITALOPRAM 10 MG/1
10 TABLET ORAL NIGHTLY
Qty: 30 TABLET | Refills: 3 | Status: SHIPPED | OUTPATIENT
Start: 2020-01-01 | End: 2020-01-01

## 2020-01-01 RX ORDER — GLUCOSAMINE HCL/CHONDROITIN SU 500-400 MG
CAPSULE ORAL
Qty: 100 STRIP | Refills: 5 | Status: SHIPPED | OUTPATIENT
Start: 2020-01-01

## 2020-01-01 RX ORDER — OMEPRAZOLE 40 MG/1
40 CAPSULE, DELAYED RELEASE ORAL DAILY PRN
Qty: 30 CAPSULE | Refills: 1 | Status: SHIPPED | OUTPATIENT
Start: 2020-01-01 | End: 2020-01-01 | Stop reason: SDUPTHER

## 2020-01-01 RX ORDER — HEPARIN SODIUM (PORCINE) LOCK FLUSH IV SOLN 100 UNIT/ML 100 UNIT/ML
300 SOLUTION INTRAVENOUS PRN
Status: DISCONTINUED | OUTPATIENT
Start: 2020-01-01 | End: 2020-01-01 | Stop reason: HOSPADM

## 2020-01-01 RX ORDER — OXYCODONE HYDROCHLORIDE 10 MG/1
10 TABLET ORAL EVERY 6 HOURS PRN
Qty: 60 TABLET | Refills: 0 | Status: SHIPPED | OUTPATIENT
Start: 2020-01-01 | End: 2020-01-01

## 2020-01-01 RX ORDER — MORPHINE SULFATE 2 MG/ML
2 INJECTION, SOLUTION INTRAMUSCULAR; INTRAVENOUS ONCE
Status: COMPLETED | OUTPATIENT
Start: 2020-01-01 | End: 2020-01-01

## 2020-01-01 RX ORDER — DEXTROSE MONOHYDRATE 50 MG/ML
100 INJECTION, SOLUTION INTRAVENOUS PRN
Status: DISCONTINUED | OUTPATIENT
Start: 2020-01-01 | End: 2020-01-01 | Stop reason: HOSPADM

## 2020-01-01 RX ORDER — OXYCODONE HYDROCHLORIDE 10 MG/1
10 TABLET ORAL EVERY 6 HOURS PRN
Qty: 120 TABLET | Refills: 0 | Status: SHIPPED | OUTPATIENT
Start: 2020-01-01 | End: 2020-01-01

## 2020-01-01 RX ORDER — SODIUM CHLORIDE 9 MG/ML
INJECTION, SOLUTION INTRAVENOUS CONTINUOUS
Status: DISCONTINUED | OUTPATIENT
Start: 2020-01-01 | End: 2020-01-01

## 2020-01-01 RX ORDER — METRONIDAZOLE 500 MG/1
500 TABLET ORAL EVERY 8 HOURS SCHEDULED
Status: DISCONTINUED | OUTPATIENT
Start: 2020-01-01 | End: 2020-01-01 | Stop reason: HOSPADM

## 2020-01-01 RX ORDER — CITALOPRAM 20 MG/1
10 TABLET ORAL NIGHTLY
Status: DISCONTINUED | OUTPATIENT
Start: 2020-01-01 | End: 2020-01-01 | Stop reason: HOSPADM

## 2020-01-01 RX ORDER — IPRATROPIUM BROMIDE AND ALBUTEROL SULFATE 2.5; .5 MG/3ML; MG/3ML
1 SOLUTION RESPIRATORY (INHALATION)
Status: DISCONTINUED | OUTPATIENT
Start: 2020-01-01 | End: 2020-01-01 | Stop reason: HOSPADM

## 2020-01-01 RX ORDER — FENTANYL CITRATE 50 UG/ML
25 INJECTION, SOLUTION INTRAMUSCULAR; INTRAVENOUS
Status: DISCONTINUED | OUTPATIENT
Start: 2020-01-01 | End: 2020-01-01 | Stop reason: HOSPADM

## 2020-01-01 RX ORDER — NALOXONE HYDROCHLORIDE 4 MG/.1ML
1 SPRAY NASAL PRN
Qty: 1 EACH | Refills: 5 | Status: SHIPPED | OUTPATIENT
Start: 2020-01-01 | End: 2020-01-01

## 2020-01-01 RX ORDER — POTASSIUM CHLORIDE 20 MEQ/1
40 TABLET, EXTENDED RELEASE ORAL ONCE
Status: DISCONTINUED | OUTPATIENT
Start: 2020-01-01 | End: 2020-01-01

## 2020-01-01 RX ORDER — MORPHINE SULFATE 4 MG/ML
4 INJECTION, SOLUTION INTRAMUSCULAR; INTRAVENOUS
Status: DISCONTINUED | OUTPATIENT
Start: 2020-01-01 | End: 2020-10-20 | Stop reason: HOSPADM

## 2020-01-01 RX ORDER — POTASSIUM CHLORIDE 7.45 MG/ML
10 INJECTION INTRAVENOUS
Status: COMPLETED | OUTPATIENT
Start: 2020-01-01 | End: 2020-01-01

## 2020-01-01 RX ORDER — FLUTICASONE PROPIONATE 50 MCG
1 SPRAY, SUSPENSION (ML) NASAL DAILY
Qty: 1 BOTTLE | Refills: 0 | Status: SHIPPED | OUTPATIENT
Start: 2020-01-01 | End: 2020-01-01 | Stop reason: ALTCHOICE

## 2020-01-01 RX ORDER — ONDANSETRON HYDROCHLORIDE 8 MG/1
8 TABLET, FILM COATED ORAL EVERY 12 HOURS PRN
Qty: 60 TABLET | Refills: 2 | Status: SHIPPED | OUTPATIENT
Start: 2020-01-01

## 2020-01-01 RX ORDER — METOPROLOL TARTRATE 5 MG/5ML
5 INJECTION INTRAVENOUS EVERY 6 HOURS PRN
Status: DISCONTINUED | OUTPATIENT
Start: 2020-01-01 | End: 2020-10-20 | Stop reason: HOSPADM

## 2020-01-01 RX ORDER — LACTULOSE 10 G/15ML
10 SOLUTION ORAL 2 TIMES DAILY
Qty: 946 ML | Refills: 1 | Status: ON HOLD | OUTPATIENT
Start: 2020-01-01 | End: 2020-01-01 | Stop reason: HOSPADM

## 2020-01-01 RX ORDER — OXYCODONE HYDROCHLORIDE AND ACETAMINOPHEN 5; 325 MG/1; MG/1
2 TABLET ORAL EVERY 4 HOURS PRN
Status: DISCONTINUED | OUTPATIENT
Start: 2020-01-01 | End: 2020-01-01

## 2020-01-01 RX ORDER — SODIUM CHLORIDE, SODIUM LACTATE, POTASSIUM CHLORIDE, AND CALCIUM CHLORIDE .6; .31; .03; .02 G/100ML; G/100ML; G/100ML; G/100ML
500 INJECTION, SOLUTION INTRAVENOUS ONCE
Status: COMPLETED | OUTPATIENT
Start: 2020-01-01 | End: 2020-01-01

## 2020-01-01 RX ORDER — DOCUSATE SODIUM 100 MG/1
100 CAPSULE, LIQUID FILLED ORAL 2 TIMES DAILY PRN
Status: DISCONTINUED | OUTPATIENT
Start: 2020-01-01 | End: 2020-01-01 | Stop reason: HOSPADM

## 2020-01-01 RX ORDER — OXYCODONE HYDROCHLORIDE AND ACETAMINOPHEN 5; 325 MG/1; MG/1
1 TABLET ORAL EVERY 4 HOURS PRN
Status: DISCONTINUED | OUTPATIENT
Start: 2020-01-01 | End: 2020-01-01

## 2020-01-01 RX ORDER — OXYCODONE HYDROCHLORIDE 10 MG/1
10 TABLET ORAL EVERY 6 HOURS PRN
Status: DISCONTINUED | OUTPATIENT
Start: 2020-01-01 | End: 2020-01-01 | Stop reason: HOSPADM

## 2020-01-01 RX ORDER — SODIUM CHLORIDE AND POTASSIUM CHLORIDE .9; .15 G/100ML; G/100ML
SOLUTION INTRAVENOUS CONTINUOUS
Status: DISCONTINUED | OUTPATIENT
Start: 2020-01-01 | End: 2020-01-01

## 2020-01-01 RX ORDER — BENZONATATE 200 MG/1
200 CAPSULE ORAL 3 TIMES DAILY
Qty: 30 CAPSULE | Refills: 0 | Status: SHIPPED | OUTPATIENT
Start: 2020-01-01 | End: 2020-01-01

## 2020-01-01 RX ORDER — OXYCODONE HYDROCHLORIDE 10 MG/1
TABLET ORAL
Qty: 120 TABLET | Refills: 0 | Status: SHIPPED | OUTPATIENT
Start: 2020-01-01 | End: 2020-11-01

## 2020-01-01 RX ORDER — NALOXONE HYDROCHLORIDE 1 MG/ML
1 INJECTION INTRAMUSCULAR; INTRAVENOUS; SUBCUTANEOUS ONCE
Status: COMPLETED | OUTPATIENT
Start: 2020-01-01 | End: 2020-01-01

## 2020-01-01 RX ORDER — OMEPRAZOLE 40 MG/1
40 CAPSULE, DELAYED RELEASE ORAL DAILY
Qty: 90 CAPSULE | Refills: 0 | Status: SHIPPED | OUTPATIENT
Start: 2020-01-01

## 2020-01-01 RX ORDER — PREDNISONE 20 MG/1
20 TABLET ORAL DAILY
Status: DISCONTINUED | OUTPATIENT
Start: 2020-01-01 | End: 2020-01-01

## 2020-01-01 RX ORDER — IPRATROPIUM BROMIDE AND ALBUTEROL SULFATE 2.5; .5 MG/3ML; MG/3ML
3 SOLUTION RESPIRATORY (INHALATION) EVERY 4 HOURS PRN
Status: DISCONTINUED | OUTPATIENT
Start: 2020-01-01 | End: 2020-10-20 | Stop reason: HOSPADM

## 2020-01-01 RX ORDER — OXYCODONE HYDROCHLORIDE 10 MG/1
TABLET ORAL
Qty: 60 TABLET | Refills: 0 | Status: SHIPPED | OUTPATIENT
Start: 2020-01-01 | End: 2020-01-01

## 2020-01-01 RX ORDER — POTASSIUM CHLORIDE 20 MEQ/1
TABLET, EXTENDED RELEASE ORAL
Qty: 30 TABLET | Refills: 0 | Status: SHIPPED | OUTPATIENT
Start: 2020-01-01

## 2020-01-01 RX ORDER — PANTOPRAZOLE SODIUM 40 MG/10ML
40 INJECTION, POWDER, LYOPHILIZED, FOR SOLUTION INTRAVENOUS ONCE
Status: CANCELLED | OUTPATIENT
Start: 2020-01-01 | End: 2020-01-01

## 2020-01-01 RX ORDER — TRAZODONE HYDROCHLORIDE 300 MG/1
300 TABLET ORAL NIGHTLY
Qty: 30 TABLET | Refills: 0 | Status: SHIPPED | OUTPATIENT
Start: 2020-01-01 | End: 2020-01-01 | Stop reason: SDUPTHER

## 2020-01-01 RX ORDER — MAGNESIUM SULFATE 1 G/100ML
1 INJECTION INTRAVENOUS
Status: COMPLETED | OUTPATIENT
Start: 2020-01-01 | End: 2020-01-01

## 2020-01-01 RX ORDER — POTASSIUM CHLORIDE 20 MEQ/1
40 TABLET, EXTENDED RELEASE ORAL ONCE
Status: COMPLETED | OUTPATIENT
Start: 2020-01-01 | End: 2020-01-01

## 2020-01-01 RX ORDER — PROMETHAZINE HYDROCHLORIDE 12.5 MG/1
12.5 TABLET ORAL EVERY 6 HOURS PRN
Status: DISCONTINUED | OUTPATIENT
Start: 2020-01-01 | End: 2020-01-01 | Stop reason: HOSPADM

## 2020-01-01 RX ORDER — HYDROMORPHONE HYDROCHLORIDE 2 MG/1
2 TABLET ORAL EVERY 4 HOURS PRN
Qty: 30 TABLET | Refills: 0 | Status: SHIPPED | OUTPATIENT
Start: 2020-01-01 | End: 2020-01-01

## 2020-01-01 RX ORDER — PANTOPRAZOLE SODIUM 40 MG/10ML
40 INJECTION, POWDER, LYOPHILIZED, FOR SOLUTION INTRAVENOUS ONCE
Status: COMPLETED | OUTPATIENT
Start: 2020-01-01 | End: 2020-01-01

## 2020-01-01 RX ORDER — LANCETS 30 GAUGE
EACH MISCELLANEOUS
Qty: 100 EACH | Refills: 5 | Status: SHIPPED | OUTPATIENT
Start: 2020-01-01

## 2020-01-01 RX ORDER — LACTULOSE 10 G/15ML
10 SOLUTION ORAL 2 TIMES DAILY
Status: DISCONTINUED | OUTPATIENT
Start: 2020-01-01 | End: 2020-01-01

## 2020-01-01 RX ORDER — SODIUM CHLORIDE AND POTASSIUM CHLORIDE .9; .15 G/100ML; G/100ML
SOLUTION INTRAVENOUS ONCE
Status: DISCONTINUED | OUTPATIENT
Start: 2020-01-01 | End: 2020-01-01

## 2020-01-01 RX ORDER — FAMOTIDINE 20 MG/1
20 TABLET, FILM COATED ORAL DAILY
Status: DISCONTINUED | OUTPATIENT
Start: 2020-01-01 | End: 2020-01-01

## 2020-01-01 RX ORDER — METRONIDAZOLE 500 MG/1
500 TABLET ORAL 2 TIMES DAILY
Qty: 14 TABLET | Refills: 0 | Status: SHIPPED | OUTPATIENT
Start: 2020-01-01 | End: 2020-01-01

## 2020-01-01 RX ORDER — POTASSIUM CHLORIDE 7.45 MG/ML
10 INJECTION INTRAVENOUS PRN
Status: DISCONTINUED | OUTPATIENT
Start: 2020-01-01 | End: 2020-01-01 | Stop reason: HOSPADM

## 2020-01-01 RX ORDER — OXYCODONE HYDROCHLORIDE 5 MG/1
20 TABLET ORAL EVERY 4 HOURS PRN
Status: DISCONTINUED | OUTPATIENT
Start: 2020-01-01 | End: 2020-01-01 | Stop reason: HOSPADM

## 2020-01-01 RX ORDER — LORAZEPAM 2 MG/ML
0.5 INJECTION INTRAMUSCULAR EVERY 4 HOURS PRN
Status: DISCONTINUED | OUTPATIENT
Start: 2020-01-01 | End: 2020-10-20 | Stop reason: HOSPADM

## 2020-01-01 RX ORDER — LACTULOSE 10 G/15ML
10 SOLUTION ORAL 2 TIMES DAILY
Status: DISCONTINUED | OUTPATIENT
Start: 2020-01-01 | End: 2020-01-01 | Stop reason: HOSPADM

## 2020-01-01 RX ORDER — FENTANYL CITRATE 50 UG/ML
50 INJECTION, SOLUTION INTRAMUSCULAR; INTRAVENOUS
Status: DISCONTINUED | OUTPATIENT
Start: 2020-01-01 | End: 2020-01-01 | Stop reason: HOSPADM

## 2020-01-01 RX ORDER — ACETAMINOPHEN 325 MG/1
650 TABLET ORAL EVERY 6 HOURS PRN
Status: DISCONTINUED | OUTPATIENT
Start: 2020-01-01 | End: 2020-10-20 | Stop reason: HOSPADM

## 2020-01-01 RX ORDER — ONDANSETRON 2 MG/ML
4 INJECTION INTRAMUSCULAR; INTRAVENOUS EVERY 4 HOURS PRN
Status: DISCONTINUED | OUTPATIENT
Start: 2020-01-01 | End: 2020-10-20 | Stop reason: HOSPADM

## 2020-01-01 RX ORDER — BUTALBITAL, ACETAMINOPHEN AND CAFFEINE 50; 325; 40 MG/1; MG/1; MG/1
1 TABLET ORAL EVERY 4 HOURS PRN
Status: DISCONTINUED | OUTPATIENT
Start: 2020-01-01 | End: 2020-01-01 | Stop reason: HOSPADM

## 2020-01-01 RX ORDER — CIPROFLOXACIN 500 MG/1
500 TABLET, FILM COATED ORAL EVERY 12 HOURS SCHEDULED
Status: DISCONTINUED | OUTPATIENT
Start: 2020-01-01 | End: 2020-01-01 | Stop reason: HOSPADM

## 2020-01-01 RX ORDER — TRAZODONE HYDROCHLORIDE 300 MG/1
300 TABLET ORAL NIGHTLY
Qty: 30 TABLET | Refills: 3 | Status: SHIPPED | OUTPATIENT
Start: 2020-01-01 | End: 2020-01-01

## 2020-01-01 RX ORDER — DEXTROSE MONOHYDRATE 25 G/50ML
12.5 INJECTION, SOLUTION INTRAVENOUS PRN
Status: DISCONTINUED | OUTPATIENT
Start: 2020-01-01 | End: 2020-01-01 | Stop reason: HOSPADM

## 2020-01-01 RX ORDER — IPRATROPIUM BROMIDE AND ALBUTEROL SULFATE 2.5; .5 MG/3ML; MG/3ML
3 SOLUTION RESPIRATORY (INHALATION) EVERY 4 HOURS
Status: DISCONTINUED | OUTPATIENT
Start: 2020-01-01 | End: 2020-01-01 | Stop reason: HOSPADM

## 2020-01-01 RX ORDER — IPRATROPIUM BROMIDE AND ALBUTEROL SULFATE 2.5; .5 MG/3ML; MG/3ML
3 SOLUTION RESPIRATORY (INHALATION) EVERY 4 HOURS
Qty: 360 ML | Refills: 1 | Status: SHIPPED | OUTPATIENT
Start: 2020-01-01

## 2020-01-01 RX ORDER — SODIUM CHLORIDE, SODIUM LACTATE, POTASSIUM CHLORIDE, AND CALCIUM CHLORIDE .6; .31; .03; .02 G/100ML; G/100ML; G/100ML; G/100ML
1000 INJECTION, SOLUTION INTRAVENOUS ONCE
Status: DISCONTINUED | OUTPATIENT
Start: 2020-01-01 | End: 2020-01-01

## 2020-01-01 RX ORDER — ONDANSETRON 2 MG/ML
4 INJECTION INTRAMUSCULAR; INTRAVENOUS ONCE
Status: CANCELLED | OUTPATIENT
Start: 2020-01-01 | End: 2020-01-01

## 2020-01-01 RX ORDER — HYDROXYZINE HYDROCHLORIDE 25 MG/1
50 TABLET, FILM COATED ORAL 3 TIMES DAILY PRN
Status: DISCONTINUED | OUTPATIENT
Start: 2020-01-01 | End: 2020-01-01 | Stop reason: HOSPADM

## 2020-01-01 RX ORDER — BLOOD-GLUCOSE METER
1 KIT MISCELLANEOUS DAILY
Qty: 1 KIT | Refills: 0 | Status: SHIPPED | OUTPATIENT
Start: 2020-01-01

## 2020-01-01 RX ORDER — HYDROXYZINE 50 MG/1
50 TABLET, FILM COATED ORAL DAILY
Qty: 30 TABLET | Refills: 2 | Status: SHIPPED | OUTPATIENT
Start: 2020-01-01 | End: 2020-01-01

## 2020-01-01 RX ORDER — PROMETHAZINE HYDROCHLORIDE 25 MG/1
25 TABLET ORAL EVERY 6 HOURS PRN
Qty: 30 TABLET | Refills: 1 | Status: SHIPPED | OUTPATIENT
Start: 2020-01-01 | End: 2020-01-01

## 2020-01-01 RX ORDER — METHYLPREDNISOLONE 4 MG/1
TABLET ORAL
Qty: 1 KIT | Refills: 0 | Status: SHIPPED | OUTPATIENT
Start: 2020-01-01 | End: 2020-01-01

## 2020-01-01 RX ORDER — PROMETHAZINE HYDROCHLORIDE 25 MG/1
12.5 TABLET ORAL EVERY 6 HOURS PRN
Status: DISCONTINUED | OUTPATIENT
Start: 2020-01-01 | End: 2020-01-01 | Stop reason: HOSPADM

## 2020-01-01 RX ORDER — 0.9 % SODIUM CHLORIDE 0.9 %
45 INTRAVENOUS SOLUTION INTRAVENOUS ONCE
Status: DISCONTINUED | OUTPATIENT
Start: 2020-01-01 | End: 2020-01-01 | Stop reason: HOSPADM

## 2020-01-01 RX ADMIN — MORPHINE SULFATE 2 MG: 2 INJECTION, SOLUTION INTRAMUSCULAR; INTRAVENOUS at 13:53

## 2020-01-01 RX ADMIN — HEPARIN 500 UNITS: 100 SYRINGE at 15:05

## 2020-01-01 RX ADMIN — HEPARIN 500 UNITS: 100 SYRINGE at 11:12

## 2020-01-01 RX ADMIN — SODIUM CHLORIDE: 9 INJECTION, SOLUTION INTRAVENOUS at 18:17

## 2020-01-01 RX ADMIN — GABAPENTIN 100 MG: 100 CAPSULE ORAL at 13:34

## 2020-01-01 RX ADMIN — IPRATROPIUM BROMIDE AND ALBUTEROL SULFATE 1 AMPULE: .5; 3 SOLUTION RESPIRATORY (INHALATION) at 20:47

## 2020-01-01 RX ADMIN — POTASSIUM CHLORIDE 40 MEQ: 1500 TABLET, EXTENDED RELEASE ORAL at 23:30

## 2020-01-01 RX ADMIN — IPRATROPIUM BROMIDE AND ALBUTEROL SULFATE 3 ML: .5; 3 SOLUTION RESPIRATORY (INHALATION) at 20:34

## 2020-01-01 RX ADMIN — POTASSIUM BICARBONATE 40 MEQ: 782 TABLET, EFFERVESCENT ORAL at 09:49

## 2020-01-01 RX ADMIN — GABAPENTIN 100 MG: 100 CAPSULE ORAL at 13:40

## 2020-01-01 RX ADMIN — ALBUTEROL SULFATE 2.5 MG: 2.5 SOLUTION RESPIRATORY (INHALATION) at 02:01

## 2020-01-01 RX ADMIN — SODIUM CHLORIDE 20 ML/HR: 9 INJECTION, SOLUTION INTRAVENOUS at 10:05

## 2020-01-01 RX ADMIN — Medication 10 ML: at 11:15

## 2020-01-01 RX ADMIN — SODIUM CHLORIDE, PRESERVATIVE FREE 10 ML: 5 INJECTION INTRAVENOUS at 16:11

## 2020-01-01 RX ADMIN — MORPHINE SULFATE 4 MG: 4 INJECTION, SOLUTION INTRAMUSCULAR; INTRAVENOUS at 07:59

## 2020-01-01 RX ADMIN — LACTULOSE 10 G: 20 SOLUTION ORAL at 21:33

## 2020-01-01 RX ADMIN — FAMOTIDINE 20 MG: 10 INJECTION INTRAVENOUS at 20:28

## 2020-01-01 RX ADMIN — Medication 10 ML: at 15:02

## 2020-01-01 RX ADMIN — ONDANSETRON 4 MG: 2 INJECTION INTRAMUSCULAR; INTRAVENOUS at 19:10

## 2020-01-01 RX ADMIN — SODIUM CHLORIDE 20 ML/HR: 9 INJECTION, SOLUTION INTRAVENOUS at 12:36

## 2020-01-01 RX ADMIN — NALOXONE HYDROCHLORIDE 1 MG: 1 INJECTION PARENTERAL at 08:15

## 2020-01-01 RX ADMIN — OXYCODONE HYDROCHLORIDE 10 MG: 10 TABLET ORAL at 09:52

## 2020-01-01 RX ADMIN — MIDAZOLAM HYDROCHLORIDE 0.5 MG: 1 INJECTION, SOLUTION INTRAMUSCULAR; INTRAVENOUS at 08:56

## 2020-01-01 RX ADMIN — PIPERACILLIN SODIUM AND TAZOBACTAM SODIUM 3.38 G: 3; .375 INJECTION, POWDER, LYOPHILIZED, FOR SOLUTION INTRAVENOUS at 14:26

## 2020-01-01 RX ADMIN — SODIUM CHLORIDE 20 ML/HR: 9 INJECTION, SOLUTION INTRAVENOUS at 12:54

## 2020-01-01 RX ADMIN — TRAZODONE HYDROCHLORIDE 300 MG: 100 TABLET ORAL at 21:33

## 2020-01-01 RX ADMIN — MAGNESIUM SULFATE HEPTAHYDRATE 1 G: 1 INJECTION, SOLUTION INTRAVENOUS at 14:33

## 2020-01-01 RX ADMIN — LACTULOSE 10 G: 20 SOLUTION ORAL at 20:25

## 2020-01-01 RX ADMIN — CITALOPRAM HYDROBROMIDE 10 MG: 20 TABLET ORAL at 20:25

## 2020-01-01 RX ADMIN — DEXTROSE AND SODIUM CHLORIDE: 5; 900 INJECTION, SOLUTION INTRAVENOUS at 20:09

## 2020-01-01 RX ADMIN — Medication 10 ML: at 21:19

## 2020-01-01 RX ADMIN — SODIUM CHLORIDE 20 ML/HR: 9 INJECTION, SOLUTION INTRAVENOUS at 11:15

## 2020-01-01 RX ADMIN — IPRATROPIUM BROMIDE AND ALBUTEROL SULFATE 1 AMPULE: .5; 3 SOLUTION RESPIRATORY (INHALATION) at 17:33

## 2020-01-01 RX ADMIN — IOPAMIDOL 100 ML: 612 INJECTION, SOLUTION INTRAVENOUS at 10:55

## 2020-01-01 RX ADMIN — PIPERACILLIN SODIUM AND TAZOBACTAM SODIUM 3.38 G: 3; .375 INJECTION, POWDER, LYOPHILIZED, FOR SOLUTION INTRAVENOUS at 05:30

## 2020-01-01 RX ADMIN — POTASSIUM CHLORIDE 10 MEQ: 10 INJECTION, SOLUTION INTRAVENOUS at 13:15

## 2020-01-01 RX ADMIN — TRAZODONE HYDROCHLORIDE 300 MG: 100 TABLET ORAL at 21:14

## 2020-01-01 RX ADMIN — CIPROFLOXACIN HYDROCHLORIDE 500 MG: 500 TABLET, FILM COATED ORAL at 08:44

## 2020-01-01 RX ADMIN — PIPERACILLIN SODIUM AND TAZOBACTAM SODIUM 3.38 G: 3; .375 INJECTION, POWDER, LYOPHILIZED, FOR SOLUTION INTRAVENOUS at 05:58

## 2020-01-01 RX ADMIN — HEPARIN 500 UNITS: 100 SYRINGE at 16:39

## 2020-01-01 RX ADMIN — GABAPENTIN 100 MG: 100 CAPSULE ORAL at 20:51

## 2020-01-01 RX ADMIN — ONDANSETRON 4 MG: 2 INJECTION INTRAMUSCULAR; INTRAVENOUS at 10:58

## 2020-01-01 RX ADMIN — Medication 10 ML: at 08:45

## 2020-01-01 RX ADMIN — TETANUS TOXOID, REDUCED DIPHTHERIA TOXOID AND ACELLULAR PERTUSSIS VACCINE, ADSORBED 0.5 ML: 5; 2.5; 8; 8; 2.5 SUSPENSION INTRAMUSCULAR at 14:11

## 2020-01-01 RX ADMIN — SODIUM CHLORIDE 500 MG: 9 INJECTION, SOLUTION INTRAVENOUS at 15:00

## 2020-01-01 RX ADMIN — LACTULOSE 10 G: 20 SOLUTION ORAL at 08:44

## 2020-01-01 RX ADMIN — LACTULOSE 20 G: 20 SOLUTION ORAL at 20:41

## 2020-01-01 RX ADMIN — ALBUTEROL SULFATE 2.5 MG: 2.5 SOLUTION RESPIRATORY (INHALATION) at 05:41

## 2020-01-01 RX ADMIN — METRONIDAZOLE 500 MG: 500 TABLET ORAL at 06:05

## 2020-01-01 RX ADMIN — ONDANSETRON 4 MG: 2 INJECTION INTRAMUSCULAR; INTRAVENOUS at 17:43

## 2020-01-01 RX ADMIN — Medication 10 ML: at 09:00

## 2020-01-01 RX ADMIN — POTASSIUM CHLORIDE 40 MEQ: 1500 TABLET, EXTENDED RELEASE ORAL at 21:19

## 2020-01-01 RX ADMIN — SODIUM CHLORIDE, PRESERVATIVE FREE 500 UNITS: 5 INJECTION INTRAVENOUS at 16:07

## 2020-01-01 RX ADMIN — GADOTERIDOL 10 ML: 279.3 INJECTION, SOLUTION INTRAVENOUS at 12:46

## 2020-01-01 RX ADMIN — OXYCODONE HYDROCHLORIDE 5 MG: 5 TABLET ORAL at 21:01

## 2020-01-01 RX ADMIN — PIPERACILLIN SODIUM AND TAZOBACTAM SODIUM 3.38 G: 3; .375 INJECTION, POWDER, LYOPHILIZED, FOR SOLUTION INTRAVENOUS at 13:57

## 2020-01-01 RX ADMIN — POTASSIUM CHLORIDE, DEXTROSE MONOHYDRATE AND SODIUM CHLORIDE: 150; 5; 450 INJECTION, SOLUTION INTRAVENOUS at 12:46

## 2020-01-01 RX ADMIN — IPRATROPIUM BROMIDE AND ALBUTEROL SULFATE 1 AMPULE: .5; 3 SOLUTION RESPIRATORY (INHALATION) at 15:56

## 2020-01-01 RX ADMIN — OXYCODONE HYDROCHLORIDE 10 MG: 5 TABLET ORAL at 05:46

## 2020-01-01 RX ADMIN — MORPHINE SULFATE 4 MG: 4 INJECTION INTRAVENOUS at 06:00

## 2020-01-01 RX ADMIN — THIAMINE HYDROCHLORIDE 100 MG: 100 INJECTION, SOLUTION INTRAMUSCULAR; INTRAVENOUS at 10:46

## 2020-01-01 RX ADMIN — POTASSIUM CHLORIDE 40 MEQ: 1500 TABLET, EXTENDED RELEASE ORAL at 19:00

## 2020-01-01 RX ADMIN — CITALOPRAM 10 MG: 10 TABLET, FILM COATED ORAL at 20:43

## 2020-01-01 RX ADMIN — FENTANYL CITRATE 50 MCG: 50 INJECTION, SOLUTION INTRAMUSCULAR; INTRAVENOUS at 08:33

## 2020-01-01 RX ADMIN — SODIUM CHLORIDE 1000 ML: 9 INJECTION, SOLUTION INTRAVENOUS at 09:50

## 2020-01-01 RX ADMIN — OXYCODONE HYDROCHLORIDE 20 MG: 5 TABLET ORAL at 14:07

## 2020-01-01 RX ADMIN — CITALOPRAM 10 MG: 10 TABLET, FILM COATED ORAL at 21:15

## 2020-01-01 RX ADMIN — OXYCODONE HYDROCHLORIDE 10 MG: 5 TABLET ORAL at 09:26

## 2020-01-01 RX ADMIN — IPRATROPIUM BROMIDE AND ALBUTEROL SULFATE 1 AMPULE: .5; 3 SOLUTION RESPIRATORY (INHALATION) at 09:02

## 2020-01-01 RX ADMIN — SODIUM CHLORIDE 1000 ML: 9 INJECTION, SOLUTION INTRAVENOUS at 07:33

## 2020-01-01 RX ADMIN — LACTULOSE 20 G: 20 SOLUTION ORAL at 21:15

## 2020-01-01 RX ADMIN — SODIUM CHLORIDE, PRESERVATIVE FREE 10 ML: 5 INJECTION INTRAVENOUS at 12:04

## 2020-01-01 RX ADMIN — MORPHINE SULFATE 4 MG: 4 INJECTION, SOLUTION INTRAMUSCULAR; INTRAVENOUS at 09:05

## 2020-01-01 RX ADMIN — PIPERACILLIN SODIUM AND TAZOBACTAM SODIUM 3.38 G: 3; .375 INJECTION, POWDER, LYOPHILIZED, FOR SOLUTION INTRAVENOUS at 00:49

## 2020-01-01 RX ADMIN — LACTULOSE 20 G: 20 SOLUTION ORAL at 12:46

## 2020-01-01 RX ADMIN — HEPARIN 500 UNITS: 100 SYRINGE at 13:59

## 2020-01-01 RX ADMIN — SODIUM CHLORIDE 500 MG: 9 INJECTION, SOLUTION INTRAVENOUS at 12:53

## 2020-01-01 RX ADMIN — SODIUM CHLORIDE, PRESERVATIVE FREE 10 ML: 5 INJECTION INTRAVENOUS at 15:04

## 2020-01-01 RX ADMIN — PIPERACILLIN AND TAZOBACTAM 4.5 G: 4; .5 INJECTION, POWDER, LYOPHILIZED, FOR SOLUTION INTRAVENOUS; PARENTERAL at 15:36

## 2020-01-01 RX ADMIN — FAMOTIDINE 20 MG: 10 INJECTION INTRAVENOUS at 09:09

## 2020-01-01 RX ADMIN — GABAPENTIN 100 MG: 100 CAPSULE ORAL at 08:01

## 2020-01-01 RX ADMIN — ENOXAPARIN SODIUM 40 MG: 40 INJECTION SUBCUTANEOUS at 08:33

## 2020-01-01 RX ADMIN — SODIUM CHLORIDE: 9 INJECTION, SOLUTION INTRAVENOUS at 07:23

## 2020-01-01 RX ADMIN — ENOXAPARIN SODIUM 40 MG: 40 INJECTION SUBCUTANEOUS at 08:01

## 2020-01-01 RX ADMIN — Medication 3 MILLICURIE: at 10:00

## 2020-01-01 RX ADMIN — CITALOPRAM HYDROBROMIDE 10 MG: 20 TABLET ORAL at 20:53

## 2020-01-01 RX ADMIN — OXYCODONE HYDROCHLORIDE 10 MG: 10 TABLET ORAL at 19:57

## 2020-01-01 RX ADMIN — OXYCODONE HYDROCHLORIDE 10 MG: 10 TABLET ORAL at 22:08

## 2020-01-01 RX ADMIN — MORPHINE SULFATE 4 MG: 4 INJECTION, SOLUTION INTRAMUSCULAR; INTRAVENOUS at 20:02

## 2020-01-01 RX ADMIN — SODIUM CHLORIDE 80 ML: 9 INJECTION, SOLUTION INTRAVENOUS at 16:26

## 2020-01-01 RX ADMIN — POTASSIUM BICARBONATE 40 MEQ: 782 TABLET, EFFERVESCENT ORAL at 06:24

## 2020-01-01 RX ADMIN — CIPROFLOXACIN HYDROCHLORIDE 500 MG: 500 TABLET, FILM COATED ORAL at 20:53

## 2020-01-01 RX ADMIN — SODIUM CHLORIDE 20 ML/HR: 9 INJECTION, SOLUTION INTRAVENOUS at 15:00

## 2020-01-01 RX ADMIN — METRONIDAZOLE 500 MG: 500 TABLET ORAL at 17:47

## 2020-01-01 RX ADMIN — GABAPENTIN 100 MG: 100 CAPSULE ORAL at 08:28

## 2020-01-01 RX ADMIN — DEXTROSE AND SODIUM CHLORIDE: 5; 900 INJECTION, SOLUTION INTRAVENOUS at 04:21

## 2020-01-01 RX ADMIN — PIPERACILLIN SODIUM AND TAZOBACTAM SODIUM 3.38 G: 3; .375 INJECTION, POWDER, LYOPHILIZED, FOR SOLUTION INTRAVENOUS at 09:03

## 2020-01-01 RX ADMIN — FENTANYL CITRATE 50 MCG: 50 INJECTION, SOLUTION INTRAMUSCULAR; INTRAVENOUS at 12:13

## 2020-01-01 RX ADMIN — FAMOTIDINE 20 MG: 10 INJECTION INTRAVENOUS at 09:03

## 2020-01-01 RX ADMIN — Medication 50 MCG: at 09:44

## 2020-01-01 RX ADMIN — FENTANYL CITRATE 50 MCG: 50 INJECTION, SOLUTION INTRAMUSCULAR; INTRAVENOUS at 22:16

## 2020-01-01 RX ADMIN — IOHEXOL 50 ML: 240 INJECTION, SOLUTION INTRATHECAL; INTRAVASCULAR; INTRAVENOUS; ORAL at 10:20

## 2020-01-01 RX ADMIN — TIOTROPIUM BROMIDE INHALATION SPRAY 2 PUFF: 3.12 SPRAY, METERED RESPIRATORY (INHALATION) at 09:03

## 2020-01-01 RX ADMIN — PIPERACILLIN SODIUM AND TAZOBACTAM SODIUM 3.38 G: 3; .375 INJECTION, POWDER, LYOPHILIZED, FOR SOLUTION INTRAVENOUS at 15:55

## 2020-01-01 RX ADMIN — TRAZODONE HYDROCHLORIDE 300 MG: 100 TABLET ORAL at 20:53

## 2020-01-01 RX ADMIN — MORPHINE SULFATE 4 MG: 4 INJECTION, SOLUTION INTRAMUSCULAR; INTRAVENOUS at 01:56

## 2020-01-01 RX ADMIN — METRONIDAZOLE 500 MG: 500 TABLET ORAL at 20:53

## 2020-01-01 RX ADMIN — SODIUM CHLORIDE 80 ML: 9 INJECTION, SOLUTION INTRAVENOUS at 10:18

## 2020-01-01 RX ADMIN — FAMOTIDINE 20 MG: 10 INJECTION INTRAVENOUS at 20:53

## 2020-01-01 RX ADMIN — SODIUM CHLORIDE: 9 INJECTION, SOLUTION INTRAVENOUS at 07:18

## 2020-01-01 RX ADMIN — SODIUM CHLORIDE 20 ML/HR: 9 INJECTION, SOLUTION INTRAVENOUS at 09:00

## 2020-01-01 RX ADMIN — IOHEXOL 75 ML: 350 INJECTION, SOLUTION INTRAVENOUS at 18:38

## 2020-01-01 RX ADMIN — OXYCODONE HYDROCHLORIDE 5 MG: 5 TABLET ORAL at 01:18

## 2020-01-01 RX ADMIN — GABAPENTIN 100 MG: 100 CAPSULE ORAL at 18:03

## 2020-01-01 RX ADMIN — SODIUM CHLORIDE 500 MG: 9 INJECTION, SOLUTION INTRAVENOUS at 13:42

## 2020-01-01 RX ADMIN — Medication 10 ML: at 14:31

## 2020-01-01 RX ADMIN — HEPARIN 500 UNITS: 100 SYRINGE at 14:20

## 2020-01-01 RX ADMIN — INSULIN LISPRO 1 UNITS: 100 INJECTION, SOLUTION INTRAVENOUS; SUBCUTANEOUS at 12:10

## 2020-01-01 RX ADMIN — Medication 10 ML: at 10:19

## 2020-01-01 RX ADMIN — IOHEXOL 50 ML: 240 INJECTION, SOLUTION INTRATHECAL; INTRAVASCULAR; INTRAVENOUS; ORAL at 14:48

## 2020-01-01 RX ADMIN — LACTULOSE 10 G: 20 SOLUTION ORAL at 19:57

## 2020-01-01 RX ADMIN — SODIUM CHLORIDE, PRESERVATIVE FREE 10 ML: 5 INJECTION INTRAVENOUS at 13:59

## 2020-01-01 RX ADMIN — MORPHINE SULFATE 4 MG: 4 INJECTION INTRAVENOUS at 10:58

## 2020-01-01 RX ADMIN — PIPERACILLIN SODIUM AND TAZOBACTAM SODIUM 3.38 G: 3; .375 INJECTION, POWDER, LYOPHILIZED, FOR SOLUTION INTRAVENOUS at 06:08

## 2020-01-01 RX ADMIN — POTASSIUM CHLORIDE 10 MEQ: 10 INJECTION, SOLUTION INTRAVENOUS at 12:15

## 2020-01-01 RX ADMIN — SODIUM CHLORIDE, PRESERVATIVE FREE 10 ML: 5 INJECTION INTRAVENOUS at 20:43

## 2020-01-01 RX ADMIN — LACTULOSE 10 G: 20 SOLUTION ORAL at 21:18

## 2020-01-01 RX ADMIN — LACTULOSE 20 G: 20 SOLUTION ORAL at 18:03

## 2020-01-01 RX ADMIN — HYDROXYZINE HYDROCHLORIDE 50 MG: 25 TABLET, FILM COATED ORAL at 09:03

## 2020-01-01 RX ADMIN — IPRATROPIUM BROMIDE AND ALBUTEROL SULFATE 1 AMPULE: .5; 3 SOLUTION RESPIRATORY (INHALATION) at 20:12

## 2020-01-01 RX ADMIN — INSULIN LISPRO 1 UNITS: 100 INJECTION, SOLUTION INTRAVENOUS; SUBCUTANEOUS at 19:56

## 2020-01-01 RX ADMIN — TRAZODONE HYDROCHLORIDE 300 MG: 100 TABLET ORAL at 21:17

## 2020-01-01 RX ADMIN — HYDROXYZINE HYDROCHLORIDE 50 MG: 25 TABLET, FILM COATED ORAL at 09:52

## 2020-01-01 RX ADMIN — THIAMINE HYDROCHLORIDE 100 MG: 100 INJECTION, SOLUTION INTRAMUSCULAR; INTRAVENOUS at 09:56

## 2020-01-01 RX ADMIN — Medication 50 MCG: at 10:55

## 2020-01-01 RX ADMIN — IPRATROPIUM BROMIDE AND ALBUTEROL SULFATE 1 AMPULE: .5; 3 SOLUTION RESPIRATORY (INHALATION) at 20:23

## 2020-01-01 RX ADMIN — Medication 10 ML: at 11:12

## 2020-01-01 RX ADMIN — ACETAMINOPHEN 650 MG: 325 TABLET ORAL at 12:04

## 2020-01-01 RX ADMIN — POTASSIUM CHLORIDE: 2 INJECTION, SOLUTION, CONCENTRATE INTRAVENOUS at 12:03

## 2020-01-01 RX ADMIN — SODIUM CHLORIDE, PRESERVATIVE FREE 10 ML: 5 INJECTION INTRAVENOUS at 14:56

## 2020-01-01 RX ADMIN — OXYCODONE HYDROCHLORIDE 5 MG: 5 TABLET ORAL at 05:11

## 2020-01-01 RX ADMIN — DEXTROSE AND SODIUM CHLORIDE: 5; 900 INJECTION, SOLUTION INTRAVENOUS at 20:24

## 2020-01-01 RX ADMIN — IOVERSOL 60 ML: 741 INJECTION INTRA-ARTERIAL; INTRAVENOUS at 10:29

## 2020-01-01 RX ADMIN — POTASSIUM CHLORIDE 10 MEQ: 10 INJECTION, SOLUTION INTRAVENOUS at 09:25

## 2020-01-01 RX ADMIN — ENOXAPARIN SODIUM 40 MG: 40 INJECTION SUBCUTANEOUS at 09:56

## 2020-01-01 RX ADMIN — MORPHINE SULFATE 4 MG: 4 INJECTION, SOLUTION INTRAMUSCULAR; INTRAVENOUS at 19:11

## 2020-01-01 RX ADMIN — MORPHINE SULFATE 4 MG: 4 INJECTION, SOLUTION INTRAMUSCULAR; INTRAVENOUS at 15:15

## 2020-01-01 RX ADMIN — INSULIN LISPRO 1 UNITS: 100 INJECTION, SOLUTION INTRAVENOUS; SUBCUTANEOUS at 16:54

## 2020-01-01 RX ADMIN — POTASSIUM CHLORIDE 40 MEQ: 1500 TABLET, EXTENDED RELEASE ORAL at 14:33

## 2020-01-01 RX ADMIN — MORPHINE SULFATE 4 MG: 4 INJECTION INTRAVENOUS at 14:20

## 2020-01-01 RX ADMIN — INSULIN LISPRO 1 UNITS: 100 INJECTION, SOLUTION INTRAVENOUS; SUBCUTANEOUS at 17:13

## 2020-01-01 RX ADMIN — OXYCODONE HYDROCHLORIDE 5 MG: 5 TABLET ORAL at 11:38

## 2020-01-01 RX ADMIN — SODIUM CHLORIDE 20 ML/HR: 9 INJECTION, SOLUTION INTRAVENOUS at 13:24

## 2020-01-01 RX ADMIN — IPRATROPIUM BROMIDE AND ALBUTEROL SULFATE 3 ML: .5; 3 SOLUTION RESPIRATORY (INHALATION) at 07:34

## 2020-01-01 RX ADMIN — INSULIN LISPRO 1 UNITS: 100 INJECTION, SOLUTION INTRAVENOUS; SUBCUTANEOUS at 13:00

## 2020-01-01 RX ADMIN — FAMOTIDINE 20 MG: 20 TABLET, FILM COATED ORAL at 14:24

## 2020-01-01 RX ADMIN — CITALOPRAM HYDROBROMIDE 10 MG: 20 TABLET ORAL at 19:56

## 2020-01-01 RX ADMIN — SODIUM CHLORIDE 500 ML: 9 INJECTION, SOLUTION INTRAVENOUS at 09:49

## 2020-01-01 RX ADMIN — POTASSIUM BICARBONATE 40 MEQ: 782 TABLET, EFFERVESCENT ORAL at 01:55

## 2020-01-01 RX ADMIN — LACTULOSE 10 G: 20 SOLUTION ORAL at 09:52

## 2020-01-01 RX ADMIN — CITALOPRAM HYDROBROMIDE: 20 TABLET ORAL at 21:17

## 2020-01-01 RX ADMIN — LACTULOSE 20 G: 20 SOLUTION ORAL at 21:01

## 2020-01-01 RX ADMIN — OXYCODONE HYDROCHLORIDE 10 MG: 10 TABLET ORAL at 11:32

## 2020-01-01 RX ADMIN — INSULIN LISPRO 2 UNITS: 100 INJECTION, SOLUTION INTRAVENOUS; SUBCUTANEOUS at 17:21

## 2020-01-01 RX ADMIN — POTASSIUM CHLORIDE 40 MEQ: 20 TABLET, EXTENDED RELEASE ORAL at 16:07

## 2020-01-01 RX ADMIN — SODIUM CHLORIDE 500 MG: 9 INJECTION, SOLUTION INTRAVENOUS at 13:16

## 2020-01-01 RX ADMIN — FAMOTIDINE 20 MG: 10 INJECTION INTRAVENOUS at 21:33

## 2020-01-01 RX ADMIN — IPRATROPIUM BROMIDE AND ALBUTEROL SULFATE 1 AMPULE: .5; 3 SOLUTION RESPIRATORY (INHALATION) at 08:38

## 2020-01-01 RX ADMIN — SODIUM CHLORIDE 80 ML: 9 INJECTION, SOLUTION INTRAVENOUS at 15:02

## 2020-01-01 RX ADMIN — MORPHINE SULFATE 4 MG: 4 INJECTION INTRAVENOUS at 18:03

## 2020-01-01 RX ADMIN — DEXTROSE AND SODIUM CHLORIDE: 5; 900 INJECTION, SOLUTION INTRAVENOUS at 20:02

## 2020-01-01 RX ADMIN — OXYCODONE HYDROCHLORIDE 10 MG: 10 TABLET ORAL at 14:35

## 2020-01-01 RX ADMIN — INSULIN LISPRO 1 UNITS: 100 INJECTION, SOLUTION INTRAVENOUS; SUBCUTANEOUS at 08:50

## 2020-01-01 RX ADMIN — HEPARIN 500 UNITS: 100 SYRINGE at 14:57

## 2020-01-01 RX ADMIN — OXYCODONE HYDROCHLORIDE 5 MG: 5 TABLET ORAL at 16:51

## 2020-01-01 RX ADMIN — IOVERSOL 100 ML: 741 INJECTION INTRA-ARTERIAL; INTRAVENOUS at 10:19

## 2020-01-01 RX ADMIN — MAGNESIUM SULFATE HEPTAHYDRATE 2 G: 40 INJECTION, SOLUTION INTRAVENOUS at 20:05

## 2020-01-01 RX ADMIN — POTASSIUM PHOSPHATE, MONOBASIC AND POTASSIUM PHOSPHATE, DIBASIC 20 MMOL: 224; 236 INJECTION, SOLUTION, CONCENTRATE INTRAVENOUS at 10:44

## 2020-01-01 RX ADMIN — IPRATROPIUM BROMIDE AND ALBUTEROL SULFATE 3 ML: .5; 3 SOLUTION RESPIRATORY (INHALATION) at 12:21

## 2020-01-01 RX ADMIN — OXYCODONE HYDROCHLORIDE 10 MG: 10 TABLET ORAL at 19:01

## 2020-01-01 RX ADMIN — MORPHINE SULFATE 4 MG: 4 INJECTION, SOLUTION INTRAMUSCULAR; INTRAVENOUS at 21:17

## 2020-01-01 RX ADMIN — DEXTROSE AND SODIUM CHLORIDE: 5; 900 INJECTION, SOLUTION INTRAVENOUS at 14:07

## 2020-01-01 RX ADMIN — SODIUM CHLORIDE 500 MG: 9 INJECTION, SOLUTION INTRAVENOUS at 12:37

## 2020-01-01 RX ADMIN — INSULIN LISPRO 2 UNITS: 100 INJECTION, SOLUTION INTRAVENOUS; SUBCUTANEOUS at 08:34

## 2020-01-01 RX ADMIN — FAMOTIDINE 20 MG: 10 INJECTION INTRAVENOUS at 20:09

## 2020-01-01 RX ADMIN — OXYCODONE HYDROCHLORIDE 10 MG: 10 TABLET ORAL at 10:11

## 2020-01-01 RX ADMIN — MIDAZOLAM HYDROCHLORIDE 0.5 MG: 1 INJECTION, SOLUTION INTRAMUSCULAR; INTRAVENOUS at 09:36

## 2020-01-01 RX ADMIN — MIDAZOLAM 0.5 MG: 1 INJECTION INTRAMUSCULAR; INTRAVENOUS at 09:44

## 2020-01-01 RX ADMIN — INSULIN LISPRO 1 UNITS: 100 INJECTION, SOLUTION INTRAVENOUS; SUBCUTANEOUS at 20:53

## 2020-01-01 RX ADMIN — ONDANSETRON 4 MG: 2 INJECTION INTRAMUSCULAR; INTRAVENOUS at 08:21

## 2020-01-01 RX ADMIN — ALBUTEROL SULFATE 2.5 MG: 2.5 SOLUTION RESPIRATORY (INHALATION) at 20:23

## 2020-01-01 RX ADMIN — IOPAMIDOL 75 ML: 755 INJECTION, SOLUTION INTRAVENOUS at 12:10

## 2020-01-01 RX ADMIN — SODIUM CHLORIDE: 9 INJECTION, SOLUTION INTRAVENOUS at 19:31

## 2020-01-01 RX ADMIN — PIPERACILLIN SODIUM AND TAZOBACTAM SODIUM 3.38 G: 3; .375 INJECTION, POWDER, LYOPHILIZED, FOR SOLUTION INTRAVENOUS at 21:33

## 2020-01-01 RX ADMIN — MORPHINE SULFATE 4 MG: 4 INJECTION, SOLUTION INTRAMUSCULAR; INTRAVENOUS at 12:29

## 2020-01-01 RX ADMIN — FENTANYL CITRATE 50 MCG: 50 INJECTION, SOLUTION INTRAMUSCULAR; INTRAVENOUS at 18:42

## 2020-01-01 RX ADMIN — SODIUM CHLORIDE, PRESERVATIVE FREE 10 ML: 5 INJECTION INTRAVENOUS at 21:01

## 2020-01-01 RX ADMIN — LORAZEPAM 0.5 MG: 2 INJECTION INTRAMUSCULAR; INTRAVENOUS at 08:12

## 2020-01-01 RX ADMIN — ONDANSETRON 4 MG: 2 INJECTION INTRAMUSCULAR; INTRAVENOUS at 08:05

## 2020-01-01 RX ADMIN — MORPHINE SULFATE 4 MG: 4 INJECTION, SOLUTION INTRAMUSCULAR; INTRAVENOUS at 18:17

## 2020-01-01 RX ADMIN — SODIUM CHLORIDE, PRESERVATIVE FREE 10 ML: 5 INJECTION INTRAVENOUS at 09:57

## 2020-01-01 RX ADMIN — PANTOPRAZOLE SODIUM 40 MG: 40 INJECTION, POWDER, FOR SOLUTION INTRAVENOUS at 09:07

## 2020-01-01 RX ADMIN — TRAZODONE HYDROCHLORIDE 300 MG: 100 TABLET ORAL at 19:56

## 2020-01-01 RX ADMIN — MORPHINE SULFATE 4 MG: 4 INJECTION INTRAVENOUS at 02:04

## 2020-01-01 RX ADMIN — SODIUM CHLORIDE, PRESERVATIVE FREE 20 ML: 5 INJECTION INTRAVENOUS at 16:38

## 2020-01-01 RX ADMIN — HYDROXYZINE HYDROCHLORIDE 50 MG: 25 TABLET, FILM COATED ORAL at 08:44

## 2020-01-01 RX ADMIN — ALBUTEROL SULFATE 2.5 MG: 2.5 SOLUTION RESPIRATORY (INHALATION) at 01:47

## 2020-01-01 RX ADMIN — POTASSIUM CHLORIDE, DEXTROSE MONOHYDRATE AND SODIUM CHLORIDE: 150; 5; 450 INJECTION, SOLUTION INTRAVENOUS at 01:56

## 2020-01-01 RX ADMIN — LACTULOSE 20 G: 20 SOLUTION ORAL at 08:28

## 2020-01-01 RX ADMIN — IPRATROPIUM BROMIDE AND ALBUTEROL SULFATE 1 AMPULE: .5; 3 SOLUTION RESPIRATORY (INHALATION) at 08:36

## 2020-01-01 RX ADMIN — Medication 10 ML: at 08:28

## 2020-01-01 RX ADMIN — DEXTROSE AND SODIUM CHLORIDE: 5; 900 INJECTION, SOLUTION INTRAVENOUS at 05:32

## 2020-01-01 RX ADMIN — DEXTROSE AND SODIUM CHLORIDE: 5; 900 INJECTION, SOLUTION INTRAVENOUS at 05:57

## 2020-01-01 RX ADMIN — INSULIN LISPRO 1 UNITS: 100 INJECTION, SOLUTION INTRAVENOUS; SUBCUTANEOUS at 11:35

## 2020-01-01 RX ADMIN — FAMOTIDINE 20 MG: 10 INJECTION INTRAVENOUS at 08:44

## 2020-01-01 RX ADMIN — POTASSIUM CHLORIDE, DEXTROSE MONOHYDRATE AND SODIUM CHLORIDE: 150; 5; 450 INJECTION, SOLUTION INTRAVENOUS at 17:20

## 2020-01-01 RX ADMIN — IPRATROPIUM BROMIDE AND ALBUTEROL SULFATE 1 AMPULE: .5; 3 SOLUTION RESPIRATORY (INHALATION) at 09:04

## 2020-01-01 RX ADMIN — CITALOPRAM HYDROBROMIDE 10 MG: 20 TABLET ORAL at 21:33

## 2020-01-01 RX ADMIN — SODIUM CHLORIDE, PRESERVATIVE FREE 10 ML: 5 INJECTION INTRAVENOUS at 20:55

## 2020-01-01 RX ADMIN — ENOXAPARIN SODIUM 40 MG: 40 INJECTION SUBCUTANEOUS at 23:05

## 2020-01-01 RX ADMIN — Medication 10 ML: at 10:05

## 2020-01-01 RX ADMIN — MAGNESIUM SULFATE HEPTAHYDRATE 1 G: 1 INJECTION, SOLUTION INTRAVENOUS at 15:46

## 2020-01-01 RX ADMIN — OXYCODONE HYDROCHLORIDE 5 MG: 5 TABLET ORAL at 18:10

## 2020-01-01 RX ADMIN — POTASSIUM CHLORIDE 10 MEQ: 10 INJECTION, SOLUTION INTRAVENOUS at 23:27

## 2020-01-01 RX ADMIN — SODIUM CHLORIDE: 9 INJECTION, SOLUTION INTRAVENOUS at 08:38

## 2020-01-01 RX ADMIN — HEPARIN 500 UNITS: 100 SYRINGE at 14:31

## 2020-01-01 RX ADMIN — GABAPENTIN 100 MG: 100 CAPSULE ORAL at 21:01

## 2020-01-01 RX ADMIN — CITALOPRAM 10 MG: 10 TABLET, FILM COATED ORAL at 20:51

## 2020-01-01 RX ADMIN — IPRATROPIUM BROMIDE AND ALBUTEROL SULFATE 1 AMPULE: .5; 3 SOLUTION RESPIRATORY (INHALATION) at 16:37

## 2020-01-01 RX ADMIN — OXYCODONE HYDROCHLORIDE 10 MG: 10 TABLET ORAL at 05:31

## 2020-01-01 RX ADMIN — Medication 10 ML: at 16:27

## 2020-01-01 RX ADMIN — IOPAMIDOL 100 ML: 755 INJECTION, SOLUTION INTRAVENOUS at 15:02

## 2020-01-01 RX ADMIN — OXYCODONE HYDROCHLORIDE 5 MG: 5 TABLET ORAL at 23:30

## 2020-01-01 RX ADMIN — Medication 50 MCG: at 09:47

## 2020-01-01 RX ADMIN — GADOTERIDOL: 279.3 INJECTION, SOLUTION INTRAVENOUS at 17:49

## 2020-01-01 RX ADMIN — THIAMINE HYDROCHLORIDE 100 MG: 100 INJECTION, SOLUTION INTRAMUSCULAR; INTRAVENOUS at 07:59

## 2020-01-01 RX ADMIN — SODIUM CHLORIDE, PRESERVATIVE FREE 10 ML: 5 INJECTION INTRAVENOUS at 12:10

## 2020-01-01 RX ADMIN — ONDANSETRON 4 MG: 2 INJECTION INTRAMUSCULAR; INTRAVENOUS at 02:09

## 2020-01-01 RX ADMIN — OXYCODONE HYDROCHLORIDE 10 MG: 10 TABLET ORAL at 06:05

## 2020-01-01 RX ADMIN — HEPARIN 500 UNITS: 100 SYRINGE at 14:03

## 2020-01-01 RX ADMIN — ONDANSETRON 4 MG: 2 INJECTION INTRAMUSCULAR; INTRAVENOUS at 18:13

## 2020-01-01 RX ADMIN — MORPHINE SULFATE 4 MG: 4 INJECTION INTRAVENOUS at 17:43

## 2020-01-01 RX ADMIN — IOVERSOL 75 ML: 741 INJECTION INTRA-ARTERIAL; INTRAVENOUS at 16:26

## 2020-01-01 RX ADMIN — IPRATROPIUM BROMIDE AND ALBUTEROL SULFATE 1 AMPULE: .5; 3 SOLUTION RESPIRATORY (INHALATION) at 12:33

## 2020-01-01 RX ADMIN — SODIUM CHLORIDE 500 MG: 9 INJECTION, SOLUTION INTRAVENOUS at 13:34

## 2020-01-01 RX ADMIN — PIPERACILLIN SODIUM AND TAZOBACTAM SODIUM 3.38 G: 3; .375 INJECTION, POWDER, LYOPHILIZED, FOR SOLUTION INTRAVENOUS at 21:34

## 2020-01-01 RX ADMIN — CITALOPRAM 10 MG: 10 TABLET, FILM COATED ORAL at 21:01

## 2020-01-01 RX ADMIN — Medication 10 ML: at 14:08

## 2020-01-01 RX ADMIN — PREDNISONE 20 MG: 20 TABLET ORAL at 14:56

## 2020-01-01 RX ADMIN — LORAZEPAM 0.5 MG: 2 INJECTION INTRAMUSCULAR; INTRAVENOUS at 14:20

## 2020-01-01 RX ADMIN — HEPARIN 500 UNITS: 100 SYRINGE at 16:11

## 2020-01-01 RX ADMIN — MIDAZOLAM 0.5 MG: 1 INJECTION INTRAMUSCULAR; INTRAVENOUS at 09:47

## 2020-01-01 RX ADMIN — IPRATROPIUM BROMIDE AND ALBUTEROL SULFATE 1 AMPULE: .5; 3 SOLUTION RESPIRATORY (INHALATION) at 11:52

## 2020-01-01 RX ADMIN — PIPERACILLIN SODIUM AND TAZOBACTAM SODIUM 3.38 G: 3; .375 INJECTION, POWDER, LYOPHILIZED, FOR SOLUTION INTRAVENOUS at 20:24

## 2020-01-01 RX ADMIN — INSULIN LISPRO 1 UNITS: 100 INJECTION, SOLUTION INTRAVENOUS; SUBCUTANEOUS at 07:59

## 2020-01-01 RX ADMIN — MORPHINE SULFATE 4 MG: 4 INJECTION, SOLUTION INTRAMUSCULAR; INTRAVENOUS at 06:08

## 2020-01-01 RX ADMIN — THIAMINE HYDROCHLORIDE 100 MG: 100 INJECTION, SOLUTION INTRAMUSCULAR; INTRAVENOUS at 08:39

## 2020-01-01 RX ADMIN — ONDANSETRON 4 MG: 2 INJECTION INTRAMUSCULAR; INTRAVENOUS at 18:08

## 2020-01-01 RX ADMIN — OXYCODONE HYDROCHLORIDE 10 MG: 10 TABLET ORAL at 17:44

## 2020-01-01 RX ADMIN — SODIUM CHLORIDE 500 MG: 9 INJECTION, SOLUTION INTRAVENOUS at 12:58

## 2020-01-01 RX ADMIN — SODIUM CHLORIDE 500 MG: 9 INJECTION, SOLUTION INTRAVENOUS at 15:40

## 2020-01-01 RX ADMIN — TRAZODONE HYDROCHLORIDE 300 MG: 100 TABLET ORAL at 20:25

## 2020-01-01 RX ADMIN — Medication 400 MG: at 08:44

## 2020-01-01 RX ADMIN — FENTANYL CITRATE 50 MCG: 50 INJECTION INTRAMUSCULAR; INTRAVENOUS at 08:56

## 2020-01-01 RX ADMIN — CIPROFLOXACIN HYDROCHLORIDE 500 MG: 500 TABLET, FILM COATED ORAL at 12:10

## 2020-01-01 RX ADMIN — SODIUM CHLORIDE 80 ML: 0.9 INJECTION, SOLUTION INTRAVENOUS at 12:09

## 2020-01-01 RX ADMIN — FAMOTIDINE 20 MG: 10 INJECTION INTRAVENOUS at 21:16

## 2020-01-01 RX ADMIN — MORPHINE SULFATE 4 MG: 4 INJECTION INTRAVENOUS at 11:40

## 2020-01-01 RX ADMIN — LACTULOSE 20 G: 20 SOLUTION ORAL at 13:40

## 2020-01-01 RX ADMIN — CITALOPRAM 10 MG: 10 TABLET, FILM COATED ORAL at 20:41

## 2020-01-01 RX ADMIN — GABAPENTIN 100 MG: 100 CAPSULE ORAL at 21:16

## 2020-01-01 RX ADMIN — LACTULOSE 10 G: 20 SOLUTION ORAL at 20:53

## 2020-01-01 RX ADMIN — Medication 10 ML: at 21:17

## 2020-01-01 RX ADMIN — LACTULOSE 20 G: 20 SOLUTION ORAL at 08:01

## 2020-01-01 RX ADMIN — OXYCODONE HYDROCHLORIDE 10 MG: 10 TABLET ORAL at 09:03

## 2020-01-01 RX ADMIN — POTASSIUM CHLORIDE 10 MEQ: 10 INJECTION, SOLUTION INTRAVENOUS at 10:46

## 2020-01-01 RX ADMIN — SODIUM CHLORIDE 20 ML/HR: 9 INJECTION, SOLUTION INTRAVENOUS at 13:42

## 2020-01-01 RX ADMIN — SODIUM CHLORIDE: 9 INJECTION, SOLUTION INTRAVENOUS at 22:02

## 2020-01-01 RX ADMIN — DEXTROSE AND SODIUM CHLORIDE: 5; 900 INJECTION, SOLUTION INTRAVENOUS at 22:42

## 2020-01-01 RX ADMIN — IOHEXOL 75 ML: 350 INJECTION, SOLUTION INTRAVENOUS at 09:13

## 2020-01-01 RX ADMIN — MORPHINE SULFATE 4 MG: 4 INJECTION INTRAVENOUS at 08:38

## 2020-01-01 RX ADMIN — IPRATROPIUM BROMIDE AND ALBUTEROL SULFATE 1 AMPULE: .5; 3 SOLUTION RESPIRATORY (INHALATION) at 12:53

## 2020-01-01 RX ADMIN — LACTULOSE 20 G: 20 SOLUTION ORAL at 20:50

## 2020-01-01 RX ADMIN — IPRATROPIUM BROMIDE AND ALBUTEROL SULFATE 1 AMPULE: .5; 3 SOLUTION RESPIRATORY (INHALATION) at 16:21

## 2020-01-01 RX ADMIN — SODIUM CHLORIDE, PRESERVATIVE FREE 10 ML: 5 INJECTION INTRAVENOUS at 14:03

## 2020-01-01 RX ADMIN — HEPARIN 300 UNITS: 100 SYRINGE at 15:22

## 2020-01-01 RX ADMIN — HYDROXYZINE HYDROCHLORIDE 50 MG: 25 TABLET, FILM COATED ORAL at 14:35

## 2020-01-01 RX ADMIN — MORPHINE SULFATE 4 MG: 4 INJECTION INTRAVENOUS at 09:50

## 2020-01-01 RX ADMIN — SODIUM CHLORIDE 500 MG: 9 INJECTION, SOLUTION INTRAVENOUS at 14:26

## 2020-01-01 RX ADMIN — MORPHINE SULFATE 4 MG: 4 INJECTION, SOLUTION INTRAMUSCULAR; INTRAVENOUS at 12:16

## 2020-01-01 RX ADMIN — Medication 400 MG: at 17:47

## 2020-01-01 RX ADMIN — ONDANSETRON 4 MG: 2 INJECTION INTRAMUSCULAR; INTRAVENOUS at 09:03

## 2020-01-01 RX ADMIN — MORPHINE SULFATE 2 MG: 2 INJECTION, SOLUTION INTRAMUSCULAR; INTRAVENOUS at 21:23

## 2020-01-01 RX ADMIN — SODIUM CHLORIDE, POTASSIUM CHLORIDE, SODIUM LACTATE AND CALCIUM CHLORIDE 500 ML: 600; 310; 30; 20 INJECTION, SOLUTION INTRAVENOUS at 23:05

## 2020-01-01 RX ADMIN — KETOROLAC TROMETHAMINE 30 MG: 30 INJECTION, SOLUTION INTRAMUSCULAR at 09:00

## 2020-01-01 RX ADMIN — Medication 10 ML: at 16:07

## 2020-01-01 RX ADMIN — HEPARIN 500 UNITS: 100 SYRINGE at 14:08

## 2020-01-01 RX ADMIN — HEPARIN 300 UNITS: 100 SYRINGE at 10:32

## 2020-01-01 RX ADMIN — ONDANSETRON 4 MG: 2 INJECTION INTRAMUSCULAR; INTRAVENOUS at 09:50

## 2020-01-01 RX ADMIN — HEPARIN 500 UNITS: 100 SYRINGE at 12:04

## 2020-01-01 RX ADMIN — OXYCODONE HYDROCHLORIDE 10 MG: 5 TABLET ORAL at 18:26

## 2020-01-01 RX ADMIN — FAMOTIDINE 20 MG: 20 TABLET, FILM COATED ORAL at 08:32

## 2020-01-01 RX ADMIN — Medication 3 GBQ: at 10:42

## 2020-01-01 RX ADMIN — IOHEXOL 30 ML: 300 INJECTION, SOLUTION INTRAVENOUS at 15:02

## 2020-01-01 RX ADMIN — INSULIN LISPRO 1 UNITS: 100 INJECTION, SOLUTION INTRAVENOUS; SUBCUTANEOUS at 09:03

## 2020-01-01 RX ADMIN — INSULIN LISPRO 1 UNITS: 100 INJECTION, SOLUTION INTRAVENOUS; SUBCUTANEOUS at 20:51

## 2020-01-01 ASSESSMENT — PAIN SCALES - GENERAL
PAINLEVEL_OUTOF10: 6
PAINLEVEL_OUTOF10: 0
PAINLEVEL_OUTOF10: 8
PAINLEVEL_OUTOF10: 7
PAINLEVEL_OUTOF10: 9
PAINLEVEL_OUTOF10: 3
PAINLEVEL_OUTOF10: 10
PAINLEVEL_OUTOF10: 5
PAINLEVEL_OUTOF10: 9
PAINLEVEL_OUTOF10: 4
PAINLEVEL_OUTOF10: 8
PAINLEVEL_OUTOF10: 9
PAINLEVEL_OUTOF10: 0
PAINLEVEL_OUTOF10: 8
PAINLEVEL_OUTOF10: 4
PAINLEVEL_OUTOF10: 8
PAINLEVEL_OUTOF10: 10
PAINLEVEL_OUTOF10: 8
PAINLEVEL_OUTOF10: 2
PAINLEVEL_OUTOF10: 7
PAINLEVEL_OUTOF10: 4
PAINLEVEL_OUTOF10: 5
PAINLEVEL_OUTOF10: 5
PAINLEVEL_OUTOF10: 9
PAINLEVEL_OUTOF10: 8
PAINLEVEL_OUTOF10: 3
PAINLEVEL_OUTOF10: 8
PAINLEVEL_OUTOF10: 8
PAINLEVEL_OUTOF10: 7
PAINLEVEL_OUTOF10: 8
PAINLEVEL_OUTOF10: 8
PAINLEVEL_OUTOF10: 4
PAINLEVEL_OUTOF10: 9
PAINLEVEL_OUTOF10: 8
PAINLEVEL_OUTOF10: 9
PAINLEVEL_OUTOF10: 10
PAINLEVEL_OUTOF10: 8
PAINLEVEL_OUTOF10: 6
PAINLEVEL_OUTOF10: 6
PAINLEVEL_OUTOF10: 0
PAINLEVEL_OUTOF10: 5
PAINLEVEL_OUTOF10: 9
PAINLEVEL_OUTOF10: 7
PAINLEVEL_OUTOF10: 3
PAINLEVEL_OUTOF10: 7
PAINLEVEL_OUTOF10: 6
PAINLEVEL_OUTOF10: 9
PAINLEVEL_OUTOF10: 9
PAINLEVEL_OUTOF10: 7
PAINLEVEL_OUTOF10: 9
PAINLEVEL_OUTOF10: 9
PAINLEVEL_OUTOF10: 5
PAINLEVEL_OUTOF10: 7
PAINLEVEL_OUTOF10: 7
PAINLEVEL_OUTOF10: 6
PAINLEVEL_OUTOF10: 4
PAINLEVEL_OUTOF10: 3
PAINLEVEL_OUTOF10: 7
PAINLEVEL_OUTOF10: 7
PAINLEVEL_OUTOF10: 2
PAINLEVEL_OUTOF10: 0
PAINLEVEL_OUTOF10: 9
PAINLEVEL_OUTOF10: 8
PAINLEVEL_OUTOF10: 7
PAINLEVEL_OUTOF10: 7
PAINLEVEL_OUTOF10: 9
PAINLEVEL_OUTOF10: 6
PAINLEVEL_OUTOF10: 7
PAINLEVEL_OUTOF10: 9
PAINLEVEL_OUTOF10: 8
PAINLEVEL_OUTOF10: 8
PAINLEVEL_OUTOF10: 7
PAINLEVEL_OUTOF10: 8
PAINLEVEL_OUTOF10: 7
PAINLEVEL_OUTOF10: 4
PAINLEVEL_OUTOF10: 8
PAINLEVEL_OUTOF10: 5
PAINLEVEL_OUTOF10: 9
PAINLEVEL_OUTOF10: 7
PAINLEVEL_OUTOF10: 6
PAINLEVEL_OUTOF10: 8
PAINLEVEL_OUTOF10: 6
PAINLEVEL_OUTOF10: 8
PAINLEVEL_OUTOF10: 8
PAINLEVEL_OUTOF10: 3
PAINLEVEL_OUTOF10: 0
PAINLEVEL_OUTOF10: 7
PAINLEVEL_OUTOF10: 6
PAINLEVEL_OUTOF10: 10
PAINLEVEL_OUTOF10: 8
PAINLEVEL_OUTOF10: 8
PAINLEVEL_OUTOF10: 6
PAINLEVEL_OUTOF10: 7
PAINLEVEL_OUTOF10: 6
PAINLEVEL_OUTOF10: 5
PAINLEVEL_OUTOF10: 10
PAINLEVEL_OUTOF10: 5
PAINLEVEL_OUTOF10: 4
PAINLEVEL_OUTOF10: 7
PAINLEVEL_OUTOF10: 3
PAINLEVEL_OUTOF10: 8
PAINLEVEL_OUTOF10: 3
PAINLEVEL_OUTOF10: 9

## 2020-01-01 ASSESSMENT — PAIN DESCRIPTION - PAIN TYPE
TYPE: ACUTE PAIN
TYPE: CHRONIC PAIN
TYPE: ACUTE PAIN
TYPE: CHRONIC PAIN
TYPE: ACUTE PAIN
TYPE: ACUTE PAIN
TYPE: CHRONIC PAIN
TYPE: CHRONIC PAIN
TYPE: ACUTE PAIN
TYPE: ACUTE PAIN
TYPE: CHRONIC PAIN
TYPE: CHRONIC PAIN
TYPE: ACUTE PAIN
TYPE: CHRONIC PAIN
TYPE: CHRONIC PAIN
TYPE: ACUTE PAIN
TYPE: CHRONIC PAIN
TYPE: CHRONIC PAIN
TYPE: ACUTE PAIN
TYPE: ACUTE PAIN

## 2020-01-01 ASSESSMENT — PAIN DESCRIPTION - PROGRESSION
CLINICAL_PROGRESSION: NOT CHANGED
CLINICAL_PROGRESSION: OTHER (COMMENT)
CLINICAL_PROGRESSION: NOT CHANGED
CLINICAL_PROGRESSION: OTHER (COMMENT)
CLINICAL_PROGRESSION: GRADUALLY IMPROVING
CLINICAL_PROGRESSION: NOT CHANGED
CLINICAL_PROGRESSION: NOT CHANGED
CLINICAL_PROGRESSION: GRADUALLY IMPROVING
CLINICAL_PROGRESSION: NOT CHANGED
CLINICAL_PROGRESSION: NOT CHANGED
CLINICAL_PROGRESSION: OTHER (COMMENT)
CLINICAL_PROGRESSION: NOT CHANGED
CLINICAL_PROGRESSION: OTHER (COMMENT)
CLINICAL_PROGRESSION: OTHER (COMMENT)
CLINICAL_PROGRESSION: NOT CHANGED
CLINICAL_PROGRESSION: OTHER (COMMENT)

## 2020-01-01 ASSESSMENT — ENCOUNTER SYMPTOMS
SHORTNESS OF BREATH: 0
SHORTNESS OF BREATH: 1
SHORTNESS OF BREATH: 0
COUGH: 0
NAUSEA: 0
ABDOMINAL PAIN: 1
PHOTOPHOBIA: 0
DIARRHEA: 0
NAUSEA: 1
ALLERGIC/IMMUNOLOGIC NEGATIVE: 1
VOMITING: 0
CONSTIPATION: 0
VOMITING: 1
WHEEZING: 1
EYE PAIN: 0
VOMITING: 0
SORE THROAT: 0
BACK PAIN: 0
DIARRHEA: 1
COUGH: 0
BACK PAIN: 0
NAUSEA: 0
BLOOD IN STOOL: 0
CONSTIPATION: 0
NAUSEA: 0
DIARRHEA: 0
WHEEZING: 0
SHORTNESS OF BREATH: 1
VOMITING: 0
NAUSEA: 0
RHINORRHEA: 1
CONSTIPATION: 1
COUGH: 1
VOMITING: 0
BACK PAIN: 0
GASTROINTESTINAL NEGATIVE: 1
WHEEZING: 1
CHOKING: 0

## 2020-01-01 ASSESSMENT — PAIN DESCRIPTION - FREQUENCY
FREQUENCY: CONTINUOUS
FREQUENCY: INTERMITTENT
FREQUENCY: INTERMITTENT
FREQUENCY: CONTINUOUS

## 2020-01-01 ASSESSMENT — PAIN - FUNCTIONAL ASSESSMENT
PAIN_FUNCTIONAL_ASSESSMENT: 0-10
PAIN_FUNCTIONAL_ASSESSMENT: PREVENTS OR INTERFERES SOME ACTIVE ACTIVITIES AND ADLS
PAIN_FUNCTIONAL_ASSESSMENT: 0-10
PAIN_FUNCTIONAL_ASSESSMENT: PREVENTS OR INTERFERES SOME ACTIVE ACTIVITIES AND ADLS

## 2020-01-01 ASSESSMENT — PAIN DESCRIPTION - ORIENTATION
ORIENTATION: POSTERIOR
ORIENTATION: RIGHT;POSTERIOR
ORIENTATION: RIGHT;LEFT
ORIENTATION: RIGHT;LEFT
ORIENTATION: RIGHT;POSTERIOR
ORIENTATION: LOWER;MID
ORIENTATION: MID
ORIENTATION: MID
ORIENTATION: RIGHT;LEFT;UPPER
ORIENTATION: RIGHT;UPPER
ORIENTATION: LEFT
ORIENTATION: RIGHT;LEFT;UPPER

## 2020-01-01 ASSESSMENT — PAIN DESCRIPTION - DESCRIPTORS
DESCRIPTORS: ACHING;CONSTANT
DESCRIPTORS: ACHING;SORE
DESCRIPTORS: CONSTANT
DESCRIPTORS: ACHING;CONSTANT
DESCRIPTORS: ACHING
DESCRIPTORS: CONSTANT;ACHING
DESCRIPTORS: ACHING;CONSTANT
DESCRIPTORS: STABBING
DESCRIPTORS: ACHING
DESCRIPTORS: BURNING;CONSTANT;SHARP;STABBING
DESCRIPTORS: SHARP;SHOOTING
DESCRIPTORS: ACHING;SORE
DESCRIPTORS: ACHING;CONSTANT
DESCRIPTORS: SHARP;ACHING
DESCRIPTORS: ACHING;SORE

## 2020-01-01 ASSESSMENT — PAIN DESCRIPTION - LOCATION
LOCATION: ABDOMEN
LOCATION: HEAD
LOCATION: ABDOMEN
LOCATION: ABDOMEN;BACK;SHOULDER
LOCATION: CHEST;SHOULDER
LOCATION: GENERALIZED
LOCATION: ABDOMEN;BACK
LOCATION: ABDOMEN
LOCATION: KNEE
LOCATION: BACK
LOCATION: HEAD
LOCATION: ABDOMEN
LOCATION: ABDOMEN
LOCATION: BACK
LOCATION: BACK
LOCATION: ABDOMEN
LOCATION: BACK
LOCATION: HEAD
LOCATION: NECK
LOCATION: BACK
LOCATION: HEAD
LOCATION: NECK
LOCATION: ABDOMEN
LOCATION: SHOULDER;ABDOMEN
LOCATION: BACK
LOCATION: OTHER (COMMENT)
LOCATION: CHEST;SHOULDER
LOCATION: ABDOMEN
LOCATION: ABDOMEN;BACK;NECK;SHOULDER
LOCATION: NECK;BACK;CHEST
LOCATION: ABDOMEN
LOCATION: BACK
LOCATION: ABDOMEN
LOCATION: BACK

## 2020-01-01 ASSESSMENT — PAIN DESCRIPTION - ONSET
ONSET: ON-GOING

## 2020-01-01 ASSESSMENT — PATIENT HEALTH QUESTIONNAIRE - PHQ9
SUM OF ALL RESPONSES TO PHQ QUESTIONS 1-9: 2
SUM OF ALL RESPONSES TO PHQ QUESTIONS 1-9: 2
2. FEELING DOWN, DEPRESSED OR HOPELESS: 1
1. LITTLE INTEREST OR PLEASURE IN DOING THINGS: 1
SUM OF ALL RESPONSES TO PHQ9 QUESTIONS 1 & 2: 2

## 2020-01-01 ASSESSMENT — PAIN SCALES - WONG BAKER
WONGBAKER_NUMERICALRESPONSE: 6
WONGBAKER_NUMERICALRESPONSE: 2
WONGBAKER_NUMERICALRESPONSE: 0
WONGBAKER_NUMERICALRESPONSE: 6

## 2020-01-03 NOTE — PROGRESS NOTES
puffs by mouth every 6 hours if needed for wheezing, Disp: 18 g, Rfl: 3    hydrOXYzine (ATARAX) 50 MG tablet, Take 1 tablet by mouth 2 times daily, Disp: 60 tablet, Rfl: 3    metFORMIN (GLUCOPHAGE) 500 MG tablet, Take 1 tablet by mouth 2 times daily (with meals), Disp: 60 tablet, Rfl: 3    budesonide-formoterol (SYMBICORT) 160-4.5 MCG/ACT AERO, Inhale 2 puffs into the lungs 2 times daily, Disp: 1 Inhaler, Rfl: 5    albuterol (PROVENTIL) (2.5 MG/3ML) 0.083% nebulizer solution, Take 3 mLs by nebulization every 6 hours as needed for Wheezing, Disp: 120 each, Rfl: 5    tiotropium (SPIRIVA RESPIMAT) 2.5 MCG/ACT AERS inhaler, Inhale 2 puffs into the lungs daily, Disp: 1 Inhaler, Rfl: 5    omeprazole (PRILOSEC) 40 MG delayed release capsule, Take 1 capsule by mouth every morning (before breakfast), Disp: 30 capsule, Rfl: 3    lidocaine viscous hcl (XYLOCAINE) 2 % SOLN solution, Take 5-10 mLs by mouth as needed for Irritation, Disp: 100 mL, Rfl: 1    Oxygen Concentrator, , Disp: , Rfl:     varenicline (CHANTIX CONTINUING MONTH USHA) 1 MG tablet, Take 1 tablet by mouth 2 times daily, Disp: 60 tablet, Rfl: 3    REVIEW OF SYSTEMS: I reviewed the complete 12-Point ROS with the patient. Pertinent ones noted in the HPI. PHYSICAL EXAMINATION:  BP (!) 125/55   Pulse 98   Temp 98.6 °F (37 °C) (Oral)   Resp 16   Wt 122 lb (55.3 kg)   SpO2 96%   BMI 21.61 kg/m² Pain 3/10 chronic pain, KPS 70. GENERAL:  Well-appearing, in no apparent distress, alert and fully oriented, answers questions appropriately. HEENT:  Normocephalic, atraumatic, PERRLA, EOMI, on inspection of the oral cavity and visible oropharynx subsites,  mucous membranes moist, uvula elevates normally on phonation, no suspicious asymmetry or abnormal mass lesions. NECK:  No cervical or supraclavicular lymphadenopathy. LUNGS:  Mild crackles, otherwise CTA. HEART:  Normal rate, regular rhythm, normal S1/S2, no murmurs/rubs/gallops.   ABD:  Normoactive recurrence or progression, and she knows to notify her oncologic team promptly if these ever occur. Return to clinic in 6 months, or sooner if clinically warranted. Continue smoking cessation.       Electronically signed by Danisha Hunter MD on 1/3/2020 at 3:52 PM    Patient Care Team:  Katie Gutierrez PA-C as PCP - General (Physician Assistant)  Katie Gutierrez PA-C as PCP - [facility] Empaneled Provider  Aleah Willett MD as Consulting Physician (Pulmonology)  Farida Gaxiola MD as Consulting Physician (Hematology and Oncology)  Danisha Hunter MD as Consulting Physician (Radiation Oncology)

## 2020-01-03 NOTE — PROGRESS NOTES
100 mL, Rfl: 1    Oxygen Concentrator, , Disp: , Rfl:     varenicline (CHANTIX CONTINUING MONTH USHA) 1 MG tablet, Take 1 tablet by mouth 2 times daily, Disp: 60 tablet, Rfl: 3    Immunizations:    Influenza status:    [x]   Current   []   Patient declined    Pneumococcal status:  [x]   Current  []   Patient declined    Smoking Status:    [] Smoker - PPD:  Smoking cessation education: Provided []   Declined []    [x] Nonsmoker - Quit Date: 4 days ago (1-3-20)              [] Never a smoker           Cancer Screening:  Colonoscopy [] Current [] Not current   [] Not current, but scheduled   [x] NA  Mammogram [] Current [] Not current   [] Not current, but scheduled   [x] NA  Prostate [] Current [] Not current   [] Not current, but scheduled   [x] NA  PAP/Pelvic [] Current [] Not current   [] Not current, but scheduled   [x] NA  Skin  [] Current  [] Not current   [] Not current, but scheduled   [x] NA    Hormone:  Lupron []   Last dose given:           Next dose due:   Eligard []   Last dose given:           Next dose due:   Aromatase Inhibitors []   Medication name:   N/A:  [x]         FALLS RISK SCREEN  Instructions:  Assess the patient and enter the appropriate indicators that are present for fall risk identification. Total the numbers entered and assign a fall risk score from Table 2.  Reassess patient at a minimum every 12 weeks or with status change. Assessment   Date  1/3/2020     1. Mental Ability: confusion/cognitively impaired 0     2. Elimination Issues: incontinence, frequency 0       3. Ambulatory: use of assistive devices (walker, cane, off-loading devices),        attached to equipment (IV pole, oxygen) 0     4. Sensory Limitations: dizziness, vertigo, impaired vision 0     5. Age less than 65        0     6. Age 72 or greater 1     7. Medication: diuretics, strong analgesics, hypnotics, sedatives,        antihypertensive agents 0   8.   Falls:  recent history of falls within the last 3

## 2020-01-13 NOTE — TELEPHONE ENCOUNTER
Writer called pt and states cab late, so cancelled the cab and did not come to her appt. Writer reminded pt was to see the MD as well and will need rescheduled. Asked pt to call [facility] if that happens again. Radha notified to call pt to reschedule, pt notified [address] will nella pt to reschedule her and pt states understanding.

## 2020-01-16 NOTE — PROGRESS NOTES
Risk 3-dose series) 03/[birth year]/1970    Breast cancer screen  03/21/2001    Shingles Vaccine (1 of 2) 03/21/2001    Colon cancer screen colonoscopy  03/21/2001    DEXA (modify frequency per FRAX score)  03/21/2016    Annual Wellness Visit (AWV)  11/04/2020    Lipid screen  04/04/2024    Flu vaccine  Completed    Pneumococcal 65+ years Vaccine  Completed

## 2020-01-16 NOTE — PROGRESS NOTES
decongestant and nasal spray to take, if no improvement instructed patient to follow-up with pulmonology. Patient states anxiety improved with medication, Celexa, patient requesting medication refill. Patient voiced concern with recent chest pain, states \" history of mitral valve prolapse\", informed patient she will be sent for echo to reevaluate chest pain and MVP. Patient is seen by oncology for lung cancer. Patient blood pressure controlled in office. Patient weight is stable. [facility] gap diagnosis reviewed. Labs reviewed. Health maintenance reviewed. Medications reviewed, prescribed Tessalon 200 mg 3 times daily for 10 days, Flonase 50 mcg nasal spray applying 1 spray to each nostril daily for 10 days, refill trazodone 300 mg, Celexa 10 mg.     HPI    Patient Active Problem List   Diagnosis    Cellulitis of right arm    Cat bite of right upper arm    CL (cirrhosis of liver) (HCC)    Chronic hepatitis C without hepatic coma (HCC)    Thrombocytopenia (HCC)    Chronic obstructive pulmonary disease (HCC)    Anxiety    Malignant neoplasm of upper lobe of right lung ([facility]    Substance abuse ([facility]       Health Maintenance Due   Topic Date Due    Hepatitis A vaccine (1 of 2 - Risk 2-dose series) 03/21/1951    Hepatitis B vaccine (1 of 3 - Risk 3-dose series) 03/21/1970    Breast cancer screen  03/21/2001    Colon cancer screen colonoscopy  03/21/2001    DEXA (modify frequency per FRAX score)  03/21/2016       No Known Allergies      Current Outpatient Medications   Medication Sig Dispense Refill    citalopram (CELEXA) 10 MG tablet Take 1 tablet by mouth nightly 30 tablet 3    benzonatate (TESSALON) 200 MG capsule Take 1 capsule by mouth 3 times daily for 10 days 30 capsule 0    traZODone (DESYREL) 300 MG tablet Take 1 tablet by mouth nightly 30 tablet 3    fluticasone (FLONASE) 50 MCG/ACT nasal spray 1 spray by Each Nostril route daily for 10 days 1 Bottle 0    oxyCODONE (ROXICODONE) 5 MG immediate release tablet Take 2 tablets by mouth every 6 hours as needed for Pain for up to 30 days. 90 tablet 0    nicotine (NICODERM CQ) 21 MG/24HR Place 1 patch onto the skin daily 42 patch 0    VENTOLIN  (90 Base) MCG/ACT inhaler inhale 2 puffs by mouth every 6 hours if needed for wheezing 18 g 3    hydrOXYzine (ATARAX) 50 MG tablet Take 1 tablet by mouth 2 times daily (Patient taking differently: Take 50 mg by mouth daily ) 60 tablet 3    metFORMIN (GLUCOPHAGE) 500 MG tablet Take 1 tablet by mouth 2 times daily (with meals) 60 tablet 3    budesonide-formoterol (SYMBICORT) 160-4.5 MCG/ACT AERO Inhale 2 puffs into the lungs 2 times daily 1 Inhaler 5    albuterol (PROVENTIL) (2.5 MG/3ML) 0.083% nebulizer solution Take 3 mLs by nebulization every 6 hours as needed for Wheezing 120 each 5    tiotropium (SPIRIVA RESPIMAT) 2.5 MCG/ACT AERS inhaler Inhale 2 puffs into the lungs daily 1 Inhaler 5    omeprazole (PRILOSEC) 40 MG delayed release capsule Take 1 capsule by mouth every morning (before breakfast) 30 capsule 3    lidocaine viscous hcl (XYLOCAINE) 2 % SOLN solution Take 5-10 mLs by mouth as needed for Irritation 100 mL 1    Oxygen Concentrator        No current facility-administered medications for this visit. Social History     Tobacco Use    Smoking status: Current Every Day Smoker     Packs/day: 0.25     Years: 52.00     Pack years: 13.00     Types: Cigarettes     Start date: 1/1/1967    Smokeless tobacco: Never Used    Tobacco comment: Started patches   Substance Use Topics    Alcohol use: No    Drug use: Yes     Types: Cocaine     Comment: 4/25/19       Family History   Problem Relation Age of Onset    Heart Attack Father     Cancer Paternal Grandmother     Lung Cancer Paternal Grandfather     Emphysema Mother         REVIEW OFSYSTEMS:  Review of Systems   Constitutional: Positive for chills. Negative for fever.    HENT: Positive for congestion (nasal) and cervical adenopathy. Skin:     General: Skin is warm and dry. Neurological:      Mental Status: She is alert and oriented to person, place, and time. Psychiatric:         Behavior: Behavior is cooperative. DIAGNOSTIC FINDINGS:  CBC:  Lab Results   Component Value Date    WBC 4.5 12/16/2019    HGB 11.3 12/16/2019    PLT 74 12/16/2019       BMP:    Lab Results   Component Value Date     12/16/2019    K 4.0 12/16/2019     12/16/2019    CO2 24 12/16/2019    BUN 9 12/16/2019    CREATININE 0.54 12/16/2019    GLUCOSE 112 12/16/2019       HEMOGLOBIN A1C:   Lab Results   Component Value Date    LABA1C 5.2 12/16/2019       FASTING LIPID PANEL:  Lab Results   Component Value Date    CHOL 115 04/04/2019    HDL 34 (L) 04/04/2019    TRIG 58 04/04/2019       ASSESSMENT AND PLAN:  Aurea Pineda was seen today for anxiety, cough, shortness of breath and health maintenance. Diagnoses and all orders for this visit:    Chest pain, unspecified type  -     ECHO Complete 2D W Doppler W Color; Future    Mass of left elbow  -     Cancel: US Soft Tissue Limited Area; Future  -     US EXTREMITY JOINT LEFT NON VASC COMPLETE; Future    Productive cough  -     benzonatate (TESSALON) 200 MG capsule; Take 1 capsule by mouth 3 times daily for 10 days    Sinus pressure  -     fluticasone (FLONASE) 50 MCG/ACT nasal spray; 1 spray by Each Nostril route daily for 10 days    Anxiety  -     citalopram (CELEXA) 10 MG tablet; Take 1 tablet by mouth nightly    Malignant neoplasm of upper lobe of right lung (HCC)    Chronic obstructive pulmonary disease, unspecified COPD type ([address]    Substance abuse (HCC)    Thrombocytopenia ([address]    History of mitral valve prolapse  -     ECHO Complete 2D W Doppler W Color; Future    Medication refill  -     traZODone (DESYREL) 300 MG tablet; Take 1 tablet by mouth nightly      FOLLOW UP AND INSTRUCTIONS:  Return in about 2 months (around 3/16/2020) for COPD.     · Discussed use, benefit, and side effects of prescribed medications. Barriers to medication compliance addressed. All patient questions answered. Pt voiced understanding. · Patient given educational materials - see patient instructions    Electronically signed by Elda Johnson PA-C on 1/16/20 at 2:22 PM    This note is created with the assistance of a speech-recognition program. While intendingto generate a document that actually reflects the content of the visit, the document can still have some mistakes which may not have been identified and corrected by editing.

## 2020-01-19 PROBLEM — F19.10 SUBSTANCE ABUSE (HCC): Status: ACTIVE | Noted: 2020-01-01

## 2020-01-20 NOTE — TELEPHONE ENCOUNTER
TOMAS ARRIVES AMBULATORY FOR MD VISIT & TX  DR SMITH IN TO SEE PATIENT  ORDERS RECEIVED  CONTINUE CHEMO PER ORDERS  CT NEXT WEEK  RV 2 WEEKS W/TX & LABS  CT CHEST W/CONTRAST 01/31/20 @11AM  ARRIVE BY 10:45AM MAIN REG, 10:30AM FOR PORT ACCESS  MD VISIT 02/03/20 @11:20AM  [email or web address]  SCRIPTS SENT TO PATIENTS PHARMACY  AVS PRINTED AND GIVEN TO PATIENT WITH INSTRUCTIONS  PATIENT REMAINS IN [address] [place] [address]

## 2020-01-20 NOTE — PROGRESS NOTES
DIAGNOSIS:   1. Squamous cell carcinoma, right lung 04/15/2019 with right main stem bronchus invasion (T3N0M0)   2. PET showed localized disease; brain MRI negative for metastases,   3. HX Liver disease and hepatitis C    CURRENT THERAPY:  1. S/P bronchoscopy establishing the diagnosis   2. Chemoradiation with taxol and carboplatin - started 05/23/2019, completed 07/09/2019  3. Lisette Wadeer started 08/26/2019    BRIEF CASE HISTORY: Zonia Rankin is a very pleasant 77 y.o. female who is referred to us for consultation and management of recently diagnosed lung cancer. She initially presented with flu like illness and shortness of breath 11/2018 and was evaluated by PCP and was started on antibiotics, X-ray done at the time showed suspicious findings. Follow up CT showed mass lesion within the right hilum either intervening or originating from the right mainstem bronchus and narrowing of multiple segmental pulmonary arterial branches. Referred to Dr. Lars Wong for bronchoscopy and right main stem bronchus mass was appreciated, bronchial washings were positive for squamous cell carcinoma. She reports she has liver disease, Hep C and assumed her recent weight loss and cachexia was related to that. She has right shoulder and back pain. She has not been treated for Hep C. She smokes cigarettes and cocaine. She has COPD and is oxygen dependent. Staging PET showed localized disease, pleural effusion seen; brain MRI was negative for metastatic disease. She will need pleurocentesis with cytological evaluation of the fluid, assuming it is negative , and since she is not surgical candidate. Thoracentesis was done and fluid was negative for malignancy. Chemoradiation Carbo/Taxol -started 05/23/2019, completed 07/09/2019. Follow up CT showed good response to treatment with some pleural effusion and liver lesion likely due to cirrhosis. We will plan for centesis and maintenance Imfinzi.     INTERIM HISTORY: The patient presents for follow up for lung cancer and cycle #10 of Imfinzi. She has mass on left elbow. She has had URI, she spent several days in bed and ran out of pain medication and is in severe pain. Her pain was well controlled with medication. Her breathing is worse with URI, she has taken antibiotics. PAST MEDICAL HISTORY: has a past medical history of Anxiety and depression, Back pain, Bipolar disorder ([address] Bronchitis, Cancer ([address] Chronic obstructive pulmonary disease (COPD) ([address] Cirrhosis ([address] Cough, Current every day smoker, Fibromyalgia, Hepatitis, History of cervical cancer, Liver disease, Lung mass, MVP (mitral valve prolapse), On supplemental oxygen therapy, Wears dentures, Wears glasses, and Wheezing. PAST SURGICAL HISTORY: has a past surgical history that includes Cervical disc surgery; Hysterectomy; Cholecystectomy; Breast surgery (Left); Carpal tunnel release (Bilateral); Knee arthroscopy (Right); bronchoscopy (04/15/2019); bronchoscopy (N/A, 4/15/2019); bronchoscopy (4/15/2019); bronchoscopy (4/15/2019); TUNNELED CENTRAL VENOUS CATHETER W/ SUBCUTANEOUS PORT (Right, 05/13/2019); thoracentesis (Right, 05/13/2019); and thoracentesis (Right, 08/19/2019). CURRENT MEDICATIONS:  has a current medication list which includes the following prescription(s): citalopram, benzonatate, trazodone, fluticasone, oxycodone, nicotine, ventolin hfa, hydroxyzine, metformin, budesonide-formoterol, albuterol, tiotropium, omeprazole, lidocaine viscous hcl, oxygen concentrator, and ibuprofen. ALLERGIES:  has No Known Allergies. FAMILY HISTORY: Negative for any hematological or oncological conditions. SOCIAL HISTORY:  reports that she has been smoking cigarettes. She started smoking about 53 years ago. She has a 13.00 pack-year smoking history. She has never used smokeless tobacco. She reports current drug use. Drug: Cocaine. She reports that she does not drink alcohol.     REVIEW OF SYSTEMS:   General: No fever or night sweats. Improved fatigue, weight stable. +xerostomia - improved  ENT: No double or blurred vision, no tinnitus or hearing problem, or sore throat   Respiratory: Chest pain and shortness of breath worse, no hemoptysis. +URI  Cardiovascular: Denies chest pain, PND or orthopnea. No L E swelling or palpitations. Gastrointestinal: No nausea or vomiting, abdominal pain, diarrhea or constipation. Genitourinary: Denies dysuria, hematuria, frequency, urgency or incontinence. Neurological: Denies headaches, decreased LOC, no sensory or motor focal deficits. +dropping things and hand tremors  Musculoskeletal: No arthralgia no back pain or joint swelling. Chronic right shoulder pain - worse without pain medication  Skin: There are no rashes or bleeding. Psychiatric: No anxiety, no depression. Endocrine: No diabetes or thyroid disease. Hematologic: No bleeding, no adenopathy. PHYSICAL EXAM: Shows a well appearing 77y.o.-year-old female who is not in pain or distress. Vital Signs: Blood pressure (!) 122/57, pulse 87, temperature 97.7 °F (36.5 °C), temperature source Temporal, resp. rate 22, weight 122 lb 6.4 oz (55.5 kg). HEENT: . Normocephalic and atraumatic. Pupils are equal, round, reactive to light and accommodation. Extraocular muscles are intact. Neck: Showed no JVD, no carotid bruit . Lungs: Decreased air entry. Clear to auscultation bilaterally. Heart: Murmur - prolapse Abdomen: Soft, nontender. No hepatosplenomegaly. Extremities: Olecranon bursa on left elbow. Lower extremities show bilateral edema, clubbing, or cyanosis. Breasts: Examination not done today.  Neuro exam: intact cranial nerves bilaterally no motor or sensory deficit, gait is normal. Lymphatic: no adenopathy appreciated in the supraclavicular, axillary, cervical or inguinal area  Skin: right shoulder and chest wall has excoriation marks, radiation rash      REVIEW OF LABORATORY DATA:   Lab Results   Component Value Date    WBC 4.5 12/16/2019    HGB 11.3 (L) 12/16/2019    HCT 36.3 12/16/2019    MCV 89.9 12/16/2019    PLT 74 (L) 12/16/2019       Lab Results   Component Value Date    NEUTROABS 3.14 12/16/2019           Chemistry        Component Value Date/Time     12/16/2019 1316    K 4.0 12/16/2019 1316     12/16/2019 1316    CO2 24 12/16/2019 1316    BUN 9 12/16/2019 1316    CREATININE 0.54 12/16/2019 1316        Component Value Date/Time    CALCIUM 8.8 12/16/2019 1316    ALKPHOS 120 (H) 12/16/2019 1316    AST 93 (H) 12/16/2019 1316    ALT 49 (H) 12/16/2019 1316    BILITOT 0.82 12/16/2019 1316            REVIEW OF RADIOLOGICAL RESULTS:     PATHOLOGY:       IMPRESSION:   1. Squamous cell carcinoma, right lung 04/15/2019, clinical stage T3, N0, M0.   2. Liver disease and hepatitis C, severe comorbidity   3. Staging PET showed localized disease, pleural effusion seen; Brain MRI was negative for metastatic disease  4. Cocaine and cigarette addiction, quit drugs and quitting smoking with chantix  5. Chemo-radiation 05/23/2019, completed 07/09/2019  6. Pleural effusion - plan for centesis  7. Imfinzi - started 08/26/2019  8. Elevated blood sugar and amylase , likely due to uncontrolled blood sugars. Much better with diet control. And pancreatic enzyme supplement. 9. New neurological symptoms, concerned for brain metastases. Will obtain an MRI. 10. Dysphagia and I think a lot of that is related to dry mouth. Likely related to narcotics. We will see if we are able to wean her off. I also asked her to use the nebulizer as much as possible. 11. Smoking addiction, I offered referral to smoking cessation clinic but she declined. She wanted the patches and I will refill them. 12. Worsening chest pain and shortness of breath. I am concerned about the findings. This could be because that she simply ran out of her medication but I am suspicious that she either has progression of disease or pneumonitis from the immunotherapy.   We will order CT scan and start steroids. I will also adjust her pain medication    PLAN:   1. I completed toxicity check. 2. Exam shows olecranon bursa on left elbow. 3. I am refilling Emla cream and oxycodone with dose adjustment at 10 mg.  Prescription of 60 tablets was given to her today. 4. I am writing for Prednisone 20 mg until I see her next. 5. We will continue with treatment today as planned. 6. Plan for CT of the chest.   7. Return in 2 weeks.

## 2020-01-20 NOTE — PROGRESS NOTES
Pt arrived for labs, MD visit and chemotherapy. Port accessed and labs obtained. Lab results reviewed and MD evaluated. MD provided with pain medication and additional steroids for discomfort pt is experiencing. Chemotherapy given, pt denies complaints. Pt slept thru chemotherapy treatment. Port flushed with Normal Saline and Heparin flush. Gripper removed, needle intact and site unremarkable. Site covered with band aid. Pt ambulated for area in no obvious distress.

## 2020-01-28 NOTE — TELEPHONE ENCOUNTER
Pt called stating that she recently had an echo that showed ventricular enlargement and wants to know what can be done about it. Please advise.        Health Maintenance   Topic Date Due    Hepatitis A vaccine (1 of 2 - Risk 2-dose series) 03/21/1951    Hepatitis B vaccine (1 of 3 - Risk 3-dose series) 03/21/1970    Breast cancer screen  03/21/2001    Colon cancer screen colonoscopy  03/21/2001    DEXA (modify frequency per FRAX score)  03/21/2016    DTaP/Tdap/Td vaccine (1 - Tdap) 01/16/2021 (Originally 3/21/1961)    Shingles Vaccine (1 of 2) 01/16/2021 (Originally 3/21/2001)    Annual Wellness Visit (AWV)  11/04/2020    Lipid screen  04/04/2024    Flu vaccine  Completed    Pneumococcal 65+ years Vaccine  Completed             (applicable per patient's age: Cancer Screenings, Depression Screening, Fall Risk Screening, Immunizations)    Hemoglobin A1C (%)   Date Value   12/16/2019 5.2   11/04/2019 5.6     LDL Cholesterol (mg/dL)   Date Value   04/04/2019 69     AST (U/L)   Date Value   01/20/2020 46 (H)     ALT (U/L)   Date Value   01/20/2020 25     BUN (mg/dL)   Date Value   01/20/2020 12      (goal A1C is < 7)   (goal LDL is <100) need 30-50% reduction from baseline     BP Readings from Last 3 Encounters:   01/20/20 (!) 122/57   01/16/20 121/72   01/03/20 (!) 125/55    (goal /80)      All Future Testing planned in [facility]:  Lab Frequency Next Occurrence   BUN Once 05/16/2019   Creatinine, Serum Once 05/16/2019   CT Chest W Contrast Once 01/27/2020   Amylase     TSH without Reflex     CBC With Auto Differential         Next Visit Date:  Future Appointments   Date Time Provider Lamont Montes   1/31/2020 10:30 AM STV STA CHAIR 18 ST[facility] STA MED [place]   1/31/2020 11:00 AM STA CT SCAN RM 1 STAZ CT SCAN STA [facility]   2/3/2020 11:00 AM STV STA CHAIR 03 ST[facility] STA MED [place]   2/3/2020 11:20 AM Cory Lobato MD [facility]   3/9/2020  2:00 PM ERIN Galvez [facility] WALK

## 2020-01-31 NOTE — PROGRESS NOTES
Delene Jobs arrives ambulatory alone  Order for port access for ct scan  Lt chest port accessed per policy with gripper plus needle  Good blood return noted  Capped and oclussive dressing applied  Discharged per ambulatory to ct scan    Returned from ct scan   Sour John needle flushed per protocol and regan needle removed with needle intact  Band aid applied  Discharged per ambulatory

## 2020-02-03 NOTE — TELEPHONE ENCOUNTER
[address] IN TO SEE PATIENT  ORDERS RECEIVED  PROCEED W/ TX AS PLANNED AFTER CHECKING LABS  RV 2 WEEKS W/ Myah NEELY VISIT 2/17/10 @ 1:40PM [place] @ 1PM  AVS PRINTED AND GIVEN TO PATIENT W/ INSTRUCTIONS  PATIENT REMAINS IN [facility]

## 2020-02-03 NOTE — PROGRESS NOTES
Patient here for labs, MD visit and chemo. No complaints today. Vitals obtained. Port accessed per policy and labs drawn and sent. Labs reviewed. Seen by MD.  Edilia preciado per [facility]. Infusing. No complaints. Completed. Tolerated well. Port flushed per policy and gripper removed intact, band aid to site. Has a return visit scheduled. Discharged ambulatory per self.

## 2020-02-17 NOTE — TELEPHONE ENCOUNTER
Continue current therapy without changes, referred to interventional radiology for consultation- I CALLED AND LEFT MESSAGE FOR Antonietta Rodas, IR  TO [id number] Kathleen Mancilla.       Return to see me in 2 weeks- SCHEDULED FOR 3/2/2020 WITH [address].

## 2020-02-17 NOTE — PROGRESS NOTES
Rob Ferrer arrives ambulatory alone  Order for C12 D1 reviewed  C/o fatigue  Oxygen continued at 2L per nasal cannula  Lt chest port accessed per policy with #98 G 3/4 L Regan needle   Good blood return noted and blood work obtained and sent to lab  Lab results reviewed  Dr Ollie Horne vs and examined    Order to proceed with chemotherapy  Imfinzi initiated and completed   No reaction noted  Iv line flushed and regan needle flushed per protocol   Regan needle removed with needle intact  Band aid applied  Discharged per ambulatory

## 2020-02-17 NOTE — PROGRESS NOTES
DIAGNOSIS:   1. Squamous cell carcinoma, right lung 04/15/2019 with right main stem bronchus invasion (T3N0M0)   2. PET showed localized disease; brain MRI negative for metastases,   3. HX Liver disease and hepatitis C    CURRENT THERAPY:  1. S/P bronchoscopy establishing the diagnosis   2. Chemoradiation with taxol and carboplatin - started 05/23/2019, completed 07/09/2019  3. Paulo Mata started 08/26/2019    BRIEF CASE HISTORY: Flip Stevens is a very pleasant 77 y.o. female who is referred to us for consultation and management of recently diagnosed lung cancer. She initially presented with flu like illness and shortness of breath 11/2018 and was evaluated by PCP and was started on antibiotics, X-ray done at the time showed suspicious findings. Follow up CT showed mass lesion within the right hilum either intervening or originating from the right mainstem bronchus and narrowing of multiple segmental pulmonary arterial branches. Referred to Dr. Brittny Early for bronchoscopy and right main stem bronchus mass was appreciated, bronchial washings were positive for squamous cell carcinoma. She reports she has liver disease, Hep C and assumed her recent weight loss and cachexia was related to that. She has right shoulder and back pain. She has not been treated for Hep C. She smokes cigarettes and cocaine. She has COPD and is oxygen dependent. Staging PET showed localized disease, pleural effusion seen; brain MRI was negative for metastatic disease. She will need pleurocentesis with cytological evaluation of the fluid, assuming it is negative , and since she is not surgical candidate. Thoracentesis was done and fluid was negative for malignancy. Chemoradiation Carbo/Taxol -started 05/23/2019, completed 07/09/2019. Follow up CT showed good response to treatment with some pleural effusion and liver lesion likely due to cirrhosis. We will plan for centesis and maintenance Imfinzi.     INTERIM HISTORY: The patient presents for follow up for lung cancer and cycle #12 of Imfinzi and to review CT. She is working to maintain nutrition despite poor appetite. She feels stable overall with no new complaints. PAST MEDICAL HISTORY: has a past medical history of Anxiety and depression, Back pain, Bipolar disorder ([address] Bronchitis, Cancer ([address] Chronic obstructive pulmonary disease (COPD) ([address] Cirrhosis ([address] Cough, Current every day smoker, Fibromyalgia, Hepatitis, History of cervical cancer, Liver disease, Lung mass, MVP (mitral valve prolapse), On supplemental oxygen therapy, Wears dentures, Wears glasses, and Wheezing. PAST SURGICAL HISTORY: has a past surgical history that includes Cervical disc surgery; Hysterectomy; Cholecystectomy; Breast surgery (Left); Carpal tunnel release (Bilateral); Knee arthroscopy (Right); bronchoscopy (04/15/2019); bronchoscopy (N/A, 4/15/2019); bronchoscopy (4/15/2019); bronchoscopy (4/15/2019); TUNNELED CENTRAL VENOUS CATHETER W/ SUBCUTANEOUS PORT (Right, 05/13/2019); thoracentesis (Right, 05/13/2019); and thoracentesis (Right, 08/19/2019). CURRENT MEDICATIONS:  has a current medication list which includes the following prescription(s): varenicline tartrate, oxycodone hcl, lidocaine-prilocaine, citalopram, trazodone, ventolin hfa, hydroxyzine, metformin, budesonide-formoterol, albuterol, tiotropium, omeprazole, lidocaine viscous hcl, oxygen concentrator, and ibuprofen, and the following Facility-Administered Medications: sodium chloride flush and heparin flush. ALLERGIES:  has No Known Allergies. FAMILY HISTORY: Negative for any hematological or oncological conditions. SOCIAL HISTORY:  reports that she has been smoking cigarettes. She started smoking about 53 years ago. She has a 13.00 pack-year smoking history. She has never used smokeless tobacco. She reports current drug use. Drug: Cocaine. She reports that she does not drink alcohol.     REVIEW OF SYSTEMS:   General: No fever or

## 2020-03-02 NOTE — TELEPHONE ENCOUNTER
Patient is asking about a POC? States she has been waiting 2 months, her tanks are too heavy for her. Can you help her?

## 2020-03-02 NOTE — PROGRESS NOTES
effusion and liver lesion isolated in segment 7. With features concerning for hepatocellular carcinoma. Considering her cirrhosis, she is not a candidate for resection or transplant, we referred her to interventional radiology for consideration of radioembolization with Y-90 treatment. INTERIM HISTORY: The patient presents for follow up for lung cancer and cycle #13 of Imfinzi. She has met with interventional radiology of the plan for Y 90 treatment in the next 3 to 4 weeks. Patient is nervous about the treatment but otherwise feels well. She has no chest pain, no nausea or vomiting or diarrhea. No skin rash or neuropathy. PAST MEDICAL HISTORY: has a past medical history of Anxiety and depression, Back pain, Bipolar disorder ([address] Bronchitis, Cancer ([address] Chronic obstructive pulmonary disease (COPD) ([address] Cirrhosis ([address] Cough, Current every day smoker, Fibromyalgia, Hepatitis, History of cervical cancer, Liver disease, Lung mass, MVP (mitral valve prolapse), On supplemental oxygen therapy, Wears dentures, Wears glasses, and Wheezing. PAST SURGICAL HISTORY: has a past surgical history that includes Cervical disc surgery; Hysterectomy; Cholecystectomy; Breast surgery (Left); Carpal tunnel release (Bilateral); Knee arthroscopy (Right); bronchoscopy (04/15/2019); bronchoscopy (N/A, 4/15/2019); bronchoscopy (4/15/2019); bronchoscopy (4/15/2019); TUNNELED CENTRAL VENOUS CATHETER W/ SUBCUTANEOUS PORT (Right, 05/13/2019); thoracentesis (Right, 05/13/2019); and thoracentesis (Right, 08/19/2019).      CURRENT MEDICATIONS:  has a current medication list which includes the following prescription(s): oxycodone hcl, varenicline tartrate, lidocaine-prilocaine, citalopram, trazodone, ventolin hfa, hydroxyzine, metformin, budesonide-formoterol, albuterol, tiotropium, omeprazole, lidocaine viscous hcl, oxygen concentrator, and ibuprofen, and the following Facility-Administered Medications: heparin flush, sodium

## 2020-03-02 NOTE — PROGRESS NOTES
Annette Manner arrives ambulatory alone  Order for C13 D1  C/o fatigue   Congested cough  Oxygen continued at 2l/nasal cannula  Rt chest port accessed per protocol with #20 G 3/4 L Regan needle  Good blood return noted and blood work obtained and sent to lab  Lab results reviewed  Dr David Hoyt vs and examined  Order to proceed with chemotherapy  Imfinzi initiated and completed   No reaction noted  Regan needle flushed and regan needle removed with needle intact  Band aid applied  Discharged per ambulatory

## 2020-03-09 NOTE — PROGRESS NOTES
[facility] PRIMARY [facility]ARE  [facility] [place]  [address] [place] [address]  Dept: [phone]  Dept Fax: [phone]    Office Progress/Follow Up Note    Date of patient's visit: 3/9/2020    Patient's Name:  Yulia De La Vega YOB: 1950            Patient Care Team:  Clarita Nair PA-C as PCP - General (Physician Assistant)  Clarita Nair PA-C as PCP - [facility] Empaneled Provider  Ana Villa MD as Consulting Physician (Pulmonology)  Karly Bolanos MD as Consulting Physician (Hematology and Oncology)  Luisana Iqbal MD as Consulting Physician (Radiation Oncology)  ================================================================    REASON FOR VISIT/CHIEF COMPLAINT:  COPD (Patient has brung with her her living will. ) and Cancer    HISTORY OF PRESENTING ILLNESS:  History was obtained from: patient. Yulia De La Vega is a 77 y.o. is here for follow-up for GERD, anxiety, constipation. Patient states she is compliant with medications. Patient is requesting to take GERD medication daily as needed due to \"does not get symptoms daily\". Instructed patient to take medication as needed. Discussed anxiety in depth with patient, stable, patient requesting medication refill. Patient voiced concern with constipation, is requesting lactulose, \"taken med in the past for symptoms and worked wonderful\". Informed patient will refill medication. Patient has history of diabetes, currently taking metformin 500 mg. Patient A1c in office 5.4, increased from 5.2. Discussed hyperglycemia, medications in depth with patient, informed patient will decrease metformin to 500 mg daily, patient voiced understanding. Patient is seen by oncology and pulmonology for lung cancer, per oncology note cancer has metastasized to liver.   Patient states \"will be having mapping done for liver cancer\", patient is scheduled for radial embolization on 3/10/2020, will be following up with oncology on 3/16/2020. Patient blood pressure controlled in office. Patient weight is stable. Patient is a smoker, states \"1 carton monthly\" which he equates to 6 to 7 cigarettes daily. Discussed tobacco cessation and risk in depth with patient, encourage patient to decrease and quit. Labs reviewed. Health maintenance reviewed. Medications reviewed, prescribed Lactulose 10 g / 15 mL solution taking 15 mL's twice daily, metformin 500 mg daily, omeprazole 40 mg daily PRN, refill Atarax 50 mg. HPI    Patient Active Problem List   Diagnosis    Cellulitis of right arm    Cat bite of right upper arm    CL (cirrhosis of liver) (HCC)    Chronic hepatitis C without hepatic coma (HCC)    Thrombocytopenia (HCC)    Chronic obstructive pulmonary disease (HCC)    Anxiety    Malignant neoplasm of upper lobe of right lung ([facility]    Substance abuse ([facility]       Health Maintenance Due   Topic Date Due    Hepatitis A vaccine (1 of 2 - Risk 2-dose series) 03/21/1952    Hepatitis B vaccine (1 of 3 - Risk 3-dose series) 03/21/1970    Breast cancer screen  03/21/2001    Colon cancer screen colonoscopy  03/21/2001    DEXA (modify frequency per FRAX score)  03/21/2016       No Known Allergies      Current Outpatient Medications   Medication Sig Dispense Refill    omeprazole (PRILOSEC) 40 MG delayed release capsule Take 1 capsule by mouth daily as needed 30 capsule 1    hydrOXYzine (ATARAX) 50 MG tablet Take 1 tablet by mouth daily 30 tablet 3    metFORMIN (GLUCOPHAGE) 500 MG tablet Take 1 tablet by mouth daily (with breakfast) 30 tablet 3    lactulose (CHRONULAC) 10 GM/15ML solution Take 15 mLs by mouth 2 times daily 946 mL 1    Varenicline Tartrate (CHANTIX PO) Take by mouth      lidocaine-prilocaine (EMLA) 2.5-2.5 % cream Apply topically as needed.  1 Tube 1    citalopram (CELEXA) 10 MG tablet Take 1 tablet by mouth nightly 30 tablet 3    traZODone (DESYREL) 300 MG tablet Take 1 tablet by mouth nightly 30 tablet 3    VENTOLIN  (90 Base) MCG/ACT inhaler inhale 2 puffs by mouth every 6 hours if needed for wheezing 18 g 3    budesonide-formoterol (SYMBICORT) 160-4.5 MCG/ACT AERO Inhale 2 puffs into the lungs 2 times daily 1 Inhaler 5    albuterol (PROVENTIL) (2.5 MG/3ML) 0.083% nebulizer solution Take 3 mLs by nebulization every 6 hours as needed for Wheezing 120 each 5    tiotropium (SPIRIVA RESPIMAT) 2.5 MCG/ACT AERS inhaler Inhale 2 puffs into the lungs daily 1 Inhaler 5    lidocaine viscous hcl (XYLOCAINE) 2 % SOLN solution Take 5-10 mLs by mouth as needed for Irritation 100 mL 1    Oxygen Concentrator       oxyCODONE HCl (OXY-IR) 10 MG immediate release tablet Take 1 tablet by mouth every 6 hours as needed for Pain for up to 30 days. 60 tablet 0     No current facility-administered medications for this visit. Social History     Tobacco Use    Smoking status: Current Every Day Smoker     Packs/day: 0.25     Years: 52.00     Pack years: 13.00     Types: Cigarettes     Start date: 1/1/1967    Smokeless tobacco: Never Used    Tobacco comment: reports going through less than 1/4 ppd and does not smoke entire cigaratte, only few puffs   Substance Use Topics    Alcohol use: No    Drug use: Yes     Types: Cocaine     Comment: 4/15/19       Family History   Problem Relation Age of Onset    Heart Attack Father     Cancer Paternal Grandmother     Lung Cancer Paternal Grandfather     Emphysema Mother         REVIEW OFSYSTEMS:  Review of Systems   Constitutional: Negative for chills and fever. HENT: Negative for congestion. Respiratory: Positive for shortness of breath and wheezing. Negative for cough. Cardiovascular: Negative for chest pain. Gastrointestinal: Positive for constipation. Negative for diarrhea, nausea and vomiting. Genitourinary: Negative for dysuria, frequency and urgency. Musculoskeletal: Positive for myalgias.  Negative for back pain and neck pain. Neurological: Negative for headaches. PHYSICAL EXAM:  Vitals:    03/09/20 1357   BP: 100/63   Site: Left Upper Arm   Position: Sitting   Cuff Size: Medium Adult   Pulse: 93   Temp: 97.7 °F (36.5 °C)   TempSrc: Oral   SpO2: 96%   Weight: 121 lb (54.9 kg)   Height: 5' 3\" (1.6 m)     BP Readings from Last 3 Encounters:   03/16/20 134/88   03/16/20 134/88   03/10/20 (!) 147/80        Physical Exam  Vitals signs reviewed. Constitutional:       Appearance: Normal appearance. She is well-developed, well-groomed and normal weight. HENT:      Head: Normocephalic and atraumatic. Right Ear: External ear normal.      Left Ear: External ear normal.      Nose: Nose normal.   Eyes:      General: Lids are normal.      Conjunctiva/sclera: Conjunctivae normal.      Pupils: Pupils are equal, round, and reactive to light. Cardiovascular:      Rate and Rhythm: Normal rate and regular rhythm. Heart sounds: Normal heart sounds. Pulmonary:      Effort: Pulmonary effort is normal.      Breath sounds: Decreased breath sounds present. Abdominal:      Palpations: Abdomen is soft. There is no mass. Tenderness: There is abdominal tenderness in the right upper quadrant. Musculoskeletal:         General: No tenderness. Skin:     General: Skin is warm and dry. Neurological:      Mental Status: She is alert and oriented to person, place, and time. Psychiatric:         Behavior: Behavior is cooperative.            DIAGNOSTIC FINDINGS:  CBC:  Lab Results   Component Value Date    WBC 4.7 03/16/2020    HGB 10.6 03/16/2020    PLT 64 03/16/2020       BMP:    Lab Results   Component Value Date     03/16/2020    K 3.7 03/16/2020     03/16/2020    CO2 23 03/16/2020    BUN 13 03/16/2020    CREATININE 0.59 03/16/2020    GLUCOSE 125 03/16/2020       HEMOGLOBIN A1C:   Lab Results   Component Value Date    LABA1C 5.4 03/09/2020       FASTING LIPID PANEL:  Lab Results   Component Value Date    CHOL 115 04/04/2019    HDL 34 (L) 04/04/2019    TRIG 58 04/04/2019       ASSESSMENT AND PLAN:  Rufino Alex was seen today for copd and cancer. Diagnoses and all orders for this visit:    Constipation, unspecified constipation type  -     lactulose (CHRONULAC) 10 GM/15ML solution; Take 15 mLs by mouth 2 times daily    Hyperglycemia  -     POCT glycosylated hemoglobin (Hb A1C)  -     metFORMIN (GLUCOPHAGE) 500 MG tablet; Take 1 tablet by mouth daily (with breakfast)    Gastroesophageal reflux disease, esophagitis presence not specified  -     omeprazole (PRILOSEC) 40 MG delayed release capsule; Take 1 capsule by mouth daily as needed    Malignant neoplasm of upper lobe of right lung (HCC)    History of diabetes mellitus  -     POCT glycosylated hemoglobin (Hb A1C)    Medication refill  -     hydrOXYzine (ATARAX) 50 MG tablet; Take 1 tablet by mouth daily      FOLLOW UP AND INSTRUCTIONS:  Return in about 3 months (around 6/9/2020) for Cancer. · Rufino Alex received counseling on the following healthy behaviors:tobacco cessation    · Discussed use, benefit, and side effects of prescribed medications. Barriers to medication compliance addressed. All patient questions answered. Pt voiced understanding. · Patient given educational materials - see patient instructions    Electronically signed by Devorah Lama PA-C on 3/9/20 at 2:21 PM EDT    This note is created with the assistance of a speech-recognition program. While intendingto generate a document that actually reflects the content of the visit, the document can still have some mistakes which may not have been identified and corrected by editing.

## 2020-03-09 NOTE — PROGRESS NOTES
Visit Information    Have you changed or started any medications since your last visit including any over-the-counter medicines, vitamins, or herbal medicines? no   Are you having any side effects from any of your medications? -  no  Have you stopped taking any of your medications? Is so, why? -  no    Have you seen any other physician or provider since your last visit? No  Have you had any other diagnostic tests since your last visit? No  Have you been seen in the emergency room and/or had an admission to a hospital since we last saw you? No  Have you had your routine dental cleaning in the past 6 months? no    Have you activated your [employer] account? If not, what are your barriers?  No:      Patient Care Team:  Ollie Denney PA-C as PCP - General (Physician Assistant)  Ollie Denney PA-C as PCP - [facility]  Jessica Haas MD as Consulting Physician (Pulmonology)  Cas Baig MD as Consulting Physician (Hematology and Oncology)  Glory Rojas MD as Consulting Physician (Radiation Oncology)    Medical History Review  Past Medical, Family, and Social History reviewed and does not contribute to the patient presenting condition    Health Maintenance   Topic Date Due    Hepatitis A vaccine (1 of 2 - Risk 2-dose series) 03/21/1952    Hepatitis B vaccine (1 of 3 - Risk 3-dose series) 03/21/1970    Breast cancer screen  03/21/2001    Colon cancer screen colonoscopy  03/21/2001    DEXA (modify frequency per FRAX score)  03/21/2016    DTaP/Tdap/Td vaccine (1 - Tdap) 01/16/2021 (Originally 3/21/1970)    Shingles Vaccine (2 of 2) 04/06/2020    Annual Wellness Visit (AWV)  11/05/2020    Lipid screen  04/04/2024    Flu vaccine  Completed    Pneumococcal 65+ years Vaccine  Completed    Hib vaccine  Aged Out    Meningococcal (ACWY) vaccine  Aged Out

## 2020-03-10 NOTE — PRE SEDATION
Sedation Pre-Procedure Note    Patient Name: Jackson Au   YOB: 1950  Room/Bed: Room/bed info not found  Medical Record Number: [id number]  Date: 3/10/2020   Time: 8:28 AM       Indication:  Arteriogram     Consent: I have discussed with the patient and/or the patient representative the indication, alternatives, and the possible risks and/or complications of the planned procedure and the anesthesia methods. The patient and/or patient representative appear to understand and agree to proceed. Vital Signs:   Vitals:    03/10/20 0655   BP: 132/74   Pulse: 86   Resp: 22   Temp: 98.1 °F (36.7 °C)   SpO2: 100%       Past Medical History:   has a past medical history of Anxiety and depression, Back pain, Bipolar disorder ([address] Bronchitis, Cancer ([address] Chronic obstructive pulmonary disease (COPD) ([address] Cirrhosis ([address] Cough, Current every day smoker, Fibromyalgia, Hepatitis, History of cervical cancer, History of chemotherapy, History of radiation therapy, Liver disease, Liver metastases ([address] Lung cancer ([address] Lung mass, Malignant neoplasm of upper lobe of right lung ([address] MVP (mitral valve prolapse), On supplemental oxygen therapy, Wears dentures, Wears glasses, and Wheezing. Past Surgical History:   has a past surgical history that includes Cervical disc surgery; Hysterectomy; Cholecystectomy; Breast surgery (Left); Carpal tunnel release (Bilateral); Knee arthroscopy (Right); bronchoscopy (04/15/2019); bronchoscopy (N/A, 4/15/2019); bronchoscopy (4/15/2019); bronchoscopy (4/15/2019); TUNNELED CENTRAL VENOUS CATHETER W/ SUBCUTANEOUS PORT (Left, 05/13/2019); thoracentesis (Right, 05/13/2019); and thoracentesis (Right, 08/19/2019). Medications:   Scheduled Meds:   Continuous Infusions:    sodium chloride 125 mL/hr at 03/10/20 0718     PRN Meds:   Home Meds:   Prior to Admission medications    Medication Sig Start Date End Date Taking?  Authorizing Provider   omeprazole (PRILOSEC) 40 MG delayed release capsule Take 1 capsule by mouth daily as needed 3/9/20  Yes Elda Johnson PA-C   metFORMIN (GLUCOPHAGE) 500 MG tablet Take 1 tablet by mouth daily (with breakfast) 3/9/20  Yes Elda Johnson PA-C   lactulose [facility]) 10 GM/15ML solution Take 15 mLs by mouth 2 times daily 3/9/20  Yes Elda Johnson PA-C   Varenicline Tartrate (CHANTIX PO) Take by mouth   Yes Historical Provider, MD   lidocaine-prilocaine (EMLA) 2.5-2.5 % cream Apply topically as needed. 1/20/20  Yes Petrona Metzger MD   citalopram (CELEXA) 10 MG tablet Take 1 tablet by mouth nightly 1/16/20  Yes Elda Johnson PA-C   traZODone (DESYREL) 300 MG tablet Take 1 tablet by mouth nightly 1/16/20  Yes Elda Johnson PA-C   VENTOLIN  (90 Base) MCG/ACT inhaler inhale 2 puffs by mouth every 6 hours if needed for wheezing 11/18/19  Yes Elda Johnson PA-C   budesonide-formoterol (SYMBICORT) 160-4.5 MCG/ACT AERO Inhale 2 puffs into the lungs 2 times daily 9/25/19  Yes Rex Edwards MD   albuterol (PROVENTIL) (2.5 MG/3ML) 0.083% nebulizer solution Take 3 mLs by nebulization every 6 hours as needed for Wheezing 9/25/19  Yes Rex Edwards MD   tiotropium (SPIRIVA RESPIMAT) 2.5 MCG/ACT AERS inhaler Inhale 2 puffs into the lungs daily 9/25/19  Yes Rex Edwards MD   lidocaine viscous hcl (XYLOCAINE) 2 % SOLN solution Take 5-10 mLs by mouth as needed for Irritation 6/5/19  Yes Nathanael Spence MD   Oxygen Concentrator    Yes Historical Provider, MD   hydrOXYzine (ATARAX) 50 MG tablet Take 1 tablet by mouth daily 3/9/20   Elda Johnson PA-C   oxyCODONE HCl (OXY-IR) 10 MG immediate release tablet Take 1 tablet by mouth every 6 hours as needed for Pain for up to 30 days.  2/17/20 3/18/20  Kizzy Lobato MD   ibuprofen (ADVIL;MOTRIN) 600 MG tablet Take 1 tablet by mouth every 6 hours as needed for Pain 6/20/16 2/3/20  Jovany Grey MD     Coumadin Use Last 7 Days:  no  Antiplatelet drug therapy use last 7 days: no  Other anticoagulant use last 7 days: no  Additional Medication Information:  n/a      Pre-Sedation Documentation and Exam:   I have personally completed a history, physical exam & review of systems for this patient (see notes).     Mallampati Airway Assessment:  Mallampati Class II - (soft palate, fauces & uvula are visible)    Prior History of Anesthesia Complications:   none    ASA Classification:  Class 2 - A normal healthy patient with mild systemic disease    Sedation/ Anesthesia Plan:   intravenous sedation    Medications Planned:   midazolam (Versed) intravenously and fentanyl intravenously    Patient is an appropriate candidate for plan of sedation: yes    Electronically signed by VIRGILIO Nails on 3/10/2020 at 8:28 AM

## 2020-03-10 NOTE — POST SEDATION
Sedation Post Procedure Note    Patient Name: Caleb Heck   YOB: 1950  Room/Bed: Room/bed info not found  Medical Record Number: [id number]  Date: 3/10/2020   Time: 10:16 AM         Physicians/Assistants: Jean Arambula PA-C    Procedure Performed:  Arteriogram     Post-Sedation Vital Signs:  Vitals:    03/10/20 1011   BP: 134/63   Pulse: 71   Resp:    Temp:    SpO2: 98%      Vital signs were reviewed and were stable after the procedure (see flow sheet for vitals)            Post-Sedation Exam: Pt remains stable           Complications: none    Electronically signed by VIRGILIO Nelson on 3/10/2020 at 10:16 AM

## 2020-03-10 NOTE — H&P (VIEW-ONLY)
hepatitis   2. Lung cancer, liver mets      Diagnosis Date    Anxiety and depression     Back pain     Bipolar disorder (HCC)     Bronchitis     [facility]     Chronic obstructive pulmonary disease (COPD) (HCC)     Cirrhosis (HCC)     Cough     Current every day smoker     1 pack a day for the last 50 years    Fibromyalgia     Hepatitis     Hepatitis C per pt.  History of cervical cancer     is s/p hyst    History of chemotherapy     History of radiation therapy     Liver disease     stage 4    Liver metastases ([facility]     [facility] 03/2019    Dr. M[facility]    Lung mass 2019    Malignant neoplasm of upper lobe of right lung (HCC)     MVP (mitral valve prolapse)     On supplemental oxygen therapy     Wears dentures     full- not in use today 3/10/20    Wears glasses     Wheezing      PLANS:   1.  IR- Hepatic mapping    POPEYE DE JESUS-CNP  Electronically signed 3/10/2020 at 7:51 AM

## 2020-03-10 NOTE — PROGRESS NOTES
Right groin dressing dry and intact no signs of bleeding noted,bilat pedal and post tibial pulses palpable

## 2020-03-10 NOTE — PROGRESS NOTES
Pt resting quietly states pain is better.  Right groin dressing intact with small spot of red drainage noted ,no hematoma noted, bilat pedal and post tibial pulses palpable

## 2020-03-10 NOTE — H&P
the lungs daily 9/25/19  Yes Rex Edwards MD   lidocaine viscous hcl (XYLOCAINE) 2 % SOLN solution Take 5-10 mLs by mouth as needed for Irritation 6/5/19  Yes Nathanael Spence MD   Oxygen Concentrator    Yes Historical Provider, MD   hydrOXYzine (ATARAX) 50 MG tablet Take 1 tablet by mouth daily 3/9/20   Elda Johnson PA-C   oxyCODONE HCl (OXY-IR) 10 MG immediate release tablet Take 1 tablet by mouth every 6 hours as needed for Pain for up to 30 days. 2/17/20 3/18/20  Petrona Metzger MD   ibuprofen (ADVIL;MOTRIN) 600 MG tablet Take 1 tablet by mouth every 6 hours as needed for Pain 6/20/16 2/3/20  Jovany Grey MD     Past Medical History    has a past medical history of Anxiety and depression, Back pain, Bipolar disorder ([address] Bronchitis, Cancer ([address] Chronic obstructive pulmonary disease (COPD) ([address] Cirrhosis ([address] Cough, Current every day smoker, Fibromyalgia, Hepatitis, History of cervical cancer, History of chemotherapy, History of radiation therapy, Liver disease, Liver metastases ([address] Lung cancer ([address] Lung mass, Malignant neoplasm of upper lobe of right lung ([address] MVP (mitral valve prolapse), On supplemental oxygen therapy, Wears dentures, Wears glasses, and Wheezing. Past Surgical History   has a past surgical history that includes Cervical disc surgery; Hysterectomy; Cholecystectomy; Breast surgery (Left); Carpal tunnel release (Bilateral); Knee arthroscopy (Right); bronchoscopy (04/15/2019); bronchoscopy (N/A, 4/15/2019); bronchoscopy (4/15/2019); bronchoscopy (4/15/2019); TUNNELED CENTRAL VENOUS CATHETER W/ SUBCUTANEOUS PORT (Left, 05/13/2019); thoracentesis (Right, 05/13/2019); and thoracentesis (Right, 08/19/2019). Social History   reports that she has been smoking cigarettes. She started smoking about 53 years ago. She has a 13.00 pack-year smoking history. She has never used smokeless tobacco.   reports no history of alcohol use. reports current drug use. Drug: Cocaine. sober since 4/15/19  Marital Status    Children one  Occupationr retired   Family History  Family Status   Relation Name Status    Father      [address]      PGF      Mother       family history includes Cancer in her paternal grandmother; Emphysema in her mother; Heart Attack in her father; Lisha Number in her paternal grandfather. OBJECTIVE:   VITALS:  height is 5' 3\" (1.6 m) and weight is 121 lb (54.9 kg). Her cerebral temperature is 98.1 °F (36.7 °C). Her blood pressure is 132/74 and her pulse is 86. Her respiration is 22 and oxygen saturation is 100%. CONSTITUTIONAL:Alert and orientated to person, place and time. No acute distress. Friendly. Small petite frame. SKIN:  Warm & dry, no rashes on exposed skin. Mildly jaundice, dusky hue. HEENT: HEAD: Normocephalic, atraumatic        EYES:  PERRL, EOMs intact, conjunctiva clear, wearing glasses       EARS:  Equal bilaterally, no edema or thickening, skin is intact without lumps or lesions. No discharge. NOSE:  Nares patent, septum midline, no rhinorrhea      MOUTH/THROAT:  Mucous membranes moist, tongue is pink, uvula midline, edentulous  NECK:  Supple, no lymphadenopathy, full ROM  LUNGS: Respirations even and non-labored. Clear to auscultation bilaterally, no wheezes/rales/rhonchi but overall decreased breath sounds, nasal cannula in use. CARDIOVASCULAR: regular rate and rhythm, no murmurs/rubs/gallops   ABDOMEN: soft, non-tender, non-distended, bowel sounds active x 4, thin  MUSCULOSKELETAL: Full ROM bilateral upper extremities, Full ROM bilateral lower extremities. Strength of 5/5 bilateral upper extremities. Strength 5/5 bilateral lower extremities. VASCULAR:  Brisk cap refill bilateral fingers. Radial pulses are intact, 2+ bilaterally. Dorsalis pedis pulse 2+ bilaterally. Trace edema bilateral lower extremities  NEUROLOGIC: CN II-XII are grossly intact. Gait not assessed. IMPRESSIONS:   1.  Chronic

## 2020-03-16 NOTE — PROGRESS NOTES
Antonio Stager arrives ambulatory alone  Order for C14 D1 reviewed  Rt chest port accessed per protocol with #20 G 3/4 L Regan needle  Good blood return noted and blood work obtained and sent to lab  Lab results reviewed   Platelet count noted 64 and shown to Dr Beverlee Nyhan Dr Beverlee Nyhan vs and examined and order to proceed with chemotherapy  Imfinzi initiated and completed   No reaction noted   Iv line flushed and regan needle removed with needle intact  Band aid applied  Discharged per ambulatory

## 2020-03-16 NOTE — PROGRESS NOTES
DIAGNOSIS:   1. Squamous cell carcinoma, right lung 04/15/2019 with right main stem bronchus invasion (T3N0M0)   2. PET showed localized disease; brain MRI negative for metastases,   3. HX Liver disease and hepatitis C  4. Isolated liver lesion, segment 7, likely hepatocellular carcinoma     CURRENT THERAPY:  1. S/P bronchoscopy establishing the diagnosis   2. Chemoradiation with taxol and carboplatin - started 05/23/2019, completed 07/09/2019  3. Jonita Argue started 08/26/2019  4. Plan microsphere radioembolization with Y-90 treatment in March/2020    BRIEF CASE HISTORY: Rolanda Hunter is a very pleasant 77 y.o. female who is referred to us for consultation and management of recently diagnosed lung cancer. She initially presented with flu like illness and shortness of breath 11/2018 and was evaluated by PCP and was started on antibiotics, X-ray done at the time showed suspicious findings. Follow up CT showed mass lesion within the right hilum either intervening or originating from the right mainstem bronchus and narrowing of multiple segmental pulmonary arterial branches. Referred to Dr. Anayeli Alexander for bronchoscopy and right main stem bronchus mass was appreciated, bronchial washings were positive for squamous cell carcinoma. She reports she has liver disease, Hep C and assumed her recent weight loss and cachexia was related to that. She has right shoulder and back pain. She has not been treated for Hep C. She smokes cigarettes and cocaine. She has COPD and is oxygen dependent. Staging PET showed localized disease, pleural effusion seen; brain MRI was negative for metastatic disease. She will need pleurocentesis with cytological evaluation of the fluid, assuming it is negative , and since she is not surgical candidate. Thoracentesis was done and fluid was negative for malignancy. Chemoradiation Carbo/Taxol -started 05/23/2019, completed 07/09/2019.  Follow up CT showed good response to treatment with some pleural effusion and liver lesion isolated in segment 7. With features concerning for hepatocellular carcinoma. Considering her cirrhosis, she is not a candidate for resection or transplant, we referred her to interventional radiology for consideration of radioembolization with Y-90 treatment. INTERIM HISTORY: The patient presents for follow up for lung cancer and cycle #13 of Imfinzi. She has completed mapping for Y90 with treatment planned for 03/24/2020. Her breathing is worse with movement. She has reduced her smoking to 1/3 pack daily. She denies any nausea or vomiting. She is doing well with no new complaints. PAST MEDICAL HISTORY: has a past medical history of Anxiety and depression, Back pain, Bipolar disorder ([address] Bronchitis, Cancer ([address] Chronic obstructive pulmonary disease (COPD) ([address] Cirrhosis ([address] Cough, Current every day smoker, Fibromyalgia, Hepatitis, History of cervical cancer, History of chemotherapy, History of radiation therapy, Liver disease, Liver metastases ([address] Lung cancer ([address] Lung mass, Malignant neoplasm of upper lobe of right lung ([address] MVP (mitral valve prolapse), On supplemental oxygen therapy, Wears dentures, Wears glasses, and Wheezing. PAST SURGICAL HISTORY: has a past surgical history that includes Cervical disc surgery; Hysterectomy; Cholecystectomy; Breast surgery (Left); Carpal tunnel release (Bilateral); Knee arthroscopy (Right); bronchoscopy (04/15/2019); bronchoscopy (N/A, 4/15/2019); bronchoscopy (4/15/2019); bronchoscopy (4/15/2019); TUNNELED CENTRAL VENOUS CATHETER W/ SUBCUTANEOUS PORT (Left, 05/13/2019); thoracentesis (Right, 05/13/2019); and thoracentesis (Right, 08/19/2019).      CURRENT MEDICATIONS:  has a current medication list which includes the following prescription(s): omeprazole, hydroxyzine, metformin, lactulose, oxycodone hcl, varenicline tartrate, lidocaine-prilocaine, citalopram, trazodone, ventolin hfa, budesonide-formoterol, albuterol, extremities show bilateral edema, clubbing, or cyanosis. Breasts: Examination not done today. Neuro exam: intact cranial nerves bilaterally no motor or sensory deficit, gait is normal. Lymphatic: no adenopathy appreciated in the supraclavicular, axillary, cervical or inguinal area  Skin: right shoulder and chest wall has excoriation marks, radiation rash      REVIEW OF LABORATORY DATA:   Lab Results   Component Value Date    WBC 5.1 03/02/2020    HGB 11.1 (L) 03/02/2020    HCT 34.9 (L) 03/02/2020    MCV 89.5 03/02/2020    PLT See Reflexed IPF Result 03/10/2020       Lab Results   Component Value Date    NEUTROABS 3.88 03/02/2020           Chemistry        Component Value Date/Time     03/10/2020 0725    K 4.0 03/10/2020 0725     03/10/2020 0725    CO2 24 03/10/2020 0725    BUN 23 03/10/2020 0725    CREATININE 0.65 03/10/2020 0725        Component Value Date/Time    CALCIUM 8.3 (L) 03/10/2020 0725    ALKPHOS 113 (H) 03/10/2020 0725    AST 41 (H) 03/10/2020 0725    ALT 22 03/10/2020 0725    BILITOT 0.57 03/10/2020 0725            REVIEW OF RADIOLOGICAL RESULTS:       PATHOLOGY:       IMPRESSION:   1. Squamous cell carcinoma, right lung 04/15/2019, clinical stage T3, N0, M0.   2. Liver disease and hepatitis C, severe comorbidity   3. Staging PET showed localized disease, pleural effusion seen; Brain MRI was negative for metastatic disease  4. Cocaine and cigarette addiction, quit drugs and quitting smoking with chantix  5. Chemo-radiation 05/23/2019, completed 07/09/2019  6. Pleural effusion - plan for centesis  7. Imfinzi - started 08/26/2019  8. Chronic elevation of blood sugar, she is on metformin. I asked her to keep home readings log  9. Smoking addiction, she is on Chantix and that is well-tolerated. 10. Isolated segment 7 liver mass, likely hepatocellular carcinoma, plan treatment with radioembolization for 03/24/2020. PLAN:   1. I educated the patient about the COVID-19.   Information about the epidemic was shared with the patient. General information about safety precautions and protective care including hand hygiene and social distancing and avoidance of crowds was discussed with the patient. Routine checking for symptoms and temperature was advocated. The patient is asked to call us with any questions or concerns  They are specifically asked to contact us prior to their appointment if they have a fever or respiratory symptom. 2. I completed toxicity check. 3. The patient continues to tolerate treatment well and we will plan to treat to progression. 4. We reviewed her plans for Y90 and may delay next treatment 1 week to allow for recovery. 5. I counseled her on continued smoking cessation. 6. Return in 2-3 weeks.

## 2020-03-16 NOTE — FLOWSHEET NOTE
Situation:  Writer visited Ms. Pato Oliva in the treatment room as she was waiting for her treatment. Assessment:  Ms. Pato Oliva talked about her illness and her attitude about the latest diagnosis. She acknowledged that she is not worrying and trying to stay positive. She draws strength from her relationship with her animals and her . She expressed gratitude for how her  takes care of her. She acknowledged that she has her hard days but that she chooses to get out of herself and focus on taking care of her animals. Intervention:  Writer provided supportive presence and explored Pt's coping and needs; inquired about Pt's sources of support and strength; offered words of encouragement and support; affirmed Pt's strengths. Outcome:  Ms. Pato Oliva thanked writer for the visit.        03/16/20 1416   Encounter Summary   Services provided to: Patient   [facility]   Support System Friends/neighbors   Continue Visiting   (3/16/20)   Complexity of Encounter Low   Length of Encounter 15 minutes   Spiritual Assessment Completed Yes   Spiritual/Protestant   Type Spiritual support   Assessment Calm; Approachable; Hopeful;Coping   Intervention Active listening;Explored feelings, thoughts, concerns;Explored coping resources;Sustaining presence/ Ministry of presence; Discussed meaning/purpose;Discussed relationship with God;Discussed belief system/Yazidi practices/molly;Discussed illness/injury and it's impact   Outcome Coping;Engaged in conversation;Expressed gratitude;Encouraged; Hopeful;Receptive     Electronically signed by Hema Norton, [facility] [address] [facility], [address] [facility]  3/16/2020  2:20 PM

## 2020-03-16 NOTE — TELEPHONE ENCOUNTER
[facility]OM[facility] ARRIVES AMBULATORY FOR MD VISIT & TX  DR SMITH IN TO SEE PATIENT  ORDERS RECEIVED  CONTINUE CHEMO PER ORDERS  RV 3 WEEKS W/CHEMO & LABS  DELAYING NEXT TX BY 1 WEEK  MD VISIT 04/06/20 @11:45AM  [email or web address]  AVS PRINTED AND GIVEN TO PATIENT WITH INSTRUCTIONS  PATIENT REMAINS IN [address] [place] [address]

## 2020-03-24 NOTE — POST SEDATION
Sedation Post Procedure Note    Patient Name: Caleb Heck   YOB: 1950  Room/Bed: Room/bed info not found  Medical Record Number: [id number]  Date: 3/24/2020   Time: 11:19 AM         Physicians/Assistants: Niki Villanueva MD    Procedure Performed:  SIRT/Y90 segment 7 hepatic     Post-Sedation Vital Signs:  Vitals:    03/24/20 1105   BP: 120/65   Pulse: 72   Resp: 10   Temp:    SpO2: 95%      Vital signs were reviewed and were stable after the procedure (see flow sheet for vitals)            Complications: none    Electronically signed by Niki Villanueva MD on 3/24/2020 at 11:19 AM

## 2020-03-24 NOTE — FLOWSHEET NOTE
DC'd plan reviewd over phone with patients spouse Panfilo Estevez. Panfilo Estevez states \"understanding of dc plan\".

## 2020-03-24 NOTE — PRE SEDATION
Sedation Pre-Procedure Note    Patient Name: Bia Stephenson   YOB: 1950  Room/Bed: Room/bed info not found  Medical Record Number: [id number]  Date: 3/24/2020   Time: 9:02 AM       Indication:  [address]    Consent: I have discussed with the patient and/or the patient representative the indication, alternatives, and the possible risks and/or complications of the planned procedure and the anesthesia methods. The patient and/or patient representative appear to understand and agree to proceed. Vital Signs:   Vitals:    03/24/20 0809   BP: 113/74   Pulse: 94   Resp: 18   Temp: 98 °F (36.7 °C)   SpO2: 96%       Past Medical History:   has a past medical history of Anxiety and depression, Back pain, Bipolar disorder ([address] Bronchitis, Cancer ([address] Chronic obstructive pulmonary disease (COPD) ([address] Cirrhosis ([address] Cough, Current every day smoker, Fibromyalgia, Hepatitis, History of cervical cancer, History of chemotherapy, History of radiation therapy, Liver disease, Liver metastases ([address] Lung cancer ([address] Lung mass, Malignant neoplasm of upper lobe of right lung ([address] MVP (mitral valve prolapse), On supplemental oxygen therapy, Wears dentures, Wears glasses, and Wheezing. Past Surgical History:   has a past surgical history that includes Cervical disc surgery; Hysterectomy; Cholecystectomy; Breast surgery (Left); Carpal tunnel release (Bilateral); Knee arthroscopy (Right); bronchoscopy (04/15/2019); bronchoscopy (N/A, 4/15/2019); bronchoscopy (4/15/2019); bronchoscopy (4/15/2019); TUNNELED CENTRAL VENOUS CATHETER W/ SUBCUTANEOUS PORT (Left, 05/13/2019); thoracentesis (Right, 05/13/2019); and thoracentesis (Right, 08/19/2019).     Medications:   Scheduled Meds:    ketorolac  30 mg Intravenous Once    ondansetron  4 mg Intravenous Once    pantoprazole  40 mg Intravenous Once     Continuous Infusions:    sodium chloride 125 mL/hr at 03/24/20 0838     PRN Meds:   Home Meds:   Prior to Admission medications    Medication Sig Start Date End Date Taking? Authorizing Provider   oxyCODONE HCl (OXY-IR) 10 MG immediate release tablet Take 1 tablet by mouth every 6 hours as needed for Pain for up to 30 days. 3/16/20 4/15/20 Yes Felicia Cardozo MD   omeprazole (PRILOSEC) 40 MG delayed release capsule Take 1 capsule by mouth daily as needed 3/9/20  Yes Joan Cole PA-C   hydrOXYzine (ATARAX) 50 MG tablet Take 1 tablet by mouth daily 3/9/20  Yes Joan Cole PA-C   metFORMIN (GLUCOPHAGE) 500 MG tablet Take 1 tablet by mouth daily (with breakfast) 3/9/20  Yes Joan Cole PA-C   Varenicline Tartrate (CHANTIX PO) Take by mouth   Yes Historical Provider, MD   lidocaine-prilocaine (EMLA) 2.5-2.5 % cream Apply topically as needed.  1/20/20  Yes Felicia Cardozo MD   citalopram (CELEXA) 10 MG tablet Take 1 tablet by mouth nightly 1/16/20  Yes Joan Cole PA-C   VENTOLIN  (90 Base) MCG/ACT inhaler inhale 2 puffs by mouth every 6 hours if needed for wheezing 11/18/19  Yes Joan Cole PA-C   budesonide-formoterol (SYMBICORT) 160-4.5 MCG/ACT AERO Inhale 2 puffs into the lungs 2 times daily 9/25/19  Yes Sonali Fabian MD   albuterol (PROVENTIL) (2.5 MG/3ML) 0.083% nebulizer solution Take 3 mLs by nebulization every 6 hours as needed for Wheezing 9/25/19  Yes Sonali Fabian MD   tiotropium (SPIRIVA RESPIMAT) 2.5 MCG/ACT AERS inhaler Inhale 2 puffs into the lungs daily 9/25/19  Yes Sonali Fabian MD   lactulose (CHRONULAC) 10 GM/15ML solution Take 15 mLs by mouth 2 times daily 3/9/20   Joan Cole PA-C   traZODone (DESYREL) 300 MG tablet Take 1 tablet by mouth nightly 1/16/20   Joan Cole PA-C   lidocaine viscous hcl (XYLOCAINE) 2 % SOLN solution Take 5-10 mLs by mouth as needed for Irritation 6/5/19   Austin Romero MD   Oxygen Concentrator     Historical Provider, MD   ibuprofen (ADVIL;MOTRIN) 600 MG tablet Take 1 tablet by mouth every 6 hours as needed for Pain 6/20/16 2/3/20  Patricia Clement MD     Coumadin Use Last 7 Days:  no  Antiplatelet drug therapy use last 7 days: no  Other anticoagulant use last 7 days: no  Additional Medication Information:  See med [facility]      Pre-Sedation Documentation and Exam:   I have reviewed the patient's history and review of systems.     Mallampati Airway Assessment:  Mallampati Class II - (soft palate, fauces & uvula are visible)    Prior History of Anesthesia Complications:   none    ASA Classification:  Class 2 - A normal healthy patient with mild systemic disease    Sedation/ Anesthesia Plan:   intravenous sedation    Medications Planned:   midazolam (Versed) intravenously and fentanyl intravenously    Patient is an appropriate candidate for plan of sedation: yes    Electronically signed by Liberty Denver, MD on 3/24/2020 at 9:02 AM

## 2020-03-31 NOTE — PROGRESS NOTES
supplemental oxygen therapy     Wears dentures     full- not in use today 3/10/20    Wears glasses     Wheezing      Past Surgical History:   Procedure Laterality Date    BREAST SURGERY Left     benign lumpectomy, multiple    BRONCHOSCOPY  04/15/2019    biopsies and washings    BRONCHOSCOPY N/A 4/15/2019    BRONCHOSCOPY BRUSHINGS performed by Jessica Sarah MD at [address] [facility]  4/15/2019    BRONCHOSCOPY BIOPSY BRONCHUS performed by Jessica Sarah MD at [address] [facility]  4/15/2019    BRONCHOSCOPY DIAGNOSTIC OR CELL [address] ONLY performed by Jessica Sarah MD at [facility], [address] Bilateral    [address]      X 2    CHOLECYSTECTOMY      HYSTERECTOMY      complete    KNEE ARTHROSCOPY Right     THORACENTESIS Right 05/13/2019    THORACENTESIS Right 08/19/2019    TUNNELED CENTRAL VENOUS CATHETER W/ SUBCUTANEOUS PORT Left 05/13/2019        Family History   Problem Relation Age of Onset    Heart Attack Father     Cancer Paternal Grandmother     Lung Cancer Paternal Grandfather     Emphysema Mother        Social History     Socioeconomic History    Marital status:      Spouse name: Not on file    Number of children: Not on file    Years of education: Not on file    Highest education level: Not on file   Occupational History    Not on file   Social Needs    Financial resource strain: Not on file    Food insecurity     Worry: Not on file     Inability: Not on file    Transportation needs     Medical: Not on file     Non-medical: Not on file   Tobacco Use    Smoking status: Current Every Day Smoker     Packs/day: 0.25     Years: 52.00     Pack years: 13.00     Types: Cigarettes     Start date: 1/1/1967    Smokeless tobacco: Never Used    Tobacco comment: 1/3 ppd (noted 3/24/20)   Substance and Sexual Activity    Alcohol use: No    Drug use: Yes     Types: Cocaine     Comment: 4/15/19 last use (Noted 3/24/20)    Sexual activity: Not on file   Lifestyle  Physical activity     Days per week: Not on file     Minutes per session: Not on file    Stress: Not on file   Relationships    Social connections     Talks on phone: Not on file     Gets together: Not on file     Attends Temple service: Not on file     Active member of club or organization: Not on file     Attends meetings of clubs or organizations: Not on file     Relationship status: Not on file    Intimate partner violence     Fear of current or ex partner: Not on file     Emotionally abused: Not on file     Physically abused: Not on file     Forced sexual activity: Not on file   Other Topics Concern    Not on file   Social History Narrative    Not on file       Review of Systems   Constitutional: Negative. HENT: Negative. Respiratory:        Ongoing shortness of breath at rest and with exertion, chronic daily cough productive of pale yellow sputum. Denies any hemoptysis or purulent sputum. Cardiovascular: Negative. Gastrointestinal: Negative. Endocrine: Negative. Genitourinary: Negative. Musculoskeletal: Negative. Skin: Negative. Allergic/Immunologic: Negative. Neurological: Negative. Hematological: Negative. Psychiatric/Behavioral: Negative. Objective:      Physical Exam  General appearance -chronically ill appearing, cachectic frail female  Mental status - alert, oriented to person, place, and time  Eyes - pupils equal and reactive, extraocular eye movements intact  Ears - bilateral TM's and external ear canals normal, not examined  Nose - normal and patent, no erythema, discharge or polyps  Mouth - mucous membranes moist, pharynx normal without lesions  Neck - supple, no significant adenopathy  Chest -increased AP diameter, decreased thoracic expansion and excursion, prolonged expiratory phase. Lung sounds generally silent throughout no adventitious sounds appreciated.   Heart -normal rate, regular rhythm, normal S1, S2, no murmurs, rubs, clicks or gallops  Abdomen - soft, nontender, nondistended, no masses or organomegaly  Neurological - alert, oriented, normal speech, no focal findings or movement disorder noted}  Extremities -no edema, clubbing noted. Skin - normal coloration and turgor, no rashes, no suspicious skin lesions noted     Wt Readings from Last 3 Encounters:   03/31/20 121 lb (54.9 kg)   03/24/20 121 lb (54.9 kg)   03/10/20 121 lb (54.9 kg)       Results for orders placed or performed during the hospital encounter of 03/24/20   APTT   Result Value Ref Range    PTT 28.7 23 - 31 sec   Platelet count   Result Value Ref Range    Platelets 69 (L) 414 - 453 k/uL   Protime-INR   Result Value Ref Range    Protime 13.2 (H) 9.7 - 11.6 sec    INR 1.3    Comp Metab w/Bili Pr   Result Value Ref Range    Alb 2.7 (L) 3.5 - 5.2 g/dL    Albumin/Globulin Ratio NOT REPORTED 1.0 - 2.5    Alkaline Phosphatase 92 35 - 104 U/L    ALT 24 5 - 33 U/L    AST 42 (H) <32 U/L    Total Bilirubin 0.82 0.3 - 1.2 mg/dL    Bilirubin, Direct 0.32 (H) <0.31 mg/dL    Bilirubin, Indirect 0.50 0.00 - 1.00 mg/dL    BUN 11 8 - 23 mg/dL    Calcium 8.4 (L) 8.6 - 10.4 mg/dL    CREATININE 0.51 0.50 - 0.90 mg/dL    Glucose 109 (H) 70 - 99 mg/dL    Total Protein 7.4 6.4 - 8.3 g/dL    Sodium 134 (L) 135 - 144 mmol/L    Potassium 3.7 3.7 - 5.3 mmol/L    Chloride 99 98 - 107 mmol/L    CO2 26 20 - 31 mmol/L    Anion Gap 9 9 - 17 mmol/L    GFR Non-African American >60 >60 mL/min    GFR African American >60 >60 mL/min    GFR Comment          GFR Staging NOT REPORTED        Nm Liver Spect    Result Date: 3/24/2020  EXAMINATION: NUCLEAR MEDICINE LIVER SPECT 3/24/2020 TECHNIQUE: Bremsstrahlung SPECT and Planar images of the lower chest and abdomen were performed following administration of intra-arterially yttrium Y 90 into the segment 7 hepatic artery. Indications: Findings: There is good uptake seen throughout the  lobe of the liver. No significant extrahepatic uptake is seen.  COMPARISON: March 10, allow for the insertion of the 5 Western Laura sheath. With this access, a 5 Peruvian C2 catheter was advanced over an 035 guidewire manipulated with the 035 guidewire to allow for selective catheterization, injection of contrast material, and diagnostic angiography of the celiac artery. Due to the need for detailed angiographic images of this vascular distribution, a coaxial system was introduced using a 2.4 Progreat microcatheter and an 016 guidewire. This coaxial system was advanced to the level of the right hepatic artery to allow for the injection of contrast material and diagnostic angiography of the arterial segment. This demonstrated multifocal contrast accumulation/blush in the superior posterior segment right hepatic lobe (segment 7). The microcatheter was further negotiated into the posterior superior right hepatic arterial segment, at the site of known hepatocellular carcinoma. Hand angiography confirmed satisfactory tip position. Cone beam CT was also performed. With the coaxial system position within the terminal portion of the right hepatic artery, 3 millicuries of technetium 99m macroaggregated albumin were administered. At the conclusion of the procedure, hemostasis was acquired at the puncture site using Mynx percutaneous hemostatic device. Immediately after this procedure the patient was imaged in nuclear medicine for lung shunting calculations. FINDINGS: Celiac angiography demonstrates conventional anatomy. The splenic artery is patent. The common hepatic artery divides into typical proper hepatic and GDA branches. There is normal caliber proper hepatic artery with somewhat tortuous intrahepatic vessels having a corkscrew appearance consistent with the history of cirrhosis. Multifocal contrast accumulation/vascular tumor blush identified in the superior right hepatic lobe (segment 7).      Successful and uncomplicated planning angiography for possible yttrium-90 administration for treatment of the personnel. An arteriogram was then performed through the existing right groin sheath which demonstrates no vessel injury, extravasation or anomalous anatomy to preclude closure. A Mynx closure device was then deployed per protocol and hemostasis achieved. A sterile dressing was placed over the skin entrance site. The patient tolerated the procedure well with no immediate complications. Immediately after this procedure the patient is to be imaged in nuclear medicine for lung bremsstrahlung calculations. The final calculated dose delivered was 43.8 millicurie mCi; 353.1 Gy which is 98.7% of desired dose. Successful delivery of the Y90 TheraSpheres to the segment 7 hepatic artery. Refer to same date nuclear medicine liver SPECT scan for further details. Nm Lung Shunt Spect    Result Date: 3/10/2020  EXAMINATION: Nuclear medicine lung shunt SPECT  3/10/2020 10:17 am TECHNIQUE: Planar and images of the chest and abdomen were performed following administration of 3 mCi of technetium 99 MAA directly into the right hepatic artery. COMPARISON: Same date IR visceral/hepatic angiography; MRI abdomen February 27, 2020 HISTORY: ORDERING SYSTEM PROVIDED HISTORY: Liver mass TECHNOLOGIST PROVIDED HISTORY: Reason for Exam: liver mass, hepatitis C Hepatocellular carcinoma in a patient with longstanding hepatitis Celsius and cirrhosis FINDINGS: There is good uptake seen throughout the right hepatic lobe particularly the posterosuperior segment right hepatic lobe (segment 7). No significant extrahepatic uptake is seen. The percent of lung shunting equals 3.3%. No extrahepatic uptake. The percent of lung shunting equals 3.3%       Assessment:      1. Chronic obstructive pulmonary disease, unspecified COPD type ([address]    2. Chronic respiratory failure with hypoxia (HCC)    3. Malignant neoplasm of hilus of right lung ([address]          Plan:      1. Medications reviewed, changed to Trelegy.   2. Refills were provided -

## 2020-04-06 NOTE — PROGRESS NOTES
Alen Pemberton arrives ambulatory alone  Order for C15 D1 reviewed  Lt chest port accessed per protocol with #20 G 3/4 L Regan needle  Good blood return noted and blood work obtained and sent to lab  Dr Shravan Ambrose vs and examined  Lab results reviewed   Labs reviewed with Dr Shravan Ambrose as platelet count is 72   Order to proceed with chemotherapy  Imfinzi initiated and completed   No reaction noted  Iv line flushed and regan needle flushed   Regan needle removed with needle intact  Band aid applied  Discharged per ambulatory

## 2020-04-06 NOTE — PROGRESS NOTES
hfa, albuterol, tiotropium, lidocaine viscous hcl, oxygen concentrator, and ibuprofen. ALLERGIES:  has No Known Allergies. FAMILY HISTORY: Negative for any hematological or oncological conditions. SOCIAL HISTORY:  reports that she has been smoking cigarettes. She started smoking about 53 years ago. She has a 13.00 pack-year smoking history. She has never used smokeless tobacco. She reports current drug use. Drug: Cocaine. She reports that she does not drink alcohol. REVIEW OF SYSTEMS:   General: No fever or night sweats. Fatigue, weight stable. Xerostomia has improved  ENT: No double or blurred vision, no tinnitus or hearing problem, or sore throat   Respiratory: Chest pain and shortness of breath are stable  Cardiovascular: Denies chest pain, PND or orthopnea. No L E swelling or palpitations. Gastrointestinal: No vomiting, abdominal pain, diarrhea or constipation. +nausea  Genitourinary: Denies dysuria, hematuria, frequency, urgency or incontinence. Neurological: Denies headaches, decreased LOC, no sensory or motor focal deficits. Musculoskeletal: No arthralgia no back pain or joint swelling. Chronic right shoulder pain -stable, controlled with current medication  Skin: There are no rashes or bleeding. Psychiatric: No anxiety, no depression. Endocrine: No diabetes or thyroid disease. Hematologic: No bleeding, no adenopathy. PHYSICAL EXAM: Shows a well appearing 72y.o.-year-old female who is not in pain or distress. Vital Signs: Blood pressure (!) 113/43, pulse 87, temperature 96.5 °F (35.8 °C), temperature source Temporal, resp. rate 16. HEENT: . Normocephalic and atraumatic. Pupils are equal, round, reactive to light and accommodation. Extraocular muscles are intact. Neck: Showed no JVD, no carotid bruit . Lungs: Decreased air entry. Clear to auscultation bilaterally. Heart: Murmur - prolapse Abdomen: Soft, nontender. No hepatosplenomegaly. Extremities: Olecranon bursa on left elbow. MRI planned for 06/2020.  2. We reviewed plan to resume treatment today. 3. Clinically she continues to do very well. 4. Return in 2-4 week.

## 2020-04-06 NOTE — TELEPHONE ENCOUNTER
Marialuisa Schneider MD VISIT & TX  DR Shameka Reynoso IN TO SEE PATIENT  ORDERS RECEIVED  CONTINUE IMFINZI  RV 4 WEEKS  LABS ON INFUSION DAYS  MD VISIT 05/04/20 @11:30AM  [email or web address]  AVS PRINTED AND GIVEN TO PATIENT WITH INSTRUCTIONS  PATIENT REMAINS IN [address] [place] [address]

## 2020-05-04 NOTE — PROGRESS NOTES
DIAGNOSIS:   1. Squamous cell carcinoma, right lung 04/15/2019 with right main stem bronchus invasion (T3N0M0)   2. PET showed localized disease; brain MRI negative for metastases,   3. HX Liver disease and hepatitis C  4. Isolated liver lesion, segment 7, likely hepatocellular carcinoma     CURRENT THERAPY:  1. S/P bronchoscopy establishing the diagnosis   2. Chemoradiation with taxol and carboplatin - started 05/23/2019, completed 07/09/2019  3. Tyesha Nobles started 08/26/2019  4. Microsphere radioembolization Y-90 treatment in March/2020    BRIEF CASE HISTORY: Victorina Salinas is a very pleasant 72 y.o. female who is referred to us for consultation and management of recently diagnosed lung cancer. She initially presented with flu like illness and shortness of breath 11/2018 and was evaluated by PCP and was started on antibiotics, X-ray done at the time showed suspicious findings. Follow up CT showed mass lesion within the right hilum either intervening or originating from the right mainstem bronchus and narrowing of multiple segmental pulmonary arterial branches. Referred to Dr. Santana Gillespie for bronchoscopy and right main stem bronchus mass was appreciated, bronchial washings were positive for squamous cell carcinoma. She reports she has liver disease, Hep C and assumed her recent weight loss and cachexia was related to that. She has right shoulder and back pain. She has not been treated for Hep C. She smokes cigarettes and cocaine. She has COPD and is oxygen dependent. Staging PET showed localized disease, pleural effusion seen; brain MRI was negative for metastatic disease. She will need pleurocentesis with cytological evaluation of the fluid, assuming it is negative , and since she is not surgical candidate. Thoracentesis was done and fluid was negative for malignancy. Chemoradiation Carbo/Taxol -started 05/23/2019, completed 07/09/2019.  Follow up CT showed good response to treatment with some pleural effusion varenicline tartrate, lidocaine-prilocaine, trazodone, albuterol, tiotropium, lidocaine viscous hcl, oxygen concentrator, and ibuprofen. ALLERGIES:  has No Known Allergies. FAMILY HISTORY: Negative for any hematological or oncological conditions. SOCIAL HISTORY:  reports that she has been smoking cigarettes. She started smoking about 53 years ago. She has a 13.00 pack-year smoking history. She has never used smokeless tobacco. She reports current drug use. Drug: Cocaine. She reports that she does not drink alcohol. REVIEW OF SYSTEMS:   General: No fever or night sweats. Fatigue, weight stable. Xerostomia has improved; low grade fever resolved  ENT: No double or blurred vision, no tinnitus or hearing problem, or sore throat   Respiratory: Chest pain; shortness of breath and wheezing - worse  Cardiovascular: Denies chest pain, PND or orthopnea. No L E swelling or palpitations. Gastrointestinal: No constipation. +nausea, vomiting, diarrhea with abdominal pain  Genitourinary: Denies dysuria, hematuria, frequency, urgency or incontinence. Neurological: Denies headaches, decreased LOC, no sensory or motor focal deficits. Musculoskeletal: No arthralgia no back pain or joint swelling. Chronic right shoulder pain -stable, controlled with current medication  Skin: There are no rashes or bleeding. Psychiatric: No anxiety, no depression. Endocrine: No diabetes or thyroid disease. Hematologic: No bleeding, no adenopathy. PHYSICAL EXAM: Shows a well appearing 72y.o.-year-old female who is not in pain or distress. Vital Signs: Blood pressure (!) 122/58, pulse 84, temperature 98.2 °F (36.8 °C), temperature source Oral, resp. rate 16, weight 125 lb 1.6 oz (56.7 kg). HEENT: . Normocephalic and atraumatic. Pupils are equal, round, reactive to light and accommodation. Extraocular muscles are intact. Neck: Showed no JVD, no carotid bruit . Lungs: Decreased air entry. Clear to auscultation bilaterally. Heart: Murmur - prolapse Abdomen: Soft, nontender. No hepatosplenomegaly. Extremities: Olecranon bursa on left elbow. Lower extremities show bilateral edema, clubbing, or cyanosis. Breasts: Examination not done today. Neuro exam: intact cranial nerves bilaterally no motor or sensory deficit, gait is normal. Lymphatic: no adenopathy appreciated in the supraclavicular, axillary, cervical or inguinal area  Skin: right shoulder and chest wall has excoriation marks, radiation rash      REVIEW OF LABORATORY DATA:   Lab Results   Component Value Date    WBC 5.5 04/06/2020    HGB 11.0 (L) 04/06/2020    HCT 35.5 (L) 04/06/2020    MCV 91.0 04/06/2020    PLT 72 (L) 04/06/2020       Lab Results   Component Value Date    NEUTROABS 4.62 04/06/2020           Chemistry        Component Value Date/Time     04/06/2020 1120    K 3.7 04/06/2020 1120    CL 99 04/06/2020 1120    CO2 25 04/06/2020 1120    BUN 10 04/06/2020 1120    CREATININE 0.57 04/06/2020 1120        Component Value Date/Time    CALCIUM 8.3 (L) 04/06/2020 1120    ALKPHOS 106 (H) 04/06/2020 1120    AST 44 (H) 04/06/2020 1120    ALT 27 04/06/2020 1120    BILITOT 0.87 04/06/2020 1120            REVIEW OF RADIOLOGICAL RESULTS:       PATHOLOGY:       IMPRESSION:   1. Squamous cell carcinoma, right lung 04/15/2019, clinical stage T3, N0, M0.   2. Liver disease and hepatitis C, severe comorbidity   3. Staging PET showed localized disease, pleural effusion seen; Brain MRI was negative for metastatic disease  4. Cocaine and cigarette addiction, quit drugs and quitting smoking with chantix  5. Chemo-radiation 05/23/2019, completed 07/09/2019  6. Pleural effusion - plan for centesis  7. Imfinzi - started 08/26/2019  8. Chronic elevation of blood sugar, she is on metformin. I asked her to keep home readings log  9. Smoking addiction, she is on Chantix and that is well-tolerated.   10. Isolated segment 7 liver mass, likely hepatocellular carcinoma, plan treatment with radioembolization for 03/24/2020, done and MRI planned for 06/2020    PLAN:   1. I competed toxicity check - nausea, vomiting, abdominal pian, diarrhea. 2. We discussed her GI symptoms and I am holding treatment today to allow for recovery. 3. I am writing for steroids for COPD exacerbation. 4. Return in 2 weeks for evaluation and resuming treatment.

## 2020-05-18 NOTE — PATIENT INSTRUCTIONS
Hold treatment today  Please give 1 L of normal saline. rv in 2 weeks with labs including amylase and lipase  Need Ct scan later this week if possible   If unable to get scan done this week, let me know,.  Considering admission   Need labs, amylase, lipase and cmp this Friday

## 2020-05-18 NOTE — FLOWSHEET NOTE
Situation:  Writer visited with Ms. Osmin Franklin in the infusion clinic. Assessment:  Ms. Osmin Franklin appeared fatigued. She stated that she was \"not doing good. \" She talked about the symptoms she was experiencing, including pain. She talked about this with her doctor, who is addressing her concerns. She wondered about her future and if the symptoms would continue to affect her. 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 She smiled as she spoke of her work and her clientele. Ms. Douglas Hollins mood lifted as she talked about her conversations with the  who took her to her appointments who recently retired. She expressed gratitude for his care and that he was asking about her. Ms. Osmin Franklin talked about her home and her pets, which provide support and companionship to her. She shared that she does pray and that this helps her believe that she is \"never alone. \"    Intervention:  Writer provided supportive presence and explored Pt's coping and needs; inquired about Pt's sources of support and strength; offered words of encouragement and support; affirmed Pt's strengths. Outcome:   Ms. Osmin Franklin thanked writer for stopping and told writer it helped. 05/18/20 1132   Encounter Summary   Services provided to: Patient   Referral/Consult From: [address] Family members;Friends/neighbors   Continue Visiting   (5/18/20)   Complexity of Encounter Moderate   Length of Encounter 15 minutes   Spiritual Assessment Completed Yes   Routine   Type Follow up   Spiritual/Judaism   Type Spiritual support   Assessment Approachable;Coping;Concerns with suffering;Tearful;Fearful   Intervention Active listening;Explored feelings, thoughts, concerns;Explored coping resources;Sustaining presence/ Ministry of presence; Discussed meaning/purpose;Discussed relationship with God;Discussed illness/injury and it's impact   Outcome Receptive; Hopeful;Grieving;Coping; Shared reminiscences; Expressed feelings/needs/concerns;Engaged in conversation;Expressed gratitude     Electronically signed by Florencio Riddle, Oncology Outpatient [facility], [address] Radiation Oncology  5/18/2020  11:36 AM

## 2020-05-18 NOTE — PROGRESS NOTES
Patient arrive ambulatory for cycle 15 day 1 treatment and physician visit. Continues to have baseline SOB as well as right upper quad pain and nausea/vomiting at times. Port accessed; specimen sent. Physician met with patient; labs reviewed; will provide IV hydration today but no treatment. Patient tolerate hydration without issue. Port flushed and heparinized with intact regan needle removed per protocol.   Patient off unit per self at discharge; instructed to stop at  for AVS.

## 2020-05-21 NOTE — TELEPHONE ENCOUNTER
Result Notes for CT ABDOMEN PELVIS W IV CONTRAST Additional Contrast? Oral     Notes recorded by Ashley Horvath MD on 5/21/2020 at 2:33 PM EDT  CT reviewed. Called the patient, she has minimal symptoms, other than abdominal pain, she has no fever or chills, no nausea or vomiting. Marlin Newby was discussed with Dr. Kiara Ward. Eyal Boyer feels that the patient can be managed as an outpatient.  We have a referral for her and she will see him tomorrow.  The patient will be informed and a referral to Dr. Joanne Lamb was put in. RECEIVED PHONE CALL IN TRIAGE NOTING ABOVE. [address] [facility] OFFICE . APPOINTMENT FOR 05/22/2020 @ 0900. DEMOGRAPHIC SHEET FAXED TO OFFICE @ [phone] W/ CONFIRMATION. WRITER SPOKE [address] VIA PHONE. SHE IS NOTIFIED OF ABOVE AND ADDRESS OF [facility] [place]. TO BE AT OFFICE AT 0900. TOMAS QUESTIONS IF NEEDS TO STILL COME TO OUR OFFICE FOR LAB. WRITER EXPLAINED CONSULT TAKES PRIORITY THEN ON FURTHER REVIEW I SEE WE WERE DOING LABS WHEN IT WAS POSSIBLE THOUGHT OF PANCREATITIS WHICH IS NOT THE CASE. CANCEL 05/22/2020 APPT WITH US. EXPLAINED TO TOMAS IF SHE BECOMES FEBRILE, CHILLING OR INCREASE PAIN PRIOR TO HER APPT TO REPORT DIRECTLY TO AN ER. SHE ASKED A FEW QUESTIONS AND VERBALIZED UNDERSTANDING.

## 2020-05-22 PROBLEM — B18.2 CHRONIC HEPATITIS C WITHOUT HEPATIC COMA (HCC): Chronic | Status: ACTIVE | Noted: 2018-02-01

## 2020-05-22 PROBLEM — K74.60 CL (CIRRHOSIS OF LIVER) (HCC): Chronic | Status: ACTIVE | Noted: 2018-02-01

## 2020-05-22 PROBLEM — J44.9 CHRONIC OBSTRUCTIVE PULMONARY DISEASE (HCC): Chronic | Status: ACTIVE | Noted: 2019-03-30

## 2020-05-22 PROBLEM — C34.11 MALIGNANT NEOPLASM OF UPPER LOBE OF RIGHT LUNG (HCC): Chronic | Status: ACTIVE | Noted: 2019-04-26

## 2020-05-22 PROBLEM — F19.10 SUBSTANCE ABUSE (HCC): Chronic | Status: ACTIVE | Noted: 2020-01-01

## 2020-05-22 PROBLEM — K57.20 DIVERTICULITIS OF LARGE INTESTINE WITH PERFORATION AND ABSCESS WITHOUT BLEEDING: Status: ACTIVE | Noted: 2020-01-01

## 2020-05-22 PROBLEM — C22.9 MALIGNANT NEOPLASM OF LIVER (HCC): Status: ACTIVE | Noted: 2020-01-01

## 2020-05-22 PROBLEM — B18.2 CHRONIC HEPATITIS C WITH HEPATIC COMA (HCC): Status: ACTIVE | Noted: 2018-02-01

## 2020-05-22 PROBLEM — F41.9 ANXIETY: Chronic | Status: ACTIVE | Noted: 2019-03-30

## 2020-05-22 NOTE — CONSULTS
has a past surgical history that includes Cervical disc surgery; Hysterectomy; Cholecystectomy; Breast surgery (Left); Carpal tunnel release (Bilateral); Knee arthroscopy (Right); bronchoscopy (04/15/2019); bronchoscopy (N/A, 4/15/2019); bronchoscopy (4/15/2019); bronchoscopy (4/15/2019); TUNNELED CENTRAL VENOUS CATHETER W/ SUBCUTANEOUS PORT (Left, 05/13/2019); thoracentesis (Right, 05/13/2019); and thoracentesis (Right, 08/19/2019). Medications:    Prior to Admission medications    Medication Sig Start Date End Date Taking?  Authorizing Provider   predniSONE (DELTASONE) 20 MG tablet Take 1 tablet by mouth daily for 10 days 5/18/20 5/28/20 Yes Jennifer Worley MD   oxyCODONE HCl (OXY-IR) 10 MG immediate release tablet take 1 tablet by mouth every 6 hours if needed for pain 5/14/20 6/13/20 Yes Jennifer Worley MD   traZODone (DESYREL) 300 MG tablet take 1 tablet by mouth nightly 5/14/20  Yes Kelsi Shah PA-C   VENTOLIN  (90 Base) MCG/ACT inhaler inhale 2 puffs by mouth and INTO THE LUNGS every 6 hours if needed for wheezing 4/20/20  Yes Kelsi Shah PA-C   citalopram (CELEXA) 10 MG tablet take 1 tablet by mouth nightly 4/17/20  Yes Kelsi Shah PA-C   fluticasone-umeclidin-vilant (TRELEGY ELLIPTA) 100-62.5-25 MCG/INH AEPB Inhale 1 puff into the lungs daily 3/31/20  Yes LIZA Gao CNP   omeprazole (PRILOSEC) 40 MG delayed release capsule Take 1 capsule by mouth daily as needed 3/9/20  Yes Kelsi Shah PA-C   hydrOXYzine (ATARAX) 50 MG tablet Take 1 tablet by mouth daily 3/9/20  Yes Kelsi Shah PA-C   metFORMIN (GLUCOPHAGE) 500 MG tablet Take 1 tablet by mouth daily (with breakfast) 3/9/20  Yes Kelsi Shah PA-C   lactulose [facility]) 10 GM/15ML solution Take 15 mLs by mouth 2 times daily 3/9/20  Yes Kelsi Shah PA-C   Varenicline Tartrate (CHANTIX PO) Take by mouth   Yes Historical Provider, MD   albuterol (PROVENTIL) (2.5 MG/3ML) 0.083% nebulizer solution Take 3 mLs by nebulization every 6 hours as needed for Wheezing 9/25/19  Yes Junior Barrientos MD   lidocaine viscous hcl (XYLOCAINE) 2 % SOLN solution Take 5-10 mLs by mouth as needed for Irritation 6/5/19  Yes Mariana Johansen MD   naloxone 4 MG/0.1ML LIQD nasal spray 1 spray by Nasal route as needed for Opioid Reversal 3/24/20   Lori Kawasaki, MD   lidocaine-prilocaine (EMLA) 2.5-2.5 % cream Apply topically as needed.  1/20/20   Kizzy Lobato MD   tiotropium (SPIRIVA RESPIMAT) 2.5 MCG/ACT AERS inhaler Inhale 2 puffs into the lungs daily 9/25/19   Junior Barrientos MD   Oxygen Concentrator     Historical Provider, MD   ibuprofen (ADVIL;MOTRIN) 600 MG tablet Take 1 tablet by mouth every 6 hours as needed for Pain 6/20/16 2/3/20  Louie Mcfarland MD     Current Facility-Administered Medications   Medication Dose Route Frequency Provider Last Rate Last Dose    sodium chloride flush 0.9 % injection 10 mL  10 mL Intravenous 2 times per day Angelique Beard MD        sodium chloride flush 0.9 % injection 10 mL  10 mL Intravenous PRN Angelique Beard MD        acetaminophen (TYLENOL) tablet 650 mg  650 mg Oral Q6H PRN Angelique Beard MD        Or    acetaminophen (TYLENOL) suppository 650 mg  650 mg Rectal Q6H PRN Angelique Beard MD        enoxaparin (LOVENOX) injection 40 mg  40 mg Subcutaneous Daily Angelique Beard MD        dextrose 5 % and 0.9 % sodium chloride infusion   Intravenous Continuous Angelique Beard MD        famotidine (PEPCID) injection 20 mg  20 mg Intravenous BID Angelique Beard MD        piperacillin-tazobactam (ZOSYN) 3.375 g in dextrose 5 % 50 mL IVPB extended infusion (mini-bag)  3.375 g Intravenous Q8H Angelique Beard MD        morphine (PF) injection 2 mg  2 mg Intravenous Q1H PRN Angelique Beard MD        Or    morphine sulfate (PF) injection 4 mg  4 mg Intravenous Q1H PRN Angelique Beard MD        ondansetron [facility]) injection 4 mg  4 mg Intravenous Q6H PRN Angelique Beard MD        albuterol (PROVENTIL) nebulizer solution 2.5 mg  2.5 mg Nebulization Q6H PRN Mk Montgomery MD        albuterol (PROVENTIL) nebulizer solution 2.5 mg  2.5 mg Nebulization Q6H PRN Mk Montgomery MD        citalopram (CELEXA) tablet 10 mg  10 mg Oral Nightly Mk Montgomery MD        fluticasone-umeclidin-vilant (TRELEGY ELLIPTA) 157-43.7-66 MCG/INH inhaler 1 puff  1 puff Inhalation Daily Mk Montgomery MD        hydrOXYzine (ATARAX) tablet 50 mg  50 mg Oral Daily Mk Montgomery MD        lactulose (CHRONULAC) 10 GM/15ML solution 10 g  10 g Oral BID Mk Montgomery MD        lidocaine-prilocaine (EMLA) cream   Topical PRN Mk Montgomery MD        oxyCODONE HCl (OXY-IR) immediate release tablet 10 mg  10 mg Oral Q6H PRN Mk Montgomery MD        predniSONE (DELTASONE) tablet 20 mg  20 mg Oral Daily Mk Montgomery MD        tiotropium (SPIRIVA RESPIMAT) 2.5 MCG/ACT inhaler 2 puff  2 puff Inhalation Daily Mk Montgomery MD        traZODone (DESYREL) tablet 300 mg  300 mg Oral Nightly Mk Montgomery MD        glucose (GLUTOSE) 40 % oral gel 15 g  15 g Oral PRN Mk Montgomery MD        dextrose 50 % IV solution  12.5 g Intravenous PRN Mk Montgomery MD        glucagon (rDNA) injection 1 mg  1 mg Intramuscular PRN Mk Montgomery MD        dextrose 5 % solution  100 mL/hr Intravenous PRN Mk Montgomery MD        piperacillin-tazobactam (ZOSYN) 4.5 g in dextrose 5 % 100 mL IVPB (mini-bag)  4.5 g Intravenous Once Mk Montgomery MD         Facility-Administered Medications Ordered in Other Encounters   Medication Dose Route Frequency Provider Last Rate Last Dose    sodium chloride flush 0.9 % injection 10 mL  10 mL Intravenous PRN Alondra El MD   10 mL at 05/20/20 1019       Allergies:  Patient has no known allergies. Social History:   reports that she has been smoking cigarettes. She started smoking about 53 years ago. She has a 13.00 pack-year smoking history. She has never used smokeless tobacco. She reports current drug use. Drug: Cocaine. normal S1, S2, no murmurs  Abdomen -positive tenderness  Neurological - alert, oriented, normal speech, no focal findings or movement disorder noted   Musculoskeletal - no joint tenderness, deformity or swelling   Extremities - peripheral pulses normal, no pedal edema, no clubbing or cyanosis   Skin - normal coloration and turgor, no rashes, no suspicious skin lesions noted ,      DATA:      Labs:     Results for orders placed or performed during the hospital encounter of 05/18/20   Comprehensive Metabolic Panel   Result Value Ref Range    Glucose 182 (H) 70 - 99 mg/dL    BUN 11 8 - 23 mg/dL    CREATININE 0.56 0.50 - 0.90 mg/dL    Bun/Cre Ratio 20 9 - 20    Calcium 8.4 (L) 8.6 - 10.4 mg/dL    Sodium 133 (L) 135 - 144 mmol/L    Potassium 4.0 3.7 - 5.3 mmol/L    Chloride 97 (L) 98 - 107 mmol/L    CO2 24 20 - 31 mmol/L    Anion Gap 12 9 - 17 mmol/L    Alkaline Phosphatase 119 (H) 35 - 104 U/L    ALT 19 5 - 33 U/L    AST 33 (H) <32 U/L    Total Bilirubin 0.65 0.3 - 1.2 mg/dL    Total Protein 7.1 6.4 - 8.3 g/dL    Alb 2.8 (L) 3.5 - 5.2 g/dL    Albumin/Globulin Ratio NOT REPORTED 1.0 - 2.5    GFR Non-African American >60 >60 mL/min    GFR African American >60 >60 mL/min    GFR Comment          GFR Staging NOT REPORTED    CBC Auto Differential   Result Value Ref Range    WBC 7.2 3.5 - 11.3 k/uL    RBC 3.78 (L) 3.95 - 5.11 m/uL    Hemoglobin 10.9 (L) 11.9 - 15.1 g/dL    Hematocrit 33.9 (L) 36.3 - 47.1 %    MCV 89.7 82.6 - 102.9 fL    MCH 28.8 25.2 - 33.5 pg    MCHC 32.2 28.4 - 34.8 g/dL    RDW 14.5 (H) 11.8 - 14.4 %    Platelets 85 (L) 506 - 453 k/uL    MPV 8.7 8.1 - 13.5 fL    NRBC Automated 0.0 0.0 per 100 WBC    Differential Type NOT REPORTED     WBC Morphology NOT REPORTED     RBC Morphology NOT REPORTED     Platelet Estimate NOT REPORTED     Seg Neutrophils 83 (H) 36 - 66 %    Lymphocytes 6 (L) 24 - 44 %    Monocytes 8 (H) 1 - 7 %    Eosinophils % 2 1 - 4 %    Basophils 1 %    Immature Granulocytes 0 0 %    Segs thickening/enhancement. Underlying infectious process cannot be excluded. *No CT evidence of acute pancreatitis. The findings were sent to the Radiology Results [address] at 10:38 am on 5/20/2020to be communicated to a licensed caregiver. IMPRESSION:   Primary Problem  Diverticulitis of large intestine with perforation and abscess without bleeding    Active Hospital Problems    Diagnosis Date Noted    Diverticulitis of large intestine with perforation and abscess without bleeding [K57.20] 05/22/2020    MVP (mitral valve prolapse) [I34.1]     On supplemental oxygen therapy [Z99.81]     History of radiation therapy [Z92.3]     Fibromyalgia [M79.7]     History of chemotherapy [Z92.21]     Bipolar disorder ([facility] [address] [F31.9]     Current every day smoker [F17.200]      Squamous cell carcinoma of right lung status post chemoradiation on consolidation with immunotherapy  Liver lesion likely hepatocellular carcinoma status post repeat embolization  History of liver disease and hep C      RECOMMENDATIONS:  1. I personally reviewed results of lab work-up imaging studies and other relevant clinical data. 2. Reviewed results of CT scan. 3. Discussed differential diagnosis of abdominal pain. Immune therapy related side effects are also in differential however given imaging findings findings are more suggestive of perforated diverticulitis. I will discontinue prednisone. Patient is on 20 mg prednisone at home  4. Management of perforated diverticulitis per surgery team.  5. Pain control. Continue to hold off on Imfinzi until patient recovers from the diverticulitis  6. Continue symptom management supportive care. Discussed with patient and Nurse. Thank you for asking us to see this patient.           Lyle Farnsworth MD          This note is created with the assistance of a speech recognition program.  While intending to generate a document that actually reflects the content of the visit, the

## 2020-05-22 NOTE — H&P
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 She has completed chemoradiation iwth taxol and carboplatin on 7/9/2019 and has been on Imfini starting in august 2019. She had microsphere radioembolization Y90 treatment in March 202. She had 3 weeks of nausea, vomiting a couple of times. She had a bout of diarrhea for a week. She is having some mucous in stool. She has a poor appetite. She has lost 5-10 lbs. She is a little better with the vomiting. She had a temperature of 100 on Saturday. She has had some chills and drenching night sweats. This is her first attack of diverticulitis. SHe has pain sharp on the right and periumbilical region. The pain comes and goes in waves. It is up to 10 out of 10 at times. It is a zero after she lays down and puts a heating pad on it. She denies any increase of shortness of breath. She has easy bleeding and bruising due to her cirrhosis. SHe denies any blood or stool in the vomitus or stool. Past Medical History:        Diagnosis Date    Anxiety and depression     Back pain     Bipolar disorder (HCC)     Bronchitis     Cancer (HCC)     Chronic obstructive pulmonary disease (COPD) (HCC)     Cirrhosis (HCC)     Cough     Current every day smoker     1 pack a day for the last 50 years    Fibromyalgia     Hepatitis     Hepatitis C per pt.     History of cervical cancer     is s/p hyst    History of chemotherapy     History of radiation therapy     Liver disease     stage 4    Liver metastases ([facility] [address]     Lung cancer ([facility] [address] 03/2019    Dr. Nimo Lindsay partner violence     Fear of current or ex partner: Not on file     Emotionally abused: Not on file     Physically abused: Not on file     Forced sexual activity: Not on file   Other Topics Concern    Not on file   Social History Narrative    Not on file       Family History:       Problem Relation Age of Onset    Heart Attack Father     Cancer Paternal Grandmother     Lung Cancer Paternal Grandfather     Emphysema Mother        Review Of Systems (11 point):  Constitutional: No fever, chills or malaise; No weight change or fatigue  Head and Eyes: No vision, Headache, Dizziness or trauma in last 12 months  ENT ROS: No hearing, Tinnitis, sinus or taste problems  Hematological and Lymphatic ROS:No Lymphoma, Von Willebrand's, Hemophillia or Bleeding History  Psych ROS: No Depression, Homicidal thoughts,suicidal thoughts, or anxiety  Breast ROS: No prior breast abnormalities or lumps  Respiratory ROS: No SOB, Pneumoniae,Cough, or Pulmonary Embolism History  Cardiovascular ROS: No Chest Pain with Exertion, Palpitations, Syncope, Edema, Arrhythmia  Gastrointestinal ROS: No Indigestion, Heartburn, Nausea, vomiting, Diarrhea, Constipation,or Bowel Changes; No Bloody Stools or melena  Genito-Urinary ROS: No Dysuria, Hematuria or Nocturia.  No Urinary Incontinence or Vaginal Discharge  Musculoskeletal ROS: No Arthralgia, Arthritis,Gout,Osteoporosis or Rheumatism  Neurological ROS: No CVA, Migraines, Epilepsy, Seizure Hx, or Limb Weakness  Dermatological ROS: No Rash, Itching, Hives, Mole Changes or Cancer     PHYSICAL EXAM:    VITALS:  /83   Pulse 94   Temp 98.4 °F (36.9 °C) (Oral)   Resp 16   Ht 5' 3\" (1.6 m)   Wt 124 lb (56.2 kg)   SpO2 96%   BMI 21.97 kg/m²   INTAKE/OUTPUT:   No intake or output data in the 24 hours ending 05/22/20 1349    CONSTITUTIONAL:  awake, alert, not distressed and thin, ill appearing but not toxic  Head:  normocephalic/atraumatic, without obvious abnormality  Eyes: PERRL, Sclera non icteric  Mouth: she remains masked during the interview  NECK:  supple, symmetrical, trachea midline, no lymphadenopathy, no thyroid nodules, no carotid bruits  LUNGS:  CTA bilaterally, no ronchi, rales, or wheezes, diminished in right base, no intercostal muscle retractions or accessory muscle use  CARDIOVASCULAR:  regular rate and rhythm and No Murmur, rub,  Or gallops  ABDOMEN: soft, midline surgical scar present,  non distended,  No hepato-organomegaly tenderness noted in the right upper quadrant Scott's sign is absent and in the right lower quadrant, no guarding present, no masses and no hernias  MUSCULOSKELETAL:  negative, Spine range of motion normal. Muscular strength intact. , there is not obvious somatic dysfunction, gait normal  NEUROLOGIC:  Mental Status Exam:  Level of Alertness:   alert  Orientation:   oriented to person, place, and time  pshych judgement and insight is normal  Rectal: deferred  Skin: no rashes, lesions, or ulcers    Physical Exam    CBC:   Lab Results   Component Value Date    WBC 7.2 05/18/2020    RBC 3.78 05/18/2020    HGB 10.9 05/18/2020    HCT 33.9 05/18/2020    MCV 89.7 05/18/2020    MCH 28.8 05/18/2020    MCHC 32.2 05/18/2020    RDW 14.5 05/18/2020    PLT 85 05/18/2020    MPV 8.7 05/18/2020     CMP:    Lab Results   Component Value Date     05/18/2020    K 4.0 05/18/2020    CL 97 05/18/2020    CO2 24 05/18/2020    BUN 11 05/18/2020    CREATININE 0.56 05/18/2020    GFRAA >60 05/18/2020    LABGLOM >60 05/18/2020    GLUCOSE 182 05/18/2020    PROT 7.1 05/18/2020    LABALBU 2.8 05/18/2020    CALCIUM 8.4 05/18/2020    BILITOT 0.65 05/18/2020    ALKPHOS 119 05/18/2020    AST 33 05/18/2020    ALT 19 05/18/2020     PT/INR:    Lab Results   Component Value Date    PROTIME 13.2 03/24/2020    INR 1.3 03/24/2020       Pertinent Radiology: EXAMINATION:  CT OF THE ABDOMEN AND PELVIS WITH CONTRAST 5/20/2020 8:51 am    FINDINGS:  Lower Chest: Bibasilar scarring.  Small right pleural

## 2020-05-22 NOTE — PROGRESS NOTES
Pt received to room 2092 per ambulatory, oriented to room and call light, heart monitor applied, vs obtained. Pt in no acute distress at his time but does appear slightly short of breath. Pt wearing her own oxygen from home on arrival to floor.

## 2020-05-23 NOTE — PLAN OF CARE
Nutrition Problem: Inadequate oral intake  Intervention: Food and/or Nutrient Delivery: Continue NPO; initiate diet if/when appropriate.   Nutritional Goals: Initiate oral intake or nutritional support

## 2020-05-23 NOTE — PROGRESS NOTES
AM-PAC Score             Goals  Short term goals  Time Frame for Short term goals: NA  Patient Goals   Patient goals : NA       Therapy Time   Individual Concurrent Group Co-treatment   Time In 1000         Time Out 1030         Minutes 30                 Francy Trejo, PT

## 2020-05-23 NOTE — CONSULTS
Helen Martínez PA-C   hydrOXYzine (ATARAX) 50 MG tablet Take 1 tablet by mouth daily 3/9/20  Yes Lizabeth Parsons PA-C   metFORMIN (GLUCOPHAGE) 500 MG tablet Take 1 tablet by mouth daily (with breakfast) 3/9/20  Yes Lizabeth Parsons PA-C   lactulose [facility]) 10 GM/15ML solution Take 15 mLs by mouth 2 times daily 3/9/20  Yes Lizabeth Parsons PA-C   Varenicline Tartrate (CHANTIX PO) Take by mouth   Yes Historical Provider, MD   albuterol (PROVENTIL) (2.5 MG/3ML) 0.083% nebulizer solution Take 3 mLs by nebulization every 6 hours as needed for Wheezing 9/25/19  Yes Chica Wilks MD   lidocaine viscous hcl (XYLOCAINE) 2 % SOLN solution Take 5-10 mLs by mouth as needed for Irritation 6/5/19  Yes Trae Coffman MD   naloxone 4 MG/0.1ML LIQD nasal spray 1 spray by Nasal route as needed for Opioid Reversal 3/24/20   Eric Scott MD   lidocaine-prilocaine (EMLA) 2.5-2.5 % cream Apply topically as needed. 1/20/20   Kizzy Lobato MD   tiotropium (SPIRIVA RESPIMAT) 2.5 MCG/ACT AERS inhaler Inhale 2 puffs into the lungs daily 9/25/19   Chica Wilks MD   Oxygen Concentrator     Historical Provider, MD   ibuprofen (ADVIL;MOTRIN) 600 MG tablet Take 1 tablet by mouth every 6 hours as needed for Pain 6/20/16 2/3/20  Alyssa Martinez MD        Allergies:     Patient has no known allergies. Social History:     Tobacco:    reports that she has been smoking cigarettes. She started smoking about 53 years ago. She has a 13.00 pack-year smoking history. She has never used smokeless tobacco.  Alcohol:      reports no history of alcohol use. Drug Use:  reports current drug use. Drug: Cocaine. Family History:     Family History   Problem Relation Age of Onset    Heart Attack Father     Cancer Paternal Grandmother     Lung Cancer Paternal Grandfather     Emphysema Mother        Review of Systems:     Positive and Negative as described in HPI.     CONSTITUTIONAL:  negative for fevers, chills, sweats, fatigue, weight loss  HEENT:  negative for vision, hearing changes, runny nose, throat pain  RESPIRATORY:  negative for cough, congestion, wheezing. Positive for baseline shortness of breath. CARDIOVASCULAR:  negative for chest pain, palpitations. GASTROINTESTINAL: Left lower quadrant abdominal pain, nausea and vomiting improved. GENITOURINARY:  negative for difficulty of urination, burning with urination, frequency   INTEGUMENT:  negative for rash, skin lesions, easy bruising   HEMATOLOGIC/LYMPHATIC:  negative for swelling/edema   ALLERGIC/IMMUNOLOGIC:  negative for urticaria , itching  ENDOCRINE:  negative increase in drinking, increase in urination, hot or cold intolerance  MUSCULOSKELETAL:  negative joint pains, muscle aches, swelling of joints  NEUROLOGICAL:  negative for headaches, dizziness, lightheadedness, numbness, pain, tingling extremities      Physical Exam:     /85   Pulse 80   Temp 98.3 °F (36.8 °C) (Oral)   Resp 16   Ht 5' 3\" (1.6 m)   Wt 124 lb (56.2 kg)   SpO2 100%   BMI 21.97 kg/m²   Temp (24hrs), Av.2 °F (36.8 °C), Min:98 °F (36.7 °C), Max:98.3 °F (36.8 °C)    Recent Labs     20  1730 20  0718 20  1129   POCGLU 132* 128* 125*       Intake/Output Summary (Last 24 hours) at 2020 1412  Last data filed at 2020 1144  Gross per 24 hour   Intake 1849 ml   Output 1300 ml   Net 549 ml       General Appearance:  alert, well appearing, and in no acute distress  Head:  normocephalic, atraumatic.   Eye: no icterus, redness, pupils equal and reactive, extraocular eye movements intact, conjunctiva clear  Ear: normal external ear, no discharge, hearing intact  Nose:  no drainage noted  Mouth: mucous membranes moist  Neck: supple, no carotid bruits, thyroid not palpable  Lungs: Bilateral equal air entry, clear to ausculation, rales or rhonchi, normal effort, few scattered wheeze, Mediport left chest wall  Cardiovascular: normal rate, regular rhythm, no murmur, gallop, 36 - 66 %    Lymphocytes 7 (L) 24 - 44 %    Monocytes 12 (H) 1 - 7 %    Eosinophils % 3 0 - 4 %    Basophils 0 0 - 2 %    Bands 6 0 - 10 %    Segs Absolute 1.59 1.3 - 9.1 k/uL    Absolute Lymph # 0.15 (L) 1.0 - 4.8 k/uL    Absolute Mono # 0.26 0.1 - 1.3 k/uL    Absolute Eos # 0.07 0.0 - 0.4 k/uL    Basophils Absolute 0.00 0.0 - 0.2 k/uL    Absolute Bands # 0.13 0.0 - 1.0 k/uL    Morphology ANISOCYTOSIS PRESENT     Morphology 1+ ELLIPTOCYTES     Morphology FEW PLATELET CLUMPS PRESENT. Protime-INR    Collection Time: 05/23/20  5:29 AM   Result Value Ref Range    Protime 18.2 (H) 11.8 - 14.6 sec    INR 1.5    Ionized Calcium    Collection Time: 05/23/20  5:29 AM   Result Value Ref Range    Calcium, Ion 1.18 1.13 - 1.33 mmol/L   Magnesium    Collection Time: 05/23/20  5:29 AM   Result Value Ref Range    Magnesium 1.5 (L) 1.6 - 2.6 mg/dL   POC Glucose Fingerstick    Collection Time: 05/23/20  7:18 AM   Result Value Ref Range    POC Glucose 128 (H) 65 - 105 mg/dL   POC Glucose Fingerstick    Collection Time: 05/23/20 11:29 AM   Result Value Ref Range    POC Glucose 125 (H) 65 - 105 mg/dL       Imaging/Diagonstics:  Recent data reviewed    Assessment :      Primary Problem  Diverticulitis of large intestine with perforation and abscess without bleeding    Active Hospital Problems    Diagnosis Date Noted    Diverticulitis of large intestine with perforation and abscess without bleeding [K57.20] 05/22/2020    MVP (mitral valve prolapse) [I34.1]     On supplemental oxygen therapy [Z99.81]     History of radiation therapy [Z92.3]     Fibromyalgia [M79.7]     History of chemotherapy [Z92.21]     Bipolar disorder ([facility] [address] [F31.9]     Current every day smoker [F17.200]        Plan:     1. Pericolonic abscess- Zosyn  2. Sigmoid diverticulitis- tolerating liquid diet, continue with Zosyn pain control. 3. Hypokalemia-replace   4. Hypomagnesemia-replace  5.  Squamous cell carcinoma, hepatocellular carcinoma- oncology

## 2020-05-23 NOTE — PLAN OF CARE
Problem: Falls - Risk of:  Goal: Will remain free from falls  Description: Will remain free from falls  Outcome: Ongoing     Problem: Falls - Risk of:  Goal: Absence of physical injury  Description: Absence of physical injury  Outcome: Ongoing   Pt has had zero falls or injuries so far this shift. Orthopedic

## 2020-05-23 NOTE — PROGRESS NOTES
Today's Date: 5/23/2020  Patient Name: Daniel Alexander  Date of admission: 5/22/2020 11:53 AM  Patient's age: 72 y.o., 1950  Admission Dx: Diverticulitis of large intestine with perforation and abscess without bleeding [K57.20]    Reason for Consult: management recommendations  Requesting Physician: Catarino Beard MD    CHIEF COMPLAINT: Abdominal pain. Nausea. INTERIM HISTORY  The patient is seen and evaluated, continues to complain of severe abdominal pain. I have determined that the abscess is too small to be drained. She is currently on antibiotic and that is well-tolerated. There is no nausea or vomiting, her appetite is good. The patient is very discouraged. HISTORY OF PRESENT ILLNESS:      The patient is a 72 y.o.  female who is admitted to the hospital for chief complaints of abdominal pain and perforated diverticulitis. Patient has history of squamous cell carcinoma of right lung status post chemoradiation. Patient also noted to have isolated liver lesion likely fatty cell carcinoma. Patient underwent radioembolization for the liver lesion. Patient also has history of liver disease and hep C. Patient is currently on treatment using immunotherapy/Imfinzi. Patient treatment had to be held for the last couple of times due to ongoing GI problems including nausea vomiting and abdominal discomfort. Patient was also started on a trial of steroids without much improvement. Eventually patient had a CT scan of the abdomen done which raises concern regarding perforated diverticulitis.   Subsequently patient was admitted by surgery team.    Past Medical History:   has a past medical history of Anxiety and depression, Back pain, Bipolar disorder ([address] Bronchitis, Cancer ([facility] [address] Chronic obstructive pulmonary disease (COPD) ([address] Cirrhosis ([address] Cough, Current every day smoker, Fibromyalgia, Hepatitis, History of cervical cancer, History of chemotherapy, History of radiation therapy, Liver disease, Liver metastases ([address] Lung cancer ([address] Lung mass, Malignant neoplasm of upper lobe of right lung ([address] MVP (mitral valve prolapse), On supplemental oxygen therapy, Wears dentures, Wears glasses, and Wheezing. Past Surgical History:   has a past surgical history that includes Cervical disc surgery; Hysterectomy; Cholecystectomy; Breast surgery (Left); Carpal tunnel release (Bilateral); Knee arthroscopy (Right); bronchoscopy (04/15/2019); bronchoscopy (N/A, 4/15/2019); bronchoscopy (4/15/2019); bronchoscopy (4/15/2019); TUNNELED CENTRAL VENOUS CATHETER W/ SUBCUTANEOUS PORT (Left, 05/13/2019); thoracentesis (Right, 05/13/2019); and thoracentesis (Right, 08/19/2019). Medications:    Reviewed   Allergies:  Patient has no known allergies. Social History:   reports that she has been smoking cigarettes. She started smoking about 53 years ago. She has a 13.00 pack-year smoking history. She has never used smokeless tobacco. She reports current drug use. Drug: Cocaine. She reports that she does not drink alcohol. Family History: family history includes Cancer in her paternal grandmother; Emphysema in her mother; Heart Attack in her father; Zoe Brewer in her paternal grandfather. REVIEW OF SYSTEMS:      Constitutional: No fever or chills. No night sweats, positive fatigue  Eyes: No eye discharge, double vision, or eye pain   HEENT: negative for sore mouth, sore throat, hoarseness and voice change   Respiratory: negative for cough , sputum, dyspnea, wheezing, hemoptysis, chest pain   Cardiovascular: negative for chest pain, dyspnea, palpitations, orthopnea, PND   Gastrointestinal: Positive abdominal pain  Genitourinary: negative for frequency, dysuria, nocturia, urinary incontinence, and hematuria   Integument: negative for rash, skin lesions, bruises.    Hematologic/Lymphatic: negative for easy bruising, bleeding, lymphadenopathy, or petechiae   Endocrine: negative for heat or cold

## 2020-05-23 NOTE — PROGRESS NOTES
Surgery Progress Note            PATIENT NAME: Irving Wheeler     TODAY'S DATE: 5/23/2020, 1:55 PM    Chief complaint:  Abdominal pain    SUBJECTIVE:    Pt is walking around in room. Pain is well controlled. She is having bowel movements. OBJECTIVE:   VITALS:  /85   Pulse 80   Temp 98.3 °F (36.8 °C) (Oral)   Resp 16   Ht 5' 3\" (1.6 m)   Wt 124 lb (56.2 kg)   SpO2 100%   BMI 21.97 kg/m²      INTAKE/OUTPUT:      Intake/Output Summary (Last 24 hours) at 5/23/2020 1355  Last data filed at 5/23/2020 1144  Gross per 24 hour   Intake 1849 ml   Output 1300 ml   Net 549 ml       PHYSICAL EXAM  General Appearance:  appears older than stated age and appears frail  Head/face:  NCAT  Lungs:  No chest wall tenderness. Heart:  Heart sounds are normal.  Regular rate and rhythm without murmur, gallop or rub. Abdomen:  Soft, non-tender, normal bowel sounds. No bruits, organomegaly or masses. Extremities: Extremities warm to touch, pink, with no edema.          Data:  CBC:   Lab Results   Component Value Date    WBC 2.2 05/23/2020    RBC 3.32 05/23/2020    HGB 9.7 05/23/2020    HCT 29.0 05/23/2020    MCV 87.4 05/23/2020    MCH 29.1 05/23/2020    MCHC 33.3 05/23/2020    RDW 15.9 05/23/2020    PLT 44 05/23/2020    MPV 7.4 05/23/2020     CMP:    Lab Results   Component Value Date     05/23/2020    K 3.5 05/23/2020     05/23/2020    CO2 25 05/23/2020    BUN 6 05/23/2020    CREATININE 0.53 05/23/2020    GFRAA >60 05/23/2020    LABGLOM >60 05/23/2020    GLUCOSE 161 05/23/2020    PROT 7.0 05/22/2020    LABALBU 2.8 05/22/2020    CALCIUM 7.6 05/23/2020    BILITOT 0.55 05/22/2020    ALKPHOS 122 05/22/2020    AST 24 05/22/2020    ALT 18 05/22/2020     ASSESSMENT   Patient Active Problem List   Diagnosis    CL (cirrhosis of liver) (HCC)    Chronic hepatitis C without hepatic coma (HCC)    Thrombocytopenia (HCC)    Chronic obstructive pulmonary disease (HCC)    Anxiety    Malignant neoplasm of upper lobe of

## 2020-05-24 NOTE — PLAN OF CARE
Safety maintained, call light is within reach, no s/s or c/o distress, bed is low/locked, side rails are up x2  Problem: Pain:  Goal: Pain level will decrease  Description: Pain level will decrease  5/24/2020 1616 by Pooja Contreras RN  Outcome: Ongoing  5/24/2020 0510 by Magaly Richardson RN  Note: Chronic back pain and mid abdominal pain. Medicated as ordered. Patient tolerating   Goal: Control of acute pain  Description: Control of acute pain  5/24/2020 1616 by Pooja Contreras RN  Outcome: Ongoing  5/24/2020 0510 by Magaly Richardson RN  Outcome: Ongoing  Note: Chronic back pain and mid abdominal pain. Medicated as ordered. Patient tolerating   Goal: Control of chronic pain  Description: Control of chronic pain  5/24/2020 1616 by Pooja Contreras RN  Outcome: Ongoing  5/24/2020 0510 by Magaly Richardson RN  Outcome: Ongoing  Note: Chronic back pain and mid abdominal pain. Medicated as ordered. Patient tolerating      Problem: Safety:  Goal: Free from accidental physical injury  Description: Free from accidental physical injury  5/24/2020 1616 by Pooja Contreras RN  Outcome: Ongoing  5/24/2020 0510 by Magaly Richardson RN  Outcome: Ongoing  Note: Patient remains free of injury. Safe environment maintained   Goal: Free from intentional harm  Description: Free from intentional harm  5/24/2020 1616 by Pooja Contreras RN  Outcome: Ongoing  5/24/2020 0510 by Magaly Richardson RN  Outcome: Ongoing  Note: Patient remains free of self harm     Problem: Falls - Risk of:  Goal: Will remain free from falls  Description: Will remain free from falls  5/24/2020 1616 by Pooja Contreras RN  Outcome: Ongoing  5/24/2020 0510 by Magaly Richardson RN  Outcome: Ongoing  Note: Pt remained absent from falls. Call light within reach. Bed locked and in lowest position.     Goal: Absence of physical injury  Description: Absence of physical injury  5/24/2020 1616 by Pooja Contreras RN  Outcome: Ongoing  5/24/2020 0510 by Esther Stokes

## 2020-05-24 NOTE — PROGRESS NOTES
Collection Time: 05/24/20  5:50 AM   Result Value Ref Range    Amylase 49 28 - 100 U/L   LIPASE    Collection Time: 05/24/20  5:50 AM   Result Value Ref Range    Lipase 73 (H) 13 - 60 U/L   POC Glucose Fingerstick    Collection Time: 05/24/20  7:47 AM   Result Value Ref Range    POC Glucose 135 (H) 65 - 105 mg/dL       Imaging/Diagonstics:  Recent data reviewed    Assessment :      Primary Problem  Diverticulitis of large intestine with perforation and abscess without bleeding    Active Hospital Problems    Diagnosis Date Noted    Diverticulitis of large intestine with perforation and abscess without bleeding [K57.20] 05/22/2020    MVP (mitral valve prolapse) [I34.1]     On supplemental oxygen therapy [Z99.81]     History of radiation therapy [Z92.3]     Fibromyalgia [M79.7]     History of chemotherapy [Z92.21]     Bipolar disorder ([facility] [address] [F31.9]     Current every day smoker [F17.200]        Plan:     1. Pericolonic abscess- Zosyn  2. Sigmoid diverticulitis- tolerating liquid diet, continue with Zosyn pain control. 3. Hypokalemia-replace   4. Hypomagnesemia-replace  5. Squamous cell carcinoma, hepatocellular carcinoma- oncology following, no immediate intervention. 6. COPD-continue home inhalers, currently controlled, saturating well on home oxygen. 7. Type 2 diabetes-Metformin held, continue with sliding scale, blood sugars currently controlled    5/24-patient was started on full liquid diet yesterday, complains of significantly worse abdominal pain today. Tender to touch on examination which is new. Will repeat CT abdomen. Patient made n.p.o. again, continue with Zosyn. DVT prophylaxis-Lovenox    Consultations:   Bakari Prather MD  5/24/2020  10:44 AM    Copy sent to Dr. Collette Michele PAAletheaC    Please note that this chart was generated using voice recognition Dragon dictation software.   Although every effort was made to ensure the accuracy of this automated transcription, some errors in transcription may have occurred.

## 2020-05-25 NOTE — PROGRESS NOTES
Surgery Progress Note            PATIENT NAME: Mariano Lopez     TODAY'S DATE: 5/25/2020, 2:50 PM    Chief complaint:  Abdominal pain    SUBJECTIVE:    Pt is feeling a little better this morning and is hungry. She is moving her bowels. OBJECTIVE:   VITALS:  BP (!) 142/67   Pulse 70   Temp 97.9 °F (36.6 °C) (Oral)   Resp 15   Ht 5' 3\" (1.6 m)   Wt 124 lb (56.2 kg)   SpO2 97%   BMI 21.97 kg/m²      INTAKE/OUTPUT:      Intake/Output Summary (Last 24 hours) at 5/25/2020 1450  Last data filed at 5/25/2020 0602  Gross per 24 hour   Intake 3027 ml   Output 2900 ml   Net 127 ml       PHYSICAL EXAM  General Appearance:  awake, alert, oriented, in no acute distress  Lungs:  Normal expansion. Clear to auscultation. No rales, rhonchi, or wheezing. Heart:  Heart sounds are normal.  Regular rate and rhythm without murmur, gallop or rub. Abdomen:  Soft, non-tender, normal bowel sounds. No bruits, organomegaly or masses. Extremities: Extremities warm to touch, pink, with no edema.          Data:  CBC:   Lab Results   Component Value Date    WBC 3.3 05/25/2020    RBC 3.80 05/25/2020    HGB 10.5 05/25/2020    HCT 32.5 05/25/2020    MCV 85.6 05/25/2020    MCH 27.7 05/25/2020    MCHC 32.4 05/25/2020    RDW 15.7 05/25/2020    PLT 62 05/25/2020    MPV 7.4 05/25/2020     CMP:    Lab Results   Component Value Date     05/25/2020    K 4.0 05/25/2020     05/25/2020    CO2 26 05/25/2020    BUN 4 05/25/2020    CREATININE 0.50 05/25/2020    GFRAA >60 05/25/2020    LABGLOM >60 05/25/2020    GLUCOSE 151 05/25/2020    PROT 7.0 05/22/2020    LABALBU 2.8 05/22/2020    CALCIUM 7.9 05/25/2020    BILITOT 0.55 05/22/2020    ALKPHOS 122 05/22/2020    AST 24 05/22/2020    ALT 18 05/22/2020         ASSESSMENT   Patient Active Problem List   Diagnosis    CL (cirrhosis of liver) (HCC)    Chronic hepatitis C without hepatic coma (HCC)    Thrombocytopenia (HCC)    Chronic obstructive pulmonary disease ([facility] [address]    Anxiety   

## 2020-05-25 NOTE — PROGRESS NOTES
Perfect serve to Dr Kiara Ward regarding NPO status and if patient can have sips with medications. Telephone orders placed with read back for NPO order to be changed to sips with meds per Dr Kiara Ward.

## 2020-05-25 NOTE — PROGRESS NOTES
 Wears dentures     full- not in use today 3/10/20    Wears glasses     Wheezing         Past Surgical History:     Past Surgical History:   Procedure Laterality Date    BREAST SURGERY Left     benign lumpectomy, multiple    BRONCHOSCOPY  04/15/2019    biopsies and washings    BRONCHOSCOPY N/A 4/15/2019    BRONCHOSCOPY BRUSHINGS performed by David Ram[facility] [address] [facility]  4/15/2019    BRONCHOSCOPY BIOPSY BRONCHUS performed by David Ram[facility]rez MD at [address] [facility]  4/15/2019    BRONCHOSCOPY DIAGNOSTIC OR CELL [address] ONLY performed by David Ramírez MD at [address] Bilateral    [address]      X 2    CHOLECYSTECTOMY      HYSTERECTOMY      complete    KNEE ARTHROSCOPY Right     THORACENTESIS Right 05/13/2019    THORACENTESIS Right 08/19/2019    TUNNELED CENTRAL VENOUS CATHETER W/ SUBCUTANEOUS PORT Left 05/13/2019        Medications Prior to Admission:     Prior to Admission medications    Medication Sig Start Date End Date Taking?  Authorizing Provider   predniSONE (DELTASONE) 20 MG tablet Take 1 tablet by mouth daily for 10 days 5/18/20 5/28/20 Yes Isabell Au MD   oxyCODONE HCl (OXY-IR) 10 MG immediate release tablet take 1 tablet by mouth every 6 hours if needed for pain 5/14/20 6/13/20 Yes Isabell Au MD   traZODone (DESYREL) 300 MG tablet take 1 tablet by mouth nightly 5/14/20  Yes Dilip Bennett PA-C   VENTOLIN  (90 Base) MCG/ACT inhaler inhale 2 puffs by mouth and INTO THE LUNGS every 6 hours if needed for wheezing 4/20/20  Yes Dilip Bennett PA-C   citalopram (CELEXA) 10 MG tablet take 1 tablet by mouth nightly 4/17/20  Yes Dilip Bennett PA-C   fluticasone-umeclidin-vilant (TRELEGY ELLIPTA) 100-62.5-25 MCG/INH AEPB Inhale 1 puff into the lungs daily 3/31/20  Yes LIZA Mcelroy - CNP   omeprazole (PRILOSEC) 40 MG delayed release capsule Take 1 capsule by mouth daily as needed 3/9/20  Yes Kyung Carrizales - 16.0 g/dL    Hematocrit 32.5 (L) 36 - 46 %    MCV 85.6 80 - 100 fL    MCH 27.7 26 - 34 pg    MCHC 32.4 31 - 37 g/dL    RDW 15.7 (H) 11.5 - 14.9 %    Platelets 62 (L) 365 - 450 k/uL    MPV 7.4 6.0 - 12.0 fL    NRBC Automated NOT REPORTED per 100 WBC       Imaging/Diagonstics:  Recent data reviewed    Assessment :      Primary Problem  Diverticulitis of large intestine with perforation and abscess without bleeding    Active Hospital Problems    Diagnosis Date Noted    Diverticulitis of large intestine with perforation and abscess without bleeding [K57.20] 05/22/2020    MVP (mitral valve prolapse) [I34.1]     On supplemental oxygen therapy [Z99.81]     History of radiation therapy [Z92.3]     Fibromyalgia [M79.7]     History of chemotherapy [Z92.21]     Bipolar disorder ([facility] [address] [F31.9]     Current every day smoker [F17.200]        Plan:     1. Pericolonic abscess- Zosyn   2. Sigmoid diverticulitis- tolerating liquid diet, continue with Zosyn pain control. 3. Hypokalemia-replace   4. Hypomagnesemia-replace  5. Hypocalcemia- normal ionized calcium. 6. Squamous cell carcinoma, hepatocellular carcinoma- oncology following, no immediate intervention. 7. COPD-continue home inhalers, currently controlled, saturating well on home oxygen. 8. Type 2 diabetes-Metformin held, continue with sliding scale, blood sugars currently controlled    5/24-patient was started on full liquid diet yesterday, complains of significantly worse abdominal pain today. Tender to touch on examination which is new. Will repeat CT abdomen. Patient made n.p.o. again, continue with Zosyn. 5/25-worsening of sigmoid diverticulitis-per CT scan done yesterday- we will continue n.p.o. and bowel rest.  Pericolonic abscess- improved per CT scan-Zosyn day 3 today  Hepatocellular carcinoma- spread of disease per CT scan- oncology following.     DVT prophylaxis-Lovenox    Consultations:   IP CONSULT TO ONCOLOGY  IP CONSULT TO INTERNAL

## 2020-05-25 NOTE — PROGRESS NOTES
[address] [facility]    OCCUPATIONAL THERAPY MISSED TREATMENT NOTE   INPATIENT   Date: 20  Patient Name: Ary Magaña       Room:   MRN: [id number]   Account #: [de-identified]    : 1950  (72 y.o.)  Gender: female     REASON FOR MISSED TREATMENT:  Pt reports IND with self-care and mobility, denies need for therapy at this time.   -   OT being discontinued at this time.  Patient functioning at Premorbid Level  No further needs  [address]

## 2020-05-26 NOTE — FLOWSHEET NOTE
05/26/20 1310   Encounter Summary   Services provided to: Patient   Referral/Consult From: Palliative Care   Complexity of Encounter Low   Length of Encounter 15 minutes   Spiritual/Restoration   Type Spiritual support   Assessment Sleeping   Intervention Prayer

## 2020-05-26 NOTE — PLAN OF CARE
Problem: Pain:  Goal: Pain level will decrease  Description: Pain level will decrease  5/26/2020 0438 by Nataly Ortiz RN  Outcome: Ongoing     Problem: Pain:  Goal: Control of acute pain  Description: Control of acute pain  5/26/2020 0438 by Nataly Ortiz RN  Outcome: Ongoing     Problem: Pain:  Goal: Control of chronic pain  Description: Control of chronic pain  5/26/2020 0438 by Nataly Ortiz RN  Outcome: Ongoing     Problem: Safety:  Goal: Free from accidental physical injury  Description: Free from accidental physical injury  5/26/2020 0438 by Nataly Ortiz RN  Outcome: Ongoing     Problem: Safety:  Goal: Free from intentional harm  Description: Free from intentional harm  5/26/2020 0438 by Nataly Ortiz RN  Outcome: Ongoing     Problem: Falls - Risk of:  Goal: Will remain free from falls  Description: Will remain free from falls  5/26/2020 0438 by Nataly Ortiz RN  Outcome: Ongoing     Problem: Falls - Risk of:  Goal: Absence of physical injury  Description: Absence of physical injury  5/26/2020 0438 by Nataly Ortiz RN  Outcome: Ongoing     Problem: Nutrition  Goal: Optimal nutrition therapy  5/26/2020 0438 by Nataly Ortiz RN  Outcome: Ongoing

## 2020-05-26 NOTE — PROGRESS NOTES
upper lobe of right lung ([address] MVP (mitral valve prolapse), On supplemental oxygen therapy, Wears dentures, Wears glasses, and Wheezing. Past Surgical History:   has a past surgical history that includes Cervical disc surgery; Hysterectomy; Cholecystectomy; Breast surgery (Left); Carpal tunnel release (Bilateral); Knee arthroscopy (Right); bronchoscopy (04/15/2019); bronchoscopy (N/A, 4/15/2019); bronchoscopy (4/15/2019); bronchoscopy (4/15/2019); TUNNELED CENTRAL VENOUS CATHETER W/ SUBCUTANEOUS PORT (Left, 05/13/2019); thoracentesis (Right, 05/13/2019); and thoracentesis (Right, 08/19/2019). Medications:    Reviewed   Allergies:  Patient has no known allergies. Social History:   reports that she has been smoking cigarettes. She started smoking about 53 years ago. She has a 13.00 pack-year smoking history. She has never used smokeless tobacco. She reports current drug use. Drug: Cocaine. She reports that she does not drink alcohol. Family History: family history includes Cancer in her paternal grandmother; Emphysema in her mother; Heart Attack in her father; Ivette Chimera in her paternal grandfather. REVIEW OF SYSTEMS:      Constitutional: No fever or chills. No night sweats, positive fatigue  Eyes: No eye discharge, double vision, or eye pain   HEENT: negative for sore mouth, sore throat, hoarseness and voice change   Respiratory: negative for cough , sputum, dyspnea, wheezing, hemoptysis, chest pain   Cardiovascular: negative for chest pain, dyspnea, palpitations, orthopnea, PND   Gastrointestinal: Positive abdominal pain, nausea and diarrhea are improving  Genitourinary: negative for frequency, dysuria, nocturia, urinary incontinence, and hematuria   Integument: negative for rash, skin lesions, bruises.    Hematologic/Lymphatic: negative for easy bruising, bleeding, lymphadenopathy, or petechiae   Endocrine: negative for heat or cold intolerance,weight changes, change in bowel habits and hair loss

## 2020-05-26 NOTE — PROGRESS NOTES
Nutrition Assessment    Type and Reason for Visit: Reassess    Nutrition Recommendations: Continue Low-fiber diet. Nutrition Assessment: Patient diet advanced to Low fiber for lunch and was well tolerated. Patient reports she was hungry and ate well. Continue current diet and monitor p.o intakes and labs. Malnutrition Assessment:  · Malnutrition Status: Insufficient data    Nutrition Risk Level: Moderate    Nutrient Needs:  · Estimated Daily Total Kcal: 4979-2380 based on [place] with 1.3-1.4 factor using admission wt  · Estimated Daily Protein (g): 67-79 based on 1.2-1.4 gm per kg admission wt    Nutrition Diagnosis:   · Problem: Inadequate oral intake  · Etiology: related to Alteration in GI function     Signs and symptoms:  as evidenced by Diet history of poor intake, GI abnormality    Objective Information:  · Nutrition-Focused Physical Findings: No edema. Bowel are moving  · Current Nutrition Therapies:  · Oral Diet Orders: Low Fiber   · Oral Diet intake: %  · Oral Nutrition Supplement (ONS) Orders: None  · Anthropometric Measures:  · Ht: 5' 3\" (160 cm)   · Current Body Wt: 124 lb (56.2 kg)  · Admission Body Wt: 123 lb 14.4 oz (56.2 kg)  · Usual Body Wt: (54.3kg (5-18-20), 55.3 kg (1-3-20), 57.4kg (5-28-19))  · % Weight Change:  ,  (recent) 5-10 lb wt loss reported in H&P, not reflected on documented admission wt  · Ideal Body Wt: 115 lb (52.2 kg), % Ideal Body 107%  · BMI Classification: BMI 18.5 - 24.9 Normal Weight    Nutrition Interventions:   Continue current diet  Continued Inpatient Monitoring    Nutrition Evaluation:   · Evaluation: Progressing toward goals   · Goals: Initiate oral intake or nutritional support    · Monitoring: Meal Intake, Weight, Pertinent Labs, Monitor Bowel Function, Diet Tolerance, Skin Integrity, I&O      Some areas of assessment maybe incomplete due to COVID-19 precautions.     Rosy VELASQUEZ RLEONIE, L.D,  Clinical Dietitian  Office #

## 2020-05-26 NOTE — PROGRESS NOTES
[facility]    CONSULTATION / HISTORY AND PHYSICAL EXAMINATION            Date:   5/26/2020  Patient name:  Briana Lindquist  Date of admission:  5/22/2020 11:53 AM  MRN:   [id number]  Account:  [de-identified]  YOB: 1950  PCP:    Marleny Figueredo PA-C  Room:   3036/6033-37  Code Status:    Full Code    Physician Requesting Consult: Lily Mejia MD    Reason for Consult:  Medical management    Chief Complaint:     No chief complaint on file. Abdominal pain    History Obtained From:     Patient, EMR, nursing staff    History of Present Illness:     60-year-old female admitted 5/22 for abdominal pain. Reports 3 weeks of nausea vomiting diarrhea. Weight loss. Low-grade fever, drenching night sweats. Noted to have sigmoid diverticulitis on CT abdomen, with possible adjacent abscess. History of squamous cell carcinoma right lung status post chemoradiation, hepatitis C, cirrhosis, COPD on home oxygen, liver lesion likely hepatocellular carcinoma status post embolization. .    Admitted for IV antibiotics and possible drainage of pericolonic abscess. Past Medical History:     Past Medical History:   Diagnosis Date    Anxiety and depression     Back pain     Bipolar disorder (HCC)     Bronchitis     Cancer ([address]     Chronic obstructive pulmonary disease (COPD) (HCC)     Cirrhosis (HCC)     Cough     Current every day smoker     1 pack a day for the last 50 years    Fibromyalgia     Hepatitis     Hepatitis C per pt.     History of cervical cancer     is s/p hyst    History of chemotherapy     chemo every 2 weeks, last tx in April sometime    History of radiation therapy     last tx March 24th 2020    Liver disease     stage 4    [facility]     Lung cancer ([facility] [facility]    Malignant neoplasm of upper lobe of right lung (HCC)     MVP (mitral valve prolapse)     On supplemental oxygen therapy  Wears dentures     full- not in use today 3/10/20    Wears glasses     Wheezing         Past Surgical History:     Past Surgical History:   Procedure Laterality Date    BREAST SURGERY Left     benign lumpectomy, multiple    BRONCHOSCOPY  04/15/2019    biopsies and washings    BRONCHOSCOPY N/A 4/15/2019    BRONCHOSCOPY BRUSHINGS [facility] [address] [facility]  4/15/2019    BRONCHOSCOPY BIOPSY BRONCHUS performed by Mathieu Horne MD at [address] [facility]  4/15/2019    BRONCHOSCOPY DIAGNOSTIC OR CELL [address] ONLY performed by Mathieu Horne MD at [address] [facility], [address] Bilateral    [address]      X 2    CHOLECYSTECTOMY      HYSTERECTOMY      complete    KNEE ARTHROSCOPY Right     THORACENTESIS Right 05/13/2019    THORACENTESIS Right 08/19/2019    TUNNELED CENTRAL VENOUS CATHETER W/ SUBCUTANEOUS PORT Left 05/13/2019        Medications Prior to Admission:     Prior to Admission medications    Medication Sig Start Date End Date Taking?  Authorizing Provider   predniSONE (DELTASONE) 20 MG tablet Take 1 tablet by mouth daily for 10 days 5/18/20 5/28/20 Yes Napoleon Coto MD   oxyCODONE HCl (OXY-IR) 10 MG immediate release tablet take 1 tablet by mouth every 6 hours if needed for pain 5/14/20 6/13/20 Yes Napoleon Coto MD   traZODone (DESYREL) 300 MG tablet take 1 tablet by mouth nightly 5/14/20  Yes Vero Hilton PA-C   VENTOLIN  (90 Base) MCG/ACT inhaler inhale 2 puffs by mouth and INTO THE LUNGS every 6 hours if needed for wheezing 4/20/20  Yes Vero Hilton PA-C   citalopram (CELEXA) 10 MG tablet take 1 tablet by mouth nightly 4/17/20  Yes Vero Hilton PA-C   fluticasone-umeclidin-vilant (TRELEGY ELLIPTA) 100-62.5-25 MCG/INH AEPB Inhale 1 puff into the lungs daily 3/31/20  Yes Jacques Goldberg, APRN - CNP   omeprazole (PRILOSEC) 40 MG delayed release capsule Take 1 capsule by mouth daily as needed 3/9/20  Yes Viviana Bell bulk, no abnormal sensation, normal speech, cranial nerves II through XII grossly intact  Skin: No gross lesions, rashes, bruising or bleeding on exposed skin area  Extremities:  peripheral pulses palpable, no pedal edema or calf pain with palpation  Psych: normal affect    Investigations:      Laboratory Testing:  Recent Results (from the past 24 hour(s))   POC Glucose Fingerstick    Collection Time: 05/25/20  4:17 PM   Result Value Ref Range    POC Glucose 174 (H) 65 - 105 mg/dL   POC Glucose Fingerstick    Collection Time: 05/25/20  7:25 PM   Result Value Ref Range    POC Glucose 169 (H) 65 - 105 mg/dL   Basic Metabolic Panel w/ Reflex to MG    Collection Time: 05/26/20  6:06 AM   Result Value Ref Range    Glucose 146 (H) 70 - 99 mg/dL    BUN 3 (L) 8 - 23 mg/dL    CREATININE 0.48 (L) 0.50 - 0.90 mg/dL    Bun/Cre Ratio NOT REPORTED 9 - 20    Calcium 7.9 (L) 8.6 - 10.4 mg/dL    Sodium 137 135 - 144 mmol/L    Potassium 3.5 (L) 3.7 - 5.3 mmol/L    Chloride 102 98 - 107 mmol/L    CO2 28 20 - 31 mmol/L    Anion Gap 7 (L) 9 - 17 mmol/L    GFR Non-African American >60 >60 mL/min    GFR African American >60 >60 mL/min    GFR Comment          GFR Staging NOT REPORTED    CBC    Collection Time: 05/26/20  6:06 AM   Result Value Ref Range    WBC 2.5 (L) 3.5 - 11.0 k/uL    RBC 3.97 (L) 4.0 - 5.2 m/uL    Hemoglobin 11.0 (L) 12.0 - 16.0 g/dL    Hematocrit 33.9 (L) 36 - 46 %    MCV 85.3 80 - 100 fL    MCH 27.8 26 - 34 pg    MCHC 32.6 31 - 37 g/dL    RDW 15.7 (H) 11.5 - 14.9 %    Platelets 34 (L) 905 - 450 k/uL    MPV 7.3 6.0 - 12.0 fL    NRBC Automated NOT REPORTED per 100 WBC   Ionized Calcium    Collection Time: 05/26/20  6:06 AM   Result Value Ref Range    Calcium, Ion 1.16 1.13 - 1.33 mmol/L   Magnesium    Collection Time: 05/26/20  6:06 AM   Result Value Ref Range    Magnesium 1.5 (L) 1.6 - 2.6 mg/dL   POC Glucose Fingerstick    Collection Time: 05/26/20  6:13 AM   Result Value Ref Range    POC Glucose 140 (H) 65 - 105 mg/dL Imaging/Diagonst[employer]:  Recent data reviewed    Assessment :      Primary Problem  Diverticulitis of large intestine with perforation and abscess without bleeding    Active Hospital Problems    Diagnosis Date Noted    Diverticulitis of large intestine with perforation and abscess without bleeding [K57.20] 05/22/2020    MVP (mitral valve prolapse) [I34.1]     On supplemental oxygen therapy [Z99.81]     History of radiation therapy [Z92.3]     Fibromyalgia [M79.7]     History of chemotherapy [Z92.21]     Bipolar disorder ([facility] [F31.9]     Current every day smoker [F17.200]        Plan:     1. Pericolonic abscess- Zosyn   2. Sigmoid diverticulitis- tolerating liquid diet, continue with Zosyn pain control. 3. Hypokalemia-replace   4. Hypomagnesemia-replace  5. Hypocalcemia- normal ionized calcium. 6. Squamous cell carcinoma, hepatocellular carcinoma- oncology following, no immediate intervention. 7. COPD-continue home inhalers, currently controlled, saturating well on home oxygen. 8. Type 2 diabetes-Metformin held, continue with sliding scale, blood sugars currently controlled    5/24-patient was started on full liquid diet yesterday, complains of significantly worse abdominal pain today. Tender to touch on examination which is new. Will repeat CT abdomen. Patient made n.p.o. again, continue with Zosyn. 5/25-worsening of sigmoid diverticulitis-per CT scan done yesterday- we will continue n.p.o. and bowel rest.  Pericolonic abscess- improved per CT scan-Zosyn day 3 today  Hepatocellular carcinoma- spread of disease per CT scan- oncology following.     DVT prophylaxis-Lovenox      MAY 26  ORAL ABX  DC PLAN IN AM of diarrhea is better  surg input noted      Consultations:   [address] CONSULT TO INTERNAL MEDICINE      Dolly Cleary MD  5/26/2020  11:08 AM    Copy sent to SONJA SifuentesC    Please note that this chart was generated using voice recognition Dragon

## 2020-05-27 NOTE — DISCHARGE SUMMARY
Surgery Discharge Summary     Patient Identification  Darlene Crook is a 72 y.o. female. :  1950  Admit Date:  2020    Discharge date:   2020                                  Disposition: home    Discharge Diagnoses:   Patient Active Problem List   Diagnosis    CL (cirrhosis of liver) (HCC)    Chronic hepatitis C without hepatic coma (HCC)    Thrombocytopenia (HCC)    Chronic obstructive pulmonary disease (HCC)    Anxiety    Malignant neoplasm of upper lobe of right lung (HCC)    [facility]stance [facility] ([facility]    Diverticulitis of large intestine with perforation and abscess without bleeding    Malignant neoplasm of liver (HCC)    Chronic hepatitis C with hepatic coma (HCC)    MVP (mitral valve prolapse)    On supplemental oxygen therapy    History of radiation therapy    Fibromyalgia    History of chemotherapy    Bipolar disorder ([facility]    Current every day smoker         Consults: hematology/oncology, general surgery and internal medicine, and interventional radiology    Surgery: none    Patient Instructions: Activity: no heavy lifting, pushing, pulling for 6 weeks, no driving for 2 weeks or while on analgesics  Diet: As tolerated  Follow-up with 1 in 2 weeks. See pre-printed instructions in chart and given to patient upon discharge.     Discharge Medications:      John Lowery   Home Medication Instructions [id number]    Printed on:20 1008   Medication Information                      albuterol (PROVENTIL) (2.5 MG/3ML) 0.083% nebulizer solution  Take 3 mLs by nebulization every 6 hours as needed for Wheezing             ciprofloxacin (CIPRO) 500 MG tablet  Take 1 tablet by mouth 2 times daily for 10 days             citalopram (CELEXA) 10 MG tablet  take 1 tablet by mouth nightly             fluticasone-umeclidin-vilant (TRELEGY ELLIPTA) 100-62.5-25 MCG/INH AEPB  Inhale 1 puff into the lungs daily             hydrOXYzine (ATARAX) 50 MG tablet  Take 1 tablet by mouth

## 2020-05-27 NOTE — PROGRESS NOTES
Surgery Progress Note            PATIENT NAME: Sid Conde     TODAY'S DATE: 5/27/2020, 10:02 AM    Chief complaint:  Abdominal pain    SUBJECTIVE:    Pt is feeling much better, still has her right sided liver tumor pain but the LLQ pain is gone and she is moving her bowels with the asisstance of lactulose. She is tolerating a low fiber diet. .     OBJECTIVE:   VITALS:  BP (!) 167/80   Pulse 83   Temp 97.9 °F (36.6 °C) (Oral)   Resp 15   Ht 5' 3\" (1.6 m)   Wt 124 lb (56.2 kg)   SpO2 99%   BMI 21.97 kg/m²      INTAKE/OUTPUT:    No intake or output data in the 24 hours ending 05/27/20 1002    PHYSICAL EXAM  General Appearance:  appears older than stated age and appears frail  Head/face:  Temporal wasting  Lungs:  Diminished in R>L base,   No rales, rhonchi, expiratory wheezing in bilateral.  Heart:  Heart sounds are normal.  Regular rate and rhythm without murmur, gallop or rub. Abdomen:  Soft, non-tender, enlarged tender liver, normal bowel sounds. No bruits, organomegaly or masses. Extremities: Extremities warm to touch, pink, with no edema.          Data:  CBC:   Lab Results   Component Value Date    WBC 2.7 05/27/2020    RBC 4.02 05/27/2020    HGB 11.1 05/27/2020    HCT 34.3 05/27/2020    MCV 85.3 05/27/2020    MCH 27.7 05/27/2020    MCHC 32.4 05/27/2020    RDW 15.5 05/27/2020    PLT 62 05/27/2020    MPV 7.0 05/27/2020     BMP:    Lab Results   Component Value Date     05/27/2020    K 4.3 05/27/2020     05/27/2020    CO2 27 05/27/2020    BUN 4 05/27/2020    LABALBU 2.8 05/22/2020    CREATININE 0.52 05/27/2020    CALCIUM 8.0 05/27/2020    GFRAA >60 05/27/2020    LABGLOM >60 05/27/2020    GLUCOSE 151 05/27/2020         ASSESSMENT   Patient Active Problem List   Diagnosis    CL (cirrhosis of liver) (HCC)    Chronic hepatitis C without hepatic coma (HCC)    Thrombocytopenia (HCC)    Chronic obstructive pulmonary disease (HCC)    Anxiety    Malignant neoplasm of upper lobe of right lung

## 2020-05-27 NOTE — PROGRESS NOTES
 Wears dentures     full- not in use today 3/10/20    Wears glasses     Wheezing         Past Surgical History:     Past Surgical History:   Procedure Laterality Date    BREAST SURGERY Left     benign lumpectomy, multiple    BRONCHOSCOPY  04/15/2019    biopsies and washings    BRONCHOSCOPY N/A 4/15/2019    [facility] [address] [facility]  4/15/2019    BRONCHOSCOPY BIOPSY BRONCHUS performed by [facility] [address] [facility]  4/15/2019    BRONCHOSCOPY DIAGNOSTIC OR CELL [address] ONLY performed by Adele Desai MD at [address] [facility], [address] Bilateral    [address]      X 2    CHOLECYSTECTOMY      HYSTERECTOMY      complete    KNEE ARTHROSCOPY Right     THORACENTESIS Right 05/13/2019    THORACENTESIS Right 08/19/2019    TUNNELED CENTRAL VENOUS CATHETER W/ SUBCUTANEOUS PORT Left 05/13/2019        Medications Prior to Admission:     Prior to Admission medications    Medication Sig Start Date End Date Taking?  Authorizing Provider   ciprofloxacin (CIPRO) 500 MG tablet Take 1 tablet by mouth 2 times daily for 10 days 5/27/20 6/6/20 Yes Manny Chau MD   metroNIDAZOLE (FLAGYL) 500 MG tablet Take 1 tablet by mouth 2 times daily for 10 days 5/27/20 6/6/20 Yes Manny Chau MD   predniSONE (DELTASONE) 20 MG tablet Take 1 tablet by mouth daily for 10 days 5/18/20 5/28/20 Yes Any Nava MD   oxyCODONE HCl (OXY-IR) 10 MG immediate release tablet take 1 tablet by mouth every 6 hours if needed for pain 5/14/20 6/13/20 Yes Any Nava MD   traZODone (DESYREL) 300 MG tablet take 1 tablet by mouth nightly 5/14/20  Yes Blanquita Sims PA-C   VENTOLIN  (90 Base) MCG/ACT inhaler inhale 2 puffs by mouth and INTO THE LUNGS every 6 hours if needed for wheezing 4/20/20  Yes Blanquita Sims PA-C   citalopram (CELEXA) 10 MG tablet take 1 tablet by mouth nightly 4/17/20  Yes Blanquita Sims PA-C fluticasone-umeclidin-vilant (TRELEGY ELLIPTA) 100-62.5-25 MCG/INH AEPB Inhale 1 puff into the lungs daily 3/31/20  Yes LIZA Ruelas CNP   omeprazole (PRILOSEC) 40 MG delayed release capsule Take 1 capsule by mouth daily as needed 3/9/20  Yes Hans Guzman PA-C   hydrOXYzine (ATARAX) 50 MG tablet Take 1 tablet by mouth daily 3/9/20  Yes Hans Guzman PA-C   metFORMIN (GLUCOPHAGE) 500 MG tablet Take 1 tablet by mouth daily (with breakfast) 3/9/20  Yes Hans Guzman PA-C   lactulose [facility]) 10 GM/15ML solution Take 15 mLs by mouth 2 times daily 3/9/20  Yes Hans Guzman PA-C   Varenicline Tartrate (CHANTIX PO) Take by mouth   Yes Historical Provider, MD   albuterol (PROVENTIL) (2.5 MG/3ML) 0.083% nebulizer solution Take 3 mLs by nebulization every 6 hours as needed for Wheezing 9/25/19  Yes Manny Wong MD   lidocaine viscous hcl (XYLOCAINE) 2 % SOLN solution Take 5-10 mLs by mouth as needed for Irritation 6/5/19  Yes Abdirahman Parker MD   naloxone 4 MG/0.1ML LIQD nasal spray 1 spray by Nasal route as needed for Opioid Reversal 3/24/20   Alba Marroquin MD   lidocaine-prilocaine (EMLA) 2.5-2.5 % cream Apply topically as needed. 1/20/20   Kizzy Lobato MD   tiotropium (SPIRIVA RESPIMAT) 2.5 MCG/ACT AERS inhaler Inhale 2 puffs into the lungs daily 9/25/19   Manny Wong MD   Oxygen Concentrator     Historical Provider, MD   ibuprofen (ADVIL;MOTRIN) 600 MG tablet Take 1 tablet by mouth every 6 hours as needed for Pain 6/20/16 2/3/20  Yenny Lares MD        Allergies:     Patient has no known allergies. Social History:     Tobacco:    reports that she has been smoking cigarettes. She started smoking about 53 years ago. She has a 13.00 pack-year smoking history. She has never used smokeless tobacco.  Alcohol:      reports no history of alcohol use. Drug Use:  reports current drug use. Drug: Cocaine.     Family History:     Family History   Problem Relation Age of Onset  Heart Attack Father     Cancer Paternal Grandmother     Lung Cancer Paternal Grandfather     Emphysema Mother        Review of Systems:     Positive and Negative as described in HPI. CONSTITUTIONAL:  negative for fevers, chills, sweats, fatigue, weight loss  HEENT:  negative for vision, hearing changes, runny nose, throat pain  RESPIRATORY: No new cough or wheezing. Mara Prey Positive for baseline shortness of breath. CARDIOVASCULAR:  negative for chest pain, palpitations. GASTROINTESTINAL: Abdominal pain and nausea better today compared to yesterday  GENITOURINARY:  negative for difficulty of urination, burning with urination, frequency   INTEGUMENT:  negative for rash, skin lesions, easy bruising   HEMATOLOGIC/LYMPHATIC:  negative for swelling/edema   ALLERGIC/IMMUNOLOGIC:  negative for urticaria , itching  ENDOCRINE:  negative increase in drinking, increase in urination, hot or cold intolerance  MUSCULOSKELETAL:  negative joint pains, muscle aches, swelling of joints  NEUROLOGICAL:  negative for headaches, dizziness, lightheadedness, numbness, pain, tingling extremities      Physical Exam:     BP (!) 167/80   Pulse 83   Temp 97.9 °F (36.6 °C) (Oral)   Resp 15   Ht 5' 3\" (1.6 m)   Wt 124 lb (56.2 kg)   SpO2 99%   BMI 21.97 kg/m²   Temp (24hrs), Av.4 °F (36.9 °C), Min:97.9 °F (36.6 °C), Max:98.8 °F (37.1 °C)    Recent Labs     20  1138 20  1643 20  0615   POCGLU 140* 106* 162* 146*     No intake or output data in the 24 hours ending 20 1129    General Appearance:  alert, well appearing, and in no acute distress  Head:  normocephalic, atraumatic.   Eye: no icterus, redness, pupils equal and reactive, extraocular eye movements intact, conjunctiva clear  Ear: normal external ear, no discharge, hearing intact  Nose:  no drainage noted  Mouth: mucous membranes moist  Neck: supple, no carotid bruits, thyroid not palpable  Lungs: Air entry bilaterally equal and

## 2020-06-01 NOTE — TELEPHONE ENCOUNTER
Lianne Mean MD VISIT, RN DRAW, POSSIBLE TX FOR TOMORROW   PER MD HOLD TX, TORY RV IN 2 WKS WITH POSSIBLE TX 6-  PRESCRIPTION FOR OXYCODONE GIVEN TO PT

## 2020-06-01 NOTE — PROGRESS NOTES
CENTRAL VENOUS CATHETER W/ SUBCUTANEOUS PORT (Left, 05/13/2019); thoracentesis (Right, 05/13/2019); and thoracentesis (Right, 08/19/2019). CURRENT MEDICATIONS:  has a current medication list which includes the following prescription(s): ciprofloxacin, metronidazole, oxycodone hcl, trazodone, ventolin hfa, citalopram, trelegy ellipta, omeprazole, hydroxyzine, metformin, lactulose, lidocaine-prilocaine, albuterol, lidocaine viscous hcl, oxygen concentrator, ibuprofen, and varenicline tartrate. ALLERGIES:  has No Known Allergies. FAMILY HISTORY: Negative for any hematological or oncological conditions. SOCIAL HISTORY:  reports that she has been smoking cigarettes. She started smoking about 53 years ago. She has a 13.00 pack-year smoking history. She has never used smokeless tobacco. She reports current drug use. Drug: Cocaine. She reports that she does not drink alcohol. REVIEW OF SYSTEMS:   General: No fever or night sweats. Fatigue, weight stable. Xerostomia has improved  ENT: No double or blurred vision, no tinnitus or hearing problem, or sore throat   Respiratory: Chest pain - worse and poorly controlled; shortness of breath and wheezing   Cardiovascular: Denies chest pain, PND or orthopnea. No L E swelling or palpitations. Gastrointestinal: No constipation, nausea or vomiting. +diarrhea with some abdominal discomfort  Genitourinary: Denies dysuria, hematuria, frequency, urgency or incontinence. Neurological: Denies headaches, decreased LOC, no sensory or motor focal deficits. Musculoskeletal: No arthralgia no back pain or joint swelling. Chronic right shoulder pain - worse and poorly controlled  Skin: There are no rashes or bleeding. Psychiatric: No anxiety, no depression. Endocrine: No diabetes or thyroid disease. Hematologic: No bleeding, no adenopathy. PHYSICAL EXAM: Shows a well appearing 72y.o.-year-old female who is not in pain or distress.  Vital Signs: Blood pressure

## 2020-06-04 NOTE — CARE COORDINATION
[facility] Transitions Follow Up Call    2020    Patient: Irving Wheeler  Patient : 1950   MRN: [id number]  Reason for Admission:   Discharge Date: 20 RARS: Readmission Risk Score: 28       # 1 attempt- unable to reach patient, left vm message with name and call back number, requested call back//JU    Care Transitions Subsequent and Final Call    Subsequent and Final Calls  Care Transitions Interventions  Other Interventions:             Follow Up  Future Appointments   Date Time Provider Lamont Montes   2020 11:40 AM Mary Rasmussen PA-C [facility]   6/15/2020 10:00 AM STV STA CHAIR 04 [facility] STA MED [place]   6/15/2020 10:30 AM Maegan Lobato MD [facility]   2020 10:00 AM Newton Ackerman MD Resp Spec [facility]   2020 12:00 PM STV MRI RM 1 STVZ MRI [facility] Radiolog   2020  3:15 PM Harriet Nunez MD STVZ STA JUS Aden RN

## 2020-06-09 NOTE — PROGRESS NOTES
carton lasts \"over a month, 10 packs into cardiac\". Discussed tobacco cessation and risk in depth with patient, encourage patient to decrease and quit. Labs reviewed. Health maintenance reviewed. Medications reviewed, refilled Celexa 10 mg, Atarax 50 mg, Prilosec 40 mg. HPI    Review of Systems   Constitutional: Negative for chills and fever. HENT: Negative for congestion. Respiratory: Positive for shortness of breath (on exertion). Negative for cough and wheezing. Cardiovascular: Negative for chest pain. Gastrointestinal: Positive for abdominal pain and diarrhea (\"improving\"). Negative for blood in stool, constipation, nausea and vomiting. Genitourinary: Negative for dysuria, frequency and urgency. Musculoskeletal: Negative for back pain, myalgias and neck pain. Neurological: Negative for headaches. Prior to Visit Medications    Medication Sig Taking? Authorizing Provider   citalopram (CELEXA) 10 MG tablet Take 1 tablet by mouth nightly Yes Hans Guzman PA-C   hydrOXYzine (ATARAX) 50 MG tablet Take 1 tablet by mouth daily Yes Hans Guzman PA-C   omeprazole (PRILOSEC) 40 MG delayed release capsule Take 1 capsule by mouth daily Yes Hans Guzman PA-C   oxyCODONE HCl (OXY-IR) 10 MG immediate release tablet Take 1 tablet by mouth every 6 hours as needed for Pain for up to 30 days.  Yes Kamini Raymond MD   traZODone (DESYREL) 300 MG tablet take 1 tablet by mouth nightly Yes Hans Guzman PA-C   VENTOLIN  (90 Base) MCG/ACT inhaler inhale 2 puffs by mouth and INTO THE LUNGS every 6 hours if needed for wheezing Yes Hans Guzman PA-C   fluticasone-umeclidin-vilant (TRELEGY ELLIPTA) 100-62.5-25 MCG/INH AEPB Inhale 1 puff into the lungs daily Yes LIZA Ruelas CNP   metFORMIN (GLUCOPHAGE) 500 MG tablet Take 1 tablet by mouth daily (with breakfast) Yes Hans Guzman PA-C   lactulose (CHRONULAC) 10 GM/15ML solution Take 15 mLs by mouth 2 times daily  Patient taking differently: Take 10 g by mouth 2 times daily Taking prn/ 6/1/20 Yes Sena Jones PA-C   Varenicline Tartrate (CHANTIX PO) Take by mouth Yes Historical Provider, MD   lidocaine-prilocaine (EMLA) 2.5-2.5 % cream Apply topically as needed. Yes Chrsisy Adorno MD   albuterol (PROVENTIL) (2.5 MG/3ML) 0.083% nebulizer solution Take 3 mLs by nebulization every 6 hours as needed for Wheezing Yes Liam Fay MD   lidocaine viscous hcl (XYLOCAINE) 2 % SOLN solution Take 5-10 mLs by mouth as needed for Irritation Yes Yolanda Brewer MD   Oxygen Concentrator  Yes Historical Provider, MD   ibuprofen (ADVIL;MOTRIN) 600 MG tablet Take 1 tablet by mouth every 6 hours as needed for Pain  Mahesh Soto MD       Social History     Tobacco Use    Smoking status: Current Every Day Smoker     Packs/day: 0.25     Years: 52.00     Pack years: 13.00     Types: Cigarettes     Start date: 1/1/1967    Smokeless tobacco: Never Used    Tobacco comment: \"1 carton monthly, carton has 10 packs\", 1/3 pack daily as of 6/9/2020   Substance Use Topics    Alcohol use: No    Drug use: Yes     Types: Cocaine     Comment: 4/15/19 last use (Noted 3/24/20)        Past Medical History:   Diagnosis Date    Anxiety and depression     Back pain     Bipolar disorder (HCC)     Bronchitis     [facility]     Chronic obstructive pulmonary disease (COPD) ([facility]     Cirrhosis ([facility]     Cough     Current every day smoker     1 pack a day for the last 50 years    Fibromyalgia     Hepatitis     Hepatitis C per pt.     History of cervical cancer     is s/p hyst    History of chemotherapy     chemo every 2 weeks, last tx in April sometime    History of radiation therapy     last tx March 24th 2020    Liver disease     stage 4    Liver metastases ([facility]     Lung cancer ([facility] 03/2019    Dr. Hermila Santillan Lung mass 2019    Malignant neoplasm of upper lobe of right lung (HCC)     MVP (mitral valve prolapse)     Cancer, COPD. An  electronic signature was used to authenticate this note. --Sarah Carpio PA-C on 6/12/2020 at 8:11 AM    9}    Pursuant to the emergency declaration under the [address] [place] [address], [facility] waiver authority and the [employer] and [employer] Act, this Virtual  Visit was conducted, with patient's consent, to reduce the patient's risk of exposure to COVID-19 and provide continuity of care for an established patient. Services were provided through a video synchronous discussion virtually to substitute for in-person clinic visit.

## 2020-06-15 NOTE — TELEPHONE ENCOUNTER
Berto Grajeda MD VISIT & TX  DR SMITH IN TO SEE PATIENT  ORDERS RECEIVED  CONTINUE CHEMOTHERAPY PER ORDERS  RV TO SEE ME IN 4 WEEKS  MD VISIT 07/13/20 @11:30AM  [email or web address]  AVS PRINTED AND GIVEN TO PATIENT WITH INSTRUCTIONS  PATIENT REMAINS IN [address] [place] [address]

## 2020-06-15 NOTE — PROGRESS NOTES
Patient arrived ambulatory per self for chemo infusion and physician visit. Patient denies concerns or complaints. Port accessed;specimen sent. Labs reviewed. Physician at bedside. Okay to proceed with treatment. Potassium infusion completed over 2 hours with no sign adverse reaction;line flushed. Imfinzi infused with no sign adverse reaction;line flushed. Oral potassium given as ordered. Port flushed and heparinized with intact regan needle removed as ordered. Patient discharged off unit per self.  Instructed to stop at  for AVS.

## 2020-06-15 NOTE — PROGRESS NOTES
07/09/2019  6. Pleural effusion - plan for centesis  7. Imfinzi - started 08/26/2019  8. Chronic elevation of blood sugar, she is on metformin. I asked her to keep home readings log  9. Smoking addiction, she is on Chantix and that is well-tolerated. 10. Isolated segment 7 liver mass, likely hepatocellular carcinoma, plan treatment with radioembolization for 03/24/2020, done and MRI planned for 06/2020    PLAN:   1. Her lab work was reviewed. 2. I completed toxicity check. 3. Clinically the patient is significantly improved. 4. We will treat today as per orders and plan to treat to finish the planned adjuvant treatment   5. Return in 4 weeks.

## 2020-06-16 NOTE — PROGRESS NOTES
 lidocaine viscous hcl (XYLOCAINE) 2 % SOLN solution Take 5-10 mLs by mouth as needed for Irritation 100 mL 1    Oxygen Concentrator       [DISCONTINUED] ibuprofen (ADVIL;MOTRIN) 600 MG tablet Take 1 tablet by mouth every 6 hours as needed for Pain 30 tablet 0     No facility-administered encounter medications on file as of 6/16/2020. Social History:   TOBACCO:   reports that she has been smoking cigarettes. She started smoking about 53 years ago. She has a 13.00 pack-year smoking history. She has never used smokeless tobacco.  ETOH:   reports no history of alcohol use. OCCUPATION:      Family History:       Problem Relation Age of Onset    Heart Attack Father     Cancer Paternal Grandmother     Lung Cancer Paternal Grandfather     Emphysema Mother        Immunizations:    Immunization History   Administered Date(s) Administered    Influenza, Quadv, IM, (6 mo and older Fluzone, Flulaval, Fluarix and 3 yrs and older Afluria) 03/21/2019    Influenza, Quadv, IM, PF (6 mo and older Fluzone, Flulaval, Fluarix, and 3 yrs and older Afluria) 02/01/2018    Influenza, Triv, inactivated, subunit, adjuvanted, IM (Fluad 65 yrs and older) 11/05/2019    Pneumococcal Polysaccharide (Vylcgabar55) 03/21/2019    Zoster Recombinant (Shingrix) 02/10/2020         REVIEW OF SYSTEMS:  CONSTITUTIONAL:  positive for  fatigue, malaise, anorexia and weight loss and night sweats, negative for  fevers, chills.   EYES:  negative for  double vision, blurred vision, dry eyes, eye discharge, visual disturbance, irritation, redness and icterus  HEENT:  Denies change in visual acuity negative for sore throat, nasal congestion, rhinorrhea, negative for  hearing loss, ear drainage, hoarseness and voice change  RESPIRATORY:  Positive for  cough with sputum, dyspnea, wheezing, positive for chest pain negative for hemoptysis, pleuritic pain and cyanosis  CARDIOVASCULAR:  negative for  palpitations, orthopnea, PND, early saiety, edema,

## 2020-06-29 NOTE — FLOWSHEET NOTE
Writer greeted Patient while rounding the infusion clinic. Patient was eating lunch. She reported that she was doing better and that she was in the hospital a couple weeks ago. She acknowledged writer's words of encouragement and support.       06/29/20 1154   Encounter Summary   Services provided to: Patient   Referral/Consult From: [facility] Family members   Continue Visiting   (6/29/20)   Complexity of Encounter Moderate   Length of Encounter 15 minutes   Routine   Type Follow up   Assessment Calm; Approachable;Coping   Intervention Active listening;Explored feelings, thoughts, concerns;Explored coping resources;Sustaining presence/ Ministry of presence   Outcome Expressed gratitude;Engaged in conversation;Expressed feelings/needs/concerns;Coping     Electronically signed by Darrell Jimenez, Oncology Outpatient [facility], [id number] Radiation Oncology  6/29/2020  11:57 AM

## 2020-06-29 NOTE — TELEPHONE ENCOUNTER
Joan Gonzalez MD Resp Spec [id number]   10/13/2020 11:20 AM César German PA-C [facility]            Patient Active Problem List:     CL (cirrhosis of liver) (HCC)     Chronic hepatitis C without hepatic coma (HCC)     Thrombocytopenia (HCC)     Chronic obstructive pulmonary disease (HCC)     Anxiety     Malignant neoplasm of upper lobe of right lung (HCC)     Substance abuse ([address]     Diverticulitis of large intestine with perforation and abscess without bleeding     Malignant neoplasm of liver (HCC)     Chronic hepatitis C with hepatic coma (HCC)     MVP (mitral valve prolapse)     On supplemental oxygen therapy     History of radiation therapy     Fibromyalgia     History of chemotherapy     Bipolar disorder ([address]     Current every day smoker

## 2020-06-29 NOTE — PROGRESS NOTES
Patient arrive ambulatory for cycle 17 day 1 treatment. Denies complaint or concern. Port accessed; specimen sent. Labs reviewed; patient premedicated. Imfinzi infused with no sign of adverse reaction; line flushed. Port flushed and heparinized with intact regan needle removed per protocol. Patient ambulate off unit per self at discharge.

## 2020-07-02 NOTE — PROGRESS NOTES
[facility] Radiation Oncology  Follow-Up note     Date of Service: 2020                     [facility]        Patient ID:   Nichol Bojorquez  : 1950                                  MRN: [id number]     DIAGNOSIS:  Cancer Staging  Malignant neoplasm of upper lobe of right lung [facility])  Staging form: Lung, AJCC 8th Edition  - Clinical Stage IIIA (cT4 cN0 cM0) - Signed by Darren Landau, MD on 2019  Staging comments: Squamous cell carcinoma        Chief Complaint: \"I've had a lot going on. \"     INTERVAL HISTORY:   Nichol Bojorquez returns in a previously scheduled follow-up visit. Patient was last seen in our clinic six months ago. Since then, she's been receiving durvalumab as per Dr. Lev Jade, and in March also underwent selective internal radiation therapy (SIRT)/radioembolization with 90-Yttrium microspheres to a liver lesion, presumed primary hepatocellular carcinoma arising from background of cirrhosis. Patient was hospitalized in May with abdominal pain, N/V, was noted to have diverticulitis and peridiverticular abscess, necessitating IV antibiotics, and IO holiday. Patient states that she's lost 10-15 lbs as a result of her abdominal illness, and continues to have early satiety, poor appetite, abdominal pain after eating especially, and generalized fatigue/low energy. Her breathing is stable. She continues to be dependent on supplemental oxygen at all times essentially. She also regularly nebulizes. She has stopped smoking with Chantix. she denies experiencing chest pain, worsening shortness of breath, cough, hemoptysis, unusual headaches, vision changes, focal weakness, slurred speech, involuntary movements, confusion, etc.     I closely reviewed the medical, surgical, social and family history as per the detailed physician notes from our department.   Interim changes as noted above.     MEDICATIONS:  Current Outpatient Medications:     oxyCODONE HCl (OXY-IR) 10 MG immediate release tablet, take 1     metFORMIN (GLUCOPHAGE) 500 MG tablet, take 1 tablet by mouth     VENTOLIN  (90 Base) MCG/ACT inhaler,     potassium chloride (KLOR-CON M) 20 MEQ extended release tablet,     citalopram (CELEXA) 10 MG tablet, Take 1 tablet by mouth nightly, Disp:     hydrOXYzine (ATARAX) 50 MG tablet, Take 1 tablet by mouth daily, Disp:     omeprazole (PRILOSEC) 40 MG delayed release capsule, Take 1     traZODone (DESYREL) 300 MG tablet, take 1 tablet by mouth nightly,     fluticasone-umeclidin-vilant (TRELEGY ELLIPTA) 100-62.5-25 MCG/INH     lactulose (CHRONULAC) 10 GM/15ML solution, Take 15 mLs by mouth 2     Varenicline Tartrate (CHANTIX PO), Take by mouth, Disp: , Rfl:     lidocaine-prilocaine (EMLA) 2.5-2.5 % cream, Apply topically as needed. ,     albuterol (PROVENTIL) (2.5 MG/3ML) 0.083% nebulizer solution,     lidocaine viscous hcl (XYLOCAINE) 2 % SOLN solution, Take 5-10 mLs     Oxygen Concentrator    REVIEW OF SYSTEMS: I reviewed the complete 14-Point ROS with the patient. Pertinent ones noted in the HPI.        PHYSICAL EXAMINATION:  /70   Pulse 97   Temp 98 °F (36.7 °C)   Resp 16   Wt 112 lb 12.8 oz (51.2 kg)   SpO2 97%   BMI 19.98 kg/m²  Down from 122 lbs last time. Pain 4/10 chronic pain, KPS 70. GENERAL:  Drowsy (took an opioid analgesic prior to coming to appointment), well-appearing, in no apparent distress, alert and fully oriented, answers questions appropriately. Oxygen tank at side. HEENT:  Normocephalic, atraumatic, PERRLA, EOMI, on inspection of the oral cavity and visible oropharynx subsites,  mucous membranes moist, uvula elevates normally on phonation, no suspicious asymmetry or abnormal mass lesions. NECK:  No cervical or supraclavicular lymphadenopathy. LUNGS:  Mild crackles, otherwise CTA. HEART:  Normal rate, regular rhythm, normal S1/S2, no murmurs/rubs/gallops.   ABD:  Protuberant abdomen, hypoactive bowel sounds, no visceromegaly or masses on palpation. EXTREMITIES:  Normal range of motion, no cyanosis/clubbing/edema. LYMPH:  No appreciable adenopathy. NEURO: AOx4, CNs II-XII grossly normal, gait normal, muscle power in extremities normal.  PSYCH:  Appropriate affect for the clinical situation.  Insight and judgment not impaired.     Labs:  5/82/5487 complete metabolic profile showed hyperglycemia 153 creatinine 0.93, BUN 6.  Same-day CBC demonstrated anemia with hemoglobin 11.1, hematocrit 34.9, elevated RDW 15.9, thrombocytopenia 93. RADS:  6/24/2020 MRI of the abdomen with and without contrast showed morphologic findings of cirrhosis. The treated lesion in segment 7 is no longer enhancing. Internally it has proteinaceous or hemorrhagic material.  No new liver lesions identified. Otherwise, multiple dysplastic nodules as demonstrated previously, without significant change. Cirrhosis and portal hypertension as evidenced by splenomegaly and varices. No significant ascites. Mall right pleural effusion. 2/27/2020 MRI of the abdomen demonstrated a 2.8 x 2.7 cm T1 hypointense lesion in segment 7 with early arterial enhancement and washout of the lesion most compatible with hepatocellular carcinoma. There appears to be some satellite lesions subcentimeter in size along the superior aspect. 1/31/2020 CT Chest with contrast showed interval decrease in the right pleural effusion. Unchanged appearance of heterogenous mass-like appearance in the medial right lung apex. Unchanged appearance of treated right hilar lymph nodes and minimal residual subcarinal lymphatic tisue. Redemonstration of suspected liver lesion in the posterior right hepatic lobe, may represent a metastatic deposit or primary neoplasm, given the background of cirrhosis.         PATHOLOGY: No recent studies.        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   PLAN: Continue consolidative immunotherapy and regular follow-up with Dr. Sampson Ferrer, with surveillance imaging. She appears to have derived a complete radiographic response in the liver lesion, which is encouraging. RTC in early 2021, or sooner if needed. We reviewed signs/symptoms that would be concerning for disease recurrence or progression, and she knows to notify her oncologic team promptly if these ever occur. Continue smoking cessation.     Electronically signed by Antwon Cross MD

## 2020-07-13 NOTE — PROGRESS NOTES
Patient arrive ambulatory for cycle 18 day 1 treatment and physician visit. Denies complaint or concern. Port accessed; specimen sent. Physician met with patient, labs reviewed; ok to proceed with treatment. Patient premedicated. Imfinzi infused with no sign of adverse reaction; line flushed. Port flushed and heparinized with intact regan needle removed per protocol.   Patient ambulate off unit per self at discharge; instructed to stop at  for AVS.

## 2020-07-13 NOTE — PROGRESS NOTES
DIAGNOSIS:   1. Squamous cell carcinoma, right lung 04/15/2019 with right main stem bronchus invasion (T3N0M0)   2. PET showed localized disease; brain MRI negative for metastases,   3. HX Liver disease and hepatitis C  4. Isolated liver lesion, segment 7, likely hepatocellular carcinoma     CURRENT THERAPY:  1. S/P bronchoscopy establishing the diagnosis   2. Chemoradiation with taxol and carboplatin - started 05/23/2019, completed 07/09/2019  3. Jodee Snuffer started 08/26/2019  4. Microsphere radioembolization Y-90 treatment in March/2020    BRIEF CASE HISTORY: Ivan Warner is a very pleasant 72 y.o. female who is referred to us for consultation and management of recently diagnosed lung cancer. She initially presented with flu like illness and shortness of breath 11/2018 and was evaluated by PCP and was started on antibiotics, X-ray done at the time showed suspicious findings. Follow up CT showed mass lesion within the right hilum either intervening or originating from the right mainstem bronchus and narrowing of multiple segmental pulmonary arterial branches. Referred to Dr. Prateek Steele for bronchoscopy and right main stem bronchus mass was appreciated, bronchial washings were positive for squamous cell carcinoma. She reports she has liver disease, Hep C and assumed her recent weight loss and cachexia was related to that. She has right shoulder and back pain. She has not been treated for Hep C. She smokes cigarettes and cocaine. She has COPD and is oxygen dependent. Staging PET showed localized disease, pleural effusion seen; brain MRI was negative for metastatic disease. She will need pleurocentesis with cytological evaluation of the fluid, assuming it is negative , and since she is not surgical candidate. Thoracentesis was done and fluid was negative for malignancy. Chemoradiation Carbo/Taxol -started 05/23/2019, completed 07/09/2019.  Follow up CT showed good response to treatment with some pleural effusion and liver lesion isolated in segment 7. With features concerning for hepatocellular carcinoma. Considering her cirrhosis, she is not a candidate for resection or transplant, we referred her to interventional radiology for consideration of radioembolization with Y-90 treatment, which was done 03/2020 with follow up MRI planned for 06/2020. In May/2020, she presented with abdominal pain and diarrhea. Initially her pancreatic enzymes were elevated so I suspected immunotherapy induced pancreatitis but CT scan showed diverticulitis with a small pericolonic abscess. Patient was admitted to the hospital and treated with antibiotics with improvement. We held immunotherapy until the recovery. INTERIM HISTORY: The patient presents for follow up for lung cancer, review lab work, and cycle #18 of Imfinzi. Her diarrhea has resolved and bowels have normalized. She denies any nausea or vomiting in the last 7 days and feels stable overall with no new or worsening complaints. PAST MEDICAL HISTORY: has a past medical history of Anxiety and depression, Back pain, Bipolar disorder ([address] Bronchitis, Cancer ([address] Chronic obstructive pulmonary disease (COPD) ([address] Cirrhosis ([address] Cough, Current every day smoker, Fibromyalgia, Hepatitis, History of cervical cancer, History of chemotherapy, History of radiation therapy, Liver disease, Liver metastases ([address] Lung cancer ([address] Lung mass, Malignant neoplasm of upper lobe of right lung ([address] MVP (mitral valve prolapse), On supplemental oxygen therapy, Wears dentures, Wears glasses, and Wheezing. PAST SURGICAL HISTORY: has a past surgical history that includes Cervical disc surgery; Hysterectomy; Cholecystectomy; Breast surgery (Left); Carpal tunnel release (Bilateral); Knee arthroscopy (Right); bronchoscopy (04/15/2019); bronchoscopy (N/A, 4/15/2019); bronchoscopy (4/15/2019); bronchoscopy (4/15/2019);  TUNNELED CENTRAL VENOUS CATHETER W/ SUBCUTANEOUS PORT (Left, 05/13/2019); thoracentesis (Right, 05/13/2019); and thoracentesis (Right, 08/19/2019). CURRENT MEDICATIONS:  has a current medication list which includes the following prescription(s): potassium chloride, oxycodone hcl, metformin, ventolin hfa, citalopram, hydroxyzine, omeprazole, trazodone, trelegy ellipta, lactulose, varenicline tartrate, lidocaine-prilocaine, albuterol, lidocaine viscous hcl, oxygen concentrator, and ibuprofen. ALLERGIES:  has No Known Allergies. FAMILY HISTORY: Negative for any hematological or oncological conditions. SOCIAL HISTORY:  reports that she has been smoking cigarettes. She started smoking about 53 years ago. She has a 13.00 pack-year smoking history. She has never used smokeless tobacco. She reports current drug use. Drug: Cocaine. She reports that she does not drink alcohol. REVIEW OF SYSTEMS:   General: No fever or night sweats. Fatigue - resolved, weight stable. Xerostomia has improved  ENT: No double or blurred vision, no tinnitus or hearing problem, or sore throat   Respiratory: Chest pain - improved; shortness of breath and wheezing - improved   Cardiovascular: Denies chest pain, PND or orthopnea. No L E swelling or palpitations. Gastrointestinal: No constipation, nausea or vomiting. +diarrhea with some abdominal discomfort - significantly improved and manageable   Genitourinary: Denies dysuria, hematuria, frequency, urgency or incontinence. Neurological: Denies headaches, decreased LOC, no sensory or motor focal deficits. Musculoskeletal: No arthralgia no back pain or joint swelling. Chronic right shoulder pain - improved  Skin: There are no rashes or bleeding. Psychiatric: No anxiety, no depression. Endocrine: No diabetes or thyroid disease. Hematologic: No bleeding, no adenopathy. PHYSICAL EXAM: Shows a well appearing 72y.o.-year-old female who is not in pain or distress.  Vital Signs: Blood pressure 132/76, pulse 105, temperature 98.2 °F (36.8 °C), temperature source Oral, resp. rate 20, weight 112 lb 12.8 oz (51.2 kg). HEENT: . Normocephalic and atraumatic. Pupils are equal, round, reactive to light and accommodation. Extraocular muscles are intact. Neck: Showed no JVD, no carotid bruit . Lungs: Decreased air entry. Clear to auscultation bilaterally. Heart: Murmur - prolapse Abdomen: Significant tenderness in RUQ and epigastric area, hepatomegaly along the costal margin. No splenomegaly. Extremities: Olecranon bursa on left elbow. Lower extremities show bilateral edema, clubbing, or cyanosis. Breasts: Examination not done today. Neuro exam: intact cranial nerves bilaterally no motor or sensory deficit, gait is normal. Lymphatic: no adenopathy appreciated in the supraclavicular, axillary, cervical or inguinal area  Skin: right shoulder and chest wall has excoriation marks, radiation rash      REVIEW OF LABORATORY DATA:   Lab Results   Component Value Date    WBC 6.2 06/29/2020    HGB 11.1 (L) 06/29/2020    HCT 34.9 (L) 06/29/2020    MCV 90.6 06/29/2020    PLT 93 (L) 06/29/2020       Lab Results   Component Value Date    NEUTROABS 4.79 06/29/2020           Chemistry        Component Value Date/Time     06/29/2020 1115    K 3.9 06/29/2020 1115     06/29/2020 1115    CO2 24 06/29/2020 1115    BUN 6 (L) 06/29/2020 1115    CREATININE 0.93 (H) 06/29/2020 1115        Component Value Date/Time    CALCIUM 8.4 (L) 06/29/2020 1115    ALKPHOS 100 06/29/2020 1115    AST 29 06/29/2020 1115    ALT 13 06/29/2020 1115    BILITOT 0.88 06/29/2020 1115            REVIEW OF RADIOLOGICAL RESULTS:       PATHOLOGY:       IMPRESSION:   1. Squamous cell carcinoma, right lung 04/15/2019, clinical stage T3, N0, M0.   2. Liver disease and hepatitis C, severe comorbidity   3. Staging PET showed localized disease, pleural effusion seen; Brain MRI was negative for metastatic disease  4.  Cocaine and cigarette addiction, quit drugs and quitting smoking with chantix  5. Chemo-radiation 05/23/2019, completed 07/09/2019  6. Pleural effusion - plan for centesis  7. Imfinzi - started 08/26/2019  8. Chronic elevation of blood sugar, she is on metformin. I asked her to keep home readings log  9. Smoking addiction, she is on Chantix and that is well-tolerated. 10. Isolated segment 7 liver mass, likely hepatocellular carcinoma, plan treatment with radioembolization for 03/24/2020, done and MRI planned for 06/2020    PLAN:   1. I completed toxicity check. 2. She is improved and stable over previous, bowels have normalized. 3. We will plan to continue treatment as per orders. 4. Return in 4 weeks.

## 2020-08-07 NOTE — TELEPHONE ENCOUNTER
Many made reminder call to patient for md appt on Monday  pt complains of being sick, having n/v diarrhea, cannot eat or dink, she is weak and cannot get off the couch  Called patient, she relates the same information. Called Dr Loan Tipton, he would like someone to put their eyes on her as she was recently hospitalized for diverticulitis.  Wants pt to go the the [address] [facility] ([place] back, advised of md wishes to send her to the ER, she doesn't feel like going but if she gets worse she will go,l otherwise will be in to see the md on Monday

## 2020-08-24 PROBLEM — R74.01 TRANSAMINITIS: Status: ACTIVE | Noted: 2020-01-01

## 2020-08-24 PROBLEM — R79.89 ELEVATED LACTIC ACID LEVEL: Status: ACTIVE | Noted: 2020-01-01

## 2020-08-24 PROBLEM — R89.7 ELEVATED MYOGLOBIN LEVEL: Status: ACTIVE | Noted: 2020-01-01

## 2020-08-24 PROBLEM — F32.A DEPRESSION: Status: ACTIVE | Noted: 2020-01-01

## 2020-08-24 PROBLEM — E11.9 DIABETES MELLITUS TYPE 2 IN NONOBESE (HCC): Status: ACTIVE | Noted: 2020-01-01

## 2020-08-24 PROBLEM — E87.6 HYPOKALEMIA: Status: ACTIVE | Noted: 2020-01-01

## 2020-08-24 PROBLEM — R41.82 AMS (ALTERED MENTAL STATUS): Status: ACTIVE | Noted: 2020-01-01

## 2020-08-24 PROBLEM — J90 PLEURAL EFFUSION ON RIGHT: Status: ACTIVE | Noted: 2020-01-01

## 2020-08-24 PROBLEM — E87.3 RESPIRATORY ALKALOSIS: Status: ACTIVE | Noted: 2020-01-01

## 2020-08-24 NOTE — ED NOTES
Pt assisted with bedpan, pt had BM, cleaned up     [address] [place] [address], RN  08/24/20 8144

## 2020-08-24 NOTE — PROGRESS NOTES
[facility]  ED  Emergency Department Encounter  EmergencyMedicine Resident     Pt Wilbert Schwab  MRN: [id number]  [place] 1950  Date of evaluation: 8/24/20  PCP:  Coco Contreras PA-C    CHIEF COMPLAINT       Chief Complaint   Patient presents with    Altered Mental Status     Pt to ED per [facility], EMS was called by pt  after he found her laying on the basement floor, pt was found in dog feces, EMS states house was very dirty and unfit for living, recommended contacting adult protective services, unsure when patient was last seen normal or how long she was on the floor for, pt has a history of cancer, EMS unsure what kind, pt recently finished chemo, pt appears slightly jaundice, confused, unable to follow commands, nonverbal       HISTORY OF PRESENT ILLNESS  (Location/Symptom, Timing/Onset, Context/Setting, Quality, Duration, Modifying Factors, Severity.)      Arthur Evans is a 72 y.o. female who presents with altered mental status for an unknown duration. Patient was found in basement by , lying in dog feces.  states that the patient has times where she \"gives up\" and does not take care of herself. He believes that this is a similar episode. He was unaware of how long the patient had been lying there. EMS stated that the house was unfit for living. Pt has a history of cirrhosis, chronic hep C, COPD, lung cancer, liver cancer and diabetes.      PAST MEDICAL / SURGICAL / SOCIAL / FAMILY HISTORY      has a past medical history of Anxiety and depression, Back pain, Bipolar disorder ([address] Bronchitis, Cancer ([address] Chronic obstructive pulmonary disease (COPD) ([address] Cirrhosis ([address] Cough, Current every day smoker, Fibromyalgia, Hepatitis, History of cervical cancer, History of chemotherapy, History of radiation therapy, Liver disease, Liver metastases ([address] Lung cancer ([address] Lung mass, Malignant neoplasm of upper lobe of right lung ([address] MVP (mitral valve prolapse), On supplemental oxygen therapy, Wears dentures, Wears glasses, and Wheezing. has a past surgical history that includes Cervical disc surgery; Hysterectomy; Cholecystectomy; Breast surgery (Left); Carpal tunnel release (Bilateral); Knee arthroscopy (Right); bronchoscopy (04/15/2019); bronchoscopy (N/A, 4/15/2019); bronchoscopy (4/15/2019); bronchoscopy (4/15/2019); TUNNELED CENTRAL VENOUS CATHETER W/ SUBCUTANEOUS PORT (Left, 05/13/2019); thoracentesis (Right, 05/13/2019); and thoracentesis (Right, 08/19/2019).     Social History     Socioeconomic History    Marital status:      Spouse name: Not on file    Number of children: Not on file    Years of education: Not on file    Highest education level: Not on file   Occupational History    Not on file   Social Needs    Financial resource strain: Not on file    Food insecurity     Worry: Not on file     Inability: Not on file    Transportation needs     Medical: Not on file     Non-medical: Not on file   Tobacco Use    Smoking status: Current Every Day Smoker     Packs/day: 0.25     Years: 52.00     Pack years: 13.00     Types: Cigarettes     Start date: 1/1/1967    Smokeless tobacco: Never Used    Tobacco comment: \"1 carton monthly, carton has 10 packs\", 1/3 pack daily as of 6/9/2020   Substance and Sexual Activity    Alcohol use: No    Drug use: Yes     Types: Cocaine     Comment: 4/15/19 last use (Noted 3/24/20)    Sexual activity: Not on file   Lifestyle    Physical activity     Days per week: Not on file     Minutes per session: Not on file    Stress: Not on file   Relationships    Social connections     Talks on phone: Not on file     Gets together: Not on file     Attends Confucianist service: Not on file     Active member of club or organization: Not on file     Attends meetings of clubs or organizations: Not on file     Relationship status: Not on file    Intimate partner violence     Fear of current or ex partner: Not on file Emotionally abused: Not on file     Physically abused: Not on file     Forced sexual activity: Not on file   Other Topics Concern    Not on file   Social History Narrative    Not on file       Family History   Problem Relation Age of Onset    Heart Attack Father     Cancer Paternal Grandmother     Lung Cancer Paternal Grandfather     Emphysema Mother        Allergies:  Patient has no known allergies. Home Medications:  Prior to Admission medications    Medication Sig Start Date End Date Taking?  Authorizing Provider   potassium chloride (KLOR-CON M) 20 MEQ extended release tablet take 1 tablet by mouth once daily 8/7/20   Darshan Lobato MD   oxyCODONE HCl (OXY-IR) 10 MG immediate release tablet take 1 tablet by mouth every 6 hours if needed for pain 7/29/20 8/28/20  Darshan Lobato MD   metFORMIN (GLUCOPHAGE) 500 MG tablet take 1 tablet by mouth once daily with breakfast 6/30/20   Alvin Barrow PA-C   VENTOLIN  (90 Base) MCG/ACT inhaler inhale 2 puffs by mouth and INTO THE LUNGS every 6 hours if needed for wheezing 6/29/20   Alvin Barrow PA-C   citalopram (CELEXA) 10 MG tablet Take 1 tablet by mouth nightly 6/9/20   Alvin Barrow PA-C   hydrOXYzine (ATARAX) 50 MG tablet Take 1 tablet by mouth daily 6/9/20   Alvin Barrow PA-C   omeprazole (PRILOSEC) 40 MG delayed release capsule Take 1 capsule by mouth daily 6/9/20   Alvin Barrow PA-C   traZODone (DESYREL) 300 MG tablet take 1 tablet by mouth nightly 5/14/20   Alvin Barrow PA-C   fluticasone-umeclidin-vilant (TRELEGY ELLIPTA) 100-62.5-25 MCG/INH AEPB Inhale 1 puff into the lungs daily 3/31/20   LIZA Dueñas - CNP   lactulose (CHRONULAC) 10 GM/15ML solution Take 15 mLs by mouth 2 times daily  Patient taking differently: Take 10 g by mouth 2 times daily Taking prn/ 6/1/20 3/9/20   Alvin Barrow PA-C   Varenicline Tartrate (CHANTIX PO) Take by mouth    Historical Provider, MD lidocaine-prilocaine (EMLA) 2.5-2.5 % cream Apply topically as needed.  1/20/20   Kizzy Lobato MD   albuterol (PROVENTIL) (2.5 MG/3ML) 0.083% nebulizer solution Take 3 mLs by nebulization every 6 hours as needed for Wheezing 9/25/19   Newton Medel MD   lidocaine viscous hcl (XYLOCAINE) 2 % SOLN solution Take 5-10 mLs by mouth as needed for Irritation 6/5/19   Dee Munroe MD   Oxygen Concentrator     Historical Provider, MD   ibuprofen (ADVIL;MOTRIN) 600 MG tablet Take 1 tablet by mouth every 6 hours as needed for Pain 6/20/16 6/1/20  Parul Napier MD       REVIEW OF SYSTEMS    (2-9 systems for level 4, 10 or more for level 5)      Review of Systems    PHYSICAL EXAM   (up to 7 for level 4, 8 or more for level 5)      INITIAL VITALS:   BP (!) 171/92   Pulse 85   Temp 97.2 °F (36.2 °C) (Oral)   Resp 16   SpO2 100%     Physical Exam    DIFFERENTIAL  DIAGNOSIS     PLAN (LABS / IMAGING / EKG):  Orders Placed This Encounter   Procedures    XR CHEST PORTABLE    CT HEAD WO CONTRAST    CT CHEST PULMONARY EMBOLISM W CONTRAST    Hemoglobin and hematocrit, blood    SODIUM (POC)    POTASSIUM (POC)    CHLORIDE (POC)    CALCIUM, IONIC (POC)    Blood Gas, Venous    Lactic Acid, Plasma    Urinalysis with Microscopic    Basic Metabolic Panel w/ Reflex to MG    Hepatic Function Panel    Troponin    Brain Natriuretic Peptide    D-DIMER, QUANTITATIVE    Carboxyhemoglobin    Methemoglobin    TOX SCR, BLD, ED    Urine Drug Screen    CBC Auto Differential    CK    Myoglobin, Serum    Magnesium    Ammonia    SPECIMEN REJECTION    POTASSIUM    Ferritin    Inpatient consult to Hospitalist    Inpatient consult to Social Work    Inpatient consult to Oncology    Venous Blood Gas, POC    Creatinine W/GFR Point of Care    Lactic Acid, POC    POCT Glucose    Anion Gap (Calc) POC    EKG 12 Lead    PATIENT STATUS (FROM ED OR OR/PROCEDURAL) Inpatient    Restraints non-violent or UA NOT REPORTED NOT REQ.     Yeast, UA NOT REPORTED None   Basic Metabolic Panel w/ Reflex to MG   Result Value Ref Range    Glucose 145 (H) 70 - 99 mg/dL    BUN 10 8 - 23 mg/dL    CREATININE 0.67 0.50 - 0.90 mg/dL    Bun/Cre Ratio NOT REPORTED 9 - 20    Calcium 8.3 (L) 8.6 - 10.4 mg/dL    Sodium 137 135 - 144 mmol/L    Potassium 2.4 (LL) 3.7 - 5.3 mmol/L    Chloride 100 98 - 107 mmol/L    CO2 20 20 - 31 mmol/L    Anion Gap 17 9 - 17 mmol/L    GFR Non-African American >60 >60 mL/min    GFR African American >60 >60 mL/min    GFR Comment          GFR Staging NOT REPORTED    Hepatic Function Panel   Result Value Ref Range    Alb 2.8 (L) 3.5 - 5.2 g/dL    Alkaline Phosphatase 117 (H) 35 - 104 U/L    ALT 59 (H) 5 - 33 U/L    AST 77 (H) <32 U/L    Total Bilirubin 4.02 (H) 0.3 - 1.2 mg/dL    Bilirubin, Direct 1.56 (H) <0.31 mg/dL    Bilirubin, Indirect 2.46 (H) 0.00 - 1.00 mg/dL    Total Protein 7.4 6.4 - 8.3 g/dL    Globulin NOT REPORTED 1.5 - 3.8 g/dL    Albumin/Globulin Ratio 0.6 (L) 1.0 - 2.5   Troponin   Result Value Ref Range    Troponin, High Sensitivity 20 (H) 0 - 14 ng/L    Troponin T NOT REPORTED <0.03 ng/mL    Troponin Interp NOT REPORTED    Troponin   Result Value Ref Range    Troponin, High Sensitivity 17 (H) 0 - 14 ng/L    Troponin T NOT REPORTED <0.03 ng/mL    Troponin Interp NOT REPORTED    Brain Natriuretic Peptide   Result Value Ref Range    Pro- (H) <300 pg/mL    BNP Interpretation Pro-BNP Reference Range:    D-DIMER, QUANTITATIVE   Result Value Ref Range    D-Dimer, Quant 7.25 mg/L FEU   Carboxyhemoglobin   Result Value Ref Range    Carboxyhemoglobin 1.7 0 - 5 %   Methemoglobin   Result Value Ref Range    Methemoglobin 0.5 0.0 - 1.5 %   TOX SCR, BLD, ED   Result Value Ref Range    Acetaminophen Level <5 (L) 10 - 30 ug/mL    Ethanol <10 <10 mg/dL    Ethanol percent <7.197 <8.014 %    Salicylate Lvl <1 (L) 3 - 10 mg/dL    Toxic Tricyclic Sc,Blood NEGATIVE NEGATIVE   Urine Drug Screen   Result Value Ref Range    Amphetamine Screen, Ur NEGATIVE NEGATIVE    Barbiturate Screen, Ur NEGATIVE NEGATIVE    Benzodiazepine Screen, Urine NEGATIVE NEGATIVE    Cocaine Metabolite, Urine NEGATIVE NEGATIVE    Methadone Screen, Urine NEGATIVE NEGATIVE    Opiates, Urine NEGATIVE NEGATIVE    Phencyclidine, Urine NEGATIVE NEGATIVE    Propoxyphene, Urine NOT REPORTED NEGATIVE    Cannabinoid Scrn, Ur NEGATIVE NEGATIVE    Oxycodone Screen, Ur NEGATIVE NEGATIVE    Methamphetamine, Urine NOT REPORTED NEGATIVE    Tricyclic Antidepressants, Urine NOT REPORTED NEGATIVE    MDMA, Urine NOT REPORTED NEGATIVE    Buprenorphine Urine NOT REPORTED NEGATIVE    Test Information       Assay provides medical screening only. The absence of expected drug(s) and/or metabolite(s) may indicate diluted or adulterated urine, limitations of testing or timing of collection.    CBC Auto Differential   Result Value Ref Range    WBC 10.9 3.5 - 11.3 k/uL    RBC 4.32 3.95 - 5.11 m/uL    Hemoglobin 12.6 11.9 - 15.1 g/dL    Hematocrit 35.3 (L) 36.3 - 47.1 %    MCV 81.7 (L) 82.6 - 102.9 fL    MCH 29.2 25.2 - 33.5 pg    MCHC 35.7 (H) 28.4 - 34.8 g/dL    RDW 15.9 (H) 11.8 - 14.4 %    Platelets 98 (L) 247 - 453 k/uL    MPV 9.2 8.1 - 13.5 fL    NRBC Automated 0.3 (H) 0.0 per 100 WBC    Differential Type NOT REPORTED     Seg Neutrophils 82 (H) 36 - 65 %    Lymphocytes 8 (L) 24 - 43 %    Monocytes 8 3 - 12 %    Eosinophils % 1 1 - 4 %    Basophils 0 0 - 2 %    Immature Granulocytes 1 (H) 0 %    Segs Absolute 9.04 (H) 1.50 - 8.10 k/uL    Absolute Lymph # 0.87 (L) 1.10 - 3.70 k/uL    Absolute Mono # 0.89 0.10 - 1.20 k/uL    Absolute Eos # 0.05 0.00 - 0.44 k/uL    Basophils Absolute 0.03 0.00 - 0.20 k/uL    Absolute Immature Granulocyte 0.05 0.00 - 0.30 k/uL    WBC Morphology NOT REPORTED     RBC Morphology ANISOCYTOSIS PRESENT     Platelet Estimate NOT REPORTED    CK   Result Value Ref Range    Total CK 61 26 - 192 U/L   Myoglobin, Serum   Result Value Ref Range Myoglobin 116 (H) 25 - 58 ng/mL   Magnesium   Result Value Ref Range    Magnesium 2.0 1.6 - 2.6 mg/dL   Ammonia   Result Value Ref Range    Ammonia 102 (H) 11 - 51 umol/L   SPECIMEN REJECTION   Result Value Ref Range    Specimen Source . BLOOD     Ordered Test SALINA     Reason for Rejection       Unable to perform testing: Specimen quantity not sufficient.    - NOT REPORTED    Ferritin   Result Value Ref Range    Ferritin 621 (H) 13 - 150 ug/L   Venous Blood Gas, POC   Result Value Ref Range    pH, Curry 7.497 (H) 7.320 - 7.430    pCO2, Curry 33.0 (L) 41.0 - 51.0 mm Hg    pO2, Curry 11.1 (L) 30.0 - 50.0 mm Hg    HCO3, Venous 25.5 22.0 - 29.0 mmol/L    Total CO2, Venous 27 23.0 - 30.0 mmol/L    Negative Base Excess, Curry NOT REPORTED 0.0 - 2.0    Positive Base Excess, Curry 3 0.0 - 3.0    O2 Sat, Curry 14 (L) 60.0 - 85.0 %    O2 Device/Flow/% NOT REPORTED     Nile Test NOT REPORTED     Sample Site NOT REPORTED     Mode NOT REPORTED     FIO2 NOT REPORTED     Pt Temp NOT REPORTED     POC pH Temp NOT REPORTED     POC pCO2 Temp NOT REPORTED mm Hg    POC pO2 Temp NOT REPORTED mm Hg   Creatinine W/GFR Point of Care   Result Value Ref Range    POC Creatinine 0.84 0.51 - 1.19 mg/dL    GFR Comment >60 >60 mL/min    GFR Non-African American >60 >60 mL/min    GFR Comment         Lactic Acid, POC   Result Value Ref Range    POC Lactic Acid 3.82 (H) 0.56 - 1.39 mmol/L   POCT Glucose   Result Value Ref Range    POC Glucose 152 (H) 74 - 100 mg/dL   Anion Gap (Calc) POC   Result Value Ref Range    Anion Gap 13 7 - 16 mmol/L         RADIOLOGY:  None    EKG  None    All EKG's are interpreted by the Emergency Department Physician who either signs or Co-signs this chart in the absence of a cardiologist.    EMERGENCY DEPARTMENT COURSE:  ***    PROCEDURES:  None    CONSULTS:  IP CONSULT TO HOSPITALIST  IP CONSULT TO SOCIAL WORK  IP CONSULT TO ONCOLOGY    CRITICAL CARE:  None    FINAL IMPRESSION      1. [place] coma scale total score 13-15, at arrival to emergency department          DISPOSITION / [address] Admitted 08/24/2020 10:34:41 AM      PATIENT REFERRED TO:  Bernardo Humphrey PA-C  [address] [place] [address]  [phone]            DISCHARGE MEDICATIONS:  New Prescriptions    No medications on file       Hortencia Mix      (Please note that portions of thisnote were completed with a voice recognition program.  Efforts were made to edit the dictations but occasionally words are mis-transcribed.)

## 2020-08-24 NOTE — ED NOTES
Pt pulled out iv while in restraints, restraints tightened, transport at the bedside for ultrasound, Dr Meme Uriostegui states is comfortable sending pt to ultrasound, pt remains confused at this time     [address] [place] [address], RN  08/24/20 0992

## 2020-08-24 NOTE — ED NOTES
Pt more alert at this time, pt pulled out IV and removed self from monitor, pt bed wet with fluids and potassium, pt linens changed, 2 new IV's placed, pt placed in soft restraints, she is alert but confused, pt is now answering some questions, unsure where she is or what happened, pt withdrawals from pain, states her arm hurts.       Ta Gonzalez RN  08/24/20 [id number]

## 2020-08-24 NOTE — ED NOTES
Pt given meal tray, assisted in cutting up food, pt remains alert and speaking full sentences but is confused.  Waiting for bed placement on floor     Loretta Riedel, RN  08/24/20 [id number]

## 2020-08-24 NOTE — ED NOTES
Bed: 23  Expected date:   Expected time:   Means of arrival:   Comments:  Jaziel Hale RN  08/24/20 [id number]
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
## 2020-08-24 NOTE — ED PROVIDER NOTES
FACULTY SIGN-OUT  ADDENDUM       Patient: Soila Tillman   MRN: [id number]  PCP:  Annamarie Lundy PA-C  Attestation  I was available and discussed any additional care issues that arose and coordinated the management plans with the resident(s) caring for the patient during my duty period. Any areas of disagreement with resident's documentation of care or procedures are noted on the chart. I was personally present for the key portions of any/all procedures during my duty period. I have documented in the chart those procedures where I was not present during the key portions. The patient's initial evaluation and plan have been discussed with the prior provider who initially evaluated the patient. Pertinent Comments:   The patient is a 72 y.o. female taken in signout with altered mental status with dehydration also increased ammonia greater than 100 with CT head negative and CTA of the chest negative except for chronic findings as expected  We are awaiting admission    ED COURSE      The patient was given the following medications:  Orders Placed This Encounter   Medications    0.9 % sodium chloride bolus    thiamine (B-1) 100 mg in sodium chloride 0.9 % 100 mL IVPB    naloxone (NARCAN) injection 1 mg    potassium chloride 10 mEq/100 mL IVPB (Peripheral Line)    iohexol (OMNIPAQUE 350) solution 75 mL    albuterol (PROVENTIL) nebulizer solution 2.5 mg    citalopram (CELEXA) tablet 10 mg    fluticasone-umeclidin-vilant (TRELEGY ELLIPTA) 100-62.5-25 MCG/INH inhaler 1 puff    potassium chloride (KLOR-CON M) extended release tablet 20 mEq    insulin lispro (HUMALOG) injection vial 0-6 Units    insulin lispro (HUMALOG) injection vial 0-3 Units    glucose (GLUTOSE) 40 % oral gel 15 g    dextrose 50 % IV solution    glucagon (rDNA) injection 1 mg    dextrose 5 % solution    magnesium sulfate 1 g in dextrose 5% 100 mL IVPB    OR Linked Order Group     potassium chloride (KLOR-CON M) extended release

## 2020-08-24 NOTE — ED NOTES
Pt resting on cot respirations even and unlabored, pt  had to leave, will update if change in status.       Sandy Amador RN  08/24/20 [id number]

## 2020-08-24 NOTE — ED NOTES
Pt is alert and oriented at this time, speaking in full sentences, answering questions appropriate, pt passed swallow study, pepcid given, diet ordered     Alvin Grier RN  08/24/20 8350

## 2020-08-24 NOTE — ED NOTES
Pt incontinent of urine, cleaned up, new linens placed, will continue to monitor, pt continues to be verbal but confused     Petrona Hand RN  08/24/20 8876

## 2020-08-24 NOTE — ED PROVIDER NOTES
[facility]  ED  Emergency Department Encounter  EmergencyMedicine Resident     Pt Vijay Santos  MRN: [id number]  Ramogfmyles 1950  Date of evaluation: 20   PCP:  Trnet Espinoza PA-C    CHIEF COMPLAINT       Chief Complaint   Patient presents with    Altered Mental Status     Pt to ED per [facility], EMS was called by pt  after he found her laying on the basement floor, pt was found in dog feces, EMS states house was very dirty and unfit for living, recommended contacting adult protective services, unsure when patient was last seen normal or how long she was on the floor for, pt has a history of cancer, EMS unsure what kind, pt recently finished chemo, pt appears slightly jaundice, confused, unable to follow commands, nonverbal       HISTORY OF PRESENT ILLNESS  (Location/Symptom, Timing/Onset, Context/Setting, Quality, Duration, Modifying Factors, Severity.)      Keli Magaña is a 72 y.o. female who presents with altered mental status. Patient has a history of COPD, lung cancer on chemotherapy ([facility]), mitral valve prolapse, substance abuse, liver cirrhosis, hepatitis C who presents with altered mental status for at least the past several days. EMS was called by her  for altered mental status and weakness. EMS found the woman laying in a basement in her own feces and urine unable to move. Is unclear how long she has been laying on the concrete floor. Her  states that she may be trying to follow her friend's footsteps who recently passed away of cancer and  through self-neglect in his house. He states that she will not drink or eat for days and had a time.       PAST MEDICAL / SURGICAL / SOCIAL / FAMILY HISTORY      has a past medical history of Anxiety and depression, Back pain, Bipolar disorder ([address] Bronchitis, Cancer ([address] Chronic obstructive pulmonary disease (COPD) ([address] Cirrhosis ([address] Cough, Current every day smoker, Fibromyalgia, Hepatitis, History of cervical cancer, History of chemotherapy, History of radiation therapy, Liver disease, Liver metastases ([facility] Lung cancer ([facility] Lung mass, Malignant neoplasm of upper lobe of right lung ([address] MVP (mitral valve prolapse), On supplemental oxygen therapy, Wears dentures, Wears glasses, and Wheezing. has a past surgical history that includes Cervical disc surgery; Hysterectomy; Cholecystectomy; Breast surgery (Left); Carpal tunnel release (Bilateral); Knee arthroscopy (Right); bronchoscopy (04/15/2019); bronchoscopy (N/A, 4/15/2019); bronchoscopy (4/15/2019); bronchoscopy (4/15/2019); TUNNELED CENTRAL VENOUS CATHETER W/ SUBCUTANEOUS PORT (Left, 05/13/2019); thoracentesis (Right, 05/13/2019); and thoracentesis (Right, 08/19/2019).     Social History     Socioeconomic History    Marital status:      Spouse name: Not on file    Number of children: Not on file    Years of education: Not on file    Highest education level: Not on file   Occupational History    Not on file   Social Needs    Financial resource strain: Not on file    Food insecurity     Worry: Not on file     Inability: Not on file    Transportation needs     Medical: Not on file     Non-medical: Not on file   Tobacco Use    Smoking status: Current Every Day Smoker     Packs/day: 0.25     Years: 52.00     Pack years: 13.00     Types: Cigarettes     Start date: 1/1/1967    Smokeless tobacco: Never Used    Tobacco comment: \"1 carton monthly, carton has 10 packs\", 1/3 pack daily as of 6/9/2020   Substance and Sexual Activity    Alcohol use: No    Drug use: Yes     Types: Cocaine     Comment: 4/15/19 last use (Noted 3/24/20)    Sexual activity: Not on file   Lifestyle    Physical activity     Days per week: Not on file     Minutes per session: Not on file    Stress: Not on file   Relationships    Social connections     Talks on phone: Not on file     Gets together: Not on file     Attends Alevism service: Not on file     Active member of club or organization: Not on file     Attends meetings of clubs or organizations: Not on file     Relationship status: Not on file    Intimate partner violence     Fear of current or ex partner: Not on file     Emotionally abused: Not on file     Physically abused: Not on file     Forced sexual activity: Not on file   Other Topics Concern    Not on file   Social History Narrative    Not on file       Family History   Problem Relation Age of Onset    Heart Attack Father     Cancer Paternal Grandmother     Lung Cancer Paternal Grandfather     Emphysema Mother        Allergies:  Patient has no known allergies. Home Medications:  Prior to Admission medications    Medication Sig Start Date End Date Taking?  Authorizing Provider   potassium chloride (KLOR-CON M) 20 MEQ extended release tablet take 1 tablet by mouth once daily 8/7/20   Dylan Lobato MD   oxyCODONE HCl (OXY-IR) 10 MG immediate release tablet take 1 tablet by mouth every 6 hours if needed for pain 7/29/20 8/28/20  Dylan Lobato MD   metFORMIN (GLUCOPHAGE) 500 MG tablet take 1 tablet by mouth once daily with breakfast 6/30/20   Cocodominic Contreras PA-C   VENTOLIN  (90 Base) MCG/ACT inhaler inhale 2 puffs by mouth and INTO THE LUNGS every 6 hours if needed for wheezing 6/29/20   [place] ERIN Contreras   citalopram (CELEXA) 10 MG tablet Take 1 tablet by mouth nightly 6/9/20   [place] ERIN Contreras   hydrOXYzine (ATARAX) 50 MG tablet Take 1 tablet by mouth daily 6/9/20   [place] ERIN Contreras   omeprazole (PRILOSEC) 40 MG delayed release capsule Take 1 capsule by mouth daily 6/9/20   [place] ERIN Contreras   traZODone (DESYREL) 300 MG tablet take 1 tablet by mouth nightly 5/14/20   [place] ERIN Contreras   fluticasone-umeclidin-vilant (TRELEGY ELLIPTA) 100-62.5-25 MCG/INH AEPB Inhale 1 puff into the lungs daily 3/31/20   [place]caesar Beltran, APRN - CNP   lactulose (CHRONULAC) 10 GM/15ML solution Take 15 mLs by mouth 2 times daily  Patient taking differently: Take 10 g by mouth 2 times daily Taking prn/ 6/1/20 3/9/20   Rivera Louie PA-C   Varenicline Tartrate (CHANTIX PO) Take by mouth    Historical Provider, MD   lidocaine-prilocaine (EMLA) 2.5-2.5 % cream Apply topically as needed. 1/20/20   Kizzy Lobato MD   albuterol (PROVENTIL) (2.5 MG/3ML) 0.083% nebulizer solution Take 3 mLs by nebulization every 6 hours as needed for Wheezing 9/25/19   Erika Champion MD   lidocaine viscous hcl (XYLOCAINE) 2 % SOLN solution Take 5-10 mLs by mouth as needed for Irritation 6/5/19   Alem Mclain MD   Oxygen Concentrator     Historical Provider, MD   ibuprofen (ADVIL;MOTRIN) 600 MG tablet Take 1 tablet by mouth every 6 hours as needed for Pain 6/20/16 6/1/20  Noe Simmons MD       REVIEW OF SYSTEMS    (2-9 systems for level 4, 10 or more for level 5)      Review of Systems   Unable to perform ROS: Mental status change        PHYSICAL EXAM   (up to 7 for level 4, 8 or more for level 5)      INITIAL VITALS:   BP (!) 171/92   Pulse 85   Temp 97.2 °F (36.2 °C) (Oral)   Resp 16   SpO2 100%      Vitals:    08/24/20 0743 08/24/20 0800 08/24/20 1040 08/24/20 1215   BP: (!) 147/107 (!) 148/75 (!) 140/93 (!) 171/92   Pulse: 85 83 82 85   Resp: 16 16     Temp: 97.2 °F (36.2 °C)      TempSrc: Oral      SpO2: 100% 99% 100% 100%        Physical Exam  Constitutional:       General: She is not in acute distress. Appearance: She is well-developed. She is ill-appearing. HENT:      Head: Normocephalic and atraumatic. Eyes:      Pupils: Pupils are equal, round, and reactive to light. Neck:      Musculoskeletal: Normal range of motion and neck supple. Comments: Pupils 3 to 4 mm equal reactive. Cardiovascular:      Rate and Rhythm: Normal rate and regular rhythm. Pulses: Normal pulses.       Comments: Upper and lower bilateral pulses equal.  Pulmonary:      Effort: Pulmonary effort is normal.      Breath sounds: Normal breath sounds. No wheezing or rhonchi. Abdominal:      General: Bowel sounds are normal. There is no distension. Palpations: Abdomen is soft. Tenderness: There is no abdominal tenderness. Musculoskeletal: Normal range of motion. Skin:     General: Skin is warm and dry. Comments: Skin tenting. Neurological:      Mental Status: She is alert and oriented to person, place, and time. GCS: GCS eye subscore is 4. GCS verbal subscore is 3. GCS motor subscore is 5. Comments: Moves all extremities. Unable to follow commands. DIFFERENTIAL  DIAGNOSIS     PLAN (LABS / IMAGING / EKG):  Orders Placed This Encounter   Procedures    XR CHEST PORTABLE    CT HEAD WO CONTRAST    CT CHEST PULMONARY EMBOLISM W CONTRAST    US ABDOMEN LIMITED Specify organ?  LIVER, PANCREAS    Hemoglobin and hematocrit, blood    SODIUM (POC)    POTASSIUM (POC)    CHLORIDE (POC)    CALCIUM, IONIC (POC)    Blood Gas, Venous    Lactic Acid, Plasma    Urinalysis with Microscopic    Basic Metabolic Panel w/ Reflex to MG    Hepatic Function Panel    Troponin    Brain Natriuretic Peptide    D-DIMER, QUANTITATIVE    Carboxyhemoglobin    Methemoglobin    TOX SCR, BLD, ED    Urine Drug Screen    CBC Auto Differential    CK    Myoglobin, Serum    Magnesium    Ammonia    SPECIMEN REJECTION    POTASSIUM    Ferritin    TSH with Reflex    HEMOGLOBIN A1C    AMYLASE    LIPASE    Comprehensive Metabolic Panel w/ Reflex to MG    CBC    DIET GENERAL; Carb Control: 5 carb choices (75 gms)/meal    Orthostatic blood pressure and pulse    Notify Physician    Ambulate patient    Minimize auditory stimulation    Assess ability to self feed and assist if needed    Neuro checks    HYPOGLYCEMIA TREATMENT: blood glucose less than 50 mg/dL and patient  ALERT and TOLERATING PO    HYPOGLYCEMIA TREATMENT: blood glucose less than 70 mg/dL and patient ALERT and TOLERATING PO    HYPOGLYCEMIA TREATMENT: Observations UA NOT REPORTED NOT REQ.     Yeast, UA NOT REPORTED None   Basic Metabolic Panel w/ Reflex to MG   Result Value Ref Range    Glucose 145 (H) 70 - 99 mg/dL    BUN 10 8 - 23 mg/dL    CREATININE 0.67 0.50 - 0.90 mg/dL    Bun/Cre Ratio NOT REPORTED 9 - 20    Calcium 8.3 (L) 8.6 - 10.4 mg/dL    Sodium 137 135 - 144 mmol/L    Potassium 2.4 (LL) 3.7 - 5.3 mmol/L    Chloride 100 98 - 107 mmol/L    CO2 20 20 - 31 mmol/L    Anion Gap 17 9 - 17 mmol/L    GFR Non-African American >60 >60 mL/min    GFR African American >60 >60 mL/min    GFR Comment          GFR Staging NOT REPORTED    Hepatic Function Panel   Result Value Ref Range    Alb 2.8 (L) 3.5 - 5.2 g/dL    Alkaline Phosphatase 117 (H) 35 - 104 U/L    ALT 59 (H) 5 - 33 U/L    AST 77 (H) <32 U/L    Total Bilirubin 4.02 (H) 0.3 - 1.2 mg/dL    Bilirubin, Direct 1.56 (H) <0.31 mg/dL    Bilirubin, Indirect 2.46 (H) 0.00 - 1.00 mg/dL    Total Protein 7.4 6.4 - 8.3 g/dL    Globulin NOT REPORTED 1.5 - 3.8 g/dL    Albumin/Globulin Ratio 0.6 (L) 1.0 - 2.5   Troponin   Result Value Ref Range    Troponin, High Sensitivity 20 (H) 0 - 14 ng/L    Troponin T NOT REPORTED <0.03 ng/mL    Troponin Interp NOT REPORTED    Troponin   Result Value Ref Range    Troponin, High Sensitivity 17 (H) 0 - 14 ng/L    Troponin T NOT REPORTED <0.03 ng/mL    Troponin Interp NOT REPORTED    Brain Natriuretic Peptide   Result Value Ref Range    Pro- (H) <300 pg/mL    BNP Interpretation Pro-BNP Reference Range:    D-DIMER, QUANTITATIVE   Result Value Ref Range    D-Dimer, Quant 7.25 mg/L FEU   Carboxyhemoglobin   Result Value Ref Range    Carboxyhemoglobin 1.7 0 - 5 %   Methemoglobin   Result Value Ref Range    Methemoglobin 0.5 0.0 - 1.5 %   TOX SCR, BLD, ED   Result Value Ref Range    Acetaminophen Level <5 (L) 10 - 30 ug/mL    Ethanol <10 <10 mg/dL    Ethanol percent <3.275 <7.985 %    Salicylate Lvl <1 (L) 3 - 10 mg/dL    Toxic Tricyclic Sc,Blood NEGATIVE NEGATIVE   Urine Drug Screen Range    Myoglobin 116 (H) 25 - 58 ng/mL   Magnesium   Result Value Ref Range    Magnesium 2.0 1.6 - 2.6 mg/dL   Ammonia   Result Value Ref Range    Ammonia 102 (H) 11 - 51 umol/L   SPECIMEN REJECTION   Result Value Ref Range    Specimen Source . BLOOD     Ordered Test SALINA     Reason for Rejection       Unable to perform testing: Specimen quantity not sufficient.    - NOT REPORTED    Ferritin   Result Value Ref Range    Ferritin 621 (H) 13 - 150 ug/L   Venous Blood Gas, POC   Result Value Ref Range    pH, Curry 7.497 (H) 7.320 - 7.430    pCO2, Curry 33.0 (L) 41.0 - 51.0 mm Hg    pO2, Curry 11.1 (L) 30.0 - 50.0 mm Hg    HCO3, Venous 25.5 22.0 - 29.0 mmol/L    Total CO2, Venous 27 23.0 - 30.0 mmol/L    Negative Base Excess, Curry NOT REPORTED 0.0 - 2.0    Positive Base Excess, Curry 3 0.0 - 3.0    O2 Sat, Curry 14 (L) 60.0 - 85.0 %    O2 Device/Flow/% NOT REPORTED     Nile Test NOT REPORTED     Sample Site NOT REPORTED     Mode NOT REPORTED     FIO2 NOT REPORTED     Pt Temp NOT REPORTED     POC pH Temp NOT REPORTED     POC pCO2 Temp NOT REPORTED mm Hg    POC pO2 Temp NOT REPORTED mm Hg   Creatinine W/GFR Point of Care   Result Value Ref Range    POC Creatinine 0.84 0.51 - 1.19 mg/dL    GFR Comment >60 >60 mL/min    GFR Non-African American >60 >60 mL/min    GFR Comment         Lactic Acid, POC   Result Value Ref Range    POC Lactic Acid 3.82 (H) 0.56 - 1.39 mmol/L   POCT Glucose   Result Value Ref Range    POC Glucose 152 (H) 74 - 100 mg/dL   Anion Gap (Calc) POC   Result Value Ref Range    Anion Gap 13 7 - 16 mmol/L       IMPRESSION: AMS; hepatic encephalopathy    RADIOLOGY:  US ABDOMEN LIMITED Specify organ? LIVER, PANCREAS   Final Result   1. Cirrhotic liver. 2. Liver dome mass, which is compatible with hepatocellular carcinoma, which   was previously treated with Y -90 radioembolization. This was previously   evaluated with MRI in June 2020, and demonstrated no active disease. 3. Status post cholecystectomy.          CT CHEST PULMONARY EMBOLISM W CONTRAST   Preliminary Result   1. No evidence of pulmonary embolus. Moderate emphysema. 2. Soft tissue density right suprahilar area abutting the right mediastinum   with volume loss in the right hemithorax, scarring, and bronchial wall   thickening with right pleural effusion. Findings compatible with patient's   history of lung cancer. Right hemithorax appearance similar to prior exam.   3. Hepatic steatosis. Spleen is incompletely included on this study but is   suggestively enlarged. CT HEAD WO CONTRAST   Final Result   No acute intracranial abnormality. No evidence for metastatic disease but note that noncontrast head CT has   decreased sensitivity for metastasis. Contrast-enhanced MRI may be   considered if deemed clinically necessary. XR CHEST PORTABLE   Final Result   1. Right upper lobe mass and small, loculated right apical pleural effusion,   similar in appearance to the chest CT from January 2020.   2. Decrease in size of a loculated pleural effusion at the right lung base,   which is now small. EKG  Normal sinus rhythm, normal axis, intervals remarkable for QTC of 561, poor R wave progression no ST elevation or ST depressions no significant T wave inversions. Nonspecific ST and T wave abnormalities. Interpretation is nondiagnostic ECG. All EKG's are interpreted by the Emergency Department Physician who either signs or Co-signs this chart in the absence of a cardiologist.    [facility]:    Patient arrives for evaluation of altered mental status. She was merely hooked up to the monitor, point-of-care labs were drawn and an EKG was performed. Her airway was intact, bilateral breath sounds clear, equal pulses throughout. She is awake but not alert. GCS of 13 for localization of pain and inappropriate verbal response. There is obvious dehydration.   Plan to rehydrate while evaluating for infectious, score 13-15, at arrival to emergency department    2. Hypokalemia    3. Increased ammonia level    4.  Malignant neoplasm of upper lobe of right lung [facility])        DISPOSITION / PLAN     DISPOSITION Admitted 08/24/2020 10:34:41 AM      PATIENT REFERRED TO:  Edmundo Graves PA-C  [address] [facility]  [phone]            DISCHARGE MEDICATIONS:  New Prescriptions    No medications on file       Lady Villalobos DO  Emergency Medicine Resident    (Please note that portions of thisnote were completed with a voice recognition program.  Efforts were made to edit the dictations but occasionally words are mis-transcribed.)       Lady Villalobos DO  Resident  08/24/20 0618

## 2020-08-24 NOTE — ED PROVIDER NOTES
[address] [facility]     Emergency Department     Faculty Attestation    I performed a history and physical examination of the patient and discussed management with the resident. I reviewed the resident´s note and agree with the documented findings and plan of care. Any areas of disagreement are noted on the chart. I was personally present for the key portions of any procedures. I have documented in the chart those procedures where I was not present during the key portions. I have reviewed the emergency nurses triage note. I agree with the chief complaint, past medical history, past surgical history, allergies, medications, social and family history as documented unless otherwise noted below. For Physician Assistant/ Nurse Practitioner cases/documentation I have personally evaluated this patient and have completed at least one if not all key elements of the E/M (history, physical exam, and MDM). Additional findings are as noted. Clinically dehydrated, responding to verbal stimuli, not cooperative during neuro exam, pupils 3 mm reactive, no meningeal signs, negative Kernig and Brudzinski signs, chest clear, heart regular rate and rhythm, abdomen soft and nontender.        EKG Interpretation    Interpreted by emergency department physician    Rhythm: normal sinus   Rate: normal  Axis: normal  Ectopy: none  Conduction: QTc prolonged at 561 ms  Diffuse ST and T wave abnormalities  Q Waves: none    Clinical Impression: Abnormal EKG    SANDRA Cuevas MD  08/24/20 1806

## 2020-08-24 NOTE — ED NOTES
Pt more alert at this time, pt still remains nonverbal and does not follow commands, potassium infusing, will continue to monitor.       Dianna Carreno RN  08/24/20 1007

## 2020-08-24 NOTE — TELEPHONE ENCOUNTER
Received a message from Tamela Walker at [facility] stating a family had contacted them for services this weekend. Called her back and states they went to discuss Hospice care with  due to patient sleeping throughout the meeting. Home was dirty and smelled of human waste.  decided not to pursue services. Looked chart and patient is in [address] [facility] after squad brought her in.

## 2020-08-24 NOTE — ED NOTES
Pt stated that she needed to urinate, pt assisted with bedpan, specimen sent     Loretta Riedel, RN  08/24/20 6897

## 2020-08-24 NOTE — ED NOTES
Pt  of 26 years is at the bedside, he states that the patient lives in the basement with the cats and he has been trying to get her to move upstairs but she refuses.  reports he called EMS 3 days ago due to increased weakness but she refused to go to the hospital. He also states that she has not been eating or drinking for the past week despite making her food. He also states that her verbal abilities come and go and some days she does not have the strength to talk. He believes that if she just eats and gets hydrated that she will be fine.  last saw her at 9p last night when she was sleeping. He reports that she was talking prior to that. He is unsure how long she has been on the floor.  also wishing to transfer patient to [address] [place] [address] where her oncologist is.      [address] [place] [address], RN  08/24/20 0613

## 2020-08-24 NOTE — ED NOTES
Social work advised that patient will be an adult protective services case     [address] [facility] [address], RN  08/24/20 8141

## 2020-08-25 NOTE — CARE COORDINATION
Care Transition  18 Called , no answer, unable to leave . Reason for call is to complete initial assessment. Patient has an open APS case, social work following and has a call in for her APS . Case Management Initial Discharge Plan  Rena Salter,             Met with:spouse/SO to discuss discharge plans.    Information verified: address, contacts, phone number, , insurance Yes    Emergency Contact/Next of Kin name & number: Анна Barber [phone] spouse    PCP: Latosha Bal PA-C  Date of last visit: 1 month    Insurance Provider: Medicare and Medicaid    Discharge Planning    Living Arrangements:  Spouse/Significant Other   Support Systems:  Spouse/Significant Other    Home has 2 stories  7 stairs to climb to get into front door, 1 flight stairs to climb to reach second floor  Location of bedroom/bathroom in home upstairs    Patient able to perform ADL's:Independent prior to hospital stay    Current Services (outpatient & in home) APS consult  DME equipment: oxygen  DME provider: Haley Castillo    Receiving oral anticoagulation therapy?  unknown    If indicated:   Physician managing anticoagulation treatment: unknown  Where does patient obtain lab work for ATC treatment? unknown      Potential Assistance Needed:  Judd Clemente    Patient agreeable to home care: Yes  Freedom of choice provided:  yes    Prior SNF/Rehab Placement and Facility: none  Agreeable to SNF/Rehab: Yes  [place] of choice provided: yes     Evaluation: yes    Expected Discharge date:       Patient expects to be discharged to:  SNF  Follow Up Appointment: Best Day/ Time:      Transportation provider: spouse vs [facility]  Transportation arrangements needed for discharge: Yes    Readmission Risk              Risk of Unplanned Readmission:        35             Does patient have a readmission risk score greater than 14?: Yes  If yes, follow-up appointment must be made within 7 days of discharge. Goals of Care: improve mentation      Discharge Plan: Referral to [address] [place] [address]. Current with [employer] for home oxygen. Referral faxed to [address] [place] [address]. Called Denise at [phone] to notify of referal. She will review referral and let writer know if she is accepted. She has an open APS consult.            Electronically signed by Mona Villareal RN on 8/25/20 at 2:08 PM EDT

## 2020-08-25 NOTE — CONSULTS
to be dirty with this smell of human waste, and she has not been eating or drinking. On admission patient's labs included ammonia 102, potassium 2.4, BUN 10, creatinine 0.67, hemoglobin 9.7, hematocrit 29, hemoglobin A1c 5.3, urine drug negative. Oncology was consulted for Lung and liver cancer. Palliative care was consulted for Goals of Care. CT of Head   No acute intracranial abnormality.         No CT evidence of toxoplasmosis     CTA   1. No evidence of pulmonary embolus.  Moderate emphysema. 2. Soft tissue density right suprahilar area abutting the right mediastinum    with volume loss in the right hemithorax, scarring, and bronchial wall    thickening with right pleural effusion.  Findings compatible with patient's    history of lung cancer.  Right hemithorax appearance similar to prior exam.    3. Hepatic steatosis.  Spleen is incompletely included on this study but is    suggestively enlarged. Venous Dopplers   Right Impression:                    Left Impression:     The common femoral, femoral,         The common femoral, femoral,     popliteal and tibial veins           popliteal and tibial veins     demonstrate normal compressibility   demonstrate normal compressibility     and augmentation.                    and augmentation.     Normal compressibility of the great  Normal compressibility of the great     saphenous vein.                      saphenous vein.     Normal compressibility of the small  Normal compressibility of the small     saphenous vein.                      saphenous vein. Ultrasound abdomen  1. Cirrhotic liver. 2. Liver dome mass, which is compatible with hepatocellular carcinoma, which    was previously treated with Y -90 radioembolization.  This was previously    evaluated with MRI in June 2020, and demonstrated no active disease. 3. Status post cholecystectomy.           Active Hospital Problems    Diagnosis Date Noted    AMS (altered mental status) [R41.82] 08/24/2020    Pleural effusion on right [J90] 08/24/2020    Hypokalemia [E87.6] 08/24/2020    Depression [F32.9] 08/24/2020    Transaminitis [R74.0] 08/24/2020    Respiratory alkalosis [E87.3] 08/24/2020    Elevated lactic acid level [R79.89] 08/24/2020    Elevated myoglobin level [R89.7] 08/24/2020    Diabetes mellitus type 2 in nonobese ([facility] [E11.9] 08/24/2020    Malignant neoplasm of liver ([facility] [C22.9] 05/22/2020    History of chemotherapy [Z92.21]     History of radiation therapy [Z92.3]     Malignant neoplasm of upper lobe of right lung (HCC) [C34.11] 04/26/2019    Chronic obstructive pulmonary disease ([facility] [J44.9] 03/30/2019    Chronic hepatitis C without hepatic coma (HCC) [B18.2] 02/01/2018    CL (cirrhosis of liver) ([facility] [K74.60] 02/01/2018       PAST MEDICAL HISTORY      Diagnosis Date    Anxiety and depression     Back pain     Bipolar disorder (HCC)     Bronchitis     Cancer ([facility]     Chronic obstructive pulmonary disease (COPD) ([facility]     Cirrhosis (HCC)     Cough     Current every day smoker     1 pack a day for the last 50 years    Fibromyalgia     Hepatitis     Hepatitis C per pt.     History of cervical cancer     is s/p hyst    History of chemotherapy     chemo every 2 weeks, last tx in April sometime    History of radiation therapy     last tx March 24th 2020    Liver disease     stage 4    Liver metastases ([facility]     Lung cancer ([facility] 03/2019    Dr. Shila Dixno mass 2019    Malignant neoplasm of upper lobe of right lung (HCC)     MVP (mitral valve prolapse)     On supplemental oxygen therapy     Wears dentures     full- not in use today 3/10/20    Wears glasses     Wheezing        PAST SURGICAL HISTORY  Past Surgical History:   Procedure Laterality Date    BREAST SURGERY Left     benign lumpectomy, multiple    BRONCHOSCOPY  04/15/2019    biopsies and washings    BRONCHOSCOPY N/A 4/15/2019    BRONCHOSCOPY BRUSHINGS performed by Courtney Russo MD at [facility]    BRONCHOSCOPY  4/15/2019    BRONCHOSCOPY BIOPSY BRONCHUS performed by Cole Fonseca MD at [address]  4/15/2019    BRONCHOSCOPY DIAGNOSTIC OR CELL [address] ONLY performed by Cole Fonseca MD at [facility] [address] Bilateral     CERVICAL [address]      X 2    CHOLECYSTECTOMY      HYSTERECTOMY      complete    KNEE ARTHROSCOPY Right     THORACENTESIS Right 05/13/2019    THORACENTESIS Right 08/19/2019    TUNNELED CENTRAL VENOUS CATHETER W/ SUBCUTANEOUS PORT Left 05/13/2019       SOCIAL HISTORY  Social History     Tobacco Use    Smoking status: Current Every Day Smoker     Packs/day: 0.25     Years: 52.00     Pack years: 13.00     Types: Cigarettes     Start date: 1/1/1967    Smokeless tobacco: Never Used    Tobacco comment: \"1 carton monthly, carton has 10 packs\", 1/3 pack daily as of 6/9/2020   Substance Use Topics    Alcohol use: No    Drug use: Yes     Types: Cocaine     Comment: 4/15/19 last use (Noted 3/24/20)       ALLERGIES  No Known Allergies      MEDICATIONS  Current Medications    potassium phosphate IVPB  20 mmol Intravenous Once    thiamine (VITAMIN B1) IVPB  100 mg Intravenous Q24H    citalopram  10 mg Oral Nightly    fluticasone-umeclidin-vilant  1 puff Inhalation Daily    insulin lispro  0-6 Units Subcutaneous TID WC    insulin lispro  0-3 Units Subcutaneous Nightly    sodium chloride flush  10 mL Intravenous 2 times per day    enoxaparin  40 mg Subcutaneous Daily    famotidine  20 mg Oral Daily    sodium chloride flush  10 mL Intravenous 2 times per day    ipratropium-albuterol  1 ampule Inhalation Q4H WA     albuterol, glucose, dextrose, glucagon (rDNA), dextrose, magnesium sulfate, potassium chloride **OR** potassium alternative oral replacement **OR** potassium chloride, sodium chloride flush, acetaminophen **OR** acetaminophen, promethazine **OR** ondansetron, nicotine, sodium chloride flush, acetaminophen, oxyCODONE, fentanNYL **OR** fentanNYL  IV Drips/Infusions   dextrose 5% and 0.45% NaCl with KCl 20 mEq 100 mL/hr at 08/25/20 0156    dextrose       Home Medications  No current facility-administered medications on file prior to encounter. Current Outpatient Medications on File Prior to Encounter   Medication Sig Dispense Refill    potassium chloride (KLOR-CON M) 20 MEQ extended release tablet take 1 tablet by mouth once daily 30 tablet 0    oxyCODONE HCl (OXY-IR) 10 MG immediate release tablet take 1 tablet by mouth every 6 hours if needed for pain 120 tablet 0    metFORMIN (GLUCOPHAGE) 500 MG tablet take 1 tablet by mouth once daily with breakfast 30 tablet 2    VENTOLIN  (90 Base) MCG/ACT inhaler inhale 2 puffs by mouth and INTO THE LUNGS every 6 hours if needed for wheezing 18 g 2    citalopram (CELEXA) 10 MG tablet Take 1 tablet by mouth nightly 30 tablet 2    hydrOXYzine (ATARAX) 50 MG tablet Take 1 tablet by mouth daily 30 tablet 2    omeprazole (PRILOSEC) 40 MG delayed release capsule Take 1 capsule by mouth daily 30 capsule 2    traZODone (DESYREL) 300 MG tablet take 1 tablet by mouth nightly 30 tablet 2    fluticasone-umeclidin-vilant (TRELEGY ELLIPTA) 100-62.5-25 MCG/INH AEPB Inhale 1 puff into the lungs daily 1 each 11    lactulose (CHRONULAC) 10 GM/15ML solution Take 15 mLs by mouth 2 times daily (Patient taking differently: Take 10 g by mouth 2 times daily Taking prn/ 6/1/20) 946 mL 1    Varenicline Tartrate (CHANTIX PO) Take by mouth      lidocaine-prilocaine (EMLA) 2.5-2.5 % cream Apply topically as needed.  1 Tube 1    albuterol (PROVENTIL) (2.5 MG/3ML) 0.083% nebulizer solution Take 3 mLs by nebulization every 6 hours as needed for Wheezing 120 each 5    lidocaine viscous hcl (XYLOCAINE) 2 % SOLN solution Take 5-10 mLs by mouth as needed for Irritation 100 mL 1    Oxygen Concentrator       [DISCONTINUED] ibuprofen (ADVIL;MOTRIN) 600 MG tablet Take 1 tablet by mouth every 6 hours as needed for Pain 30 tablet 0       Data         /69   Pulse 96   Temp 97.5 °F (36.4 °C) (Oral)   Resp 17   Ht 5' 3\" (1.6 m)   Wt 94 lb 5.7 oz (42.8 kg)   SpO2 100%   BMI 16.71 kg/m²     Wt Readings from Last 3 Encounters:   08/25/20 94 lb 5.7 oz (42.8 kg)   07/13/20 112 lb 12.8 oz (51.2 kg)   07/02/20 112 lb 12.8 oz (51.2 kg)        Code Status: Full Code     ADVANCED CARE PLANNING:  Patient has capacity for medical decisions: patient slightly confused presently  Health Care Power of : yes  Living Will: yes     Personal, Social, and Family History  Marital Status:   Living situation:with family:  spouse  Importance of molly/Zoroastrian/spiritual beliefs: [] Very [] Somewhat [] Not   Psychological Distress: mild  Does patient understand diagnosis/treatment? patient slightly confused   Does caregiver understand diagnosis/treatment? yes      Assessment        REVIEW OF SYSTEMS  Constitutional: No fever or chills, slight confusion, decreased activity, decreased appetite, and weight loss   Eyes: no eye pain or blurred vision  ENT: no hearing loss, congestion, or difficulty swallowing   Respiratory: Oxygen per NC, SOB with exertion  Cardiovascular: Denies cp, pressure, and edema in BLE. Gastrointestinal: no nausea, vomiting,abdominal pain, diarrhea or constipation, no melena decreased appetite and weight loss   Genitourinary: cortes catheter   Musculoskeletal: generalized weakness   Skin: scattered ecchymotic areas on extremities    Neurological: slightly forgetful, follow commands   PHYSICAL ASSESSMENT:  Constitutional: Alert and oriented to person, place, and time. Head: Normocephalic and atraumatic. Eyes: EOM are normal. Pupils are equal, round   Neck: Normal range of motion. Neck supple. No tracheal deviation present. Cardiovascular: Normal rate and regular rhythm, S1, S2, no murmur   Pulmonary/Chest: Effort normal and breath sounds normal. No rales or wheezes. Abdomen: Soft.  No tenderness, not distended, no ascites, no organomegaly   Musculoskeletal: Normal range of motion. No edema lower ext. Neurological: slightly forgetful, follow commands   Skin: Thin and fragile skin with ecchymotic areas noted on extremities      Palliative Performance Scale:  ___60%  Ambulation reduced; Significant disease; Can't do hobbies/housework; intake normal or reduced; occasional assist; LOC full/confusion  ___50%  Mainly sit/lie; Extensive disease; Can't do any work; Considerable assist; intake normal or reduced; LOC full/confusion  _x__40%  Mainly in bed; Extensive disease; Mainly assist; intake normal or reduced; LOC full/confusion   ___30%  Bed Bound; Extensive disease; Total care; intake reduced; LOCfull/confusion  ___20%  Bed Bound; Extensive disease; Total care; intake minimal; Drowsy/coma  ___10%  Bed Bound; Extensive disease; Total care; Mouth care only; Drowsy/coma  ___0       Death      Plan      Palliative Interaction:  Palliative care was consulted to see patient for goals of care. Patient was found resting in bed with  at bedside. Ultrasound was attempting to place an IV into patient. CODE STATUS  Patient is currently a full CODE STATUS. She has a history of cirrhosis, hepatitis C, lung cancer, liver cancer, and cervical cancer. Patient is treated by oncology Dr. Jorge Hernandez and was getting chemotherapy. I discussed the different levels of CODE STATUS with patient and  and explained each one completely. Patient states that she wants to remain a full code status. DPOA and Living will  Patient is  to Quirino Caban and she has no biological children. There is a DPOA and living will in patient EMR that states that Royce Kilpatrick is her POA. There is no secondary POA lisited. GOALS of Care   Patient lives at home with her , Royce Kilpatrick. She lives in the basement with her cats. Patient has not been eating or drinking at home and is very thin.   EMS and [facility] full state that the home was dirty and smelled of human waste. Patient was found laying on the basement floor and dog feces for an unknown length of time. No plans for discharge at present.  called [address] over weekend for services so went to evaluate patient and  decided to not pursue services. Palliative care will continue to follow patient and provide emotional support and encouragement to patient and family. Education/support to family  Education/support to patient  Discharge planning/helping to coordinate care  Communications with primary service  Pharmacologic pain management  Providing support for coping/adaptation/distress of family  Providing support for coping/adaptation/distress of patient  Discussing meaning/purpose   Caregiver support/education  Continue with current plan of care  Code status clarified: Full Code  Code status clarified: [address]  Code status clarified: DNRCCA  Principle Problem/Diagnosis:  AMS (altered mental status)    Additional Assessments:   Principal Problem:    AMS (altered mental status)  Active Problems:    CL (cirrhosis of liver) (HCC)    Chronic hepatitis C without hepatic coma (HCC)    Chronic obstructive pulmonary disease ([address]    Malignant neoplasm of upper lobe of right lung ([address]    Malignant neoplasm of liver ([address]    History of radiation therapy    History of chemotherapy    Pleural effusion on right    Hypokalemia    Depression    Transaminitis    Respiratory alkalosis    Elevated lactic acid level    Elevated myoglobin level    Diabetes mellitus type 2 in nonobese (HCC)      1- Symptom management/ pain control     Pain Assessment:  Pain is controlled with current analgesics. Medication(s) being used: acetaminophen, narcotic analgesics including fentanyl, Roxicodone. Anxiety:  patient drowsy and has some confusion.                           Dyspnea:  none                          Fatigue:  patient ill apearing and generalized weakness Other:    2- Goals of care evaluation   The patient goals of care are improve or maintain function/quality of life   Goals of care discussed with:    [] Patient independently    [x] Patient and Family    [] Family or Healthcare DPOA independently    [] Unable to discuss with patient, family/DPOA not present    3- Code Status  Full Code    4- Other recommendations   - We will continue to provide comfort and support to the patient and the family  Palliative Care will continue to follow Ms. Wilson's care as needed. Thank you for allowing Palliative Care to participate in the care of Ms. Castro Mera . This note has been dictated by dragon, typing errors may be a possibility. The total time I spent in seeing the patient, discussing goals of care, advanced directives, code status and other major issues was more than 60 minutes      Electronically signed by   LIZA Lopez NP  Palliative Care Team  on 8/25/2020 at 10:30 AM    Please call with any palliative questions or concerns. Palliative Care Team is available via perfect serve or via phone.

## 2020-08-25 NOTE — PROGRESS NOTES
History  Lives With: Spouse  Type of Home: House(pt reported home is house and apartment, lives with spouse but separate living spaces)  Home Layout: Two level  Home Access: Level entry  Bathroom Shower/Tub: Tub/Shower unit  Bathroom Toilet: Standard  Bathroom Equipment: Shower chair  Home Equipment: Standard walker  ADL Assistance: [address]: [address] [facility] Responsibilities: Yes(when asked about cooking, cleaning and laundry being shared with spouse, pt stated, \"I do mine, he does his\")  Ambulation Assistance: Independent  Transfer Assistance: Independent  Active : No  Patient's  Info: spouse drives  Mode of Transportation: Car  Occupation: Retired  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  Leisure & Hobbies: watch TV  Additional Comments: pt reports spouse is home and could provide A if needed, pt demo difficulty answering some social functional questions  Cognition   Cognition  Overall Cognitive Status: Exceptions  Arousal/Alertness: Delayed responses to stimuli  Following Commands:  Follows one step commands with repetition  Attention Span: Attends with cues to redirect  Safety Judgement: Decreased awareness of need for assistance;Decreased awareness of need for safety  Problem Solving: Decreased awareness of errors;Assistance required to identify errors made  Insights: Decreased awareness of deficits  Initiation: Requires cues for some  Sequencing: Requires cues for some    Objective     Observation/Palpation  Posture: Poor    AROM RLE (degrees)  RLE AROM: WFL  AROM LLE (degrees)  LLE AROM : WFL  AROM RUE (degrees)  RUE General AROM: Shoulder flex/abd ~90  AROM LUE (degrees)  LUE General AROM: Shoulder flex/abd ~90  Strength RLE  Comment: Grossly 3+/5, THUY leg flex and ankle DF d/t cognitive deficits to commands  Strength LLE  Comment: Grossly 3+/5, THUY leg flex and ankle DF d/t cognitive deficits to commands  Strength RUE  Comment: Grossly 3+/5  Strength JETHRO  Comment: Grossly 3+/5  Motor Control  Gross Motor?: Exceptions  Comments: Decreased movement, unable to perform finger to thumb  Sensation  Overall Sensation Status: Impaired  Bed mobility  Bridging: Minimal assistance  Rolling to Left: Minimal assistance  Rolling to Right: Minimal assistance  Supine to Sit: Minimal assistance  Sit to Supine: Minimal assistance  Comment: Hand held assist to assist torso movement  Transfers  Sit to Stand: Minimal Assistance  Stand to sit: Minimal Assistance  Comment: Min A for safety  Ambulation  Ambulation?: Yes  Ambulation 1  Surface: level tile  Device: Rolling Walker  Assistance: Minimal assistance  Gait Deviations: Decreased step length;Decreased step height;Shuffles; Slow Suad  Distance: 4 ft, lateral steps to [facility]     Balance  Posture: Poor  Sitting - Static: Fair;+  Sitting - Dynamic: Fair;+  Standing - Static: Fair(Assessed in RW)  Standing - Dynamic: [address]  Times per week: 5-6x/week  Times per day: Daily  Current Treatment Recommendations: Strengthening, Endurance Training, Transfer Training, Patient/Caregiver Education & Training, ROM, Balance Training, Gait Training, Home Exercise Program, Functional Mobility Training, Safety Education & Training  Safety Devices  Type of devices: Patient at risk for falls, Bed alarm in place, Left in bed, Gait belt, Nurse notified, Call light within reach              AM-PAC Score     AM-PAC Inpatient Mobility without Stair Climbing Raw Score : 11 (08/25/20 1348)  AM-PAC Inpatient without Stair Climbing T-Scale Score : 35.66 (08/25/20 1348)  Mobility Inpatient CMS 0-100% Score: 67.36 (08/25/20 1348)  Mobility Inpatient without Stair CMS G-Code Modifier : CL (08/25/20 1348)       Goals  Short term goals  Time Frame for Short term goals: 14 visits  Short term goal 1: Pt to perform bed mobility with SBA  Short term goal 2: Pt to perform transfers with SBA  Short term goal 3: Pt to perform fair + standing balance  Short term goal 4: Pt to ambulate 100 ft in RW independently  Short term goal 5: Pt to tolerate 30 min therapy to increase endurance  Patient Goals   Patient goals : Pt wants to return home       Therapy Time   Individual Concurrent Group Co-treatment   Time In 1038         Time Out 1109         Minutes 31         Timed Code Treatment Minutes: 10 Minutes       Nicola Decker This treatment/evaluation completed by signing SPT. Signing PT agrees with treatment and documentation.

## 2020-08-25 NOTE — PROGRESS NOTES
[address] [facility]  Resident Physician Progress Note    Patient - Junious Luna  Date of Admission -  2020  7:33 AM  Date of Evaluation -  2020  Room and Bed Number -  0104/0104-01   Hospital Day - 1      SUBJECTIVE:     OVERNIGHT EVENTS:    No acute events, afebrile hemodynamically stable    TODAY:   No issues    AWAKE & FOLLOWING COMMANDS:  [] No   [x] Yes    SECRETIONS Amount:  [] Small [] Moderate  [] Large  [x] None  Color:     [] White [] Colored  [] Bloody    SEDATION:  RAAS Score:  [] Propofol gtt  [] Versed gtt  [] Ativan gtt   [x] No Sedation    PARALYZED:  [x] No    [] Yes    VASOPRESSORS:  [x] No    [] Yes  [] Levophed [] Dopamine [] Vasopressin  [] Dobutamine [] Phenylephrine [] Epinephrine      OBJECTIVE:     VITAL SIGNS:  BP (!) 155/76   Pulse 90   Temp 97.9 °F (36.6 °C) (Oral)   Resp 18   Ht 5' 3\" (1.6 m)   Wt 94 lb 5.7 oz (42.8 kg)   SpO2 100%   BMI 16.71 kg/m²   Tmax over 24 hours:  Temp (24hrs), Av.6 °F (36.4 °C), Min:97.2 °F (36.2 °C), Max:97.9 °F (36.6 °C)      Patient Vitals for the past 8 hrs:   BP Temp Temp src Pulse Resp SpO2 Height Weight   20 0115 (!) 155/76 97.9 °F (36.6 °C) Oral 90 18 100 % 5' 3\" (1.6 m) 94 lb 5.7 oz (42.8 kg)   20 0100 (!) 143/62 -- -- -- -- 99 % -- --   20 (!) 142/98 -- -- -- -- 96 % -- --   20 (!) 157/83 -- -- 86 18 100 % -- --   20 1820 (!) 123/100 -- -- 89 16 -- -- --         Intake/Output Summary (Last 24 hours) at 2020 0131  Last data filed at 2020 0124  Gross per 24 hour   Intake 200 ml   Output 150 ml   Net 50 ml     Date 20 0000 - 20 2359   Shift [id number] 24 Hour Total   INTAKE   Shift Total(mL/kg)       OUTPUT   Urine(mL/kg/hr) 150   150   Shift Total(mL/kg) 150(3.5)   150(3.5)   Weight (kg) 42.8 42.8 42.8 42.8     Wt Readings from Last 3 Encounters:   20 94 lb 5.7 oz (42.8 kg)   20 112 lb 12.8 oz (51.2 kg)   20 112 lb 12.8 oz (51.2 kg) Body mass index is 16.71 kg/m².       Review of Systems      PHYSICAL EXAM:  GEN:  Cachectic, malnourished, awake, alert  EYES:  Pupils equal round and reactive to light b/l, eomi intact, no pallor   HEENT:  Moist mucus membranes, neck supple, trachea midline  LUNGS:  cta b/l equal chest ris  CV:    S1, s2  ABDOMEN:   Soft nontender, vertical scar pubic symphysis to umbilicus likely from prior section  :    No abnormalities noted,   MSK:    No gross deformities  NEURO[de-identified]   Follows commands  SKIN:   Areas of ecchymosis on arms  EXTREMITIES:  No edema      MEDICATIONS:  Scheduled Meds:   0.9% NaCl with KCl 20 mEq   Intravenous Once    thiamine (VITAMIN B1) IVPB  100 mg Intravenous Q24H    citalopram  10 mg Oral Nightly    fluticasone-umeclidin-vilant  1 puff Inhalation Daily    potassium chloride  20 mEq Oral Daily with breakfast    insulin lispro  0-6 Units Subcutaneous TID WC    insulin lispro  0-3 Units Subcutaneous Nightly    sodium chloride flush  10 mL Intravenous 2 times per day    enoxaparin  40 mg Subcutaneous Daily    famotidine  20 mg Oral Daily    potassium chloride  40 mEq Oral Once    sodium chloride flush  10 mL Intravenous 2 times per day    enoxaparin  40 mg Subcutaneous Daily    ipratropium-albuterol  1 ampule Inhalation Q4H WA     Continuous Infusions:   sodium chloride      dextrose       PRN Meds:   albuterol, 2.5 mg, Q6H PRN  glucose, 15 g, PRN  dextrose, 12.5 g, PRN  glucagon (rDNA), 1 mg, PRN  dextrose, 100 mL/hr, PRN  magnesium sulfate, 1 g, PRN  potassium chloride, 40 mEq, PRN    Or  potassium alternative oral replacement, 40 mEq, PRN    Or  potassium chloride, 10 mEq, PRN  sodium chloride flush, 10 mL, PRN  acetaminophen, 650 mg, Q6H PRN    Or  acetaminophen, 650 mg, Q6H PRN  promethazine, 12.5 mg, Q6H PRN    Or  ondansetron, 4 mg, Q6H PRN  nicotine, 1 patch, Daily PRN  sodium chloride flush, 10 mL, PRN  acetaminophen, 650 mg, Q4H PRN  oxyCODONE, 5 mg, Q4H PRN  fentanNYL, 25 mcg, Q1H PRN    Or  fentanNYL, 50 mcg, Q1H PRN        SUPPORT DEVICES: [] Ventilator [] BIPAP  [] Nasal Cannula [x] Room Air    VENT SETTINGS (Comprehensive) (if applicable):  Vent Information  SpO2: 100 %  Additional Respiratory  Assessments  Pulse: 90  Resp: 18  SpO2: 100 %    ABGs:   Lab Results   Component Value Date    FIO2 NOT REPORTED 08/24/2020       DATA:  Complete Blood Count:   Recent Labs     08/24/20  0753   WBC 10.9   RBC 4.32   HGB 12.6   HCT 35.3*   MCV 81.7*   MCH 29.2   MCHC 35.7*   RDW 15.9*   PLT 98*   MPV 9.2        Last 3 Blood Glucose:   Recent Labs     08/24/20  0753   GLUCOSE 145*        PT/INR:    Lab Results   Component Value Date    PROTIME 21.5 08/24/2020    INR 2.1 08/24/2020     PTT:    Lab Results   Component Value Date    APTT 29.6 08/24/2020       Comprehensive Metabolic Profile:   Recent Labs     08/24/20  0741 08/24/20  0753 08/24/20  1829 08/25/20  0052   NA  --  137  --   --    K  --  2.4* 2.1* 2.9*   CL  --  100  --   --    CO2  --  20  --   --    BUN  --  10  --   --    CREATININE 0.84 0.67  --   --    GLUCOSE  --  145*  --   --    CALCIUM  --  8.3*  --   --    PROT  --  7.4  --   --    LABALBU  --  2.8*  --   --    BILITOT  --  4.02*  --   --    ALKPHOS  --  117*  --   --    AST  --  77*  --   --    ALT  --  59*  --   --       Magnesium:   Lab Results   Component Value Date    MG 2.0 08/24/2020    MG 1.5 05/26/2020    MG 1.6 05/25/2020     Phosphorus: No results found for: PHOS  Ionized Calcium:   Lab Results   Component Value Date    CAION 1.20 05/27/2020    CAION 1.16 05/26/2020    CAION 1.17 05/25/2020        Urinalysis:   Lab Results   Component Value Date    NITRU NEGATIVE 08/24/2020    COLORU YELLOW 08/24/2020    PHUR 7.5 08/24/2020    WBCUA 2 TO 5 08/24/2020    RBCUA 0 TO 2 08/24/2020    MUCUS 1+ 08/24/2020    TRICHOMONAS NOT REPORTED 08/24/2020    YEAST NOT REPORTED 08/24/2020    BACTERIA NOT REPORTED 08/24/2020    SPECGRAV 1.019 08/24/2020    LEUKOCYTESUR MODERATE CT of the head/brain was performed with the administration of intravenous contrast. Multiplanar reformatted images are provided for review. Dose modulation, iterative reconstruction, and/or weight based adjustment of the mA/kV was utilized to reduce the radiation dose to as low as reasonably achievable. COMPARISON: None. HISTORY: ORDERING SYSTEM PROVIDED HISTORY: AMS, Liver/lung CA. found surrounded in Cat feces, Toxoplasmosis? TECHNOLOGIST PROVIDED HISTORY: AMS, Liver/lung CA. found surrounded in Cat feces, Toxoplasmosis? Reason for Exam: r/o toxoplasmosis/mets Acuity: Acute Type of Exam: Initial FINDINGS: BRAIN/VENTRICLES: There is no acute intracranial hemorrhage, mass effect or midline shift. No abnormal extra-axial fluid collection. The gray-white differentiation is maintained without evidence of an acute infarct. There is no evidence of hydrocephalus. No abnormal intracranial enhancement. ORBITS: The visualized portion of the orbits demonstrate no acute abnormality. SINUSES: The visualized paranasal sinuses and mastoid air cells demonstrate no acute abnormality. SOFT TISSUES/SKULL:  No acute abnormality of the visualized skull or soft tissues. No acute intracranial abnormality. No CT evidence of toxoplasmosis. Xr Chest Portable    Result Date: 8/24/2020  EXAMINATION: ONE XRAY VIEW OF THE CHEST 8/24/2020 8:29 am COMPARISON: CT chest 01/31/2020, chest radiograph 08/19/2019 HISTORY: ORDERING SYSTEM PROVIDED HISTORY: AMS, r/o infx. Hx of Lung Ca TECHNOLOGIST PROVIDED HISTORY: AMS, r/o infx. Hx of Lung Ca Reason for Exam: supine port  tried to do upright but pt moved completely off film, kept moving bst film possible Acuity: Unknown Type of Exam: Unknown FINDINGS: There is masslike opacity within the right upper lobe, with associated volume loss and a small, loculated pleural effusion. No pneumothorax is demonstrated. The left lung appears clear. The heart is normal in size.   A left IJ port distal tip is in the SVC. No acute osseous abnormality is seen. 1. Right upper lobe mass and small, loculated right apical pleural effusion, similar in appearance to the chest CT from January 2020. 2. Decrease in size of a loculated pleural effusion at the right lung base, which is now small. Ct Chest Pulmonary Embolism W Contrast    Result Date: 8/24/2020  EXAMINATION: CTA OF THE CHEST, 8/24/2020 8:59 am TECHNIQUE: CTA of the chest was performed after the administration of intravenous contrast.  Multiplanar reformatted images are provided for review. MIP images are provided for review. Dose modulation, iterative reconstruction, and/or weight based adjustment of the mA/kV was utilized to reduce the radiation dose to as low as reasonably achievable. COMPARISON: 31 January 2020 HISTORY: ORDERING SYSTEM PROVIDED HISTORY: found down, D-dimer 7.  eval for PE, PNA, lung mass TECHNOLOGIST PROVIDED HISTORY: found down, D-dimer 7.  eval for PE, PNA, lung mass Reason for Exam: found down, D-dimer 7.  eval for PE, PNA, lung mass Acuity: Unknown Type of Exam: Unknown Lung cancer FINDINGS: Pulmonary Arteries: Pulmonary arteries are adequately opacified for evaluation. No evidence of intraluminal filling defect to suggest pulmonary embolism. Main pulmonary artery is normal in caliber. Mediastinum: Right suprahilar soft tissue density of 3.1 x 2.7 cm is noted. Subcarinal lymphadenopathy is also noted. .  The heart and pericardium demonstrate no acute abnormality. There is no acute abnormality of the thoracic aorta. Coronary artery calcification is noted. Lungs/pleura: Moderate emphysematous changes. Volume loss in the right hemithorax is noted with right pleural effusion. Scarring is present in the right hemithorax. Right-sided bronchial wall thickening is noted. Upper Abdomen: Hepatic steatosis is noted. Spleen is incompletely included on the images but is suggestively enlarged. Adrenals are unremarkable.  Soft Tissues/Bones: No acute osseous or soft tissue abnormality. Infusion port is buried in the left upper chest, terminating in the superior vena cava. 1. No evidence of pulmonary embolus. Moderate emphysema. 2. Soft tissue density right suprahilar area abutting the right mediastinum with volume loss in the right hemithorax, scarring, and bronchial wall thickening with right pleural effusion. Findings compatible with patient's history of lung cancer. Right hemithorax appearance similar to prior exam. 3. Hepatic steatosis. Spleen is incompletely included on this study but is suggestively enlarged. Vl Dup Lower Extremity Venous Bilateral    Result Date: 8/25/2020    [facility]  Vascular Lower Extremities DVT Study Procedure   Patient Name    Andrade James      Date of Study             08/24/2020                  Sandoval Proctor   Date of Birth   1950  Gender                    Female   Age             72 year(s)  Race                         Room Number     21   Corporate ID #  [id number]   Patient Acct #  [de-identified]   MR #            [id number]     Sonographer               Timmy Tejeda RVT   Accession #     [id number]  Interpreting Physician    Eric Merino   Referring Nurse             Referring Physician       Kenisha Melendez  Practitioner  Procedure Type of Study:   Veins: Lower Extremities DVT Study, Venous Scan Lower Bilateral.  Indications for Study:Elevated D-Dimer. Patient Status:ER. Conclusions   Summary   No evidence of superficial or deep venous thrombosis in both lower  extremities.    Signature   ----------------------------------------------------------------  Electronically signed by Timmy Tejeda RVT(Sonographer) on  08/24/2020 05:35 PM  ----------------------------------------------------------------   ----------------------------------------------------------------  Electronically signed by Neftali Zuñiga(Interpreting physician)  on 08/25/2020 01:00 AM ----------------------------------------------------------------  Findings:   Right Impression:                    Left Impression:  The common femoral, femoral,         The common femoral, femoral,  popliteal and tibial veins           popliteal and tibial veins  demonstrate normal compressibility   demonstrate normal compressibility  and augmentation. and augmentation. Normal compressibility of the great  Normal compressibility of the great  saphenous vein. saphenous vein. Normal compressibility of the small  Normal compressibility of the small  saphenous vein. saphenous vein. Risk Factors   - Current - Every day. - The patient has lung cancer. Velocities are measured in cm/s ; Diameters are measured in cm Right Lower Extremities DVT Study Measurements Right 2D Measurements +------------------------------------+----------+---------------+----------+ ! Location                            ! Visualized! Compressibility! Thrombosis! +------------------------------------+----------+---------------+----------+ ! Common Femoral                      !Yes       ! Yes            ! None      ! +------------------------------------+----------+---------------+----------+ ! Prox Femoral                        !Yes       ! Yes            ! None      ! +------------------------------------+----------+---------------+----------+ ! Mid Femoral                         !Yes       ! Yes            ! None      ! +------------------------------------+----------+---------------+----------+ ! Dist Femoral                        !Yes       ! Yes            ! None      ! +------------------------------------+----------+---------------+----------+ ! Popliteal                           !Yes       ! Yes            ! None      ! +------------------------------------+----------+---------------+----------+ ! Sapheno Femoral Junction            ! Yes       ! Yes            ! None      ! +------------------------------------+----------+---------------+----------+ ! PTV                                 ! Yes       ! Yes            ! None      ! +------------------------------------+----------+---------------+----------+ ! Peroneal                            !Yes       ! Yes            ! None      ! +------------------------------------+----------+---------------+----------+ ! Gastroc                             ! Yes       ! Yes            ! None      ! +------------------------------------+----------+---------------+----------+ ! GSV Thigh                           ! Yes       ! Yes            ! None      ! +------------------------------------+----------+---------------+----------+ ! GSV Knee                            ! Yes       ! Yes            ! None      ! +------------------------------------+----------+---------------+----------+ ! GSV Ankle                           ! Yes       ! Yes            ! None      ! +------------------------------------+----------+---------------+----------+ ! SSV                                 ! Yes       ! Yes            ! None      ! +------------------------------------+----------+---------------+----------+ Right Doppler Measurements +---------------------------+------+------+--------------------------------+ ! Location                   ! Signal!Reflux! Reflux (msec)                   ! +---------------------------+------+------+--------------------------------+ ! Common Femoral             !Phasic!      !                                ! +---------------------------+------+------+--------------------------------+ ! Prox Femoral               !Phasic!      !                                ! +---------------------------+------+------+--------------------------------+ ! Popliteal                  !Phasic!      !                                ! +---------------------------+------+------+--------------------------------+ Left Lower Extremities DVT Study Measurements Left 2D Measurements +------------------------------------+----------+---------------+----------+ ! Location                            ! Visualized! Compressibility! Thrombosis! +------------------------------------+----------+---------------+----------+ ! Common Femoral                      !Yes       ! Yes            ! None      ! +------------------------------------+----------+---------------+----------+ ! Prox Femoral                        !Yes       ! Yes            ! None      ! +------------------------------------+----------+---------------+----------+ ! Mid Femoral                         !Yes       ! Yes            ! None      ! +------------------------------------+----------+---------------+----------+ ! Dist Femoral                        !Yes       ! Yes            ! None      ! +------------------------------------+----------+---------------+----------+ ! Popliteal                           !Yes       ! Yes            ! None      ! +------------------------------------+----------+---------------+----------+ ! Sapheno Femoral Junction            ! Yes       ! Yes            ! None      ! +------------------------------------+----------+---------------+----------+ ! PTV                                 ! Yes       ! Yes            ! None      ! +------------------------------------+----------+---------------+----------+ ! Peroneal                            !Yes       ! Yes            ! None      ! +------------------------------------+----------+---------------+----------+ ! Gastroc                             ! Yes       ! Yes            ! None      ! +------------------------------------+----------+---------------+----------+ ! GSV Thigh                           ! Yes       ! Yes            ! None      ! +------------------------------------+----------+---------------+----------+ ! GSV Knee                            ! Yes       ! Yes            ! None      ! +------------------------------------+----------+---------------+----------+ ! GSV Ankle !Yes       !Yes            ! None      ! +------------------------------------+----------+---------------+----------+ ! SSV                                 ! Yes       ! Yes            ! None      ! +------------------------------------+----------+---------------+----------+ Left Doppler Measurements +---------------------------+------+------+--------------------------------+ ! Location                   ! Signal!Reflux! Reflux (msec)                   ! +---------------------------+------+------+--------------------------------+ ! Common Femoral             !Phasic!      !                                ! +---------------------------+------+------+--------------------------------+ ! Prox Femoral               !Phasic!      !                                ! +---------------------------+------+------+--------------------------------+ ! Popliteal                  !Phasic!      !                                ! +---------------------------+------+------+--------------------------------+    Us Abdomen Limited Specify Organ? Liver, Pancreas    Result Date: 8/24/2020  EXAMINATION: RIGHT UPPER QUADRANT ULTRASOUND 8/24/2020 2:22 pm COMPARISON: 05/24/2020 HISTORY: ORDERING SYSTEM PROVIDED HISTORY: elevated LFT, know h/o liver ca TECHNOLOGIST PROVIDED HISTORY: elevated LFT, know h/o liver ca Specify organ?->LIVER Specify organ?->PANCREAS FINDINGS: LIVER:  The liver is echogenic and coarse in echotexture, compatible with cirrhosis. Within the liver dome is a 5.5 x 6.7 cm hypoechoic mass, which is due to the patient's known treated malignancy. No intrahepatic biliary ductal dilatation. There is hepatopetal flow seen within the main portal vein. BILIARY SYSTEM:  Status post cholecystectomy. Common bile duct is within normal limits measuring 3 mm. RIGHT KIDNEY: The right kidney is grossly unremarkable without evidence of hydronephrosis. PANCREAS:  Visualized portions of the pancreas are unremarkable.  OTHER: No evidence of right upper quadrant ascites. 1. Cirrhotic liver. 2. Liver dome mass, which is compatible with hepatocellular carcinoma, which was previously treated with Y -90 radioembolization. This was previously evaluated with MRI in June 2020, and demonstrated no active disease. 3. Status post cholecystectomy. ASSESSMENT:     Patient Active Problem List    Diagnosis Date Noted    AMS (altered mental status) 08/24/2020    Pleural effusion on right 08/24/2020    Hypokalemia 08/24/2020    Depression 08/24/2020    Transaminitis 08/24/2020    Respiratory alkalosis 08/24/2020    Elevated lactic acid level 08/24/2020    Elevated myoglobin level 08/24/2020    Diabetes mellitus type 2 in nonobese [facility]) 08/24/2020    Diverticulitis of large intestine with perforation and abscess without bleeding 05/22/2020    Malignant neoplasm of liver ([address] 05/22/2020    MVP (mitral valve prolapse)     On supplemental oxygen therapy     History of radiation therapy     Fibromyalgia     History of chemotherapy     [facility]     Current every day smoker     Substance abuse ([facility] 01/19/2020    Malignant neoplasm of upper lobe of right lung ([facility] 04/26/2019    Chronic obstructive pulmonary disease ([address] 03/30/2019    Anxiety 03/30/2019    Thrombocytopenia ([address] 02/02/2018    CL (cirrhosis of liver) ([facility] [facility] 02/01/2018    Chronic hepatitis C without hepatic coma ([address] 02/01/2018    Chronic hepatitis C with hepatic coma ([facility] 02/01/2018          PLAN:     WEAN PER PROTOCOL:  [] No   [] Yes  [] N/A    ICU PROPHYLAXIS:  Stress ulcer:  [] PPI Agent  [x] [id number] [] Sucralfate  [] Other:  VTE:   [x] Enoxaparin  [] Unfract.  Heparin Subcut  [] EPC Cuffs    NUTRITION:  [] NPO [] Tube Feeding (Specify: ) [] TPN  [] PO    HOME MEDS RECONCILED: [] No  [x] Yes    CONSULTATION NEEDED:  [] No  [x] Yes    FAMILY UPDATED:    [x] No  [] Yes    TRANSFER OUT OF ICU:   [x] No  [] Yes        Plan:  Neuro:  -Alert and oriented with

## 2020-08-25 NOTE — PROGRESS NOTES
Speech Language Pathology  Facility/Department: [address]  Initial Speech/Language/Cognitive Assessment    NAME: James Juarez  : 1950   MRN: [id number]  ADMISSION DATE: 2020  ADMITTING DIAGNOSIS: has CL (cirrhosis of liver) ([address] Chronic hepatitis C without hepatic coma ([address] Thrombocytopenia ([address] Chronic obstructive pulmonary disease ([address] Anxiety; Malignant neoplasm of upper lobe of right lung ([address] Substance abuse ([address] Diverticulitis of large intestine with perforation and abscess without bleeding; Malignant neoplasm of liver ([facility] Chronic hepatitis C with hepatic coma (HCC); MVP (mitral valve prolapse); On supplemental oxygen therapy; History of radiation therapy; Fibromyalgia; History of chemotherapy; Bipolar disorder ([address] Current every day smoker; AMS (altered mental status); Pleural effusion on right; Hypokalemia; Depression; Transaminitis; Respiratory alkalosis; Elevated lactic acid level; Elevated myoglobin level; and [facility] on their problem list.      Date of Eval: 2020   Evaluating Therapist: DAYNA Brush    RECENT RESULTS  CT OF HEAD/MRI:      Impression    No acute intracranial abnormality.         No CT evidence of toxoplasmosis. Primary Complaint: History was obtained from chart review and patient. James Juarez is a 72 y.o. female who presents with altered mental status for an unknown duration. Patient was found in basement by , lying in dog feces.  states that the patient has times where she \"gives up\" and does not take care of herself. He believes that this is a similar episode. He was unaware of how long the patient had been lying there. EMS stated that the house was unfit for living. Pt has a history of cirrhosis, chronic hep C, COPD, lung cancer, liver cancer and diabetes.   history significant for liver cancer, lung cancer, recent completion of chemotherapy and radiation therapy, chronic hepatitis C, liver cirrhosis, history of right-sided loculated pleural effusion who presents to the emergency department via EMS with a GCS of 13 for AMS. CT head and CTA of the chest were unremarkable for any acute process or pulmonary embolism respectively. Right upper lobe lung mass was unchanged in size and loculated pleural effusion improved when compared to previous imaging. Pain:  Pain Assessment  Pain Assessment: 0-10  Pain Level: 3  Pain Type: Acute pain  Pain Location: Abdomen  Response to Pain Intervention: Patient Satisfied  RASS Score: Alert and calm    Assessment:    Pt presents with moderate - severe cognitive deficits characterized by impaired recall, orientation, attention. Further assessment attempted however Unable to complete full eval as pt was perseverating on responses, providing tangential responses and repeating stimulus. Pt. Was able to follow commands and respond to yes/no questions appropriately. Pt. Presents mild-moderatedysarthria, + O/M deficits at this time. ST to follow up and provide treatment to address noted deficits. Education provided. Further therapy recommended at discharge. Recommendations:  Requires SLP Intervention: Yes  Duration/Frequency of Treatment: 3-5X          Plan:   Goals:  Short-term Goals  Goal 1: Recall 3 units with and without distraction with 90%  Goal 2: Recall orientation info with 90%  Goal 3: Provide comp. strategies to increased speech clarity  Goal 4: Further complete cognitive eval   Patient/family involved in developing goals and treatment plan: yes    Subjective:   Previous level of function and limitations: independent, but not taking care of self.   General  Chart Reviewed: Yes  Social/Functional History  Lives With: Spouse  Vision  Vision: Impaired  Hearing  Hearing: Exceptions to [facility]           Objective:     Oral/Motor  Oral Motor: Exceptions to [facility]  Labial ROM: Reduced left  Labial Symmetry: Abnormal symmetry left    Auditory Comprehension  Comprehension: Within Functional Limits  Yes/No Questions: (6/6)  One Step Basic Commands: (2/2)  Two Step Basic Commands: (2/2)      Motor Speech  Motor Speech: Exceptions to [facility]  Intelligibility: Mild  Dysarthria : Mild         Cognition:      Orientation  Overall Orientation Status: Impaired  Orientation Level: Oriented to person  Attention  Attention: Exceptions to [facility]  Sustained Attention: Mild  Memory  Memory: Exceptions to [facility](2/3  immediate)  Short-term Memory: Moderate(1/3)  Problem Solving  Problem Solving: Exceptions to [facility]  Verbal Reasoning Skills: Moderate(antonyms: 1/3, further assess in therapy)  Abstract Reasoning  Abstract Reasoning: Exceptions to [facility]  Convergent Thinking: To be assessed in therapy  Divergent Thinking: To be assessed in therapy  Safety/Judgement  Safety/Judgement: Exceptions to [facility]  Insight: To be assessed in therapy  Flexibility of Thought:  To be assessed in therapy    Prognosis:  Individuals consulted  Consulted and agree with results and recommendations: Patient    Education:  Patient Education: yes  Patient Education Response: Needs reinforcement          Therapy Time:   Individual Concurrent Group Co-treatment   Time In 0945         Time Out 1000         Minutes [address], SLP  8/25/2020 10:10 AM
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
## 2020-08-25 NOTE — PROGRESS NOTES
Comprehensive Nutrition Assessment    Type and Reason for Visit:  Initial, Consult (Poor oral intake)    Nutrition Recommendations/Plan: Continue current diet, Start ONS. Encourage PO intake as tolerated. Nutrition Assessment:  Pt admitted with AMS, found down. Noted poor PO intake PTA. PMH includes DM, cirrhosis, hep C, lung and liver cancer. Unable to obtain clear hx from patient at time of visit. Pt did state that her intake has been poor recently. States that she does not like nutrition supplement shakes, but will drink them. EHR weight record reviewed: noted bed scale weight of 124 lb on 5/22/20, and standing scale weight of 112 lb on 7/2/20.     Malnutrition Assessment:  Malnutrition Status:  Severe malnutrition    Context:  Chronic Illness     Findings of the 6 clinical characteristics of malnutrition:  Energy Intake:  7 - 75% or less estimated energy requirements for 1 month or longer  Weight Loss:  7 - Greater than 7.5% over 3 months(24%)     Body Fat Loss:  (moderate body fat loss) Triceps, Orbital   Muscle Mass Loss:  (moderate muscle mass loss) Temples (temporalis), Clavicles (pectoralis & deltoids), Thigh (quadraceps)  Fluid Accumulation:  No significant fluid accumulation     Strength:  Not Performed    Estimated Daily Nutrient Needs:  Energy (kcal):  9788-2319 kcal/day; Weight Used for Energy Requirements:  Current     Protein (g):  65-90 g pro/day; Weight Used for Protein Requirements:  Current(1.5-2)          Current Nutrition Therapies:    DIET CARB CONTROL; Carb Control: 4 carb choices (60 gms)/meal; Dental Soft  Dietary Nutrition Supplements: Diabetic Oral Supplement    Anthropometric Measures:  · Height: 5' 3\" (160 cm)  · Current Body Weight: 94 lb 5.7 oz (42.8 kg)   · Usual Body Weight: 124 lb (56.2 kg)(bedscale weight of 5/22/20)     · Ideal Body Weight: 115 lbs; % Ideal Body Weight  81.7%   · BMI: 16.7  · BMI Categories: Underweight (BMI less than 18.5)       Nutrition Diagnosis: · Inadequate oral intake related to current medical condition, poor appetite as evidenced by weight loss    Nutrition Interventions:   Food and/or Nutrient Delivery:  Continue Current Diet, Start Oral Nutrition Supplement  Nutrition Education/Counseling:  Education not indicated   Coordination of Nutrition Care:  Continued Inpatient Monitoring    Goals:  meet % of estimated nutrition needs       Nutrition Monitoring and Evaluation:   Food/Nutrient Intake Outcomes:  Food and Nutrient Intake, Supplement Intake  Physical Signs/Symptoms Outcomes:  Biochemical Data, Nutrition Focused Physical Findings, Skin, Weight         Electronically signed by [place] LEI Pritchett, LD on 8/25/20 at 2:48 PM EDT    Contact: [phone]
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
## 2020-08-25 NOTE — PROGRESS NOTES
Occupational Therapy   Occupational Therapy Initial Assessment  Date: 2020   Patient Name: Elberta Kehr  MRN: [id number]     : 1950    Date of Service: 2020  Chief Complaint   Patient presents with    Altered Mental Status     Pt to ED per [facility], EMS was called by pt  after he found her laying on the basement floor, pt was found in dog feces, EMS states house was very dirty and unfit for living, recommended contacting adult protective services, unsure when patient was last seen normal or how long she was on the floor for, pt has a history of cancer, EMS unsure what kind, pt recently finished chemo, pt appears slightly jaundice, confused, unable to follow commands, nonverbal     Copied from H&P  History was obtained from chart review and patient. Elberta Kehr is a 72 y.o. female who presents with altered mental status for an unknown duration. Patient was found in basement by , lying in dog feces.  states that the patient has times where she \"gives up\" and does not take care of herself. He believes that this is a similar episode. He was unaware of how long the patient had been lying there. EMS stated that the house was unfit for living. Pt has a history of cirrhosis, chronic hep C, COPD, lung cancer, liver cancer and diabetes. Discharge Recommendations:  Patient would benefit from continued therapy after discharge  OT Equipment Recommendations  Other: CTA    Assessment   Performance deficits / Impairments: Decreased functional mobility ; Decreased endurance;Decreased ADL status; Decreased balance;Decreased safe awareness;Decreased high-level IADLs;Decreased cognition;Decreased strength  Assessment: Pt would benefit from continued acute care and post acute care OT to address impairments in ADLs, IADLs, functional mobility, endurance, balance, strength, cognition and safety awareness. Pt would benefit from education on fall prevention and safety awareness.  Pt would benefit from increased activity and standing tolerance to improve I in ADLs/functional activities. Prognosis: Good  Decision Making: Medium Complexity  OT Education: OT Role;Plan of Care;Transfer Training;Energy Conservation  Patient Education: pt educated on role of OT, POC, proper hand placement during transfers, pursed lip breathing, pt demo fair understanding  Barriers to Learning: pt limited d/t decreased cognition, pt confusion and agitation  REQUIRES OT FOLLOW UP: Yes  Activity Tolerance  Activity Tolerance: Treatment limited secondary to agitation;Patient limited by pain;Treatment limited secondary to decreased cognition  Safety Devices  Safety Devices in place: Yes  Type of devices: Left in bed;Bed alarm in place;Nurse notified;Call light within reach;Gait belt;Patient at risk for falls  Restraints  Initially in place: No         Patient Diagnosis(es): The primary encounter diagnosis was [place] coma scale total score 13-15, at arrival to emergency department. Diagnoses of Hypokalemia, Increased ammonia level, and Malignant neoplasm of upper lobe of right lung [facility]) were also pertinent to this visit. has a past medical history of Anxiety and depression, Back pain, Bipolar disorder ([address] Bronchitis, Cancer ([address] Chronic obstructive pulmonary disease (COPD) ([address] Cirrhosis ([address] Cough, Current every day smoker, Fibromyalgia, Hepatitis, History of cervical cancer, History of chemotherapy, History of radiation therapy, Liver disease, Liver metastases ([address] Lung cancer ([address] Lung mass, Malignant neoplasm of upper lobe of right lung ([address] MVP (mitral valve prolapse), On supplemental oxygen therapy, Wears dentures, Wears glasses, and Wheezing. has a past surgical history that includes Cervical disc surgery; Hysterectomy; Cholecystectomy; Breast surgery (Left); Carpal tunnel release (Bilateral);  Knee arthroscopy (Right); bronchoscopy (04/15/2019); bronchoscopy (N/A, 4/15/2019); bronchoscopy (4/15/2019); bronchoscopy (4/15/2019); TUNNELED CENTRAL VENOUS CATHETER W/ SUBCUTANEOUS PORT (Left, 05/13/2019); thoracentesis (Right, 05/13/2019); and thoracentesis (Right, 08/19/2019). Restrictions  Restrictions/Precautions  Restrictions/Precautions: General Precautions  Required Braces or Orthoses?: No  Position Activity Restriction  Other position/activity restrictions: up w/ assist, ambulate pt    Subjective   General  Patient assessed for rehabilitation services?: Yes  Family / Caregiver Present: No  Patient Currently in Pain: Yes  Pain Assessment  Pain Assessment: Faces  Bateman-Baker Pain Rating: Hurts even more  Pain Type: Acute pain  Pain Location: Abdomen  Non-Pharmaceutical Pain Intervention(s): Ambulation/Increased Activity; Distraction;Repositioned    Social/Functional History  Social/Functional History  Lives With: Spouse  Type of Home: House(pt reported home is house and apartment, lives with spouse but separate living spaces)  Home Layout: Two level  Home Access: Level entry  Bathroom Shower/Tub: Tub/Shower unit  Bathroom Toilet: Standard  Bathroom Equipment: Shower chair  Home Equipment: Standard walker  ADL Assistance: [address] [place] [address] Avenue: [address] [place] [address] Responsibilities: Yes(when asked about cooking, cleaning and laundry being shared with spouse, pt stated, \"I do mine, he does his\")  Ambulation Assistance: Independent  Transfer Assistance: Independent  Active : No  Patient's  Info: spouse drives  Mode of Transportation: Car  Occupation: Retired  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  Leisure & Hobbies: watch TV  Additional Comments: pt reports spouse is home and could provide A if needed, pt demo difficulty answering some social functional questions d/t decreased cognition and pt confusion      Objective   Vision: Impaired  Vision Exceptions: Wears glasses at all times(glasses not present)  Hearing: Exceptions to [facility]  Hearing Exceptions: Hard of hearing/hearing concerns    Orientation  Overall Orientation Status: Within Functional Limits  Observation/Palpation  Posture: Good    Balance  Sitting Balance: Contact guard assistance(~15 min seated EOB)  Standing Balance: Contact guard assistance  Standing Balance  Time: ~10 min  Activity: static standing EOB, sidesteps toward HOB  Comment: using RW  Functional Mobility  Functional - Mobility Device: Rolling Walker  Activity: Other(sidesteps toward HOB)  Assist Level: Contact guard assistance  Functional Mobility Comments: pt demo difficulty following commands, pt agitation increasing    ADL  Feeding: Independent;Setup; Increased time to complete  Grooming: Independent; Increased time to complete;Setup  UE Bathing: Increased time to complete;Setup; Moderate assistance  LE Bathing: Maximum assistance; Increased time to complete;Verbal cueing  UE Dressing: Moderate assistance; Increased time to complete;Verbal cueing;Setup(for gown management seated EOB)  LE Dressing: Maximum assistance; Increased time to complete;Verbal cueing;Setup(to don socks seated EOB)  Toileting: Maximum assistance(pt had catheter in place on this date, pt very agitated, pt had liquid bowel movement standing EOB, pt required max A to perform pericare standing EOB)  Additional Comments: Pt supine in bed on arrival. Pt reported being hungry multiple times throughout session. Pt agitated on arrival, agitation progressively increased throughout session. Pt performed bed mobility to sit EOB. Pt reported dizziness seated EOB. Pt educated to keep eyes open, focus on stationary eye level object. Pt reported no improvement but demo no physical indicators of dizziness. Pt educated on pursed lip breathing to improve dizziness seated EOB, pt demo fair return. Pt donned socks seated EOB. Pt performed sit > stand transfer using RW. Pt stood EOB and took ~5 side steps toward [facility] with RW. Pt reported being \"pissed\" and stated intent to \"pee on the floor. \" Pt proceeded to have liquid bowel movement standing EOB. Pt required max A to perform pericare standing EOB. Pt returned to seated EOEB. Pt performed bed mobility to return to supine in bed. Pt left supine, call light within reach, bed alarm armed and RN notified on OT exit. Tone RUE  RUE Tone: Normotonic  Tone LUE  LUE Tone: Normotonic  Coordination  Movements Are Fluid And Coordinated: Yes     Bed mobility  Rolling to Left: Minimal assistance  Supine to Sit: Minimal assistance  Sit to Supine: Minimal assistance  Comment: HOB raised    Transfers  Stand Step Transfers: Contact guard assistance  Sit to stand: Contact guard assistance  Stand to sit: Contact guard assistance  Transfer Comments: using RW, pt demo poor safety awareness     Cognition  Overall Cognitive Status: Exceptions  Arousal/Alertness: Inconsistent responses to stimuli;Delayed responses to stimuli  Following Commands: Follows one step commands with repetition; Inconsistently follows commands; Follows one step commands with increased time  Attention Span: Attends with cues to redirect; Difficulty attending to directions; Difficulty dividing attention  Safety Judgement: Decreased awareness of need for assistance;Decreased awareness of need for safety  Problem Solving: Decreased awareness of errors;Assistance required to identify errors made;Assistance required to correct errors made;Assistance required to generate solutions;Assistance required to implement solutions  Insights: Decreased awareness of deficits  Initiation: Requires cues for some  Sequencing: Requires cues for some  Cognition Comment: pt required VCs and TCs, pt followng ~70% of 1-step commands with repetition and increased time, pt demo increased agitiation throughout session  Perception  Overall Perceptual Status: WFL     Sensation  Overall Sensation Status: Impaired(pt reports numbness in B hands and feet)      LUE AROM (degrees)  LUE AROM : WFL  Left Hand AROM (degrees)  Left Hand AROM: WFL  RUE AROM (degrees)  RUE AROM : WFL  Right Hand AROM (degrees)  Right Hand AROM: WFL  LUE Strength  Gross LUE Strength: WFL  RUE Strength  Gross RUE Strength: WFL     Plan   Plan  Times per week: 3-5x/week  Current Treatment Recommendations: Strengthening, Balance Training, Functional Mobility Training, Endurance Training, Safety Education & Training, Self-Care / ADL, Equipment Evaluation, Education, & procurement, Home Management Training    AM-PAC Score  AM-PAC Inpatient Daily Activity Raw Score: 16 (08/25/20 1345)  AM-PAC Inpatient ADL T-Scale Score : 35.96 (08/25/20 1345)  ADL Inpatient CMS 0-100% Score: 53.32 (08/25/20 1345)  ADL Inpatient CMS G-Code Modifier : CK (08/25/20 1345)    Goals  Short term goals  Time Frame for Short term goals: By discharge, pt will  Short term goal 1: demo I in UE ADLs with VCs as needed  Short term goal 2: demo SBA during LE ADLs with AE and VCs as needed  Short term goal 3: demo sup during functional transfers/mobility with LRD and safety awareness with VCs as needed  Short term goal 4: demo 35+ min activity tolerance during ADLs/functional activities with VCs as needed  Short term goal 5: demo ability to follow 95% of 1-step commands during ADls/functional activities with repetition and increased time as needed  Short term goal 6: demo understanding of fall prevention and safety awareness with VCs as needed  Patient Goals   Patient goals : feel better       Therapy Time   Individual Concurrent Group Co-treatment   Time In 1037         Time Out 1108         Minutes 31         Timed Code Treatment Minutes: 23 Minutes   See above for LOF. RN reports patient is medically stable for therapy treatment this date. Chart reviewed prior to treatment and patient is agreeable for therapy. All lines intact and patient positioned comfortably at end of treatment. All patient needs addressed prior to ending therapy session. Co-evaluation with PT student.    Archie Diop OTS

## 2020-08-25 NOTE — ED PROVIDER NOTES
[facility]   Emergency Department  Emergency Medicine Attending Sign-out     Care of Gloria Worrell was assumed from previous attending Dr. Juana Begum and is being seen for Altered Mental Status (Pt to ED per [facility], EMS was called by pt  after he found her laying on the basement floor, pt was found in dog feces, EMS states house was very dirty and unfit for living, recommended contacting adult protective services, unsure when patient was last seen normal or how long she was on the floor for, pt has a history of cancer, EMS unsure what kind, pt recently finished chemo, pt appears slightly jaundice, confused, unable to follow commands, nonverbal)  . The patient's initial evaluation and plan have been discussed with the prior provider who initially evaluated the patient. Attestation  I was available and discussed any additional care issues that arose and coordinated the management plans with the resident(s) caring for the patient during my duty period. Any areas of disagreement with resident's documentation of care or procedures are noted on the chart. I was personally present for the key portions of any/all procedures, during my duty period. I have documented in the chart those procedures where I was not present during the key portions. BRIEF PATIENT SUMMARY/MDM COURSE PER INITIAL PROVIDER:   RECENT VITALS:     Temp: 97.2 °F (36.2 °C),  Pulse: 86, Resp: 18, BP: (!) 142/98, SpO2: 96 %    This patient is a 72 y.o. Female with altered mental status. Was found to have liver cancer with likely mets. Hyperammonemia. Admit.   Awaiting bed placement    DIAGNOSTICS/MEDICATIONS:     MEDICATIONS GIVEN:  ED Medication Orders (From admission, onward)    Start Ordered     Status Ordering Provider    08/25/20 0800 08/24/20 1351  potassium chloride (KLOR-CON M) extended release tablet 20 mEq  DAILY WITH BREAKFAST      Acknowledged Tereza CESAR    08/25/20 0800 08/24/20 2014  ipratropium-albuterol (DUONEB) nebulizer solution 1 ampule  EVERY 4 HOURS WHILE AWAKE      Acknowledged [facility]    08/24/20 2100 08/24/20 1350  citalopram (CELEXA) tablet 10 mg  NIGHTLY      Acknowledged CESARFREEMAN    08/24/20 2100 08/24/20 1350  insulin lispro (HUMALOG) injection vial 0-3 Units  NIGHTLY      Last MAR action:  Not Given - by Damaris Wooten on 08/24/20 at 2110 Daniel CESARmodesto Salinas    08/24/20 2100 08/24/20 1351  sodium chloride flush 0.9 % injection 10 mL  2 times per day      Last MAR action:  Not Given - by Damaris Wooten on 08/24/20 at 2110 TALI Nicole Salinas    08/24/20 2100 08/24/20 2014  sodium chloride flush 0.9 % injection 10 mL  2 times per day      Last MAR action:  Not Given - by Damaris Wooten on 08/24/20 at [address]    08/24/20 2015 08/24/20 2014  enoxaparin (LOVENOX) injection 40 mg  DAILY      Last MAR action:  Given - by Damaris Woot[facility] 08[facility]24[facility]20 [facility]    08/24/20 2014 08/24/20 2014  fentaNYL (SUBLIMAZE) injection 25 mcg  EVERY 1 HOUR PRN      Last MAR action:  See Alternative - by Damaris Wooten on 08/24/20 at 2216 [facility]    08/24/20 2014 08/24/20 2014  fentaNYL (SUBLIMAZE) injection 50 mcg  EVERY 1 HOUR PRN      Last MAR action:  Given - by Damaris Wooten on 08/24/20 at 2216 Catherine [facility]    08/24/20 2014 08/24/20 2014  sodium chloride flush 0.9 % injection 10 mL  PRN      Acknowledged [facility]    08/24/20 2014 08/24/20 2014  acetaminophen (TYLENOL) tablet 650 mg  EVERY 4 HOURS PRN      Acknowledged [facility]    08/24/20 2014 08/24/20 2014  oxyCODONE (ROXICODONE) immediate release tablet 5 mg  EVERY 4 HOURS PRN      Acknowledged [facility]    08/24/20 2000 08/24/20 1915  potassium chloride 10 mEq/100 mL IVPB (Peripheral Line)  EVERY HOUR      Last MAR action:  New Bag - by Damaris Wooten on 08/24/20 at 2213 ROMERO Noland    08/24/20 2000 08/24/20 1954  lactated ringers bolus  ONCE      Last MAR action:  New Bag - by Damaris Wooten on 08/24/20 at 2305 Sue Guevara    08/24/20 1930 08/24/20 1915  magnesium sulfate 2 g in 50 mL IVPB premix  ONCE      Last MAR action:  Stopped - by Pankaj Capps on 08/24/20 at 2054 ROMERO Noland    08/24/20 1930 08/24/20 1915  potassium chloride (KLOR-CON M) extended release tablet 40 mEq  ONCE      Last MAR action:  Not Given - by Pankaj Capps on 08/24/20 at 2008 ROMERO MOSQUERA    08/24/20 1836 08/24/20 1838  iohexol (OMNIPAQUE 350) solution 75 mL  IMG ONCE PRN      Last MAR action:  [facility]    08/24/20 1700 08/24/20 1350  insulin lispro (HUMALOG) injection vial 0-6 Units  3 TIMES DAILY WITH MEALS      Last MAR action:  Not Given - by Pankaj Capps on 08/24/20 at 2008 FREEMAN CESAR    08/24/20 1400 08/24/20 1350  fluticasone-umeclidin-vilant (TRELEGY ELLIPTA) 100-62.5-25 MCG/INH inhaler 1 puff  DAILY      Last MAR action:  Held - by Loretta Schmidt on 08/24/20 at 94 [facility]    08/24/20 1400 08/24/20 1350  enoxaparin (LOVENOX) injection 40 mg  DAILY      Last MAR action:  Not Given - by Loretta Schmidt on 08/24/20 at 1618 FREEMAN CESAR    08/24/20 1400 08/24/20 1350  famotidine (PEPCID) tablet 20 mg  DAILY      Last MAR action:  Given - by Loretta Schmidt on 08/24/20 [facility]    08/24/20 1350 08/24/20 1351  potassium chloride (KLOR-CON M) extended release tablet 40 mEq  PRN      Acknowledged FREEMAN CESAR    08/24/20 1350 08/24/20 1351  potassium bicarb-citric acid (EFFER-K) effervescent tablet 40 mEq  PRN      Acknowledged FREEMAN CESAR    08/24/20 1350 08/24/20 1351  potassium chloride 10 mEq/100 mL IVPB (Peripheral Line)  PRN      Acknowledged FREEMAN CESAR    08/24/20 1350 08/24/20 1351  sodium chloride flush 0.9 % injection 10 mL  PRN      Acknowledged FREEMAN CESAR    08/24/20 1350 08/24/20 1350  acetaminophen (TYLENOL) tablet 650 mg  EVERY 6 HOURS PRN      Acknowledged FREEMAN CESAR    08/24/20 1350 08/24/20 1350  acetaminophen (TYLENOL) [facility]          LABS    Labs Reviewed   POTASSIUM (POC) - Abnormal; Notable for the following components:       Result Value    POC Potassium 2.2 (*)     All other components within normal limits   LACTIC ACID, PLASMA - Abnormal; Notable for the following components:    Lactic Acid, Whole Blood 4.4 (*)     All other components within normal limits   URINALYSIS WITH MICROSCOPIC - Abnormal; Notable for the following components:    Turbidity UA CLOUDY (*)     Protein, UA 1+ (*)     Leukocyte Esterase, Urine MODERATE (*)     Mucus, UA 1+ (*)     All other components within normal limits   BASIC METABOLIC PANEL W/ REFLEX TO MG FOR LOW K - Abnormal; Notable for the following components:    Glucose 145 (*)     Calcium 8.3 (*)     Potassium 2.4 (*)     All other components within normal limits   HEPATIC FUNCTION PANEL - Abnormal; Notable for the following components:    Alb 2.8 (*)     Alkaline Phosphatase 117 (*)     ALT 59 (*)     AST 77 (*)     Total Bilirubin 4.02 (*)     Bilirubin, Direct 1.56 (*)     Bilirubin, Indirect 2.46 (*)     Albumin/Globulin Ratio 0.6 (*)     All other components within normal limits   TROPONIN - Abnormal; Notable for the following components:    Troponin, High Sensitivity 20 (*)     All other components within normal limits   TROPONIN - Abnormal; Notable for the following components:    Troponin, High Sensitivity 17 (*)     All other components within normal limits   BRAIN NATRIURETIC PEPTIDE - Abnormal; Notable for the following components:    Pro- (*)     All other components within normal limits   TOX SCR, BLD, ED - Abnormal; Notable for the following components:    Acetaminophen Level <5 (*)     Salicylate Lvl <1 (*)     All other components within normal limits   CBC WITH AUTO DIFFERENTIAL - Abnormal; Notable for the following components:    Hematocrit 35.3 (*)     MCV 81.7 (*)     MCHC 35.7 (*)     RDW 15.9 (*)     Platelets 98 (*)     NRBC Automated 0.3 (*)     Seg Neutrophils 82 (*)     Lymphocytes 8 (*)     Immature Granulocytes 1 (*)     Segs Absolute 9.04 (*)     Absolute Lymph # 0.87 (*)     All other components within normal limits   MYOGLOBIN, SERUM - Abnormal; Notable for the following components:    Myoglobin 116 (*)     All other components within normal limits   AMMONIA - Abnormal; Notable for the following components:    Ammonia 102 (*)     All other components within normal limits   FERRITIN - Abnormal; Notable for the following components:    Ferritin 621 (*)     All other components within normal limits   LIPASE - Abnormal; Notable for the following components:    Lipase 99 (*)     All other components within normal limits   LACTATE, SEPSIS - Abnormal; Notable for the following components:    Lactic Acid, Sepsis, Whole Blood 2.5 (*)     All other components within normal limits   LACTATE, SEPSIS - Abnormal; Notable for the following components:    Lactic Acid, Sepsis, Whole Blood 2.9 (*)     All other components within normal limits   POTASSIUM - Abnormal; Notable for the following components:    Potassium 2.1 (*)     All other components within normal limits   PROTIME-INR - Abnormal; Notable for the following components:    Protime 21.5 (*)     All other components within normal limits   VITAMIN B12 & FOLATE - Abnormal; Notable for the following components:    Vitamin B-12 1889 (*)     Folate 4.2 (*)     All other components within normal limits   VENOUS BLOOD GAS, POINT OF CARE - Abnormal; Notable for the following components:    pH, Curry 7.497 (*)     pCO2, Curry 33.0 (*)     pO2, Curry 11.1 (*)     O2 Sat, Curry 14 (*)     All other components within normal limits   LACTIC ACID,POINT OF CARE - Abnormal; Notable for the following components:    POC Lactic Acid 3.82 (*)     All other components within normal limits   POCT GLUCOSE - Abnormal; Notable for the following components:    POC Glucose 152 (*)     All other components within normal limits   HGB/HCT   SODIUM reformatted images are provided for review. Dose modulation, iterative reconstruction, and/or weight based adjustment of the mA/kV was utilized to reduce the radiation dose to as low as reasonably achievable. COMPARISON: None. HISTORY: ORDERING SYSTEM PROVIDED HISTORY: AMS, Liver/lung CA. found surrounded in Cat feces, Toxoplasmosis? TECHNOLOGIST PROVIDED HISTORY: AMS, Liver/lung CA. found surrounded in Cat feces, Toxoplasmosis? Reason for Exam: r/o toxoplasmosis/mets Acuity: Acute Type of Exam: Initial FINDINGS: BRAIN/VENTRICLES: There is no acute intracranial hemorrhage, mass effect or midline shift. No abnormal extra-axial fluid collection. The gray-white differentiation is maintained without evidence of an acute infarct. There is no evidence of hydrocephalus. No abnormal intracranial enhancement. ORBITS: The visualized portion of the orbits demonstrate no acute abnormality. SINUSES: The visualized paranasal sinuses and mastoid air cells demonstrate no acute abnormality. SOFT TISSUES/SKULL:  No acute abnormality of the visualized skull or soft tissues. No acute intracranial abnormality. No CT evidence of toxoplasmosis. Xr Chest Portable    Result Date: 8/24/2020  EXAMINATION: ONE XRAY VIEW OF THE CHEST 8/24/2020 8:29 am COMPARISON: CT chest 01/31/2020, chest radiograph 08/19/2019 HISTORY: ORDERING SYSTEM PROVIDED HISTORY: AMS, r/o infx. Hx of Lung Ca TECHNOLOGIST PROVIDED HISTORY: AMS, r/o infx. Hx of Lung Ca Reason for Exam: supine port  tried to do upright but pt moved completely off film, kept moving bst film possible Acuity: Unknown Type of Exam: Unknown FINDINGS: There is masslike opacity within the right upper lobe, with associated volume loss and a small, loculated pleural effusion. No pneumothorax is demonstrated. The left lung appears clear. The heart is normal in size. A left IJ port distal tip is in the SVC. No acute osseous abnormality is seen.      1. Right upper lobe mass and upper chest, terminating in the superior vena cava. 1. No evidence of pulmonary embolus. Moderate emphysema. 2. Soft tissue density right suprahilar area abutting the right mediastinum with volume loss in the right hemithorax, scarring, and bronchial wall thickening with right pleural effusion. Findings compatible with patient's history of lung cancer. Right hemithorax appearance similar to prior exam. 3. Hepatic steatosis. Spleen is incompletely included on this study but is suggestively enlarged. Us Abdomen Limited Specify Organ? Liver, Pancreas    Result Date: 8/24/2020  EXAMINATION: RIGHT UPPER QUADRANT ULTRASOUND 8/24/2020 2:22 pm COMPARISON: 05/24/2020 HISTORY: ORDERING SYSTEM PROVIDED HISTORY: elevated LFT, know h/o liver ca TECHNOLOGIST PROVIDED HISTORY: elevated LFT, know h/o liver ca Specify organ?->LIVER Specify organ?->PANCREAS FINDINGS: LIVER:  The liver is echogenic and coarse in echotexture, compatible with cirrhosis. Within the liver dome is a 5.5 x 6.7 cm hypoechoic mass, which is due to the patient's known treated malignancy. No intrahepatic biliary ductal dilatation. There is hepatopetal flow seen within the main portal vein. BILIARY SYSTEM:  Status post cholecystectomy. Common bile duct is within normal limits measuring 3 mm. RIGHT KIDNEY: The right kidney is grossly unremarkable without evidence of hydronephrosis. PANCREAS:  Visualized portions of the pancreas are unremarkable. OTHER: No evidence of right upper quadrant ascites. 1. Cirrhotic liver. 2. Liver dome mass, which is compatible with hepatocellular carcinoma, which was previously treated with Y -90 radioembolization. This was previously evaluated with MRI in June 2020, and demonstrated no active disease. 3. Status post cholecystectomy.        OUTSTANDING TASKS / ADDITIONAL COMMENTS   1. Bed placement     Goldie Gowers, MD  Emergency Medicine Attending  [address] [facility] MD  08/24/20 [id number]

## 2020-08-26 NOTE — PROGRESS NOTES
..    Palliative Care Progress Note    NAME:  Baldemar Mancilla RECORD NUMBER:  [id number]  AGE: 72 y.o. GENDER: female  : 1950  TODAY'S DATE:  2020    Reason for Consult:  goals of care and support  History of Present Illness     The patient is a 72 y.o. Non-/non  female who presents with Altered Mental Status (Pt to ED per [facility], EMS was called by pt  after he found her laying on the basement floor, pt was found in dog feces, EMS states house was very dirty and unfit for living, recommended contacting adult protective services, unsure when patient was last seen normal or how long she was on the floor for, pt has a history of cancer, EMS unsure what kind, pt recently finished chemo, pt appears slightly jaundice, confused, unable to follow commands, nonverbal)    Referred to Palliative Care by  [x] Physician   [] Nursing  [] Family Request   [] Other:      She was admitted to the primary service for AMS (altered mental status) [R41.82]  AMS (altered mental status) [R41.82]. Her hospital course has been associated with AMS, malnutrition, hyperammoniemia, hypokalemia, malignant neoplasm of right lung and liver. The patient has a complicated medical history and has been hospitalized since 2020  7:33 AM. She is S/p chemoradiation and is now on immunotherapy and Dr. Andree Kendrick seeing her. Previous MRI of brain was negative for metastatic disease. Palliative care following for goals of care. OVERNIGHT EVENTS: Patient remains confused. No overnight events reported per RN. Labs : Kcl 3.3, Calcium 7.3, Alt 46/Ast 55, total bilirubin 1.90, albumin 2.1, Hgb 9.5, and Plt 58.        /75   Pulse 90   Temp 97.7 °F (36.5 °C) (Axillary)   Resp 15   Ht 5' 3\" (1.6 m)   Wt 94 lb 5.7 oz (42.8 kg)   SpO2 98%   BMI 16.71 kg/m²     Assessment        REVIEW OF SYSTEMS    [x]   UNABLE TO OBTAIN: Patient is confused  Constitutional:  []   Chills   []  Fatigue   []  Fevers   [] Malaise   []  Weight loss   [] Other:     Respiratory:   []  Cough    []  Shortness of breath    []  Chest pain    [] Other:     Cardiovascular:   []  Chest pain  []  Dyspnea    []  Exertional chest pressure/discomfort     [] Fatigue      []  Palpitations    []  Syncope   [] Other:     Gastrointestinal:   []  Abdominal pain   []  Constipation    []  Diarrhea    []   Dysphagia   []  Reflux             []  Vomiting   [] Other:     Genitourinary:  []  Dysuria     []  Frequency   []  Hematuria   [] Nocturia   []  Urinary incontinence   [] Other:     Musculoskeletal:   [] Back pain    []  Muscle weakness   []  Myalgias    []  Neck pain   []  Stiff joints   []  Other:     Behavioral/Psych:   [] Anxiety    []  Depression     []  Mood swings   [] Other:     PHYSICAL ASSESSMENT:     General: []  Oriented x3      [] well appearing      [] Intubated      [x] ill appearing      [x] Other: Cachexia     Mental Status: [] normal mental status exam      [] drowsy      [x] Confused      [] Other:     Cardiovascular: [x]  Regular rate/rhythm      [] Arrhythmia      [] Other:     Chest: [x] Effort normal      [] lungs clear      [] respiratory distress      [] Tachypnea      [x]  Other:Diminshed    Abdomen: [x] Soft/non-tender      []  Normal appearance      [] Distended      [] Ascites      [x] Other:Incont- Brief    Neurological: [] Normal Speech      [] Normal Sensation      [x]  Deficits present:  Patient is confused and following only some simple commands. Extremity:  [] normal skin color/temp      [] clubbing/cyanosis      [x]  No edema      [x] Other: Jaundice    Palliative Performance Scale:  ___60%  Ambulation reduced; Significant disease; Can't do hobbies/housework; intake normal or reduced; occasional assist; LOC full/confusion  ___50%  Mainly sit/lie;  Extensive disease; Can't do any work; Considerable assist; intake normal or reduced; LOC full/confusion  _x__40%  Mainly in bed; Extensive disease; Mainly assist; intake normal or reduced; LOC full/confusion   ___30%  Bed Bound; Extensive disease; Total care; intake reduced; LOCfull/confusion  ___20%  Bed Bound; Extensive disease; Total care; intake minimal; Drowsy/coma  ___10%  Bed Bound; Extensive disease; Total care; Mouth care only; Drowsy/coma  ___0       Death      Plan      Palliative Interaction: I received update from patients RN and no change. I visited patient and introduced myself and the palliative role. I assessed patients orientation level and she reports name as Daphne Prado but is disoriented to time/place/circumstance. Patient not following verbal commands and is repeating her name several times. I informed patient that I was going to reach out to her . I offered support and then informed RN that I will reach out to her . I called Julio Davalos and he did not answer and there was no voicemail connected. We will continue to round and attempt to reach /POA. Education/support to staff  Communications with primary service  Decisional capacity assessed  Continue with current plan of care  Attempted to reach patients  but no answer and no voicemail. Patient is confused. Principle Problem/Diagnosis:  AMS (altered mental status)    Additional Assessments:  Principal Problem:    AMS (altered mental status)  Active Problems:    CL (cirrhosis of liver) (HCC)    Chronic hepatitis C without hepatic coma (HCC)    Chronic obstructive pulmonary disease (HCC)    Malignant neoplasm of upper lobe of right lung (HCC)    Malignant neoplasm of liver (HCC)    History of radiation therapy    History of chemotherapy    Pleural effusion on right    Hypokalemia    Depression    Transaminitis    Respiratory alkalosis    Elevated lactic acid level    Elevated myoglobin level    Diabetes mellitus type 2 in nonobese [facility])    Encounter for palliative care  Resolved Problems:    * No resolved hospital problems.  *    1- Symptom management/ pain control     Pain Assessment:  Unable to assess               Anxiety:  None                          Dyspnea:  chronic dyspnea                          Fatigue:  significant change in weight and exercise intolerance    Other: Malnutrition    We feel the patient symptoms are being controlled. her current regimen is reviewed by myself and discussed with the staff. 2- Goals of care evaluation   The patient goals of care are unable to assess   Goals of care discussed with:    [] Patient independently    [] Patient and Family    [] Family or Healthcare DPOA independently    [x] Unable to discuss with patient, family/DPOA not present    3- Code Status  Full Code    4- Other recommendations  - We will continue to provide comfort and support to the patient and the family    Please call with any palliative questions or concerns. Palliative Care Team is available via perfect serve or via phone. Palliative Care will continue to follow Ms. Wilson's care as needed. The note has been dictated by dragon, typing errors may be a possibility     Thank you for allowing Palliative Care to participate in the care of Ms. BURKS [facility] . Electronically signed by   LIZA Huitron CNP  Palliative Care Team  on 8/26/2020 at 3:10 PM    Palliative care can be reached via Provade.

## 2020-08-26 NOTE — CONSULTS
_                         Today's Date: 8/25/2020  Patient Name: Alirio Sanders  Date of admission: 8/24/2020  7:33 AM  Patient's age: 72 y.o., 1950  Admission Dx: AMS (altered mental status) [R41.82]  AMS (altered mental status) [R41.82]      Requesting Physician: Horacio Lynn MD    CHIEF COMPLAINT: Altered mental status. Lung cancer status post chemoradiation and immunotherapy. History Obtained From:  patient, electronic medical record    HISTORY OF PRESENT ILLNESS:      The patient is a 72 y.o.  female who is admitted to the hospital for further management of altered mental status. Patient was found in her basement by her  lying on dog feces. Duration was unknown. Patient was confused and gives no history. She had no respiratory distress. No fever. No witnessed seizure activities. No injuries. Patient is totally confused. Patient had labs which showed evidence of rhabdomyolysis and electrolytes abnormalities. Patient is known to our practice. She is known to have lung cancer status post combined chemoradiation followed by immunotherapy with Imfinzi. Patient had multiple other comorbidities including chronic hepatitis C and liver cirrhosis. Brief oncologic history:     DIAGNOSIS:   1. Squamous cell carcinoma, right lung 04/15/2019 with right main stem bronchus invasion ([id number])   2. PET showed localized disease; brain MRI negative for metastases,   3. HX Liver disease and hepatitis C  4. Isolated liver lesion, segment 7, likely hepatocellular carcinoma      CURRENT THERAPY:  1. S/P bronchoscopy establishing the diagnosis   2. Chemoradiation with taxol and carboplatin - started 05/23/2019, completed 07/09/2019  3. Kraig Feather started 08/26/2019  4.  Microsphere radioembolization Y-90 treatment in March/2020     BRIEF CASE HISTORY: Funmilayo Grey is a very pleasant 72 y.o. female who is referred to us for consultation and management of recently diagnosed lung cancer. She initially presented with flu like illness and shortness of breath 11/2018 and was evaluated by PCP and was started on antibiotics, X-ray done at the time showed suspicious findings. Follow up CT showed mass lesion within the right hilum either intervening or originating from the right mainstem bronchus and narrowing of multiple segmental pulmonary arterial branches. Referred to Dr. Mer Lopez for bronchoscopy and right main stem bronchus mass was appreciated, bronchial washings were positive for squamous cell carcinoma. She reports she has liver disease, Hep C and assumed her recent weight loss and cachexia was related to that. She has right shoulder and back pain. She has not been treated for Hep C. She smokes cigarettes and cocaine. She has COPD and is oxygen dependent. Staging PET showed localized disease, pleural effusion seen; brain MRI was negative for metastatic disease. She will need pleurocentesis with cytological evaluation of the fluid, assuming it is negative , and since she is not surgical candidate. Thoracentesis was done and fluid was negative for malignancy. Chemoradiation Carbo/Taxol -started 05/23/2019, completed 07/09/2019. Follow up CT showed good response to treatment with some pleural effusion and liver lesion isolated in segment 7. With features concerning for hepatocellular carcinoma. Considering her cirrhosis, she is not a candidate for resection or transplant, we referred her to interventional radiology for consideration of radioembolization with Y-90 treatment, which was done 03/2020 with follow up MRI planned for 06/2020. In May/2020, she presented with abdominal pain and diarrhea. Initially her pancreatic enzymes were elevated so I suspected immunotherapy induced pancreatitis but CT scan showed diverticulitis with a small pericolonic abscess. Patient was admitted to the hospital and treated with antibiotics with improvement.   We held immunotherapy until the recovery. Past Medical History:   has a past medical history of Anxiety and depression, Back pain, Bipolar disorder ([address] Bronchitis, Cancer ([address] Chronic obstructive pulmonary disease (COPD) ([address] Cirrhosis ([address] Cough, Current every day smoker, Fibromyalgia, Hepatitis, History of cervical cancer, History of chemotherapy, History of radiation therapy, Liver disease, Liver metastases ([address] Lung cancer ([address] Lung mass, Malignant neoplasm of upper lobe of right lung ([address] MVP (mitral valve prolapse), On supplemental oxygen therapy, Wears dentures, Wears glasses, and Wheezing. Past Surgical History:   has a past surgical history that includes Cervical disc surgery; Hysterectomy; Cholecystectomy; Breast surgery (Left); Carpal tunnel release (Bilateral); Knee arthroscopy (Right); bronchoscopy (04/15/2019); bronchoscopy (N/A, 4/15/2019); bronchoscopy (4/15/2019); bronchoscopy (4/15/2019); TUNNELED CENTRAL VENOUS CATHETER W/ SUBCUTANEOUS PORT (Left, 05/13/2019); thoracentesis (Right, 05/13/2019); and thoracentesis (Right, 08/19/2019). Family History: family history includes Cancer in her paternal grandmother; Emphysema in her mother; Heart Attack in her father; [place] in her paternal grandfather. Social History:   reports that she has been smoking cigarettes. She started smoking about 53 years ago. She has a 13.00 pack-year smoking history. She has never used smokeless tobacco. She reports current drug use. Drug: Cocaine. She reports that she does not drink alcohol. Medications:    Prior to Admission medications    Medication Sig Start Date End Date Taking?  Authorizing Provider   potassium chloride (KLOR-CON M) 20 MEQ extended release tablet take 1 tablet by mouth once daily 8/7/20   Sabrina Diaz MD   oxyCODONE HCl (OXY-IR) 10 MG immediate release tablet take 1 tablet by mouth every 6 hours if needed for pain 7/29/20 8/28/20  Sabrina Diaz MD   metFORMIN (GLUCOPHAGE) 500 MG tablet take 1 tablet by mouth once daily with breakfast 6/30/20   Rashid Flood PA-C   VENTOLIN  (93 Base) MCG/ACT inhaler inhale 2 puffs by mouth and INTO THE LUNGS every 6 hours if needed for wheezing 6/29/20   Rashid Flood PA-C   citalopram (CELEXA) 10 MG tablet Take 1 tablet by mouth nightly 6/9/20   Rashid Flood PA-C   hydrOXYzine (ATARAX) 50 MG tablet Take 1 tablet by mouth daily 6/9/20   Rashid Flood PA-C   omeprazole (PRILOSEC) 40 MG delayed release capsule Take 1 capsule by mouth daily 6/9/20   Rashid Flood PA-C   traZODone (DESYREL) 300 MG tablet take 1 tablet by mouth nightly 5/14/20   Rashid Flood PA-C   fluticasone-umeclidin-vilant (TRELEGY ELLIPTA) 611-78.6-41 MCG/INH AEPB Inhale 1 puff into the lungs daily 3/31/20   LIZA Atkinson - CNP   lactulose (CHRONULAC) 10 GM/15ML solution Take 15 mLs by mouth 2 times daily  Patient taking differently: Take 10 g by mouth 2 times daily Taking prn/ 6/1/20 3/9/20   Rashid Flood PA-C   Varenicline Tartrate (CHANTIX PO) Take by mouth    Historical Provider, MD   lidocaine-prilocaine (EMLA) 2.5-2.5 % cream Apply topically as needed.  1/20/20   Kizzy Lobato MD   albuterol (PROVENTIL) (2.5 MG/3ML) 0.083% nebulizer solution Take 3 mLs by nebulization every 6 hours as needed for Wheezing 9/25/19   Darlin Casas MD   lidocaine viscous hcl (XYLOCAINE) 2 % SOLN solution Take 5-10 mLs by mouth as needed for Irritation 6/5/19   Tata Dee MD   Oxygen Concentrator     Historical Provider, MD   ibuprofen (ADVIL;MOTRIN) 600 MG tablet Take 1 tablet by mouth every 6 hours as needed for Pain 6/20/16 6/1/20  Rip Padilla MD     Current Facility-Administered Medications   Medication Dose Route Frequency Provider Last Rate Last Dose    dextrose 5 % and 0.45 % NaCl with KCl 20 mEq infusion   Intravenous Continuous Vinod Lee  mL/hr at 08/25/20 1720      thiamine (B-1) 100 mg in sodium chloride 0.9 % 100 mL IVPB  100 mg Intravenous Q24H Joshua Galicia DO   Stopped at 08/25/20 4781    albuterol (PROVENTIL) nebulizer solution 2.5 mg  2.5 mg Nebulization Q6H PRN LIZA Contreras NP   2.5 mg at 08/25/20 0147    citalopram (CELEXA) tablet 10 mg  10 mg Oral Nightly LIZA Contreras NP   10 mg at 08/25/20 2043    fluticasone-umeclidin-vilant (TRELEGY ELLIPTA) 100-62.5-25 MCG/INH inhaler 1 puff  1 puff Inhalation Daily LIZA Contreras NP   Stopped at 08/24/20 1537    insulin lispro (HUMALOG) injection vial 0-6 Units  0-6 Units Subcutaneous TID WC LIZA Contreras NP   2 Units at 08/25/20 1721    insulin lispro (HUMALOG) injection vial 0-3 Units  0-3 Units Subcutaneous Nightly LIZA Contreras NP        glucose (GLUTOSE) 40 % oral gel 15 g  15 g Oral PRN LIZA Contreras NP        dextrose 50 % IV solution  12.5 g Intravenous PRN LIZA Contreras NP        glucagon (rDNA) injection 1 mg  1 mg Intramuscular PRN LIZA Contreras NP        dextrose 5 % solution  100 mL/hr Intravenous PRN LIZA Contreras NP        magnesium sulfate 1 g in dextrose 5% 100 mL IVPB  1 g Intravenous PRN LIZA Contreras NP        potassium chloride (KLOR-CON M) extended release tablet 40 mEq  40 mEq Oral PRN LIZA Contreras NP        Or    potassium bicarb-citric acid (EFFER-K) effervescent tablet 40 mEq  40 mEq Oral PRN LIZA Contreras NP        Or    potassium chloride 10 mEq/100 mL IVPB (Peripheral Line)  10 mEq Intravenous PRN LIZA Contreras NP        sodium chloride flush 0.9 % injection 10 mL  10 mL Intravenous 2 times per day LIZA Contreras NP        sodium chloride flush 0.9 % injection 10 mL  10 mL Intravenous PRN LIZA Contreras NP        acetaminophen (TYLENOL) tablet 650 mg  650 mg Oral Q6H PRN LIZA Contreras NP        Or    acetaminophen (TYLENOL) suppository 650 mg  650 mg Rectal Q6H PRN LIZA Contreras - TUCKER        [facility]) tablet 12.5 mg  12.5 mg Oral Q6H PRN Midge Jossy, APRN - NP        Or    ondansetron [facility]) injection 4 mg  4 mg Intravenous Q6H PRN Midge Jossy, APRN - NP        nicotine (NICODERM CQ) 21 MG/24HR 1 patch  1 patch Transdermal Daily PRN Midge Jossy, APRN - NP        enoxaparin (LOVENOX) injection 40 mg  40 mg Subcutaneous Daily Midge Jossy, APRN - NP   40 mg at 20 [id number]    famotidine (PEPCID) tablet 20 mg  20 mg Oral Daily Midge Jossy, APRN - NP   20 mg at 20    sodium chloride flush 0.9 % injection 10 mL  10 mL Intravenous 2 times per day Nidhi Rosa, DO   10 mL at 20    sodium chloride flush 0.9 % injection 10 mL  10 mL Intravenous PRN Nidhi Rosa, DO        acetaminophen (TYLENOL) tablet 650 mg  650 mg Oral Q4H PRN Nidhi Rosa, DO        oxyCODONE (ROXICODONE) immediate release tablet 5 mg  5 mg Oral Q4H PRN Nidhi Rosa, DO        fentaNYL (SUBLIMAZE) injection 25 mcg  25 mcg Intravenous Q1H PRN Nidhi Rosa,         Or    fentaNYL (SUBLIMAZE) injection 50 mcg  50 mcg Intravenous Q1H PRN Nidhi Rosa, DO   50 mcg at 20    ipratropium-albuterol (DUONEB) nebulizer solution 1 ampule  1 ampule Inhalation Q4H WA Nidhi Rosa, DO   1 ampule at 20       Allergies:  Patient has no known allergies. REVIEW OF SYSTEMS:      Difficult to obtain considering her current mental status.   PHYSICAL EXAM:      /73   Pulse 97   Temp 97.9 °F (36.6 °C) (Oral)   Resp 17   Ht 5' 3\" (1.6 m)   Wt 94 lb 5.7 oz (42.8 kg)   SpO2 100%   BMI 16.71 kg/m²    Temp (24hrs), Av.7 °F (36.5 °C), Min:97.3 °F (36.3 °C), Max:97.9 °F (36.6 °C)      General appearance - not in pain or distress  Mental status -confused and disoriented  Eyes - pupils equal and reactive, extraocular eye movements intact  Ears - bilateral TM's and external ear canals normal  Nose - normal and patent, no erythema, discharge or polyps  Mouth - mucous membranes moist, pharynx normal without lesions  Neck - supple, no significant adenopathy  Lymphatics - no palpable lymphadenopathy, no hepatosplenomegaly  Chest - clear to auscultation, no wheezes, rales or rhonchi, symmetric air entry  Heart - normal rate, regular rhythm, normal S1, S2, no murmurs, rubs, clicks or gallops  Abdomen - soft, nontender, nondistended, no masses or organomegaly  Neurological -confused and disoriented no focal findings or movement disorder noted  Musculoskeletal - no joint tenderness, deformity or swelling  Extremities - peripheral pulses normal, no pedal edema, no clubbing or cyanosis  Skin - normal coloration and turgor, no rashes, no suspicious skin lesions noted           DATA:      Labs:       CBC:   Recent Labs     08/24/20 0753 08/25/20 0445   WBC 10.9 8.5   HGB 12.6 9.9*   HCT 35.3* 29.2*   PLT 98* 71*     BMP:   Recent Labs     08/24/20 0753 08/25/20  0140 08/25/20 0445  08/25/20  1552 08/25/20 2012     --   --  138  --   --   --    K 2.4*   < >  --  3.0*   < > 3.3* 3.3*   CO2 20  --   --  20  --   --   --    BUN 10  --   --  7*  --   --   --    CREATININE 0.67  --  0.63 0.50  --   --   --    LABGLOM >60  --  >60 >60  --   --   --    GLUCOSE 145*  --   --  245*  --   --   --     < > = values in this interval not displayed.      PT/INR:   Recent Labs     08/24/20 2027   PROTIME 21.5*   INR 2.1     APTT:  Recent Labs     08/24/20 2027   APTT 29.6     LIVER PROFILE:  Recent Labs     08/24/20 0753 08/25/20 0445   AST 77* 66*   ALT 59* 50*   LABALBU 2.8* 2.2*               IMPRESSION:    Primary Problem  AMS (altered mental status)    Active Hospital Problems    Diagnosis Date Noted    Encounter for palliative care [Z51.5]     AMS (altered mental status) [R41.82] 08/24/2020    Pleural effusion on right [J90] 08/24/2020    Hypokalemia [E87.6] 08/24/2020    Depression [F32.9] 08/24/2020    Transaminitis [R74.0] 08/24/2020    Respiratory alkalosis [E87.3] 08/24/2020    Elevated lactic acid level
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
## 2020-08-26 NOTE — FLOWSHEET NOTE
Assessment: was found in basement by , lying in feces.  states that the patient has times where she \"gives up\" and does not take care of herself. He believes that this is a similar episode. He was unaware of how long the patient had been lying there. EMS stated that the house was unfit for living. Intervention:  was rounding on unit.  offered words of comfort and prayer.  will follow up during hospital stay. Pt was resting quietly.

## 2020-08-26 NOTE — PROGRESS NOTES
Pt left SICU with nurses x2 with portable monitor. Report was given to [facility] nurse and pt and belongings was moved to room [phone].

## 2020-08-26 NOTE — PROGRESS NOTES
Occupational Therapy  Facility/Department: [facility]  Daily Treatment Note  NAME: Neal Smith  : 1950  MRN: [id number]    Date of Service: 2020    Discharge Recommendations:  Patient would benefit from continued therapy after discharge. Pt unsafe to return to residence sec to decreased cognition, balance, safety and activity tolerance. Assessment   Performance deficits / Impairments: Decreased functional mobility ; Decreased safe awareness;Decreased balance;Decreased ADL status; Decreased cognition;Decreased high-level IADLs;Decreased endurance;Decreased strength  Prognosis: Good  OT Education: OT Role;Transfer Training  Patient Education: purpose of OT and activity; proper hand and foot placement; proper posture  Barriers to Learning: pt addison P carry over req max A from [facility] UP: Yes  Activity Tolerance  Activity Tolerance: Treatment limited secondary to decreased cognition;Patient limited by fatigue  Safety Devices  Safety Devices in place: Yes  Type of devices: Gait belt;Patient at risk for falls; Left in bed;Call light within reach; Bed alarm in place  Restraints  Initially in place: Yes  Restraints: 4 bed rails in place at start of session and at session end         Patient Diagnosis(es): The primary encounter diagnosis was Christian[facility] coma scale total score 13-15, at arrival to emergency department. Diagnoses of Hypokalemia, Increased ammonia level, and Malignant neoplasm of upper lobe of right lung [facility]) were also pertinent to this visit.       has a past medical history of Anxiety and depression, Back pain, Bipolar disorder ([address] Bronchitis, Cancer ([address] Chronic obstructive pulmonary disease (COPD) ([address] Cirrhosis ([address] Cough, Current every day smoker, Fibromyalgia, Hepatitis, History of cervical cancer, History of chemotherapy, History of radiation therapy, Liver disease, Liver metastases ([address] Lung cancer ([address] Lung mass, Malignant neoplasm of upper lobe of right lung ([address] MVP (mitral valve prolapse), On supplemental oxygen therapy, Wears dentures, Wears glasses, and Wheezing. has a past surgical history that includes Cervical disc surgery; Hysterectomy; Cholecystectomy; Breast surgery (Left); Carpal tunnel release (Bilateral); Knee arthroscopy (Right); bronchoscopy (04/15/2019); bronchoscopy (N/A, 4/15/2019); bronchoscopy (4/15/2019); bronchoscopy (4/15/2019); TUNNELED CENTRAL VENOUS CATHETER W/ SUBCUTANEOUS PORT (Left, 05/13/2019); thoracentesis (Right, 05/13/2019); and thoracentesis (Right, 08/19/2019). Restrictions  Restrictions/Precautions  Restrictions/Precautions: Up as Tolerated, Fall Risk, General Precautions  Required Braces or Orthoses?: No  Position Activity Restriction  Other position/activity restrictions: ambulate pt  Subjective   General  Chart Reviewed: Yes  Patient assessed for rehabilitation services?: Yes  Family / Caregiver Present: No  General Comment  Comments: no facial grimacing or groaning noted  Vital Signs  Patient Currently in Pain: Denies   Orientation  Orientation  Overall Orientation Status: Impaired  Orientation Level: Oriented to person;Oriented to place; Disoriented to situation;Disoriented to time  Objective    ADL  Grooming: Stand by assistance;Setup;Verbal cueing; Increased time to complete(pt req mod encouragement for facial washing)  LE Dressing: Dependent/Total(to doff/don socks)  Additional Comments: Pt supine in bed at start of session noting decreased comfort.  Face washing completed seated at EOB with 0-1 hand support for balance maintaince  Balance  Sitting Balance: Contact guard assistance(pt tolerated approx 3-5 min static/dynamic sitting)  Standing Balance: Minimal assistance  Standing Balance  Time: pt toelrated approx 3-4 min  Activity: standing during mobility  Comment: utilizing RW  Functional Mobility  Functional - Mobility Device: Rolling Walker  Activity: Other  Assist Level: Maximum assistance  Functional Mobility Comments: to take small steps towards [facility] req NOWAK assist with proper foot placement and progression and balance maintaince  Bed mobility  Supine to Sit: Maximum assistance(req assist with trunk and BLE)  Sit to Supine: Moderate assistance(req assist with BLE)  Scooting: Minimal assistance  Comment: HOB slightly elevated  Transfers  Stand Step Transfers: Maximum assistance  Sit to stand: Moderate assistance  Stand to sit: Moderate assistance  Transfer Comments: utilizing RW  Cognition  Overall Cognitive Status: Exceptions  Arousal/Alertness: Appropriate responses to stimuli  Following Commands: Follows one step commands with repetition; Follows one step commands with increased time; Inconsistently follows commands  Attention Span: Attends with cues to redirect  Safety Judgement: Decreased awareness of need for assistance;Decreased awareness of need for safety  Problem Solving: Decreased awareness of errors;Assistance required to generate solutions;Assistance required to identify errors made;Assistance required to implement solutions;Assistance required to correct errors made  Insights: Decreased awareness of deficits  Initiation: Requires cues for some  Sequencing: Requires cues for some     Pt and RN agreeable to therapy this day. ADL tasks of LB dressing and grooming completed see above for LOF. Decreased balance noted standing with posterior lean pt req increased assist for balance maintaince. Throughout session pt req increased encouragement for level of alertness, increased fatigue noted. At session end pt supine in bed with BLE elevated, RUE elevated, call light in reach and bed alarm on.     Plan   Plan  Times per week: 3-5x/week  Current Treatment Recommendations: Strengthening, Balance Training, Functional Mobility Training, Endurance Training, Safety Education & Training, Self-Care / ADL, Equipment Evaluation, Education, & procurement, Home Management Training   Cont POC    Goals  Short term

## 2020-08-26 NOTE — CONSULTS
Palliative care consult done on 8/25/2020 per Kristen Rivera CNP. Palliative care will continue to follow patient.

## 2020-08-26 NOTE — PLAN OF CARE
Problem: Mental Status - Impaired:  Goal: Mental status will improve  Description: Mental status will improve  Outcome: Ongoing     Problem: Fluid Volume:  Goal: Ability to achieve a balanced intake and output will improve  Description: Ability to achieve a balanced intake and output will improve  Outcome: Ongoing     Problem: Physical Regulation:  Goal: Ability to maintain clinical measurements within normal limits will improve  Description: Ability to maintain clinical measurements within normal limits will improve  Outcome: Ongoing  Goal: Will show no signs and symptoms of electrolyte imbalance  Description: Will show no signs and symptoms of electrolyte imbalance  Outcome: Ongoing     Problem: Coping:  Goal: Ability to remain calm will improve  Description: Ability to remain calm will improve  Outcome: Ongoing     Problem: Safety:  Goal: Ability to remain free from injury will improve  Description: Ability to remain free from injury will improve  Outcome: Ongoing     Problem: Self-Care:  Goal: Ability to participate in self-care as condition permits will improve  Description: Ability to participate in self-care as condition permits will improve  Outcome: Ongoing     Problem: Pain:  Goal: Pain level will decrease  Description: Pain level will decrease  Outcome: Ongoing  Goal: Control of acute pain  Description: Control of acute pain  Outcome: Ongoing  Goal: Control of chronic pain  Description: Control of chronic pain  Outcome: Ongoing     Problem: Musculor/Skeletal Functional Status  Goal: Highest potential functional level  Outcome: Ongoing  Goal: Absence of falls  Outcome: Ongoing     Problem: Nutrition  Goal: Optimal nutrition therapy  Description: Nutrition Problem #1: Inadequate oral intake  Intervention: Food and/or Nutrient Delivery: Continue Current Diet, Start Oral Nutrition Supplement  Nutritional Goals: meet % of estimated nutrition needs     Outcome: Ongoing     Problem: Skin Integrity:  Goal: Will show no infection signs and symptoms  Description: Will show no infection signs and symptoms  Outcome: Ongoing  Goal: Absence of new skin breakdown  Description: Absence of new skin breakdown  Outcome: Ongoing     Problem: Falls - Risk of:  Goal: Will remain free from falls  Description: Will remain free from falls  Outcome: Ongoing  Goal: Absence of physical injury  Description: Absence of physical injury  Outcome: Ongoing

## 2020-08-26 NOTE — PROGRESS NOTES
Progress Note               Date:                           8/26/2020  Patient name:           Carleen Payan  Date of admission:  8/24/2020  7:33 AM  MRN:   [id number]  YOB: 1950  PCP:                           Mauro Brock PA-C        Subjective:   Patient seen and examined at bedside. No acute issues overnight. Patient transferred out of ICU. Not Alert and oriented at all. HPI in Brief:     The patient is a 72 y.o.  female who is admitted to the hospital for further management of altered mental status. Patient was found in her basement by her  lying on dog feces. Duration was unknown. Patient was confused and gives no history. She had no respiratory distress. No fever. No witnessed seizure activities. No injuries. Patient is totally confused. Patient had labs which showed evidence of rhabdomyolysis and electrolytes abnormalities. Patient is known to our practice. She is known to have lung cancer status post combined chemoradiation followed by immunotherapy with Imfinzi. Patient had multiple other comorbidities including chronic hepatitis C and liver cirrhosis. Review of systems  ROS cannot be obtained because of patient's mentation.     Medications:   Reviewed in Epic     Objective:   Vitals: /75   Pulse 90   Temp 97.7 °F (36.5 °C) (Axillary)   Resp 15   Ht 5' 3\" (1.6 m)   Wt 94 lb 5.7 oz (42.8 kg)   SpO2 98%   BMI 16.71 kg/m²   General appearance - Mentation is poor  Chest - clear to auscultation, no wheezes, rales or rhonchi, symmetric air entry   Heart - normal rate, regular rhythm, normal S1, S2, no murmurs, rubs, clicks or gallops   Abdomen - Tender abdomen in all quadrants  Neurological - Difficult to access because of patient's mentation   Musculoskeletal - no joint tenderness, deformity or swelling   Extremities - peripheral pulses normal, no pedal edema, no clubbing or cyanosis   Skin - normal coloration and turgor, no rashes, no suspicious skin lesions noted ,     Data:  No intake/output data recorded. In: 3019.1 [P.O.:120; I.V.:2899.1]  Out: 820 [Urine:820]  CBC:   Recent Labs     08/24/20 0753 08/25/20 0445 08/26/20  0534   WBC 10.9 8.5 7.1   HGB 12.6 9.9* 9.5*   PLT 98* 71* 58*     BMP:    Recent Labs     08/24/20 0753 08/25/20  0140 08/25/20 0445 08/25/20 2012 08/26/20  0108 08/26/20  0534     --   --  138  --   --   --  138   K 2.4*   < >  --  3.0*   < > 3.3* 3.3* 4.0     --   --  107  --   --   --  108*   CO2 20  --   --  20  --   --   --  21   BUN 10  --   --  7*  --   --   --  4*   CREATININE 0.67  --  0.63 0.50  --   --   --  0.46*   GLUCOSE 145*  --   --  245*  --   --   --  166*    < > = values in this interval not displayed.      Hepatic:   Recent Labs     08/24/20 0753 08/25/20 0445 08/26/20  0534   AST 77* 66* 55*   ALT 59* 50* 46*   BILITOT 4.02* 2.90* 1.90*   ALKPHOS 117* 97 102     INR:   Recent Labs     08/24/20 2027   INR 2.1       IMAGING DATA:    Problem Lists:   Primary Problem:  AMS (altered mental status)   Current Problems:  Active Hospital Problems    Diagnosis Date Noted    Encounter for palliative care [Z51.5]     AMS (altered mental status) [R41.82] 08/24/2020    Pleural effusion on right [J90] 08/24/2020    Hypokalemia [E87.6] 08/24/2020    Depression [F32.9] 08/24/2020    Transaminitis [R74.0] 08/24/2020    Respiratory alkalosis [E87.3] 08/24/2020    Elevated lactic acid level [R79.89] 08/24/2020    Elevated myoglobin level [R89.7] 08/24/2020    Diabetes mellitus type 2 in nonobese ([facility] [E11.9] 08/24/2020    Malignant neoplasm of liver ([facility] [C22.9] 05/22/2020    History of chemotherapy [Z92.21]     History of radiation therapy [Z92.3]     Malignant neoplasm of upper lobe of right lung ([facility] [C34.11] 04/26/2019    Chronic obstructive pulmonary disease ([facility] [J44.9] 03/30/2019    Chronic hepatitis C without hepatic coma ([facility] [B18.2] 02/01/2018    CL (cirrhosis of liver) problem list as documented.                                [facility]                          Cell: [phone]
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
## 2020-08-26 NOTE — PROGRESS NOTES
Physical Therapy  Facility/Department: [facility] ONC/MED SURG  Daily Treatment Note  NAME: Gloria Worrell  : 1950  MRN: [id number]    Date of Service: 2020    Discharge Recommendations:  Patient would benefit from continued therapy after discharge   PT Equipment Recommendations  Equipment Needed: (Continue to assess pending progression of mobility)    Assessment   Body structures, Functions, Activity limitations: Decreased functional mobility ; Decreased cognition;Decreased ROM; Decreased safe awareness;Decreased strength;Decreased balance;Decreased sensation;Decreased endurance  Assessment: Pt required maxA to perform bed mobility, mod-maxA to perform STS transfers. Pt is a high fall risk and would be unsafe to perform functional mobility unassisted at this time. Recommending continued skilled physical therapy to address functional mobility deficits and return pt to prior level of function. Prognosis: Good  Decision Making: Medium Complexity  PT Education: Goals;PT Role;Plan of Care;General Safety  Patient Education: Importance of out of bed mobility; decreasing fall risk  REQUIRES PT FOLLOW UP: Yes     Patient Diagnosis(es): The primary encounter diagnosis was McDavid coma scale total score 13-15, at arrival to emergency department. Diagnoses of Hypokalemia, Increased ammonia level, and Malignant neoplasm of upper lobe of right lung [facility]) were also pertinent to this visit.      has a past medical history of Anxiety and depression, Back pain, Bipolar disorder ([address] Bronchitis, Cancer ([address] Chronic obstructive pulmonary disease (COPD) ([address] Cirrhosis ([address] Cough, Current every day smoker, Fibromyalgia, Hepatitis, History of cervical cancer, History of chemotherapy, History of radiation therapy, Liver disease, Liver metastases ([address] Lung cancer ([address] Lung mass, Malignant neoplasm of upper lobe of right lung ([address] MVP (mitral valve prolapse), On supplemental oxygen therapy, Wears dentures, Wears glasses, and Wheezing. has a past surgical history that includes Cervical disc surgery; Hysterectomy; Cholecystectomy; Breast surgery (Left); Carpal tunnel release (Bilateral); Knee arthroscopy (Right); bronchoscopy (04/15/2019); bronchoscopy (N/A, 4/15/2019); bronchoscopy (4/15/2019); bronchoscopy (4/15/2019); TUNNELED CENTRAL VENOUS CATHETER W/ SUBCUTANEOUS PORT (Left, 05/13/2019); thoracentesis (Right, 05/13/2019); and thoracentesis (Right, 08/19/2019). Restrictions  Restrictions/Precautions  Restrictions/Precautions: General Precautions  Required Braces or Orthoses?: No  Position Activity Restriction  Other position/activity restrictions: up w/ assist, ambulate pt  Subjective   General  Response To Previous Treatment: Patient with no complaints from previous session. Family / Caregiver Present: No  Subjective  Subjective: RN and pt in agreement for PT treat; pt supine in bed upon PT arrival, pt lethargic, frequently confused throughout session  Pain Screening  Patient Currently in Pain: Denies  Vital Signs  Patient Currently in Pain: Denies       Orientation  Orientation  Overall Orientation Status: Impaired  Orientation Level: Oriented to person;Disoriented to place; Disoriented to time;Disoriented to situation  Cognition   Cognition  Overall Cognitive Status: Exceptions  Arousal/Alertness: Inconsistent responses to stimuli;Delayed responses to stimuli  Following Commands: Follows one step commands with repetition; Inconsistently follows commands; Follows one step commands with increased time  Attention Span: Attends with cues to redirect; Difficulty attending to directions; Difficulty dividing attention  Safety Judgement: Decreased awareness of need for assistance;Decreased awareness of need for safety  Problem Solving: Decreased awareness of errors;Assistance required to identify errors made;Assistance required to correct errors made;Assistance required to generate solutions;Assistance required to implement solutions  Insights: Not aware of deficits  Initiation: Requires cues for all  Sequencing: Requires cues for all  Objective   Bed mobility  Rolling to Left: Minimal assistance  Rolling to Right: Minimal assistance  Supine to Sit: Maximum assistance(Assist provided for trunk progression)  Sit to Supine: Maximum assistance  Scooting: Moderate assistance  Comment: Pt required max verbal and tactile cueing for all sequencing and initiation with poor return demo. Pt required min-modA to maintain upright trunk posture while seated EOB. R<>L rolling performed x 2 bilaterally for repositioning of bedding. Transfers  Sit to Stand: Moderate Assistance  Stand to sit: Moderate Assistance  Comment: STS performed x2. Pt demonstrates posterior trunk lean upon standing requiring modA to correct. Pt required max tactile cueing for hand placement on RW. Ambulation  Ambulation?: No(Ambulation attempted- pt unable to progress bilateral LE for ambulation despite maxA and cueing)     Balance  Posture: Poor  Sitting - Static: Fair;-  Sitting - Dynamic: Fair;-  Standing - Static: Poor; +(Assessed in RW)  Standing - Dynamic: Poor;-  Comments: Standing balance assessed w/ RW; pt required modA to sit EOB      Exercises deferred due to arrival of lunch and difficulty with pt following commands and falling asleep.    Goals  Short term goals  Time Frame for Short term goals: 14 visits  Short term goal 1: Pt to perform bed mobility with SBA  Short term goal 2: Pt to perform transfers with SBA  Short term goal 3: Pt to perform fair + standing balance  Short term goal 4: Pt to ambulate 100 ft in RW independently  Short term goal 5: Pt to tolerate 30 min therapy to increase endurance  Patient Goals   Patient goals : Pt wants to return home    Plan    Plan  Times per week: 5-6x/week  Times per day: Daily  Current Treatment Recommendations: Strengthening, Endurance Training, Transfer Training, Patient/Caregiver Education & Training, ROM, Balance Training, Gait Training, Home Exercise Program, Functional Mobility Training, Safety Education & Training  Safety Devices  Type of devices: Patient at risk for falls, Bed alarm in place, Left in bed, Gait belt, Nurse notified, Call light within reach  Restraints  Initially in place: No     Therapy Time   Individual Concurrent Group Co-treatment   Time In 1115         Time Out 1154         Minutes 39         Timed Code Treatment Minutes: [address], PT

## 2020-08-26 NOTE — PROGRESS NOTES
[facility]  Speech Language Pathology    Date: 8/26/2020  Patient Name: Mana Vasquez  YOB: 1950   AGE: 72 y.o. MRN: [id number]        Patient Not Available for Speech Therapy     Due to:  [] Testing  [] Hemodialysis  [] Cancelled by RN  [] Surgery   [] Intubation/Sedation/Pain Medication  [] Medical instability  [x] Other: Pt refused. ST to continue to follow.      Next scheduled treatment: 8/27/2020  Completed by: Francy Chavira M.S., CF-SLP

## 2020-08-27 NOTE — DISCHARGE INSTR - COC
Continuity of Care Form    Patient Name: James Juarez   :  1950  MRN:  [id number]    Admit date:  2020  Discharge date:  ***2020     Code Status Order: Full Code   Advance Directives:     Admitting Physician:  Nirav Kraus MD  PCP: Herlinda Pastrana PA-C    Discharging Nurse: Estefani Silverio [facility] Unit/Room#: 8897/1487-39  Discharging Unit Phone Number: [phone]    Emergency Contact:   Extended Emergency Contact Information  Primary Emergency Contact: Julia Arriaga *Saw doshi  Address: [address], [address] [place] [address] [address] Phone: [phone]  Mobile Phone: [phone]  Relation: Spouse   needed?  No    Past Surgical History:  Past Surgical History:   Procedure Laterality Date    BREAST SURGERY Left     benign lumpectomy, multiple    BRONCHOSCOPY  04/15/2019    biopsies and washings    BRONCHOSCOPY N/A 4/15/2019    BRONCHOSCOPY BRUSHINGS performed by Nirav Kraus MD at [address] [facility]  4/15/2019    BRONCHOSCOPY BIOPSY BRONCHUS performed by Nirav Kraus MD at [address] [facility]  4/15/2019    BRONCHOSCOPY DIAGNOSTIC OR CELL [address] ONLY performed by Nirav Kraus MD at [address] Bilateral     CERVICAL [address]      X 2    CHOLECYSTECTOMY      HYSTERECTOMY      complete    KNEE ARTHROSCOPY Right     THORACENTESIS Right 2019    THORACENTESIS Right 2019    TUNNELED CENTRAL VENOUS CATHETER W/ SUBCUTANEOUS PORT Left 2019       Immunization History:   Immunization History   Administered Date(s) Administered    Influenza, Quadv, IM, (6 mo and older Fluzone, Flulaval, Fluarix and 3 yrs and older Afluria) 2019    Influenza, Quadv, IM, PF (6 mo and older Fluzone, Flulaval, Fluarix, and 3 yrs and older Afluria) 2018    Influenza, Triv, inactivated, subunit, adjuvanted, IM (Fluad 65 yrs and older) 2019    Pneumococcal Polysaccharide ([id number]) concentrate and follow conversation    IV Access:  - None    Nursing Mobility/ADLs:  Walking   Assisted  Transfer  Assisted  Bathing  Dependent  Dressing  Dependent  Toileting  Dependent  Feeding  Independent  Med Admin  Assisted  Med Delivery   whole    Wound Care Documentation and Therapy:        Elimination:  Continence:   · Bowel: {YES / VQ:53683}  · Bladder: {YES / JA:17908}  Urinary Catheter: None   Colostomy/Ileostomy/Ileal Conduit: {YES / OI:82155}       Date of Last BM: ***    Intake/Output Summary (Last 24 hours) at 8/27/2020 1122  Last data filed at 8/26/2020 1742  Gross per 24 hour   Intake 2100 ml   Output 600 ml   Net 1500 ml     I/O last 3 completed shifts: In: 2100 [P.O.:850; I.V.:1250]  Out: 600 [Urine:600]    Safety Concerns:     History of Falls (last 30 days) and At Risk for Falls    Impairments/Disabilities:      None    Nutrition Therapy:  Current Nutrition Therapy:   - Oral Diet:  Carb Control 4 carbs/meal (1800kcals/day)  - Oral Nutrition Supplement:  Standard  twice a day    Routes of Feeding: Oral  Liquids: {Slp liquid thickness:34041}  Daily Fluid Restriction: no  Last Modified Barium Swallow with Video (Video Swallowing Test): not done    Treatments at the Time of Hospital Discharge:   Respiratory Treatments: ***  Oxygen Therapy:  is not on home oxygen therapy.   Ventilator:    - No ventilator support    Rehab Therapies: {THERAPEUTIC INTERVENTION:2609430091}  Weight Bearing Status/Restrictions: No weight bearing restirctions  Other Medical Equipment (for information only, NOT a DME order):  {EQUIPMENT:315258974}  Other Treatments: ***    Patient's personal belongings (please select all that are sent with patient):  None    RN SIGNATURE:  Electronically signed by Ajay Beltran on 8/28/20 at 8:45 AM EDT    CASE MANAGEMENT/SOCIAL WORK SECTION    Inpatient Status Date: 8-24    Readmission Risk Assessment Score:  Readmission Risk              Risk of Unplanned Readmission:        36 Discharging to Facility/ Agency   · Name: [address]  · Address:  · Phone:  · Fax:    Dialysis Facility (if applicable)   · Name:  · Address:  · Dialysis Schedule:  · Phone:  · Fax:    / signature: Electronically signed by Dari Cummins RN on 8/28/20 at 5:10 PM EDT    PHYSICIAN SECTION    Prognosis: Good    Condition at Discharge: Stable    Rehab Potential (if transferring to Rehab): Good    Recommended Labs or Other Treatments After Discharge: [facility]    Physician Certification: I certify the above information and transfer of James Juarez  is necessary for the continuing treatment of the diagnosis listed and that she requires [place] for less 30 days.      Update Admission H&P: No change in H&P    PHYSICIAN SIGNATURE:  Electronically signed by Callie Stafford MD on 8/27/20 at 11:26 AM EDT

## 2020-08-27 NOTE — CARE COORDINATION
Attempted to call  X2. No answer. He needs to pick another SNF choice. [address] called from [facility]. They are accepting the patient and patient can be discharged. need to speak with the . His first choice [facility] and he changed his mind and wanted [facility]. [facility] has no beds. Having a problem contacting the . Patient is discharged and can go in the morning. Please call [facility] admissions coordinator on cell - [phone].

## 2020-08-27 NOTE — PLAN OF CARE
Problem: Mental Status - Impaired:  Goal: Mental status will improve  Description: Mental status will improve  8/27/2020 0124 by Sasha Guzman RN  Outcome: Ongoing     Problem: Fluid Volume:  Goal: Ability to achieve a balanced intake and output will improve  Description: Ability to achieve a balanced intake and output will improve  8/27/2020 0124 by Sasha Guzman RN  Outcome: Ongoing     Problem: Physical Regulation:  Goal: Ability to maintain clinical measurements within normal limits will improve  Description: Ability to maintain clinical measurements within normal limits will improve  8/27/2020 0124 by Sasha Guzman RN  Outcome: Ongoing     Problem: Physical Regulation:  Goal: Will show no signs and symptoms of electrolyte imbalance  Description: Will show no signs and symptoms of electrolyte imbalance  8/27/2020 0124 by Sasha Guzman RN  Outcome: Ongoing     Problem: Coping:  Goal: Ability to remain calm will improve  Description: Ability to remain calm will improve  8/27/2020 0124 by Sasha Guzman RN  Outcome: Ongoing     Problem: Safety:  Goal: Ability to remain free from injury will improve  Description: Ability to remain free from injury will improve  8/27/2020 0124 by Sasha Guzman RN  Outcome: Ongoing     Problem: Self-Care:  Goal: Ability to participate in self-care as condition permits will improve  Description: Ability to participate in self-care as condition permits will improve  8/27/2020 0124 by Sasha Guzman RN  Outcome: Ongoing     Problem: Pain:  Goal: Pain level will decrease  Description: Pain level will decrease  8/27/2020 0124 by Sasha Guzman RN  Outcome: Ongoing     Problem: Pain:  Goal: Control of acute pain  Description: Control of acute pain  8/27/2020 0124 by Sasha Guzman RN  Outcome: Ongoing     Problem: Pain:  Goal: Control of chronic pain  Description: Control of chronic pain  8/27/2020 0124 by Sasha Guzman RN  Outcome: Ongoing     Problem: Musculor/Skeletal Functional Status  Goal: Highest potential functional level  8/27/2020 0124 by Joya Howrad RN  Outcome: Ongoing     Problem: Musculor/Skeletal Functional Status  Goal: Absence of falls  8/27/2020 0124 by Joya Howard RN  Outcome: Ongoing     Problem: Nutrition  Goal: Optimal nutrition therapy  Description: Nutrition Problem #1: Inadequate oral intake  Intervention: Food and/or Nutrient Delivery: Continue Current Diet, Start Oral Nutrition Supplement  Nutritional Goals: meet % of estimated nutrition needs     8/27/2020 0124 by Joya Howard RN  Outcome: Ongoing     Problem: Skin Integrity:  Goal: Will show no infection signs and symptoms  Description: Will show no infection signs and symptoms  8/27/2020 0124 by Joya Howard RN  Outcome: Ongoing     Problem: Skin Integrity:  Goal: Absence of new skin breakdown  Description: Absence of new skin breakdown  8/27/2020 0124 by Joya Howard RN  Outcome: Ongoing     Problem: Falls - Risk of:  Goal: Will remain free from falls  Description: Will remain free from falls  8/27/2020 0124 by Joya Howard RN  Outcome: Ongoing     Problem: Falls - Risk of:  Goal: Absence of physical injury  Description: Absence of physical injury  8/27/2020 0124 by Joya Howard RN  Outcome: Ongoing

## 2020-08-27 NOTE — PROGRESS NOTES
Physical Therapy  Facility/Department: [facility] ONC/MED SURG  Daily Treatment Note  NAME: Walter Brock  : 1950  MRN: [id number]    Date of Service: 2020    Discharge Recommendations:  Patient would benefit from continued therapy after discharge   PT Equipment Recommendations  Equipment Needed: Yes  Mobility Devices: Alex Brantley: Rolling  Other: CTA    Assessment   Body structures, Functions, Activity limitations: Decreased functional mobility ; Decreased cognition;Decreased ROM; Decreased safe awareness;Decreased strength;Decreased balance;Decreased sensation;Decreased endurance  Assessment: Pt currently completes bed mobility ModA, functional transfers with Curt, and ambulates 6ft along EOB with ModA. Pt currently is not safe to complete functional mobility without skilled assistance. Pt would benefit from continued skilled physical therapy to increase independence with functional mobility. Prognosis: Good  Decision Making: Medium Complexity  PT Education: Goals;PT Role;Plan of Care;General Safety  Patient Education: Importance of out of bed mobility; decreasing fall risk  REQUIRES PT FOLLOW UP: Yes  Activity Tolerance  Activity Tolerance: Patient limited by endurance; Patient limited by fatigue     Patient Diagnosis(es): The primary encounter diagnosis was Christiano coma scale total score 13-15, at arrival to emergency department. Diagnoses of Hypokalemia, Increased ammonia level, and Malignant neoplasm of upper lobe of right lung [facility]) were also pertinent to this visit.      has a past medical history of Anxiety and depression, Back pain, Bipolar disorder ([address] Bronchitis, Cancer ([address] Chronic obstructive pulmonary disease (COPD) ([address] Cirrhosis ([address] Cough, Current every day smoker, Fibromyalgia, Hepatitis, History of cervical cancer, History of chemotherapy, History of radiation therapy, Liver disease, Liver metastases ([address] Lung cancer ([address] Lung mass, Malignant neoplasm of upper lobe of right errors;Assistance required to generate solutions;Assistance required to identify errors made;Assistance required to implement solutions;Assistance required to correct errors made  Insights: Decreased awareness of deficits  Initiation: Requires cues for some  Sequencing: Requires cues for some  Objective   Bed mobility  Bridging: Contact guard assistance  Rolling to Left: Contact guard assistance  Rolling to Right: Contact guard assistance  Supine to Sit: Moderate assistance  Sit to Supine: Contact guard assistance  Scooting: Contact guard assistance  Comment: Pt requires increased time to complete; HOB elevated; and use of bedrails. Pt completed rolling tasks multiple times to assist with brief change. Transfers  Sit to Stand: Minimal Assistance  Stand to sit: Minimal Assistance  Comment: Pt requires VC's for proper UE placement and proper use of AD. Pt requires VC's and tactile cues for proper UE placement on RW. Ambulation  Ambulation?: Yes  Ambulation 1  Surface: level tile  Device: Rolling Walker  Assistance: Moderate assistance  Quality of Gait: Forward flexed posture, decreased santos, unsteady. Significant VC's and tactile cues for proper use of RW. Gait Deviations: Decreased step length;Decreased step height;Shuffles; Slow Santos  Distance: 6ft x2 lateral steps to HOB.   Stairs/Curb  Stairs?: No     Balance  Posture: Poor  Sitting - Static: Fair  Sitting - Dynamic: Fair;-  Standing - Static: Poor;+  Standing - Dynamic: Poor  Comments: Standing balance assessed w/ RW                          Goals  Short term goals  Time Frame for Short term goals: 14 visits  Short term goal 1: Pt to perform bed mobility with SBA  Short term goal 2: Pt to perform transfers with SBA  Short term goal 3: Pt to perform fair + standing balance  Short term goal 4: Pt to ambulate 100 ft in RW independently  Short term goal 5: Pt to tolerate 30 min therapy to increase endurance  Patient Goals   Patient goals : Pt wants to return home    Plan    Plan  Times per week: 5-6x/week  Times per day: Daily  Current Treatment Recommendations: Strengthening, Endurance Training, Transfer Training, Patient/Caregiver Education & Training, ROM, Balance Training, Gait Training, Home Exercise Program, Functional Mobility Training, Safety Education & Training  Safety Devices  Type of devices:  All fall risk precautions in place, Bed alarm in place, Call light within reach, Gait belt, Patient at risk for falls, Left in bed, Nurse notified(All four rails up.)  Restraints  Initially in place: (All four rails up.)     Therapy Time   Individual Concurrent Group Co-treatment   Time In 0926         Time Out 0949         Minutes 23         Timed Code Treatment Minutes: [address], PT

## 2020-08-27 NOTE — PROGRESS NOTES
at bedside. States he wants his wife to go to [facility] as it's closer to him.  had to leave but will be back to speak with .

## 2020-08-27 NOTE — CARE COORDINATION
Waiting on [employer] to call writer for acceptance to [facility].  and patient will want to [facility]. Waiting on a return phone call.
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
## 2020-08-27 NOTE — CARE COORDINATION
Spoke to  to inform him that [facility] does not have beds. He seemed very agitated and wanted patient to be transferred to [facility] because patient has not had any chemo treatments in 3-4 weeks. Writer explained that she is ready to discharge to a SNF.  states that he is going to call the oncologist over at [facility] and will call writer back. Waiting on return phone call. I believe that APS was called and that is why the  is upset.

## 2020-08-27 NOTE — PROGRESS NOTES
..    Palliative Care Progress Note    NAME:  Baldemar Mancilla RECORD NUMBER:  [id number]  AGE: 72 y.o. GENDER: female  : 1950  TODAY'S DATE:  2020    Reason for Consult:  goals of care and support  History of Present Illness     The patient is a 72 y.o. Non-/non  female who presents with Altered Mental Status (Pt to ED per [facility], EMS was called by pt  after he found her laying on the basement floor, pt was found in dog feces, EMS states house was very dirty and unfit for living, recommended contacting adult protective services, unsure when patient was last seen normal or how long she was on the floor for, pt has a history of cancer, EMS unsure what kind, pt recently finished chemo, pt appears slightly jaundice, confused, unable to follow commands, nonverbal)      Referred to Palliative Care by  [x] Physician   [] Nursing  [] Family Request   [] Other:       She was admitted to the primary service for AMS (altered mental status) [R41.82]  AMS (altered mental status) [R41.82]. Her hospital course has been associated with AMS, malnutrition, hyperammoniemia, hypokalemia, malignant neoplasm of right lung and liver. The patient has a complicated medical history and has been hospitalized since 2020  7:33 AM. Patient is S/p Chemoradiation with Dr. Kehinde Qureshi. Patient has h/o of cirrhosis and chronic Hep C and is not a candidate for transplant. Oncology referred patient in 3/2020 for Microsphere radioembolization Y-90 treatment for liver lesion. Patient has been on immunotherapy since 20. Palliative received second consult yesterday and continues to follow for review of goals. Overnight Events: Patient is more alert today, with some improvement in orientation today.  RN reports possible placement and  note reports APS referral.      BP (!) 150/98   Pulse 106   Temp 97.5 °F (36.4 °C) (Oral)   Resp 20   Ht 5' 3\" (1.6 m)   Wt 94 lb 5.7 oz (42.8 kg)   SpO2 100% BMI 16.71 kg/m²     Assessment        REVIEW OF SYSTEMS    []   UNABLE TO OBTAIN:     Constitutional:  []   Chills   [x]  Fatigue   []  Fevers   []  Malaise   []  Weight loss   [] Other:     Respiratory:   []  Cough    []  Shortness of breath    []  Chest pain    [] Other:     Cardiovascular:   []  Chest pain  []  Dyspnea    []  Exertional chest pressure/discomfort     [] Fatigue      []  Palpitations    []  Syncope   [] Other:     Gastrointestinal:   [x]  Abdominal pain   []  Constipation    [x]  Diarrhea    []   Dysphagia   []  Reflux             []  Vomiting   [] Other:     Genitourinary:  []  Dysuria     []  Frequency   []  Hematuria   [] Nocturia   [x]  Urinary incontinence   [] Other:     Musculoskeletal:   [] Back pain    [x]  Muscle weakness   []  Myalgias    []  Neck pain   []  Stiff joints   []  Other:     Behavioral/Psych:   [] Anxiety    []  Depression     []  Mood swings   [] Other:     PHYSICAL ASSESSMENT:     General: []  Oriented x3      [] well appearing      [] Intubated      [x] ill appearing      [x] Other: Oriented to person and place but not time. Mental Status: [] normal mental status exam      [] drowsy      [x] Confused      [] Other:     Cardiovascular: [x]  Regular rate/rhythm      [] Arrhythmia      [] Other:     Chest: [x] Effort normal      [] lungs clear      [] respiratory distress      [] Tachypnea      [x]  Other: NC    Abdomen: [] Soft/non-tender      []  Normal appearance      [] Distended      [] Ascites      [x] Other: Generalized tenderness    Neurological: [x] Normal Speech      [x] Normal Sensation      []  Deficits present:      Extremity:  [] normal skin color/temp      [] clubbing/cyanosis      [x]  No edema      [x] Other: Jaundice    Palliative Performance Scale:  ___60%  Ambulation reduced; Significant disease; Can't do hobbies/housework; intake normal or reduced; occasional assist; LOC full/confusion  _x__50%  Mainly sit/lie;  Extensive disease; Can't do any work; Considerable assist; intake normal or reduced; LOC full/confusion  ___40%  Mainly in bed; Extensive disease; Mainly assist; intake normal or reduced; LOC full/confusion   ___30%  Bed Bound; Extensive disease; Total care; intake reduced; LOCfull/confusion  ___20%  Bed Bound; Extensive disease; Total care; intake minimal; Drowsy/coma  ___10%  Bed Bound; Extensive disease; Total care; Mouth care only; Drowsy/coma  ___0       Death      Plan      Palliative Interaction: I visited the patient and introduced myself and the palliative role. Patient appears more alert and is carrying more of a conversation today, so I checked her orientation level. Patient reports her name, she reports that she is in [address] [place] [address], but is unable to tell me who the president is or the month/year- she states, \"July 22, 22.\" I asked patient if her  was here this AM and she reported yes and charting also states this. I told her that I have been trying to reach him, but have not been able to. I discussed with patient her cancer journey and her goals for this. She reports wanting to continue to seek treatment. I discussed with patient her chronic liver condition. She reports to be having loose stool now. I explained palliative care to patients and discussed options for outpatient. I told her that I wanted to discuss this with her  as well. I reviewed patients present code status (FULL CODE) with her in full detail. Patient reports wanting CPR, Defibrillation, and intubation after each explained. I again discussed her chronic illness. Patient remains semi-oriented so this will need to be discussed with her  or when she is fully oriented. Patient reports  to be POA/decision maker. I spoke to RN and discussed patients orientation level and informed her that we received 2nd consult yesterday on patient. I explained that we saw patient and have attempted to get a hold of patients  but no answer. She reports that patients  will return later today. I informed RN to notify writer when he arrives. I offered patient much emotional support and told her I would discuss her pain with her RN. I called patients  no voicemail and mobile number is not valid. UPDATE: patients  arrived and RN contacted writer via perfect serve. I came to visit patient and  and  was no longer in the room. RN reports after hanging up APS visited patient and  wouldn't leave room as they asked so they could speak to her privately.  was upset and has been in/out since then. RN will page if  returns. Education/support to staff  Education/support to patient  Communications with primary service  Providing support for coping/adaptation/distress of patient  Discussing meaning/purpose   Decisional capacity assessed  Continue with current plan of care  Code status clarified: Full Code  Have attempted to reach patients  numerous times to discuss goals since patient is confused. No voicemail and mobile number is not in service. Principle Problem/Diagnosis:  AMS (altered mental status)    Additional Assessments:  Principal Problem:    AMS (altered mental status)  Active Problems:    CL (cirrhosis of liver) (HCC)    Chronic hepatitis C without hepatic coma (HCC)    Chronic obstructive pulmonary disease (HCC)    Malignant neoplasm of upper lobe of right lung (HCC)    Malignant neoplasm of liver (HCC)    History of radiation therapy    History of chemotherapy    Pleural effusion on right    Hypokalemia    Depression    Transaminitis    Respiratory alkalosis    Elevated lactic acid level    Elevated myoglobin level    Diabetes mellitus type 2 in nonobese [facility])    Encounter for palliative care  Resolved Problems:    * No resolved hospital problems. *    1- Symptom management/ pain control     Pain Assessment:  Pain is controlled with current analgesics.   Medication(s) being used: narcotic analgesics including Roxicodone 5mg Q4prn or Fentanyl 25-50mcg Q1 hour PRN. Anxiety:  none                          Dyspnea:  chronic dyspnea                          Fatigue:  significant change in weight and exercise intolerance    Other: Malnutrition  We feel the patient symptoms are being controlled. her current regimen is reviewed by myself and discussed with the staff. Spoke to RN in regards to patients present pain. She is holding her stomach, grimacing,  and rating generalized abdominal pain 7/10 on pain scale and reports needing pain medication. RN reviewed current orders and can medicate for pain. 2- Goals of care evaluation   The patient goals of care are live longer, improve or maintain function/quality of life, remain at home and support for family/caregiver   Goals of care discussed with:    [x] Patient independently    [] Patient and Family    [] Family or Healthcare DPOA independently    [] Unable to discuss with patient, family/DPOA not present    3- Code Status  Full Code    4- Other recommendations  - We will continue to provide comfort and support to the patient and the family    Please call with any palliative questions or concerns. Palliative Care Team is available via perfect serve or via phone. Palliative Care will continue to follow Ms. Wilson's care as needed. The note has been dictated by dragon, typing errors may be a possibility     Thank you for allowing Palliative Care to participate in the care of Ms. Chelsea Dominguez . Electronically signed by   LIZA Duffy - [facility]  Palliative Care Team  on 8/27/2020 at 11:26 AM    Palliative care can be reached via [employer].

## 2020-08-27 NOTE — PROGRESS NOTES
[facility]  Occupational Therapy Not Seen Note    DATE: 2020  Name: Bishop Blancas  : 1950  MRN: [id number]    Patient not available for Occupational Therapy due to:    [] Testing:    [] Hemodialysis    [] Blood Transfusion in Progress    [x]Refusal by Patient: Pt reports not wanting to do therapy this date, pain in hips and does not want to get up to use restroom. Pt reports wanting to return to sleep. [] Surgery/Procedure:    [] Strict Bedrest    [] Sedation    [] Spine Precautions     [] Pt being transferred to palliative care at this time. Spoke with pt/family and OT services to be defered. [] Pt independent with functional mobility and functional tasks. Pt with no OT acute care needs at this time, will defer OT eval.    [] Other    Next Scheduled Treatment: Ck .     Delores Carvalho, OT/S

## 2020-08-28 PROBLEM — E87.6 HYPOKALEMIA: Status: RESOLVED | Noted: 2020-01-01 | Resolved: 2020-01-01

## 2020-08-28 PROBLEM — R41.82 AMS (ALTERED MENTAL STATUS): Status: RESOLVED | Noted: 2020-01-01 | Resolved: 2020-01-01

## 2020-08-28 PROBLEM — R89.7 ELEVATED MYOGLOBIN LEVEL: Status: RESOLVED | Noted: 2020-01-01 | Resolved: 2020-01-01

## 2020-08-28 PROBLEM — R79.89 ELEVATED LACTIC ACID LEVEL: Status: RESOLVED | Noted: 2020-01-01 | Resolved: 2020-01-01

## 2020-08-28 NOTE — PLAN OF CARE
Problem: Mental Status - Impaired:  Goal: Mental status will improve  Description: Mental status will improve  8/28/2020 0504 by Ray Ricketts RN  Outcome: Ongoing  8/27/2020 1653 by Sydnie Ruiz RN  Outcome: Ongoing     Problem: Fluid Volume:  Goal: Ability to achieve a balanced intake and output will improve  Description: Ability to achieve a balanced intake and output will improve  8/28/2020 0504 by Ray Ricketts RN  Outcome: Ongoing  8/27/2020 1653 by Sydnie Ruiz RN  Outcome: Ongoing     Problem: Physical Regulation:  Goal: Ability to maintain clinical measurements within normal limits will improve  Description: Ability to maintain clinical measurements within normal limits will improve  8/28/2020 0504 by aRy Ricketts RN  Outcome: Ongoing  8/27/2020 1653 by Sydnie Ruiz RN  Outcome: Ongoing  Goal: Will show no signs and symptoms of electrolyte imbalance  Description: Will show no signs and symptoms of electrolyte imbalance  8/28/2020 0504 by Ray Ricketts RN  Outcome: Ongoing  8/27/2020 1653 by Sydnie Ruiz RN  Outcome: Ongoing     Problem: Coping:  Goal: Ability to remain calm will improve  Description: Ability to remain calm will improve  8/28/2020 0504 by Ray Rikcetts RN  Outcome: Ongoing  8/27/2020 1653 by Sydnie Ruiz RN  Outcome: Ongoing     Problem: Safety:  Goal: Ability to remain free from injury will improve  Description: Ability to remain free from injury will improve  8/28/2020 0504 by Ray Ricketts RN  Outcome: Ongoing  8/27/2020 1653 by Sydnie Ruiz RN  Outcome: Ongoing     Problem: Self-Care:  Goal: Ability to participate in self-care as condition permits will improve  Description: Ability to participate in self-care as condition permits will improve  8/28/2020 0504 by Ray Ricketts RN  Outcome: Ongoing  8/27/2020 1653 by Sydnie Ruiz RN  Outcome: Ongoing     Problem: Pain:  Goal: Pain level will decrease  Description: Pain level will decrease  8/28/2020 0504 by Scot Brittle, RN  Outcome: Ongoing  8/27/2020 1653 by Bashir Byrd RN  Outcome: Ongoing  Goal: Control of acute pain  Description: Control of acute pain  8/28/2020 0504 by Scot Brittle, RN  Outcome: Ongoing  8/27/2020 1653 by Bashir Byrd RN  Outcome: Ongoing  Goal: Control of chronic pain  Description: Control of chronic pain  8/28/2020 0504 by Scot Brittle, RN  Outcome: Ongoing  8/27/2020 1653 by Bashir Byrd RN  Outcome: Ongoing     Problem: Musculor/Skeletal Functional Status  Goal: Highest potential functional level  8/28/2020 0504 by Scot Brittle, RN  Outcome: Ongoing  8/27/2020 1653 by Bashir Byrd RN  Outcome: Ongoing  Goal: Absence of falls  8/28/2020 0504 by Scot Brittle, RN  Outcome: Ongoing  8/27/2020 1653 by Bashir Byrd RN  Outcome: Ongoing     Problem: Nutrition  Goal: Optimal nutrition therapy  Description: Nutrition Problem #1: Inadequate oral intake  Intervention: Food and/or Nutrient Delivery: Continue Current Diet, Start Oral Nutrition Supplement  Nutritional Goals: meet % of estimated nutrition needs     8/28/2020 0504 by Scot Brittle, RN  Outcome: Ongoing  8/27/2020 1653 by Bashir Byrd RN  Outcome: Ongoing     Problem: Skin Integrity:  Goal: Will show no infection signs and symptoms  Description: Will show no infection signs and symptoms  8/28/2020 0504 by Scot Brittle, RN  Outcome: Ongoing  8/27/2020 1653 by Bashir Byrd RN  Outcome: Ongoing  Goal: Absence of new skin breakdown  Description: Absence of new skin breakdown  8/28/2020 0504 by Scot Brittle, RN  Outcome: Ongoing  8/27/2020 1653 by Bashir Byrd RN  Outcome: Ongoing     Problem: Falls - Risk of:  Goal: Will remain free from falls  Description: Will remain free from falls  8/28/2020 0504 by Scot Brittle, RN  Outcome: Ongoing  8/27/2020 1653 by Bashir Byrd RN  Outcome: Ongoing  Goal: Absence of physical injury  Description: Absence of physical injury  8/28/2020 0504 by Sona Ross RN  Outcome: Ongoing  8/27/2020 1653 by Vesta Chavez RN  Outcome: Ongoing

## 2020-08-28 NOTE — DISCHARGE SUMMARY
[address] [facility]     Department of Internal Medicine - Staff Internal Medicine Teaching Service    INPATIENT DISCHARGE SUMMARY      Patient Identification:  Guera Spring is a 72 y.o. female. :  1950  MRN: [id number]     Acct: [de-identified]   PCP: Mago Colindres PA-C  Admit Date:  2020  Discharge date and time: No discharge date for patient encounter. Attending Provider: Myriam Herbert MD                                     [address] [facility] Problem Lists:  Principal Problem (Resolved):    AMS (altered mental status)  Active Problems:    CL (cirrhosis of liver) (HCC)    Chronic hepatitis C without hepatic coma (HCC)    Chronic obstructive pulmonary disease (HCC)    Malignant neoplasm of upper lobe of right lung (HCC)    Malignant neoplasm of liver (HCC)    History of radiation therapy    History of chemotherapy    Pleural effusion on right    Depression    Transaminitis    Respiratory alkalosis    Diabetes mellitus type 2 in nonobese ([address]  Resolved Problems:    Elevated lactic acid level    Elevated myoglobin level    Encounter for palliative care      HOSPITAL STAY     Brief Inpatient course: She was admitted to the [facility] for AMS (altered mental status) [R41.82]  AMS (altered mental status) [R41.82].  Her hospital course has been associated with AMS (altered mental status). Patient's home has reported to be dirty with this smell of human waste, and she has not been eating or drinking.       Hypokalemic dehydrated malnourished.  History lung and liver cancer, cirrhosis.  Was initially admitted for severe hypokalemia of initial 2.4 not on diuretics.  He was additionally hypophosphatemia likely secondary to poor caloric intake with severe malnutrition.  She was hypovolemic on bedside point-of-care ultrasound but not hypotensive or hypoxic.  Potassium improved with replacement.  No longer meets ICU standards     Currently   The patient is being transferred to the floors for further management.  The patient is lying comfortably on the bed alert orientedIrina New knows she is at [facility] and it is 2020.  Complains of diffuse abdominal pain. Vitals /78, heart rate 106, RR 18, saturating 100% on room air  We will continue to monitor. Procedures/ Significant Interventions:    None. Consults:     Consults:     Final Specialist Recommendations/Findings:   IP CONSULT TO HOSPITALIST  IP CONSULT TO SOCIAL WORK  IP CONSULT TO ONCOLOGY  IP CONSULT TO CRITICAL CARE  IP CONSULT TO HOSPITALIST  IP CONSULT TO PALLIATIVE CARE  IP CONSULT TO DIETITIAN  IP CONSULT TO IV TEAM  IP CONSULT TO IV TEAM  IP CONSULT TO PALLIATIVE CARE      Any Hospital Acquired Infections: none    Discharge Functional Status:  stable    DISCHARGE PLAN     Disposition: SNF    Patient Instructions:   Current Discharge Medication List      START taking these medications    Details   ipratropium-albuterol (DUONEB) 0.5-2.5 (3) MG/3ML SOLN nebulizer solution Inhale 3 mLs into the lungs every 4 hours  Qty: 360 mL, Refills: 1      gabapentin (NEURONTIN) 100 MG capsule Take 1 capsule by mouth 3 times daily for 30 days.   Qty: 90 capsule, Refills: 3         CONTINUE these medications which have CHANGED    Details   lactulose (CHRONULAC) 10 GM/15ML solution Take 30 mLs by mouth 3 times daily  Qty: 15 mL, Refills: 1         CONTINUE these medications which have NOT CHANGED    Details   potassium chloride (KLOR-CON M) 20 MEQ extended release tablet take 1 tablet by mouth once daily  Qty: 30 tablet, Refills: 0      oxyCODONE HCl (OXY-IR) 10 MG immediate release tablet take 1 tablet by mouth every 6 hours if needed for pain  Qty: 120 tablet, Refills: 0    Comments: Reduce doses taken as pain becomes manageable  Associated Diagnoses: Malignant neoplasm of upper lobe of right lung (HCC)      metFORMIN (GLUCOPHAGE) 500 MG tablet take 1 tablet by mouth once daily with breakfast  Qty: 30 tablet, Refills: 2    Associated Diagnoses: Hyperglycemia      VENTOLIN  (90 Base) MCG/ACT inhaler inhale 2 puffs by mouth and INTO THE LUNGS every 6 hours if needed for wheezing  Qty: 18 g, Refills: 2    Associated Diagnoses: Chronic obstructive pulmonary disease, unspecified COPD type (HCC)      citalopram (CELEXA) 10 MG tablet Take 1 tablet by mouth nightly  Qty: 30 tablet, Refills: 2    Associated Diagnoses: Anxiety      hydrOXYzine (ATARAX) 50 MG tablet Take 1 tablet by mouth daily  Qty: 30 tablet, Refills: 2    Associated Diagnoses: Anxiety      omeprazole (PRILOSEC) 40 MG delayed release capsule Take 1 capsule by mouth daily  Qty: 30 capsule, Refills: 2    Associated Diagnoses: Gastroesophageal reflux disease, esophagitis presence not specified      traZODone (DESYREL) 300 MG tablet take 1 tablet by mouth nightly  Qty: 30 tablet, Refills: 2    Associated Diagnoses: Medication refill      fluticasone-umeclidin-vilant (TRELEGY ELLIPTA) 100-62.5-25 MCG/INH AEPB Inhale 1 puff into the lungs daily  Qty: 1 each, Refills: 11      Varenicline Tartrate (CHANTIX PO) Take by mouth      lidocaine-prilocaine (EMLA) 2.5-2.5 % cream Apply topically as needed. Qty: 1 Tube, Refills: 1      albuterol (PROVENTIL) (2.5 MG/3ML) 0.083% nebulizer solution Take 3 mLs by nebulization every 6 hours as needed for Wheezing  Qty: 120 each, Refills: 5      lidocaine viscous hcl (XYLOCAINE) 2 % SOLN solution Take 5-10 mLs by mouth as needed for Irritation  Qty: 100 mL, Refills: 1      Oxygen Concentrator              Activity: activity as tolerated    Diet: regular diet    Follow-up:    Luis Yin PA-C  [address]    In 1 week  Admitted for AMS, f/u with Bobby[address] [place], MD  [address] [facility]  [phone]    In 2 weeks  For cancer follow up. Patient Instructions: Follow up outpatient with Heme- Onc in 2 weeks. Follow up outpatient with PCP.   Take medications as prescribed. Note that over 30 minutes was spent in preparing discharge papers, discussing discharge with patient, medication review, etc.      Bishop Kayleigh MD,   Internal Medicine Resident, PGY-1  [facility];  [place], [place]  8/28/2020, 4:34 PM

## 2020-08-28 NOTE — PROGRESS NOTES
Palliative Care Progress Note    NAME:  Baldemar Mancilla RECORD NUMBER:  [id number]  AGE: 72 y.o. GENDER: female  : 1950  TODAY'S DATE:  2020    Reason for Consult:  goals of care  History of Present Illness     The patient is a 72 y.o. Non-/non  female who presents with Altered Mental Status (Pt to ED per [facility], EMS was called by pt  after he found her laying on the basement floor, pt was found in dog feces, EMS states house was very dirty and unfit for living, recommended contacting adult protective services, unsure when patient was last seen normal or how long she was on the floor for, pt has a history of cancer, EMS unsure what kind, pt recently finished chemo, pt appears slightly jaundice, confused, unable to follow commands, nonverbal)      Referred to Palliative Care by  [x] Physician   [] Nursing  [] Family Request   [] Other:       She was admitted to the primary service for AMS (altered mental status) [R41.82]  AMS (altered mental status) [R41.82]. Her hospital course has been associated with altered mental status. The patient has a complicated medical history and has been hospitalized since 2020  7:33 AM. Patient remains confused but is improving. She has squamous cell lung cancer and been treated with Chemoradiation and now on immunotherapy. Patient also with Chronic hepatitis and liver cirrhosis. Patient ammonia level is 86 and she is on lactulose and her Potassium is 4.5. APS was out yesterday to review patient. [facility] OVERNIGHT EVENTS:  Patient remains confused but is more alert   Patient is full code   Possible discharge to [address] [facility] today if  gives consent.      /84   Pulse 108   Temp 97.6 °F (36.4 °C) (Oral)   Resp 22   Ht 5' 3\" (1.6 m)   Wt 94 lb 5.7 oz (42.8 kg)   SpO2 99%   BMI 16.71 kg/m²     Assessment        REVIEW OF SYSTEMS    []   UNABLE TO OBTAIN:     Constitutional:  []   Chills sit/lie; Extensive disease; Can't do any work; Considerable assist; intake normal or reduced; LOC full/confusion  __x_40%  Mainly in bed; Extensive disease; Mainly assist; intake normal or reduced; LOC full/confusion   ___30%  Bed Bound; Extensive disease; Total care; intake reduced; LOCfull/confusion  ___20%  Bed Bound; Extensive disease; Total care; intake minimal; Drowsy/coma  ___10%  Bed Bound; Extensive disease; Total care; Mouth care only; Drowsy/coma  ___0       Death      Plan      Palliative Interaction:  Attempted to call patient  and update him on patient being ready for discharge to [address] [place] [address]. They need  to sign consent to transfer patient to [facility] due to patient confusion. Morris Seen did not answer phone and there was no answering machine. Morris Seen has not been answering his phone when called. Education/support to patient  Discharge planning/helping to coordinate care  Communications with primary service  Pharmacologic pain management  Providing support for coping/adaptation/distress of patient  Discussing meaning/purpose   Caregiver support/education  Continue with current plan of care  Code status clarified: Full Code  Principle Problem/Diagnosis:  AMS (altered mental status)    Additional Assessments:    1- Symptom management/ pain control     Pain Assessment:  Pain is controlled with current analgesics. Medication(s) being used: acetaminophen, narcotic analgesics including Fentanyl .                Anxiety:  none                          Dyspnea:  none                          Fatigue:  generalized weakness and confusion    Other:      2- Goals of care evaluation   The patient goals of care are improve or maintain function/quality of life   Goals of care discussed with:    [x] Patient independently    [] Patient and Family    [] Family or Healthcare DPOA independently    [] Unable to discuss with patient, family/DPOA not present    3- Code Status  Full Code    4- Other recommendations  - We will continue to provide comfort and support to the patient and the family        Palliative Care will continue to follow Ms. Wilson's care as needed. The note has been dictated by dragon, typing errors may be a possibility     Thank you for allowing Palliative Care to participate in the care of Ms. Julia Arriaga . Electronically signed by   LIZA Burch NP  Palliative Care Team  on 8/28/2020 at 10:46 AM    Please call with any palliative questions or concerns. Palliative Care Team is available via perfect serve or via phone.

## 2020-08-28 NOTE — PROGRESS NOTES
Progress Note               Date:                           8/27/2020  Patient name:           Ana Rosa Culver  Date of admission:  8/24/2020  7:33 AM  MRN:   [id number]  YOB: 1950  PCP:                           Jamaal Rea PA-C        Subjective:   Patient seen and examined at bedside. No acute issues overnight. Slightly confused. Not Alert and oriented at all. HPI in Brief:     The patient is a 72 y.o.  female who is admitted to the hospital for further management of altered mental status. Patient was found in her basement by her  lying on dog feces. Duration was unknown. Patient was confused and gives no history. She had no respiratory distress. No fever. No witnessed seizure activities. No injuries. Patient is totally confused. Patient had labs which showed evidence of rhabdomyolysis and electrolytes abnormalities. Patient is known to our practice. She is known to have lung cancer status post combined chemoradiation followed by immunotherapy with Imfinzi. Patient had multiple other comorbidities including chronic hepatitis C and liver cirrhosis. Review of systems  ROS cannot be obtained because of patient's mentation.     Medications:   Reviewed in Epic     Objective:   Vitals: /81   Pulse 110   Temp 98.5 °F (36.9 °C) (Oral)   Resp 20   Ht 5' 3\" (1.6 m)   Wt 94 lb 5.7 oz (42.8 kg)   SpO2 98%   BMI 16.71 kg/m²   General appearance - Mentation is poor  Chest - clear to auscultation, no wheezes, rales or rhonchi, symmetric air entry   Heart - normal rate, regular rhythm, normal S1, S2, no murmurs, rubs, clicks or gallops   Abdomen - Tender abdomen in all quadrants  Neurological - Difficult to access because of patient's mentation   Musculoskeletal - no joint tenderness, deformity or swelling   Extremities - peripheral pulses normal, no pedal edema, no clubbing or cyanosis   Skin - normal coloration and turgor, no rashes, no suspicious skin lesions noted ,     Data:  No intake/output data recorded. No intake/output data recorded. CBC:   Recent Labs     08/25/20  0445 08/26/20  0534 08/27/20  0842   WBC 8.5 7.1 10.8   HGB 9.9* 9.5* 10.0*   PLT 71* 58* 69*     BMP:    Recent Labs     08/25/20  0445  08/26/20  0534  08/27/20  0842 08/27/20  1226 08/27/20  1700 08/27/20  2049     --  138  --  136  --   --   --    K 3.0*   < > 4.0   < > 4.5 4.2 4.5 4.5     --  108*  --  109*  --   --   --    CO2 20  --  21  --  17*  --   --   --    BUN 7*  --  4*  --  3*  --   --   --    CREATININE 0.50  --  0.46*  --  0.35*  --   --   --    GLUCOSE 245*  --  166*  --  162*  --   --   --     < > = values in this interval not displayed. Hepatic:   Recent Labs     08/25/20  0445 08/26/20  0534 08/27/20  0842   AST 66* 55* 68*   ALT 50* 46* 61*   BILITOT 2.90* 1.90* 2.76*   ALKPHOS 97 102 117*     INR:   No results for input(s): INR in the last 72 hours.     IMAGING DATA:    Problem Lists:   Primary Problem:  AMS (altered mental status)   Current Problems:  Active Hospital Problems    Diagnosis Date Noted    Encounter for palliative care [Z51.5]     AMS (altered mental status) [R41.82] 08/24/2020    Pleural effusion on right [J90] 08/24/2020    Hypokalemia [E87.6] 08/24/2020    Depression [F32.9] 08/24/2020    Transaminitis [R74.0] 08/24/2020    Respiratory alkalosis [E87.3] 08/24/2020    Elevated lactic acid level [R79.89] 08/24/2020    Elevated myoglobin level [R89.7] 08/24/2020    Diabetes mellitus type 2 in nonobese ([facility] [E11.9] 08/24/2020    Malignant neoplasm of liver ([facility] [C22.9] 05/22/2020    History of chemotherapy [Z92.21]     History of radiation therapy [Z92.3]     Malignant neoplasm of upper lobe of right lung ([facility] [C34.11] 04/26/2019    Chronic obstructive pulmonary disease ([facility] [J44.9] 03/30/2019    Chronic hepatitis C without hepatic coma ([facility] [B18.2] 02/01/2018    CL (cirrhosis of liver) ([place] [address] [K74.60] 02/01/2018 PMH:  Past Medical History:   Diagnosis Date    Anxiety and depression     Back pain     Bipolar disorder (HCC)     Bronchitis     Cancer ([address]     Chronic obstructive pulmonary disease (COPD) (HCC)     Cirrhosis (HCC)     Cough     Current every day smoker     1 pack a day for the last 50 years    Fibromyalgia     Hepatitis     Hepatitis C per pt.  History of cervical cancer     is s/p hyst    History of chemotherapy     chemo every 2 weeks, last tx in April sometime    History of radiation therapy     last tx March 24th 2020    Liver disease     stage 4    Liver [facility]     Lung cancer ([facility] 03/2019    Dr. Geeta Wheeler Lung mass 2019    Malignant neoplasm of upper lobe of right lung (HCC)     MVP (mitral valve prolapse)     On supplemental oxygen therapy     Wears dentures     full- not in use today 3/10/20    Wears glasses     Wheezing       Allergies: No Known Allergies   Assessment    1. AMS likely due hyperammonemia and underlying cirrhosis  2. Right lung squamous cell cancer s/p chemoradiation and now on Immunotherapy. T3N0M0  3. PET scan shows localized disease, brain MRI negative for metastasis  4.  History of [address] and Hepatitis C    Plan     Condition is stable but poor  Continue supportive care   Looking towards placement         [facility] MD [place]  Hematologist/Medical Oncologist  Cell: [phone]

## 2020-08-28 NOTE — PROGRESS NOTES
Progress Note               Date:                           8/28/2020  Patient name:           Andrés Marx  Date of admission:  8/24/2020  7:33 AM  MRN:   [id number]  YOB: 1950  PCP:                           Alvin Barrow PA-C        Subjective:   Patient seen and examined at bedside. Mentation slightly better today. Patient requesting pain. Awaiting placement. HPI in Brief:     The patient is a 80 y.o.  female who is admitted to the hospital for further management of altered mental status.  Patient was found in her basement by her  lying on dog feces.  Duration was unknown. Delfina Tipton was confused and gives no history.  She had no respiratory distress.  No fever.  No witnessed seizure activities.  No injuries.  Patient is totally confused.  Patient had labs which showed evidence of rhabdomyolysis and electrolytes abnormalities.  Patient is known to our practice. Snehal Zhang is known to have lung cancer status post combined chemoradiation followed by immunotherapy with Imfinzi. Patient had multiple other comorbidities including chronic hepatitis C and liver cirrhosis.     Review of systems  ROS cannot be obtained because of patient's mentation.     Medications:   Reviewed in Epic     Objective:   Vitals: /84   Pulse 108   Temp 97.6 °F (36.4 °C) (Oral)   Resp 22   Ht 5' 3\" (1.6 m)   Wt 94 lb 5.7 oz (42.8 kg)   SpO2 99%   BMI 16.71 kg/m²   General appearance - Mentation is slightly better  Chest - clear to auscultation, no wheezes, rales or rhonchi, symmetric air entry   Heart - normal rate, regular rhythm, normal S1, S2, no murmurs, rubs, clicks or gallops   Abdomen - Tender abdomen in all quadrants  Neurological - Difficult to access because of patient's mentation   Musculoskeletal - no joint tenderness, deformity or swelling   Extremities - peripheral pulses normal, no pedal edema, no clubbing or cyanosis   Skin - normal coloration and turgor, no rashes, no suspicious skin lesions noted ,     Data:  No intake/output data recorded. In: -   Out: 275 [Urine:275]  CBC:   Recent Labs     08/26/20  0534 08/27/20  0842   WBC 7.1 10.8   HGB 9.5* 10.0*   PLT 58* 69*     BMP:    Recent Labs     08/26/20  0534  08/27/20  0842  08/27/20 2049 08/28/20  0045 08/28/20  0528     --  136  --   --   --   --    K 4.0   < > 4.5   < > 4.5 4.6 4.5   *  --  109*  --   --   --   --    CO2 21  --  17*  --   --   --   --    BUN 4*  --  3*  --   --   --   --    CREATININE 0.46*  --  0.35*  --   --   --   --    GLUCOSE 166*  --  162*  --   --   --   --     < > = values in this interval not displayed. Hepatic:   Recent Labs     08/26/20  0534 08/27/20  0842   AST 55* 68*   ALT 46* 61*   BILITOT 1.90* 2.76*   ALKPHOS 102 117*     INR: No results for input(s): INR in the last 72 hours.     IMAGING DATA:    Problem Lists:   Primary Problem:  AMS (altered mental status)   Current Problems:  Active Hospital Problems    Diagnosis Date Noted    Encounter for palliative care [Z51.5]     AMS (altered mental status) [R41.82] 08/24/2020    Pleural effusion on right [J90] 08/24/2020    Hypokalemia [E87.6] 08/24/2020    Depression [F32.9] 08/24/2020    Transaminitis [R74.0] 08/24/2020    Respiratory alkalosis [E87.3] 08/24/2020    Elevated lactic acid level [R79.89] 08/24/2020    Elevated myoglobin level [R89.7] 08/24/2020    Diabetes mellitus type 2 in nonobese ([facility] [E11.9] 08/24/2020    Malignant neoplasm of liver ([facility] [C22.9] 05/22/2020    History of chemotherapy [Z92.21]     History of radiation therapy [Z92.3]     Malignant neoplasm of upper lobe of right lung ([facility] [C34.11] 04/26/2019    Chronic obstructive pulmonary disease ([facility] [J44.9] 03/30/2019    Chronic hepatitis C without hepatic coma ([facility] [B18.2] 02/01/2018    CL (cirrhosis of liver) ([facility] [K74.60] 02/01/2018     PMH:  Past Medical History:   Diagnosis Date    Anxiety and depression     Back pain     Bipolar disorder [facility]     Bronchitis     Cancer ([facility]     Chronic obstructive pulmonary disease (COPD) (HCC)     Cirrhosis (HCC)     Cough     Current every day smoker     1 pack a day for the last 50 years    Fibromyalgia     Hepatitis     Hepatitis C per pt.  History of cervical cancer     is s/p hyst    History of chemotherapy     chemo every 2 weeks, last tx in April sometime    History of radiation therapy     last tx March 24th 2020    Liver disease     stage 4    Liver metastases ([facility]     Lung cancer ([facility] 03/2019    Dr. Stephany Whitehead Lung mass 2019    Malignant neoplasm of upper lobe of right lung (HCC)     MVP (mitral valve prolapse)     On supplemental oxygen therapy     Wears dentures     full- not in use today 3/10/20    Wears glasses     Wheezing       Allergies: No Known Allergies   Assessment    1. AMS likely due hyperammonemia and underlying cirrhosis  2. Right lung squamous cell cancer s/p chemoradiation and now on Immunotherapy. T3N0M0  3. PET scan shows localized disease, brain MRI negative for metastasis  4. History of [address] and Hepatitis C    Plan     Condition is stable but poor  Continue supportive care   Looking towards placement     Michelle Kelly MD  PGY-3, Department of Internal Medicine  [facility], [place], [place]  8/28/2020 10:40 AM    Attending Physician Statement   I have discussed the care of Pilar Calzada, including pertinent history and exam findings with the resident. I have reviewed the key elements of all parts of the encounter with the resident. I have seen and examined the patient with the resident. I agree with the assessment and plan and status of the problem list as documented.                                [address] [place] [address] [facility]                          Cell: [phone]

## 2020-08-28 NOTE — PROGRESS NOTES
[facility]  Internal Medicine Teaching Residency Program  Inpatient Daily Progress Note  ______________________________________________________________________________    Patient: Monique Love  YOB: 1950   [id number]    Acct: [de-identified]     Room: [address] [place] [address]  Admit date: 8/24/2020  Today's date: 08/28/20  Number of days in the hospital: 4    SUBJECTIVE   Admitting Diagnosis: AMS (altered mental status)  CC: AMS  Pt examined at bedside. Chart & results reviewed. Pt lying comfortably in the bed, Mentation improved. Pt c/o sob this morning and put n 2L O2 NC, but weaned off. Now saturating well on room air. Pt is going to [address] today. ROS:  Constitutional:  negative for chills, fevers, sweats  Respiratory:  negative for cough, dyspnea on exertion, hemoptysis, shortness of breath, wheezing  Cardiovascular:  negative for chest pain, chest pressure/discomfort, lower extremity edema, palpitations  Gastrointestinal:  negative for abdominal pain, constipation, diarrhea, nausea, vomiting  Neurological:  negative for dizziness, headache  BRIEF HISTORY     She was admitted to the [address] for AMS (altered mental status) [R41.82]  AMS (altered mental status) [R41.82].  Her hospital course has been associated with AMS (altered mental status). Patient's home has reported to be dirty with this smell of human waste, and she has not been eating or drinking.       Hypokalemic dehydrated malnourished.  History lung and liver cancer, cirrhosis.  Was initially admitted for severe hypokalemia of initial 2.4 not on diuretics.  He was additionally hypophosphatemia likely secondary to poor caloric intake with severe malnutrition.  She was hypovolemic on bedside point-of-care ultrasound but not hypotensive or hypoxic.  Potassium improved with replacement.  No longer meets ICU standards     Currently   The patient is being transferred to the floors for further management.  The patient is lying comfortably on the bed alert oriented. Adelia Garcia knows she is at [facility] and it is 2020.  Complains of diffuse abdominal pain. Vitals /78, heart rate 106, RR 18, saturating 100% on room air  We will continue to monitor. OBJECTIVE     Vital Signs:  /84   Pulse 108   Temp 97.6 °F (36.4 °C) (Oral)   Resp 22   Ht 5' 3\" (1.6 m)   Wt 94 lb 5.7 oz (42.8 kg)   SpO2 97%   BMI 16.71 kg/m²     Temp (24hrs), Av.1 °F (36.7 °C), Min:97.6 °F (36.4 °C), Max:98.5 °F (36.9 °C)    In: -   Out: 275 [Urine:275]    Physical Exam:  Constitutional: This is a well developed, well nourished,  72y.o. year old female  Respiratory: Chest was symmetrical without dullness to percussion. Breath sounds bilaterally were clear to auscultation. Cardiovascular: Regular without murmur, clicks, gallops or rubs. Abdomen: Slightly rounded and soft without organomegaly. Tender in all quadrants. Lymphatic: No lymphadenopathy. Musculoskeletal: Normal curvature of the spine. No gross muscle weakness. Extremities: Tender to touch on the shin and legs  Skin:  Warm and dry. Good color, turgor and pigmentation. No lesions or scars. No cyanosis or clubbing  Neurological/Psychiatric: Difficult to access because of patient's mentation.          Medications:  Scheduled Medications:    gabapentin  100 mg Oral TID    lactulose  20 g Oral TID    thiamine (VITAMIN B1) IVPB  100 mg Intravenous Q24H    citalopram  10 mg Oral Nightly    fluticasone-umeclidin-vilant  1 puff Inhalation Daily    insulin lispro  0-6 Units Subcutaneous TID WC    insulin lispro  0-3 Units Subcutaneous Nightly    sodium chloride flush  10 mL Intravenous 2 times per day    enoxaparin  40 mg Subcutaneous Daily    sodium chloride flush  10 mL Intravenous 2 times per day    ipratropium-albuterol  1 ampule Inhalation Q4H WA     Continuous Infusions:    dextrose 5% and 0.45% NaCl with KCl 20 mEq Stopped (08/27/20 1930)    dextrose       PRN Medicationsalbuterol, 2.5 mg, Q6H PRN  glucose, 15 g, PRN  dextrose, 12.5 g, PRN  glucagon (rDNA), 1 mg, PRN  dextrose, 100 mL/hr, PRN  magnesium sulfate, 1 g, PRN  potassium chloride, 40 mEq, PRN    Or  potassium alternative oral replacement, 40 mEq, PRN    Or  potassium chloride, 10 mEq, PRN  sodium chloride flush, 10 mL, PRN  acetaminophen, 650 mg, Q6H PRN    Or  acetaminophen, 650 mg, Q6H PRN  promethazine, 12.5 mg, Q6H PRN    Or  ondansetron, 4 mg, Q6H PRN  nicotine, 1 patch, Daily PRN  sodium chloride flush, 10 mL, PRN  acetaminophen, 650 mg, Q4H PRN  oxyCODONE, 5 mg, Q4H PRN  fentanNYL, 25 mcg, Q1H PRN    Or  fentanNYL, 50 mcg, Q1H PRN        Diagnostic Labs:  CBC:   Recent Labs     08/26/20  0534 08/27/20  0842   WBC 7.1 10.8   RBC 3.25* 3.31*   HGB 9.5* 10.0*   HCT 27.9* 29.9*   MCV 85.8 90.3   RDW 16.9* 17.8*   PLT 58* 69*     BMP:   Recent Labs     08/26/20  0534  08/27/20  0842  08/27/20 2049 08/28/20 0045 08/28/20  0528     --  136  --   --   --   --    K 4.0   < > 4.5   < > 4.5 4.6 4.5   *  --  109*  --   --   --   --    CO2 21  --  17*  --   --   --   --    BUN 4*  --  3*  --   --   --   --    CREATININE 0.46*  --  0.35*  --   --   --   --     < > = values in this interval not displayed.      LIVER PROFILE:   Recent Labs     08/26/20 0534 08/27/20  0842   AST 55* 68*   ALT 46* 61*   BILITOT 1.90* 2.76*   ALKPHOS 102 117*        ASSESSMENT & PLAN     Principal Problem (Resolved):    AMS (altered mental status)  Active Problems:    CL (cirrhosis of liver) (HCC)    Chronic hepatitis C without hepatic coma (HCC)    Chronic obstructive pulmonary disease (HCC)    Malignant neoplasm of upper lobe of right lung (HCC)    Malignant neoplasm of liver (HCC)    History of radiation therapy    History of chemotherapy    Pleural effusion on right    Depression    Transaminitis    Respiratory alkalosis    Diabetes mellitus type 2 in nonobese [facility])  Resolved

## 2020-08-28 NOTE — PROGRESS NOTES
Physician Progress Note      Brant Holbrook  [facility] #:                  [id number]  :                       1950  ADMIT DATE:       2020 7:33 AM  DISCH DATE:  RESPONDING  PROVIDER #:        Ollie Smalls MD          QUERY TEXT:    Pt admitted with Altered Mental Status. If possible, please document in   progress notes and discharge summary if you think patient may be exhibiting   one of the following encephalopathies: The medical record reflects the following:  Risk Factors: Cirrhosis, Hep C, Liver Cancer  Clinical Indicators: Ammonia, 102, Alb   2.8, Alkaline Phosphatase   117,  ALT     59,  AST   77, Total Bilirubin   4.02,  Bilirubin, Direct   1.56, Bilirubin, Indirect   2.46, CT Chest: Hepatic   Steatosis, restraints, confusion  Treatment: Lactulose (Holding at this time due to Low K+), Replace K+, Give IV   fluids, Monitor Labs, VS and Mentation. Thank you for your time, Markie Stanton MSN, RN CDS. For any questions   please call [phone]. Options provided:  -- Acute hepatic encephalopathy without coma  -- Chronic hepatic encephalopathy without coma  -- Hypertensive encephalopathy  -- Metabolic encephalopathy  -- Toxic metabolic encephalopathy  -- Other - I will add my own diagnosis  -- Disagree - Not applicable / Not valid  -- Disagree - Clinically unable to determine / Unknown  -- Refer to Clinical Documentation Reviewer    PROVIDER RESPONSE TEXT:    This patient has toxic metabolic encephalopathy.     Query created by: Inga Brittle on 2020 8:19 AM      Electronically signed by:  Ollie Smalls MD 2020 12:16 PM

## 2020-08-28 NOTE — PLAN OF CARE
Problem: RESPIRATORY  Intervention: Respiratory assessment  8/27/2020 2220 by Bubba Umanzor RCP  Note: BRONCHOSPASM/BRONCHOCONSTRICTION    IMPROVE  AERATION/BREATHSOUNDS  ADMINISTER BRONCHODILATOR THERAPY AS APPROPRIATE  ASSESS BREATH SOUNDS  PATIENT EDUCATION AS NEEDED

## 2020-08-28 NOTE — PLAN OF CARE
Problem: Mental Status - Impaired:  Goal: Mental status will improve  Description: Mental status will improve  8/28/2020 0922 by Eual Linda  Outcome: Ongoing  8/28/2020 0504 by Joan Segundo RN  Outcome: Ongoing     Problem: Fluid Volume:  Goal: Ability to achieve a balanced intake and output will improve  Description: Ability to achieve a balanced intake and output will improve  8/28/2020 0922 by Eual Linda  Outcome: Ongoing  8/28/2020 0504 by Joan Segundo RN  Outcome: Ongoing     Problem: Physical Regulation:  Goal: Ability to maintain clinical measurements within normal limits will improve  Description: Ability to maintain clinical measurements within normal limits will improve  8/28/2020 0922 by Eual Linda  Outcome: Ongoing  8/28/2020 0504 by Joan Segundo RN  Outcome: Ongoing  Goal: Will show no signs and symptoms of electrolyte imbalance  Description: Will show no signs and symptoms of electrolyte imbalance  8/28/2020 0922 by Eual Linda  Outcome: Ongoing  8/28/2020 0504 by Joan Segundo RN  Outcome: Ongoing     Problem: Coping:  Goal: Ability to remain calm will improve  Description: Ability to remain calm will improve  8/28/2020 0922 by Eual Linda  Outcome: Ongoing  8/28/2020 0504 by Joan Segundo RN  Outcome: Ongoing     Problem: Safety:  Goal: Ability to remain free from injury will improve  Description: Ability to remain free from injury will improve  8/28/2020 0922 by Eual Linda  Outcome: Ongoing  8/28/2020 0504 by Joan Segundo RN  Outcome: Ongoing     Problem: Self-Care:  Goal: Ability to participate in self-care as condition permits will improve  Description: Ability to participate in self-care as condition permits will improve  8/28/2020 0922 by Eual Linda  Outcome: Ongoing  8/28/2020 0504 by Joan Segundo RN  Outcome: Ongoing     Problem: Pain:  Goal: Pain level will decrease  Description: Pain level will decrease  8/28/2020 9971 by Doyle Heck  Outcome: Ongoing  8/28/2020 0504 by Jennifer Ngo RN  Outcome: Ongoing  Goal: Control of acute pain  Description: Control of acute pain  8/28/2020 0922 by Doyle Heck  Outcome: Ongoing  8/28/2020 0504 by Jennifer Ngo RN  Outcome: Ongoing  Goal: Control of chronic pain  Description: Control of chronic pain  8/28/2020 0922 by Doyle Heck  Outcome: Ongoing  8/28/2020 0504 by Jennifer Ngo RN  Outcome: Ongoing     Problem: Musculor/Skeletal Functional Status  Goal: Highest potential functional level  8/28/2020 0922 by Doyle Heck  Outcome: Ongoing  8/28/2020 0504 by Jennifer Ngo RN  Outcome: Ongoing  Goal: Absence of falls  8/28/2020 0922 by Doyle Heck  Outcome: Ongoing  8/28/2020 0504 by Jennifer Ngo RN  Outcome: Ongoing     Problem: Nutrition  Goal: Optimal nutrition therapy  Description: Nutrition Problem #1: Inadequate oral intake  Intervention: Food and/or Nutrient Delivery: Continue Current Diet, Start Oral Nutrition Supplement  Nutritional Goals: meet % of estimated nutrition needs     8/28/2020 5557 by Doyle Heck  Outcome: Ongoing  8/28/2020 0504 by Jennifer Ngo RN  Outcome: Ongoing     Problem: Skin Integrity:  Goal: Will show no infection signs and symptoms  Description: Will show no infection signs and symptoms  8/28/2020 0922 by Doyle Heck  Outcome: Ongoing  8/28/2020 0504 by Jennifer Ngo RN  Outcome: Ongoing  Goal: Absence of new skin breakdown  Description: Absence of new skin breakdown  8/28/2020 0922 by Doyle Heck  Outcome: Ongoing  8/28/2020 0504 by Jennifer Ngo RN  Outcome: Ongoing     Problem: Falls - Risk of:  Goal: Will remain free from falls  Description: Will remain free from falls  8/28/2020 0922 by Doyle Heck  Outcome: Ongoing  8/28/2020 0504 by Jennifer Ngo RN  Outcome: Ongoing  Goal: Absence of physical injury  Description: Absence of physical injury  8/28/2020 0922 by Tim Rodriguez Julia  Outcome: Ongoing  8/28/2020 0504 by Trae Corado RN  Outcome: Ongoing

## 2020-08-28 NOTE — CARE COORDINATION
Call placed to APS worker Zaki Guevara [phone] requesting return call to discuss case and see if APS has been in contact with .  on unit concerned because they are unable to reach patients  to notify him of discharge order and plan to send pt to [address] [place] [address]. Await return call from APS. Received call from Kobe Cox , she states that she has not been in contact with patients  yet. Case was taken but investigation has not started since pt is in the hospital with plans to go to a SNF. Message left for Case Management with update.

## 2020-08-28 NOTE — PROGRESS NOTES
Physical Therapy  Facility/Department: [facility]  Daily Treatment Note  NAME: Xavier Persaud  : 1950  MRN: [id number]    Date of Service: 2020    Discharge Recommendations:  Patient would benefit from continued therapy after discharge   PT Equipment Recommendations  Equipment Needed: Yes  Mobility Devices: Apple Simmer: Rolling    Assessment   Body structures, Functions, Activity limitations: Decreased functional mobility ; Decreased cognition;Decreased ROM; Decreased safe awareness;Decreased strength;Decreased balance;Decreased sensation;Decreased endurance  Assessment: Pt currently completes bed mobility with Curt, functional transfers with CGA, and ambulates 40ft with RW and ModA. Pt currently is not safe to complete functional mobility without skilled physical assistance and is a high fall risk. Pt would benefit from continued skilled physical therapy to increased independence with functional mobility. Pt currently requires 24/7 assistance for safety. Prognosis: Fair  Decision Making: Medium Complexity  PT Education: Goals;PT Role;Plan of Care;General Safety;Gait Training;Equipment;Transfer Training;Functional Mobility Training  Patient Education: Proper use of RW.  REQUIRES PT FOLLOW UP: Yes  Activity Tolerance  Activity Tolerance: Patient limited by endurance; Patient limited by fatigue  Activity Tolerance: Pt reports significant fatigue after ambulation. Pt on room air during entire session, SPO2% 95% after mobility/ambulation. Patient Diagnosis(es): The primary encounter diagnosis was [place] coma scale total score 13-15, at arrival to emergency department. Diagnoses of Hypokalemia, Increased ammonia level, and Malignant neoplasm of upper lobe of right lung [facility]) were also pertinent to this visit.      has a past medical history of Anxiety and depression, Back pain, Bipolar disorder ([facility] [address] Bronchitis, Cancer ([facility] [address] Chronic obstructive pulmonary disease (COPD) ([facility] [address] Cirrhosis ([facility] [address] Cough, Current every day smoker, Fibromyalgia, Hepatitis, History of cervical cancer, History of chemotherapy, History of radiation therapy, Liver disease, Liver metastases ([address] Lung cancer ([address] Lung mass, Malignant neoplasm of upper lobe of right lung ([address] MVP (mitral valve prolapse), On supplemental oxygen therapy, Wears dentures, Wears glasses, and Wheezing. has a past surgical history that includes Cervical disc surgery; Hysterectomy; Cholecystectomy; Breast surgery (Left); Carpal tunnel release (Bilateral); Knee arthroscopy (Right); bronchoscopy (04/15/2019); bronchoscopy (N/A, 4/15/2019); bronchoscopy (4/15/2019); bronchoscopy (4/15/2019); TUNNELED CENTRAL VENOUS CATHETER W/ SUBCUTANEOUS PORT (Left, 05/13/2019); thoracentesis (Right, 05/13/2019); and thoracentesis (Right, 08/19/2019). Restrictions  Restrictions/Precautions  Restrictions/Precautions: Up as Tolerated, Fall Risk, General Precautions  Required Braces or Orthoses?: No  Position Activity Restriction  Other position/activity restrictions: ambulate pt  Subjective   General  Response To Previous Treatment: Patient with no complaints from previous session. Family / Caregiver Present: No  Subjective  Subjective: Pt lying supine in bed upon PT arrival. Pt and RN agreeable to PT this morning. Pt reports wanting to get out of bed. Pain Screening  Patient Currently in Pain: No  Pain Assessment  Pain Assessment: 0-10  Pain Level: 0  Vital Signs  Patient Currently in Pain: No       Orientation  Orientation  Overall Orientation Status: Impaired  Orientation Level: Oriented to place;Oriented to person;Disoriented to time;Disoriented to situation  Cognition   Cognition  Overall Cognitive Status: Exceptions  Arousal/Alertness: Appropriate responses to stimuli  Following Commands: Follows one step commands with repetition; Follows one step commands with increased time; Inconsistently follows commands  Attention Span: Attends with cues to redirect  Safety Judgement: Decreased awareness of need for assistance;Decreased awareness of need for safety  Problem Solving: Decreased awareness of errors;Assistance required to generate solutions;Assistance required to identify errors made;Assistance required to implement solutions;Assistance required to correct errors made  Insights: Decreased awareness of deficits  Initiation: Requires cues for some  Sequencing: Requires cues for some  Objective   Bed mobility  Supine to Sit: Minimal assistance  Sit to Supine: Minimal assistance  Scooting: Contact guard assistance  Comment: Pt requires increased time to complete; HOB elevated and use of bedrails. Pt requires VC's for proper sequencing. Transfers  Sit to Stand: Contact guard assistance  Stand to sit: Contact guard assistance  Comment: Pt requires VC's for proper UE placement and proper use of AD. Pt requires VC's and tactile cues for proper UE placement on RW. Ambulation  Ambulation?: Yes  Ambulation 1  Surface: level tile  Device: Rolling Walker  Assistance: Moderate assistance  Quality of Gait: Forward flexed posture, decreased santos, unsteady. Significant VC's and tactile cues for proper use of RW. Gait Deviations: Decreased step length;Decreased step height;Shuffles; Slow Santos  Distance: 40ft  Comments: Pt requires significant assistance with progression of RW and to keep walker close to her.   Stairs/Curb  Stairs?: No     Balance  Posture: Poor  Sitting - Static: Good;-  Sitting - Dynamic: Fair;+  Standing - Static: Fair;-  Standing - Dynamic: Poor  Comments: Standing balance assessed w/ RW                             Goals  Short term goals  Time Frame for Short term goals: 14 visits  Short term goal 1: Pt to perform bed mobility with SBA  Short term goal 2: Pt to perform transfers with SBA  Short term goal 3: Pt to perform fair + standing balance  Short term goal 4: Pt to ambulate 100 ft in RW independently  Short term goal 5: Pt to tolerate 30 min therapy to increase endurance  Patient Goals   Patient goals : Pt wants to return home    Plan    Plan  Times per week: 5-6x/week  Times per day: Daily  Current Treatment Recommendations: Strengthening, Endurance Training, Transfer Training, Patient/Caregiver Education & Training, ROM, Balance Training, Gait Training, Home Exercise Program, Functional Mobility Training, Safety Education & Training  Safety Devices  Type of devices:  All fall risk precautions in place, Bed alarm in place, Call light within reach, Gait belt, Patient at risk for falls, Left in bed, Nurse notified(All four rails up)  Restraints  Initially in place: Yes(All four rails up)     Therapy Time   Individual Concurrent Group Co-treatment   Time In 0937         Time Out 0950         Minutes 13         Timed Code Treatment Minutes: [address] Mittie Aschoff, PT

## 2020-08-29 NOTE — PROGRESS NOTES
I accessed this chart as the nurse on the floor told me that one of my attending physicians discharged this patient and the nursing home is calling and needs a prescription. Once I accessed the chart I noted the patient was not mine, however I contacted the correct service Internal medicine 1 to notify of the issue -   I spoke with Dr Mary Akhtar MD who discharged the patient the previous day.

## 2020-08-31 NOTE — TELEPHONE ENCOUNTER
Health Maintenance   Topic Date Due    Hepatitis A vaccine (1 of 2 - Risk 2-dose series) 03/21/1951    Diabetic foot exam  03/21/1961    Diabetic retinal exam  03/21/1961    Diabetic microalbuminuria test  03/21/1969    Breast cancer screen  03/21/2001    Colon cancer screen colonoscopy  03/21/2001    DEXA (modify frequency per FRAX score)  03/21/2006    Lipid screen  04/04/2020    DTaP/Tdap/Td vaccine (1 - Tdap) 01/16/2021 (Originally 3/21/1970)    Flu vaccine (1) 09/01/2020    Annual Wellness Visit (AWV)  11/05/2020    A1C test (Diabetic or Prediabetic)  08/24/2021    Shingles Vaccine  Completed    Pneumococcal 65+ years Vaccine  Completed    Hib vaccine  Aged Out    Meningococcal (ACWY) vaccine  Aged Out             (applicable per patient's age: Cancer Screenings, Depression Screening, Fall Risk Screening, Immunizations)    Hemoglobin A1C (%)   Date Value   08/24/2020 5.3   03/09/2020 5.4   12/16/2019 5.2     LDL Cholesterol (mg/dL)   Date Value   04/04/2019 69     AST (U/L)   Date Value   08/27/2020 68 (H)     ALT (U/L)   Date Value   08/27/2020 61 (H)     BUN (mg/dL)   Date Value   08/27/2020 3 (L)      (goal A1C is < 7)   (goal LDL is <100) need 30-50% reduction from baseline     BP Readings from Last 3 Encounters:   08/28/20 116/73   07/13/20 132/76   07/02/20 120/70    (goal /80)      All Future Testing planned in [facility]:  Lab Frequency Next Occurrence   AFP Tumor Marker Once 06/24/2020   TSH without Reflex     CBC With Auto Differential     CBC Auto Differential     Comprehensive Metabolic Panel     Lipase     Cortisol Total     Amylase     TSH without Reflex     TSH without Reflex         Next Visit Date:  Future Appointments   Date Time Provider Lamont Montes   9/15/2020 10:00 AM [address] DYLAN MD Resp Spec Shari Friend   10/13/2020 11:20 AM [facility] Shari Friend   1/21/2021  3:15 PM Devika Mackey MD [facility] Jamil Faith            Patient Active Problem List:     CL (cirrhosis of liver) (HCC)     Chronic hepatitis C without hepatic coma (HCC)     Thrombocytopenia (HCC)     Chronic obstructive pulmonary disease (HCC)     Anxiety     Malignant neoplasm of upper lobe of right lung (HCC)     Substance abuse ([facility] [address]     Diverticulitis of large intestine with perforation and abscess without bleeding     Malignant neoplasm of liver (HCC)     Chronic hepatitis C with hepatic coma (HCC)     MVP (mitral valve prolapse)     History of radiation therapy     Fibromyalgia     History of chemotherapy     Bipolar disorder ([facility] [address]     Current every day smoker     Pleural effusion on right     Depression     Transaminitis     Respiratory alkalosis     Diabetes mellitus type 2 in nonobese (HCC)     [place] coma scale total score 13-15     Somnolence

## 2020-09-03 NOTE — TELEPHONE ENCOUNTER
Health Maintenance   Topic Date Due    Hepatitis A vaccine (1 of 2 - Risk 2-dose series) 03/21/1952    Diabetic foot exam  03/21/1961    Diabetic retinal exam  03/21/1961    Diabetic microalbuminuria test  03/21/1969    Breast cancer screen  03/21/2001    Colon cancer screen colonoscopy  03/21/2001    DEXA (modify frequency per FRAX score)  03/21/2006    Lipid screen  04/04/2020    Flu vaccine (1) 09/01/2020    DTaP/Tdap/Td vaccine (1 - Tdap) 01/16/2021 (Originally 3/21/1970)    Annual Wellness Visit (AWV)  11/05/2020    A1C test (Diabetic or Prediabetic)  08/24/2021    Shingles Vaccine  Completed    Pneumococcal 65+ years Vaccine  Completed    Hib vaccine  Aged Out    Meningococcal (ACWY) vaccine  Aged Out             (applicable per patient's age: Cancer Screenings, Depression Screening, Fall Risk Screening, Immunizations)    Hemoglobin A1C (%)   Date Value   08/24/2020 5.3   03/09/2020 5.4   12/16/2019 5.2     LDL Cholesterol (mg/dL)   Date Value   04/04/2019 69     AST (U/L)   Date Value   08/27/2020 68 (H)     ALT (U/L)   Date Value   08/27/2020 61 (H)     BUN (mg/dL)   Date Value   08/27/2020 3 (L)      (goal A1C is < 7)   (goal LDL is <100) need 30-50% reduction from baseline     BP Readings from Last 3 Encounters:   08/28/20 116/73   07/13/20 132/76   07/02/20 120/70    (goal /80)      All Future Testing planned in [facility]:  Lab Frequency Next Occurrence   AFP Tumor Marker Once 06/24/2020   TSH without Reflex     CBC With Auto Differential     CBC Auto Differential     Comprehensive Metabolic Panel     Lipase     Cortisol Total     Amylase     TSH without Reflex     TSH without Reflex         Next Visit Date:  Future Appointments   Date Time Provider Lamont Layi   9/15/2020 10:00 AM Horacio Lynn MD Resp Spec [facility]   10/13/2020 11:20 AM Mauro Brock PA-C [facility]   1/21/2021  3:15 PM Malorie Mata MD [facility] ST[facility] RAD Deepa Chinchilla            Patient Active

## 2020-09-25 NOTE — TELEPHONE ENCOUNTER
TOMAS ARRIVES VIA WHEELCHAIR FOR MD VISIT  DR Kaila Belle IN TO SEE PATIENT  ORDERS RECEIVED  NEED PARACENTESIS FOLLOWED BY CT SCAN  RV 2 WEEKS W/CDP C MP AFP  CALLED IR, NO ANSWER. FAXED ORDER -[phone].  WILL ALSO CALL Monday TO SET UP PARACENTESIS & CT SCAN  INFORMED PATIENT I WILL SET UP ALL APPOINTMENTS THEN NOTIFY THEM OF DATES & TIMES  AVS PRINTED AND GIVEN TO PATIENT WITH INSTRUCTIONS  PATIENT DISCHARGED VIA WHEELCHAIR

## 2020-09-25 NOTE — PROGRESS NOTES
DIAGNOSIS:   1. Squamous cell carcinoma, right lung 04/15/2019 with right main stem bronchus invasion (T3N0M0)   2. PET showed localized disease; brain MRI negative for metastases,   3. HX Liver disease and hepatitis C  4. Isolated liver lesion, segment 7, likely hepatocellular carcinoma     CURRENT THERAPY:  1. S/P bronchoscopy establishing the diagnosis   2. Chemoradiation with taxol and carboplatin - started 05/23/2019, completed 07/09/2019  3. Emily Dills started 08/26/2019  4. Microsphere radioembolization Y-90 treatment in March/2020    BRIEF CASE HISTORY: Jenny Betancur is a very pleasant 72 y.o. female who is referred to us for consultation and management of recently diagnosed lung cancer. She initially presented with flu like illness and shortness of breath 11/2018 and was evaluated by PCP and was started on antibiotics, X-ray done at the time showed suspicious findings. Follow up CT showed mass lesion within the right hilum either intervening or originating from the right mainstem bronchus and narrowing of multiple segmental pulmonary arterial branches. Referred to Dr. Verduzco for bronchoscopy and right main stem bronchus mass was appreciated, bronchial washings were positive for squamous cell carcinoma. She reports she has liver disease, Hep C and assumed her recent weight loss and cachexia was related to that. She has right shoulder and back pain. She has not been treated for Hep C. She smokes cigarettes and cocaine. She has COPD and is oxygen dependent. Staging PET showed localized disease, pleural effusion seen; brain MRI was negative for metastatic disease. She will need pleurocentesis with cytological evaluation of the fluid, assuming it is negative , and since she is not surgical candidate. Thoracentesis was done and fluid was negative for malignancy. Chemoradiation Carbo/Taxol -started 05/23/2019, completed 07/09/2019.  Follow up CT showed good response to treatment with some pleural effusion and liver lesion isolated in segment 7. With features concerning for hepatocellular carcinoma. Considering her cirrhosis, she is not a candidate for resection or transplant, we referred her to interventional radiology for consideration of radioembolization with Y-90 treatment, which was done 03/2020 with follow up MRI planned for 06/2020. In May/2020, she presented with abdominal pain and diarrhea. Initially her pancreatic enzymes were elevated so I suspected immunotherapy induced pancreatitis but CT scan showed diverticulitis with a small pericolonic abscess. Patient was admitted to the hospital and treated with antibiotics with improvement. We held immunotherapy until the recovery. INTERIM HISTORY: The patient presents for follow up for lung cancer and discuss further recommendations. She was in house with hepatic encephalopathy, managed conservatively and discharged to rehab. She expects to be discharged 10/01/2020. She reports prior to hospitalization she had stopped eating. She complains of abdominal pain in RUQ with some distension. Her pain is not well controlled with current medication. She has not had any recurrent confusion. PAST MEDICAL HISTORY: has a past medical history of Anxiety and depression, Back pain, Bipolar disorder ([address] Bronchitis, Cancer ([address] Chronic obstructive pulmonary disease (COPD) ([address] Cirrhosis ([address] Cough, Current every day smoker, Fibromyalgia, Hepatitis, History of cervical cancer, History of chemotherapy, History of radiation therapy, Liver disease, Liver metastases ([address] Lung cancer ([address] Lung mass, Malignant neoplasm of upper lobe of right lung ([address] MVP (mitral valve prolapse), On supplemental oxygen therapy, Wears dentures, Wears glasses, and Wheezing. PAST SURGICAL HISTORY: has a past surgical history that includes Cervical disc surgery; Hysterectomy; Cholecystectomy; Breast surgery (Left); Carpal tunnel release (Bilateral);  Knee arthroscopy y.o.-year-old female who is not in pain or distress. Vital Signs: Blood pressure 129/69, pulse 98, temperature 97.6 °F (36.4 °C), temperature source Temporal, weight 118 lb 9.6 oz (53.8 kg). HEENT: . Normocephalic and atraumatic. Pupils are equal, round, reactive to light and accommodation. Extraocular muscles are intact. Neck: Showed no JVD, no carotid bruit . Lungs: Decreased air entry. Clear to auscultation bilaterally. Heart: Murmur - prolapse Abdomen: Significant tenderness in RUQ and epigastric area, hepatomegaly along the costal margin. No splenomegaly. Extremities: Olecranon bursa on left elbow. Lower extremities show bilateral edema, clubbing, or cyanosis. Breasts: Examination not done today. Neuro exam: intact cranial nerves bilaterally no motor or sensory deficit, gait is normal. Lymphatic: no adenopathy appreciated in the supraclavicular, axillary, cervical or inguinal area  Skin: right shoulder and chest wall has excoriation marks, radiation rash      REVIEW OF LABORATORY DATA:   Lab Results   Component Value Date    WBC 10.8 08/27/2020    HGB 10.0 (L) 08/27/2020    HCT 29.9 (L) 08/27/2020    MCV 90.3 08/27/2020    PLT 69 (L) 08/27/2020       Lab Results   Component Value Date    NEUTROABS 9.04 (H) 08/24/2020           Chemistry        Component Value Date/Time     08/27/2020 0842    K 4.5 08/28/2020 0528     (H) 08/27/2020 0842    CO2 17 (L) 08/27/2020 0842    BUN 3 (L) 08/27/2020 0842    CREATININE 0.35 (L) 08/27/2020 0842        Component Value Date/Time    CALCIUM 7.7 (L) 08/27/2020 0842    ALKPHOS 117 (H) 08/27/2020 0842    AST 68 (H) 08/27/2020 0842    ALT 61 (H) 08/27/2020 0842    BILITOT 2.76 (H) 08/27/2020 0842            REVIEW OF RADIOLOGICAL RESULTS:       PATHOLOGY:       IMPRESSION:   1. Squamous cell carcinoma, right lung 04/15/2019, clinical stage T3, N0, M0.   2. Liver disease and hepatitis C, severe comorbidity   3.  Staging PET showed localized disease, pleural effusion

## 2020-10-02 NOTE — TELEPHONE ENCOUNTER
Received e request from [employer] for refill of Oxy IR. Last written #[id number] 07/29/2020   Current with appointments and md exam due 10/09/2020. Note pended to kevan worthy as Dr. Frances Field on vacation and no md in office today.

## 2020-10-02 NOTE — PROGRESS NOTES
PT HAD PARACENTESIS TODAY WITH APPROPRIATE TIME OUT. 2.5 LITERS OF YELLOW ASCITIC FLUID COLLECTED. 2X2 GAUZE AND TEGADERM TO CATHETER SITE. DRY AND INTACT. SPECIMEN SENT. PT TOLERATED WELL. VSS. Discharged to family stable. Discharge instructions given with verbalized understanding.

## 2020-10-02 NOTE — CARE COORDINATION
[facility] Initial [facility] Up [facility]    Call within 2 business days of discharge: Yes    Patient: Edna Keyes Patient : 1950   MRN: <[id number]>  Reason for Admission: AMS  Discharge Date: 20 RARS: Readmission Risk Score: 35      Last Discharge [address] [address]       Complaint Diagnosis Description Type Department Provider    20 Altered Mental Status Christiano coma scale total score 13-15, at arrival to emergency department . .. ED to Hosp-Admission (Discharged) (ADMITTED) [address] [facility] Dejuan Tarango MD; Sarbjit Linares. .. Unable to reach for first attempt. Pt did go to radiation today      Pt to [facility] from  to  for AMS. Went to [facility] for 13 days with a d/c on 10/1 to home.     Sig Hx: Lung CA (R), cirrhosis of liver, hep C, COPD, smoker,  DMII,      Spoke with: unable to reach    Facility: [address] [place] [address] 24 Hour Call    Care Transitions Interventions         Follow Up  Future Appointments   Date Time Provider Lamont Montes   10/2/2020  2:45 PM Owen Leavitt MD Resp Spec [facility]   10/5/2020  3:00 PM STA CT SCAN RM 1 [facility] CT SCAN [facility] Radiolog   10/9/2020 12:15 PM Guilherme Lobato MD [facility]   10/13/2020 11:20 AM Sammie Bone PA-C [facility] [address]   2021  3:15 PM Flaco Diaz MD [facility] RAD Haile Rosales RN, [phone]

## 2020-10-06 NOTE — TELEPHONE ENCOUNTER
Patient is requesting order for gluco monitor,test stripes, lancets and alcohol swabs be sent to pharmacy in chart.

## 2020-10-09 NOTE — CARE COORDINATION
[facility] 45 Transitions Follow Up Call    10/9/2020    Patient: Alondra Hemphill  Patient : 1950   MRN: <[id number]>  Reason for Admission: AMS  Discharge Date: 20 RARS: Readmission Risk Score: 35  Per review of chart patient out to see Dr. Jazmin Martínez. oncologist currently. Per review of chart referral to hospice of  [facility]. Will check next week for patient status.  GEORGIE Galicia RN  Care Transition Nurse [phone]                   Follow Up  Future Appointments   Date Time Provider Lamont Montes   10/13/2020 11:20 AM Luis Plata PA-C [facility] [place] [address]   2021  3:15 PM [facility] Annamarie Carrillo RN

## 2020-10-09 NOTE — PROGRESS NOTES
and liver lesion isolated in segment 7. With features concerning for hepatocellular carcinoma. Considering her cirrhosis, she is not a candidate for resection or transplant, we referred her to interventional radiology for consideration of radioembolization with Y-90 treatment, which was done 03/2020 with follow up MRI planned for 06/2020. In May/2020, she presented with abdominal pain and diarrhea. Initially her pancreatic enzymes were elevated so I suspected immunotherapy induced pancreatitis but CT scan showed diverticulitis with a small pericolonic abscess. Patient was admitted to the hospital and treated with antibiotics with improvement. We held immunotherapy until the recovery. INTERIM HISTORY: The patient presents for follow up for lung cancer and discuss further recommendations. She has developed significant edema and is weaker overall. PAST MEDICAL HISTORY: has a past medical history of Anxiety and depression, Back pain, Bipolar disorder ([address] Bronchitis, Cancer ([address] Chronic obstructive pulmonary disease (COPD) ([address] Cirrhosis ([address] Cough, Current every day smoker, Fibromyalgia, Hepatitis, History of cervical cancer, History of chemotherapy, History of radiation therapy, Liver disease, Liver metastases ([address] Lung cancer ([address] Lung mass, Malignant neoplasm of upper lobe of right lung ([address] MVP (mitral valve prolapse), On supplemental oxygen therapy, Wears dentures, Wears glasses, and Wheezing. PAST SURGICAL HISTORY: has a past surgical history that includes Cervical disc surgery; Hysterectomy; Cholecystectomy; Breast surgery (Left); Carpal tunnel release (Bilateral); Knee arthroscopy (Right); bronchoscopy (04/15/2019); bronchoscopy (N/A, 4/15/2019); bronchoscopy (4/15/2019); bronchoscopy (4/15/2019); TUNNELED CENTRAL VENOUS CATHETER W/ SUBCUTANEOUS PORT (Left, 05/13/2019); thoracentesis (Right, 05/13/2019); and thoracentesis (Right, 08/19/2019).      CURRENT MEDICATIONS:  has a current medication list which includes the following prescription(s): hydroxyzine, oxycodone hcl, trazodone, omeprazole, gabapentin, lactulose, potassium chloride, metformin, ventolin hfa, citalopram, lidocaine viscous hcl, ipratropium-albuterol, trelegy ellipta, lidocaine-prilocaine, albuterol, oxygen concentrator, and ibuprofen. ALLERGIES:  has No Known Allergies. FAMILY HISTORY: Negative for any hematological or oncological conditions. SOCIAL HISTORY:  reports that she has been smoking cigarettes. She started smoking about 53 years ago. She has a 13.00 pack-year smoking history. She has never used smokeless tobacco. She reports current drug use. Drug: Cocaine. She reports that she does not drink alcohol. REVIEW OF SYSTEMS:   General: No fever or night sweats. Fatigue - resolved, weight stable. ENT: No double or blurred vision, no tinnitus or hearing problem, or sore throat   Respiratory: Chest pain - improved; shortness of breath and wheezing - improved   Cardiovascular: Denies chest pain, PND or orthopnea, or palpitations. +LE edema   Gastrointestinal: No constipation, diarrhea, nausea or vomiting. +RUQ pain with distension  Genitourinary: Denies dysuria, hematuria, frequency, urgency or incontinence. Neurological: Denies headaches, decreased LOC, no sensory or motor focal deficits. Musculoskeletal: No arthralgia no back pain or joint swelling. Chronic right shoulder pain - improved  Skin: There are no rashes or bleeding. Psychiatric: No anxiety, no depression. Endocrine: No diabetes or thyroid disease. Hematologic: No bleeding, no adenopathy. PHYSICAL EXAM: Shows a well appearing 72y.o.-year-old female who is not in pain or distress. Vital Signs: Blood pressure 121/80, pulse 100, temperature 97.3 °F (36.3 °C), temperature source Temporal, resp. rate 18. HEENT: Normocephalic and atraumatic. Pupils are equal, round, reactive to light and accommodation. Extraocular muscles are intact.  Neck: Showed no JVD, no carotid bruit . Lungs: Decreased air entry. Clear to auscultation bilaterally. Heart: Murmur - prolapse Abdomen: Significant tenderness in RUQ and epigastric area, hepatomegaly along the costal margin. No splenomegaly. Extremities: Olecranon bursa on left elbow. Lower extremities show bilateral edema as well as left arm extending to hand. No clubbing, or cyanosis. Breasts: Examination not done today. Neuro exam: intact cranial nerves bilaterally no motor or sensory deficit, gait is normal. Lymphatic: no adenopathy appreciated in the supraclavicular, axillary, cervical or inguinal area  Skin: right shoulder and chest wall has excoriation marks, radiation rash      REVIEW OF LABORATORY DATA:   Lab Results   Component Value Date    WBC 6.8 10/09/2020    HGB 10.0 (L) 10/09/2020    HCT 30.7 (L) 10/09/2020    MCV 92.2 10/09/2020     (L) 10/09/2020       Lab Results   Component Value Date    NEUTROABS 5.51 10/09/2020           Chemistry        Component Value Date/Time     (L) 10/09/2020 1234    K 4.1 10/09/2020 1234    CL 99 10/09/2020 1234    CO2 24 10/09/2020 1234    BUN 11 10/09/2020 1234    CREATININE 0.65 10/09/2020 1234        Component Value Date/Time    CALCIUM 8.2 (L) 10/09/2020 1234    ALKPHOS 115 (H) 10/09/2020 1234    AST 57 (H) 10/09/2020 1234    ALT 23 10/09/2020 1234    BILITOT 1.28 (H) 10/09/2020 1234            REVIEW OF RADIOLOGICAL RESULTS:     PATHOLOGY:       IMPRESSION:   1. Squamous cell carcinoma, right lung 04/15/2019, clinical stage T3, N0, M0.   2. Liver disease and hepatitis C, severe comorbidity   3. Staging PET showed localized disease, pleural effusion seen; Brain MRI was negative for metastatic disease  4. Cocaine and cigarette addiction, quit drugs and quitting smoking with chantix  5. Chemo-radiation 05/23/2019, completed 07/09/2019  6. Pleural effusion - plan for centesis  7. Imfinzi - started 08/26/2019  8.  Chronic elevation of blood sugar, she is on metformin. I asked her to keep home readings log  9. Smoking addiction, she is on Chantix and that is well-tolerated. 10. Isolated segment 7 liver mass, likely hepatocellular carcinoma, plan treatment with radioembolization for 03/24/2020, done and MRI planned for 06/2020    PLAN:   1. Her lab work was reviewed. 2. We reviewed discussion regarding treatment plan and I explained with her decline in health and performance status we are awaiting approval.   3. She has developed significant edema with tumor burden and progression in the liver. 4. I recommend home hospice and she is agreeable, I am putting in referral.   5. We discussed code status. She agreed to [facility]   6. Return as needed.

## 2020-10-09 NOTE — TELEPHONE ENCOUNTER
TOMAS ARRIVES VIA WHEELCHAIR FOR MD VISIT  DR Raissa Vaca IN TO SEE PATIENT  ORDERS RECEIVED  RFERRAL TO HOSPICE  RV PRN  CALLED H[facility] OF [facility] @[phone] @[facility] TO [address] VICTORIA TOOK PT'S INFORMATION. BRENDAN NEELY NOTE, [address] -[phone].  VICTORIA WILL CALL PT TO SCHEDULE APPT  MD VISIT PRN  AVS PRINTED AND GIVEN TO PATIENT WITH INSTRUCTIONS  PATIENT DISCHARGED VIA WHEELCHAIR

## 2020-10-13 NOTE — CARE COORDINATION
[facility] 45 Transitions Follow Up Call    10/13/2020    Patient: Chalice Bamberger  Patient : 1950   MRN: <[id number]>  Reason for Admission:   Discharge Date: 20 RARS: Readmission Risk Score: 35    Attempted to contact patient for follow up  transition call. Unable to leave a voicemail message to return call. Will continue to follow. Care Transitions Subsequent and Final Call    Subsequent and Final Calls  Care Transitions Interventions  Other Interventions:             Follow Up  Future Appointments   Date Time Provider Lamont Montes   10/15/2020  9:20 AM Vinnie Fam PA-C [facility]   10/15/2020  1:00 PM ST[facility] ULTRASOUND RM 1 [facility] US [facility] Radiolog   2021  3:15 PM Marli Liriano MD [facility] NICHELLE Adhikari LPN

## 2020-10-15 PROBLEM — I60.9 SAH (SUBARACHNOID HEMORRHAGE) (HCC): Status: ACTIVE | Noted: 2020-01-01

## 2020-10-15 NOTE — BRIEF OP NOTE
Brief Postoperative Note for Paracentesis    Pamella Garza  YOB: 1950  [id number]    Pre-operative Diagnosis:  Liver CA w/ ascites      Post-operative Diagnosis: Same    Procedure: Ultrasound guided Paracentesis     Anesthesia: 1% Lidocaine     Surgeons/Assistants: VIRGILIO Magana     Complications: none    EBL: Minimal    Specimens: were obtained    Ultrasound guided paracentesis performed. 3700 ml clear yellow fluid obtained. Dressing applied.      Electronically signed by VIRGILIO Lucio on 10/15/2020 at 12:29 PM

## 2020-10-15 NOTE — PROGRESS NOTES
Visit Information    Have you changed or started any medications since your last visit including any over-the-counter medicines, vitamins, or herbal medicines? no   Are you having any side effects from any of your medications? -  no  Have you stopped taking any of your medications? Is so, why? -  no    Have you seen any other physician or provider since your last visit? No  Have you had any other diagnostic tests since your last visit? Yes - Records Obtained  Have you been seen in the emergency room and/or had an admission to a hospital since we last saw you? Yes - Records Obtained  Have you had your routine dental cleaning in the past 6 months? no    Have you activated your [employer] account? If not, what are your barriers?  Yes     Patient Care Team:  Sammie Bone PA-C as PCP - General (Physician Assistant)  Sammie Bone PA-C as PCP - [facility]  Owen Leavitt MD as Consulting Physician (Pulmonology)  Lizbeth Mcleod MD as Consulting Physician (Hematology and Oncology)  Flaco Diaz MD as Consulting Physician (Radiation Oncology)    Medical History Review  Past Medical, Family, and Social History reviewed and does contribute to the patient presenting condition    Health Maintenance   Topic Date Due    Hepatitis A vaccine (1 of 2 - Risk 2-dose series) 03/21/1952    Diabetic foot exam  03/21/1961    Diabetic retinal exam  03/21/1961    Diabetic microalbuminuria test  03/21/1969    Breast cancer screen  03/21/2001    Colon cancer screen colonoscopy  03/21/2001    DEXA (modify frequency per FRAX score)  03/21/2006    Lipid screen  04/04/2020    Flu vaccine (1) 09/01/2020    Annual Wellness Visit (AWV)  11/05/2020    DTaP/Tdap/Td vaccine (1 - Tdap) 01/16/2021 (Originally 3/21/1970)    A1C test (Diabetic or Prediabetic)  08/24/2021    Shingles Vaccine  Completed    Pneumococcal 65+ years Vaccine  Completed    Hib vaccine  Aged Out    Meningococcal (ACWY) vaccine Aged Out

## 2020-10-15 NOTE — H&P
patient was being carried by the squad patient slid out landing on the back of her head no loss of consciousness patient has history of squamous cell cancer currently under hospice care patient wanted to go home but eventually was admitted  CT scan of the head showed subarachnoid hemorrhage neurosurgery was consulted recommended repeat CT scan patient does not want any intervention patient want to be comfortable    Past Medical History:     Past Medical History:   Diagnosis Date    Anxiety and depression     Back pain     [facility]     Bronchitis     Cancer ([facility]     Chronic obstructive pulmonary disease (COPD) ([facility]     Cirrhosis ([facility]     Cough     Current every day smoker     1 pack a day for the last 50 years    Fibromyalgia     Hepatitis     Hepatitis C per pt.     History of cervical cancer     is s/p hyst    History of chemotherapy     chemo every 2 weeks, last tx in April sometime    History of radiation therapy     last tx March 24th 2020    Liver disease     stage 4    Liver metastases ([facility]     Lung cancer ([facility] 03/2019    Dr. Merritt Pap mass 2019    Malignant neoplasm of upper lobe of right lung (HCC)     MVP (mitral valve prolapse)     On supplemental oxygen therapy     Wears dentures     full- not in use today 3/10/20    Wears glasses     Wheezing         Past Surgical History:     Past Surgical History:   Procedure Laterality Date    BREAST SURGERY Left     benign lumpectomy, multiple    BRONCHOSCOPY  04/15/2019    biopsies and washings    BRONCHOSCOPY N/A 4/15/2019    BRONCHOSCOPY BRUSHINGS performed by Lasha Toth MD at [address] [facility]  4/15/2019    BRONCHOSCOPY BIOPSY BRONCHUS performed by Lasha Toth MD at [address] [facility]  4/15/2019    BRONCHOSCOPY DIAGNOSTIC OR CELL [address] ONLY performed by Lasha Toth MD at [address] [facility], [address] Bilateral    [facility]      X 2    CHOLECYSTECTOMY      HYSTERECTOMY complete    KNEE ARTHROSCOPY Right     THORACENTESIS Right 05/13/2019    THORACENTESIS Right 08/19/2019    TUNNELED CENTRAL VENOUS CATHETER W/ SUBCUTANEOUS PORT Left 05/13/2019        Medications Prior to Admission:     Prior to Admission medications    Medication Sig Start Date End Date Taking? Authorizing Provider   ondansetron (ZOFRAN) 8 MG tablet Take 1 tablet by mouth every 12 hours as needed for Nausea or Vomiting 10/15/20   Gabby Nguyen PA-C   omeprazole (PRILOSEC) 40 MG delayed release capsule Take 1 capsule by mouth daily 10/15/20   Gabby Nguyen PA-C   metFORMIN (GLUCOPHAGE) 500 MG tablet Take 1 tablet by mouth daily (with breakfast) 10/15/20   Gabby Nguyen PA-C   citalopram (CELEXA) 10 MG tablet Take 1 tablet by mouth nightly 10/15/20   Gabby Nguyen PA-C   glucose monitoring kit (FREESTYLE) monitoring kit 1 kit by Does not apply route daily Provide insurance covered glucometer compatible with test strips and lancets, check 1-2 times daily 10/13/20   Gabby Nguyen PA-C   Lancets MISC Please Dispense appropriate insurance approved lancets for patients glucometer, check blood sugars 1-2 times daily.  10/13/20   Gabby Nguyen PA-C   blood glucose monitor strips Provide insurance covered test strips compatible with glucometer, test 1-2 times a day 10/13/20   Gabby Nguyen PA-C   Alcohol Swabs PADS Please Dispense alcohol swabs 10/13/20   Gabby Nguyen PA-C   hydrOXYzine (ATARAX) 50 MG tablet take 1 tablet by mouth once daily 10/2/20   Gabby Nguyen PA-C   oxyCODONE HCl (OXY-IR) 10 MG immediate release tablet take 1 tablet by mouth every 6 hours if needed for pain 10/2/20 11/1/20  Kain Foote MD   traZODone (DESYREL) 300 MG tablet take 1 tablet by mouth nightly 9/3/20   Gabby Nguyen PA-C   ipratropium-albuterol (DUONEB) 0.5-2.5 (3) MG/3ML SOLN nebulizer solution Inhale 3 mLs into the lungs every 4 hours 8/27/20   Ester Gomez MD   gabapentin (NEURONTIN) 100 MG capsule Take 1 capsule by mouth 3 times daily for 30 days. 8/27/20 10/9/20  Michelle Valencia MD   lactulose [facility]) 10 GM/15ML solution Take 30 mLs by mouth 3 times daily 8/27/20   Michelle Valencia MD   potassium chloride (KLOR-CON M) 20 MEQ extended release tablet take 1 tablet by mouth once daily 8/7/20   Ryne Lobato MD   VENTOLIN  (90 Base) MCG/ACT inhaler inhale 2 puffs by mouth and INTO THE LUNGS every 6 hours if needed for wheezing 6/29/20   Gabby Nguyen PAELISABET   fluticasone-umeclidin-vilant (TRELEGY ELLIPTA) 320-34.2-31 MCG/INH AEPB Inhale 1 puff into the lungs daily 3/31/20   LIZA Paiz CNP   lidocaine-prilocaine (EMLA) 2.5-2.5 % cream Apply topically as needed. Patient not taking: Reported on 9/25/2020 1/20/20   Wiley Dawn MD   albuterol (PROVENTIL) (2.5 MG/3ML) 0.083% nebulizer solution Take 3 mLs by nebulization every 6 hours as needed for Wheezing 9/25/19   Braulio Samuel MD   lidocaine viscous hcl (XYLOCAINE) 2 % SOLN solution Take 5-10 mLs by mouth as needed for Irritation 6/5/19   Nessa Chavira MD   Oxygen Concentrator     Historical Provider, MD   ibuprofen (ADVIL;MOTRIN) 600 MG tablet Take 1 tablet by mouth every 6 hours as needed for Pain 6/20/16 6/1/20  Yady Sapp MD        Allergies:     Patient has no known allergies. Social History:     Tobacco:    reports that she has been smoking cigarettes. She started smoking about 53 years ago. She has a 13.00 pack-year smoking history. She has never used smokeless tobacco.  Alcohol:      reports no history of alcohol use. Drug Use:  reports current drug use. Drug: Cocaine. Family History:     Family History   Problem Relation Age of Onset    Heart Attack Father     Cancer Paternal Grandmother     Lung Cancer Paternal Grandfather     Emphysema Mother        Review of Systems:     Positive and Negative as described in HPI.     CONSTITUTIONAL: Generalized weakness  HEENT:  negative for vision, hearing changes, runny nose, throat pain  RESPIRATORY:  Negative wheezing  CARDIOVASCULAR:  negative for chest pain, palpitations  GASTROINTESTINAL: Nausea  GENITOURINARY:  negative for difficulty of urination, burning with urination, frequency   INTEGUMENT:  negative for rash, skin lesions, easy bruising   HEMATOLOGIC/LYMPHATIC:  negative for swelling/edema   ALLERGIC/IMMUNOLOGIC:  negative for urticaria , itching  ENDOCRINE:  negative increase in drinking, increase in urination, hot or cold intolerance  MUSCULOSKELETAL: Joint pain  NEUROLOGICAL:  negative for headaches, dizziness, lightheadedness, numbness, pain, tingling extremities  BEHAVIOR/PSYCH:  negative for depression, anxiety    Physical Exam:   /79   Pulse 108   Temp 97.6 °F (36.4 °C) (Oral)   Resp 18   Ht 5' 3\" (1.6 m)   Wt 105 lb (47.6 kg)   SpO2 98%   BMI 18.60 kg/m²   Temp (24hrs), Av.6 °F (36.4 °C), Min:97.6 °F (36.4 °C), Max:97.6 °F (36.4 °C)    No results for input(s): POCGLU in the last 72 hours.   No intake or output data in the 24 hours ending 10/15/20 1801    General Appearance: Severely malnourished  Mental status: oriented to person, place, and time  Head: normocephalic, atraumatic  Eye: no icterus, redness, pupils equal and reactive, extraocular eye movements intact, conjunctiva clear  Ear: normal external ear, no discharge, hearing intact  Nose: no drainage noted  Mouth: mucous membranes moist  Neck: supple, no carotid bruits, thyroid not palpable  Lungs: Bilateral equal air entry, clear to ausculation, no wheezing, rales or rhonchi, normal effort  Cardiovascular: normal rate, regular rhythm, no murmur, gallop, rub  Abdomen: Soft, nontender, nondistended,  Neurologic: Moves all extremities  Skin: Laceration  Extremities: peripheral pulses palpable, no pedal edema or calf pain with palpation  Psych: normal affect    Investigations:      Laboratory Testing:  Recent Results (from the past 24 hour(s))   CBC    Collection Time: 10/15/20  4:05 PM   Result Value Ref Range    WBC 10.1 3.5 - 11.3 k/uL    RBC 3.60 (L) 3.95 - 5.11 m/uL    Hemoglobin 10.8 (L) 11.9 - 15.1 g/dL    Hematocrit 33.9 (L) 36.3 - 47.1 %    MCV 94.2 82.6 - 102.9 fL    MCH 30.0 25.2 - 33.5 pg    MCHC 31.9 28.4 - 34.8 g/dL    RDW 16.1 (H) 11.8 - 14.4 %    Platelets 234 (L) 841 - 453 k/uL    MPV 8.2 8.1 - 13.5 fL    NRBC Automated 0.0 0.0 per 100 WBC   Comprehensive Metabolic Panel    Collection Time: 10/15/20  4:05 PM   Result Value Ref Range    Glucose 92 70 - 99 mg/dL    BUN 10 8 - 23 mg/dL    CREATININE 0.55 0.50 - 0.90 mg/dL    Bun/Cre Ratio NOT REPORTED 9 - 20    Calcium 8.0 (L) 8.6 - 10.4 mg/dL    Sodium 130 (L) 135 - 144 mmol/L    Potassium 4.2 3.7 - 5.3 mmol/L    Chloride 98 98 - 107 mmol/L    CO2 23 20 - 31 mmol/L    Anion Gap 9 9 - 17 mmol/L    Alkaline Phosphatase 104 35 - 104 U/L    ALT 20 5 - 33 U/L    AST 36 (H) <32 U/L    Total Bilirubin 1.68 (H) 0.3 - 1.2 mg/dL    Total Protein 6.7 6.4 - 8.3 g/dL    Alb 2.0 (L) 3.5 - 5.2 g/dL    Albumin/Globulin Ratio 0.4 (L) 1.0 - 2.5    GFR Non-African American >60 >60 mL/min    GFR African American >60 >60 mL/min    GFR Comment          GFR Staging NOT REPORTED    Protime-INR    Collection Time: 10/15/20  4:05 PM   Result Value Ref Range    Protime 16.7 (H) 9.0 - 12.0 sec    INR 1.6    APTT    Collection Time: 10/15/20  4:05 PM   Result Value Ref Range    PTT 28.6 20.5 - 30.5 sec       Imaging/Diagnostics:  Ct Head Wo Contrast    Result Date: 10/15/2020  Mild-to-moderate left frontal and left anterior temporal subarachnoid hemorrhage. Otherwise mild generalized involution changes and chronic small ischemic disease. Moderate multilevel degenerate changes cervical spine. No acute fracture traumatic malalignment     Ct Cervical Spine Wo Contrast    Result Date: 10/15/2020  Mild-to-moderate left frontal and left anterior temporal subarachnoid hemorrhage.  Otherwise mild generalized involution changes and chronic small ischemic disease. Moderate multilevel degenerate changes cervical spine. No acute fracture traumatic malalignment     Us Guided Paracentesis    Result Date: 10/15/2020  Successful ultrasound guided paracentesis. Assessment :      Hospital Problems           Last Modified POA    SAH (subarachnoid hemorrhage) ([address] 10/15/2020 Yes          Plan:     Patient status observation in the Med/Surge    Subarachnoid hemorrhage neurosurgery was consulted repeat CT scan as needed and not sure of the benefit of repeating CT scan as the patient want to be comfortable      History of hepatocellular cancer hospice care      Severe protein calorie malnutrition dietitian follow-up    COPD stable    Lung cancer hospice care    Consultations:   IP CONSULT TO TRAUMA SURGERY  IP CONSULT TO NEUROSURGERY  IP CONSULT TO HOSPITALIST     Patient is admitted as inpatient status because of co-morbidities listed above, severity of signs and symptoms as outlined, requirement for current medical therapies and most importantly because of direct risk to patient if care not provided in a hospital setting. Expected length of stay > 48 hours.     Tata Pino MD  10/15/2020  6:01 PM    Copy sent to Dr. Ronnell Hinojosa PA-C

## 2020-10-15 NOTE — ED PROVIDER NOTES
[facility]  ED  Emergency Department  Emergency Medicine Resident Sign-out     Care of Gabino Stanley was assumed from Dr. Danilo García and is being seen for Head Laceration (Pt arrives via squad, c/o head lac, squad had been transporting pt home from here after a procedure, carrying pt in a \"sling\" pt slipped out landing on back of head, denies LOC)  . The patient's initial evaluation and plan have been discussed with the prior provider who initially evaluated the patient. EMERGENCY DEPARTMENT COURSE / MEDICAL DECISION MAKING:       MEDICATIONS GIVEN:  Orders Placed This Encounter   Medications    Tetanus-Diphth-Acell Pertussis (BOOSTRIX) injection 0.5 mL    morphine injection 4 mg       LABS / RADIOLOGY:     Labs Reviewed   CBC - Abnormal; Notable for the following components:       Result Value    RBC 3.60 (*)     Hemoglobin 10.8 (*)     Hematocrit 33.9 (*)     RDW 16.1 (*)     Platelets 752 (*)     All other components within normal limits   COMPREHENSIVE METABOLIC PANEL - Abnormal; Notable for the following components:    Calcium 8.0 (*)     Sodium 130 (*)     AST 36 (*)     Total Bilirubin 1.68 (*)     Alb 2.0 (*)     Albumin/Globulin Ratio 0.4 (*)     All other components within normal limits   PROTIME-INR - Abnormal; Notable for the following components:    Protime 16.7 (*)     All other components within normal limits   APTT       Ct Head Wo Contrast    Result Date: 10/15/2020  EXAMINATION: CT OF THE HEAD WITHOUT CONTRAST; CT OF THE CERVICAL SPINE WITHOUT CONTRAST 10/15/2020 1:52 pm TECHNIQUE: CT of the head was performed without the administration of intravenous contrast. Dose modulation, iterative reconstruction, and/or weight based adjustment of the mA/kV was utilized to reduce the radiation dose to as low as reasonably achievable.; CT of the cervical spine was performed without the administration of intravenous contrast. Multiplanar reformatted images are provided for review.  Dose modulation, iterative reconstruction, and/or weight based adjustment of the mA/kV was utilized to reduce the radiation dose to as low as reasonably achievable. COMPARISON: CT head and CT angiogram of the chest 08/24/2020 HISTORY: ORDERING SYSTEM PROVIDED HISTORY: Fall TECHNOLOGIST PROVIDED HISTORY: Fall ; ORDERING SYSTEM PROVIDED HISTORY: fall TECHNOLOGIST PROVIDED HISTORY: fall FINDINGS: BRAIN/VENTRICLES: There is mild-to-moderate subarachnoid hemorrhage identified involving the left anterior inferior frontal lobe. Minimal subarachnoid blood products left anterior temporal lobe. Mild generalized involutional changes brain parenchyma identified with prominence of the ventricles and sulci. Mild periventricular and subcortical white matter hypoattenuation suggestive chronic small vessel ischemic disease. No shift of midline structures. Basal cisterns are patent. Intracranial vascular calcification. ORBITS: The visualized portion of the orbits demonstrate no acute abnormality. SINUSES: Minimal ethmoid sinus mucosal thickening. Mastoids are clear. SOFT TISSUES/SKULL:  No acute abnormality of the visualized skull or soft tissues. CT cervical spine: Mild to moderate dextrocurvature. The cervical vertebral heights are maintained. Prior interbody fusion C5-6 and C6-C7. Otherwise mild-to-moderate multilevel degenerate changes with moderate severe multilevel degenerate facet arthropathy. Moderate stenosis multiple levels due to small disc bulges. These most pronounced C4-C5 and C2-C3 and C6-7 C7-T1. Within the upper lungs there is a patulous appearance of the proximal esophagus with air-fluid level. Loculated collection right lung apex appears similar to previous CT chest..  Bilateral emphysematous changes. Parenchymal opacity involving the right upper lobe similar prior CT chest.     Mild-to-moderate left frontal and left anterior temporal subarachnoid hemorrhage.  Otherwise mild generalized involution changes and chronic small ischemic disease. Moderate multilevel degenerate changes cervical spine. No acute fracture traumatic malalignment     Ct Cervical Spine Wo Contrast    Result Date: 10/15/2020  EXAMINATION: CT OF THE HEAD WITHOUT CONTRAST; CT OF THE CERVICAL SPINE WITHOUT CONTRAST 10/15/2020 1:52 pm TECHNIQUE: CT of the head was performed without the administration of intravenous contrast. Dose modulation, iterative reconstruction, and/or weight based adjustment of the mA/kV was utilized to reduce the radiation dose to as low as reasonably achievable.; CT of the cervical spine was performed without the administration of intravenous contrast. Multiplanar reformatted images are provided for review. Dose modulation, iterative reconstruction, and/or weight based adjustment of the mA/kV was utilized to reduce the radiation dose to as low as reasonably achievable. COMPARISON: CT head and CT angiogram of the chest 08/24/2020 HISTORY: ORDERING SYSTEM PROVIDED HISTORY: Fall TECHNOLOGIST PROVIDED HISTORY: Fall ; ORDERING SYSTEM PROVIDED HISTORY: fall TECHNOLOGIST PROVIDED HISTORY: fall FINDINGS: BRAIN/VENTRICLES: There is mild-to-moderate subarachnoid hemorrhage identified involving the left anterior inferior frontal lobe. Minimal subarachnoid blood products left anterior temporal lobe. Mild generalized involutional changes brain parenchyma identified with prominence of the ventricles and sulci. Mild periventricular and subcortical white matter hypoattenuation suggestive chronic small vessel ischemic disease. No shift of midline structures. Basal cisterns are patent. Intracranial vascular calcification. ORBITS: The visualized portion of the orbits demonstrate no acute abnormality. SINUSES: Minimal ethmoid sinus mucosal thickening. Mastoids are clear. SOFT TISSUES/SKULL:  No acute abnormality of the visualized skull or soft tissues. CT cervical spine: Mild to moderate dextrocurvature.   The cervical vertebral heights are maintained. Prior interbody fusion C5-6 and C6-C7. Otherwise mild-to-moderate multilevel degenerate changes with moderate severe multilevel degenerate facet arthropathy. Moderate stenosis multiple levels due to small disc bulges. These most pronounced C4-C5 and C2-C3 and C6-7 C7-T1. Within the upper lungs there is a patulous appearance of the proximal esophagus with air-fluid level. Loculated collection right lung apex appears similar to previous CT chest..  Bilateral emphysematous changes. Parenchymal opacity involving the right upper lobe similar prior CT chest.     Mild-to-moderate left frontal and left anterior temporal subarachnoid hemorrhage. Otherwise mild generalized involution changes and chronic small ischemic disease. Moderate multilevel degenerate changes cervical spine. No acute fracture traumatic malalignment     Ct Abdomen Pelvis W Iv Contrast Additional Contrast? Oral    Result Date: 10/5/2020  EXAMINATION: CT OF THE ABDOMEN AND PELVIS WITH CONTRAST, 10/5/2020 3:02 pm TECHNIQUE: CT of the abdomen and pelvis was performed with the administration of intravenous contrast. Multiplanar reformatted images are provided for review. Dose modulation, iterative reconstruction, and/or weight based adjustment of the mA/kV was utilized to reduce the radiation dose to as low as reasonably achievable. COMPARISON: May 24, 2020 HISTORY: ORDERING SYSTEM PROVIDED HISTORY: Malignant neoplasm of upper lobe of right lung ([address] TECHNOLOGIST PROVIDED HISTORY: [address] s/p ablation, new ascites and liver dysfunction, possible recurrence. Reason for Exam: Hx right lung cancer with liver mets 3/2019, did chemo and radiation. Hx hysterectomy, cholecystectomy. Type of Exam: Subsequent/Follow-up FINDINGS: Lower Chest: Small pleural effusions. Nonspecific nodular opacities at the right lung base with the component measuring 9 mm and 19 mm series 2, image 17. Organs: Multiple low-density lesions are seen throughout the liver. pocket in the right upperquadrant. Using ultrasound guidance (image attached to the medical record), the fluid pocket was accessed with a 5 [employer] Laura Yueh needle with aspiration of clear yellow fluid. Sendy vacuum machine was connected and paracentesis was performed and approximately 3.7 L was removed. Post procedural ultrasound demonstrated no residual fluid. A sample was not sent for laboratory analysis. Estimated blood loss was minimum. The patient tolerated the procedure well and left the department in good condition. FINDINGS: A total of 3.7 L of clear yellow fluid was removed. Successful ultrasound guided paracentesis. Us Guided Paracentesis    Result Date: 10/2/2020  PROCEDURE: ULTRASOUND GUIDED PARACENTESIS 10/2/2020 HISTORY: ORDERING SYSTEM PROVIDED HISTORY: Other ascites Liver cell carcinoma with ascites TECHNIQUE: This procedure was performed by Rima Alford PA-C under indirect supervision of . Informed consent was obtained after a detailed explanation of the procedure including the risks, benefits, and alternatives. Universal protocol was followed. The area was prepped and draped in sterile fashion and local anesthesia was achieved with lidocaine. Pre-procedure ultrasound shows a dominant fluid pocket in the rightquadrant. Using ultrasound guidance (image attached to the medical record), the fluid pocket was accessed with a 5 [employer] Laura Yueh needle with aspiration of clear yellow fluid. Sendy vacuum machine was connected and paracentesis was performed and approximately 2500 mL was removed. Post procedural ultrasound demonstrated no residual fluid. A sample was collected and sent for laboratory analysis. Estimated blood loss was minimum. The patient tolerated the procedure well and left the department in good condition. FINDINGS: A total of 2500 mL of clear yellow fluid was removed. A sample was collected and sent for laboratory analysis.      Successful ultrasound guided

## 2020-10-15 NOTE — CONSULTS
TRAUMA HISTORY AND PHYSICAL EXAMINATION    PATIENT NAME: Arthur Reddy  YOB: 1950  MEDICAL RECORD NO. [id number]   DATE: 10/15/2020  PRIMARY CARE PHYSICIAN: Kadie Hoffman PA-C  PATIENT EVALUATED AT THE REQUEST OF : Michael Phipps    ACTIVATION   []Trauma Alert     [] Trauma Priority     [x]Trauma Consult. IMPRESSION:     Patient Active Problem List   Diagnosis    CL (cirrhosis of liver) (HCC)    Chronic hepatitis C without hepatic coma (HCC)    Thrombocytopenia (HCC)    Chronic obstructive pulmonary disease (HCC)    Anxiety    Malignant neoplasm of upper lobe of right lung (HCC)    Substance abuse ([facility]    Diverticulitis of large intestine with perforation and abscess without bleeding    Malignant neoplasm of liver (HCC)    Chronic hepatitis C with hepatic coma (HCC)    MVP (mitral valve prolapse)    History of radiation therapy    Fibromyalgia    History of chemotherapy    Bipolar disorder ([address]    Current every day smoker    Pleural effusion on right    Depression    Transaminitis    Respiratory alkalosis    Diabetes mellitus type 2 in nonobese ([address]    [place] coma scale total score 13-15    Somnolence       MEDICAL DECISION MAKING AND PLAN:     67-year-old female with past medical history of hepatocellular carcinoma, squamous cell cancer of the lung, brain metastases, who presents after a fall today with left frontal and parietal subarachnoid hemorrhage.   · Palliative care patient with code status DNR CC  · She would not like extensive measures performed, completion scans foregone at this time  · Patient to be admitted to [facility] with neurosurgery consult  · Will perform tertiary exam and sign off if no other traumatic injuries identified      [address]    [x] Neurosurgery     [] Orthopedic Surgery    [] Cardiothoracic     [] Facial Trauma    [] Plastic Surgery (Burn)    [] Pediatric Surgery     [x] Internal Medicine    [] Pulmonary Medicine    [] Other: HISTORY:     Chief Complaint:  \"My head hurts\"    INJURY SUMMARY  Mild to moderate left frontal, left anterior SAH    GENERAL DATA  Age 72 y.o.  female   Patient information was obtained from patient. History/Exam limitations: none. Patient presented to the Emergency Department by ambulance   Injury Date: 10/15/2020   Approximate Injury Time: 1330        Transport mode:   [x]Ambulance      [] Helicopter     []Car       [] Other  Referring Hospital: [place] [address], (e.g., home, farm, industry, street)  Specific Details of Location (e.g., bedroom, kitchen, garage): front steps  Type of Residence (if occurred in home setting) (e.g., apartment, mobile home, single family home): home    MECHANISM OF INJURY          [x] Fall    []From Standing     [x]From Height 3 Ft     []Down Stairs ___steps    HISTORY:     Racheal Hughes is a 72 y.o. female that presented to the Emergency Department following a fall earlier today. Patient states she was transported from her paracentesis appointment, and the transporters accidentally dropped her when transferring her into her house. She was lying on a stretcher or blanket and hit her head on the cement. Patient did not lose consciousness she is not on blood thinners. She denies any other injuries and is moving all extremities. Patient occasionally ambulates with walker but does not get around much anymore. She has an extensive past medical history including hepatocellular carcinoma, lung cancer, metastases to the brain and is currently on hospice. CODE STATUS is DNR CC. She has some mild right chest wall pain, but denies shortness of breath. She also has baseline abdominal tenderness and receives paracentesis fairly often. No weakness, numbness, tingling in the extremities. No back pain.     Loss of Consciousness [x]No   []Yes Duration(min)       [] Unknown     Total Fluids Given Prior To Arrival  mL    MEDICATIONS:   []  None     []  Information not available due to exam limitations documented above  Prior to Admission medications    Medication Sig Start Date End Date Taking? Authorizing Provider   ondansetron (ZOFRAN) 8 MG tablet Take 1 tablet by mouth every 12 hours as needed for Nausea or Vomiting 10/15/20   Haider Morrow PA-C   omeprazole (PRILOSEC) 40 MG delayed release capsule Take 1 capsule by mouth daily 10/15/20   Haider Morrow PA-C   metFORMIN (GLUCOPHAGE) 500 MG tablet Take 1 tablet by mouth daily (with breakfast) 10/15/20   Haider Morrow PA-C   citalopram (CELEXA) 10 MG tablet Take 1 tablet by mouth nightly 10/15/20   Haider Morrow PA-C   glucose monitoring kit (FREESTYLE) monitoring kit 1 kit by Does not apply route daily Provide insurance covered glucometer compatible with test strips and lancets, check 1-2 times daily 10/13/20   Haider Morrow PA-C   Lancets MISC Please Dispense appropriate insurance approved lancets for patients glucometer, check blood sugars 1-2 times daily. 10/13/20   Haider Morrow PA-C   blood glucose monitor strips Provide insurance covered test strips compatible with glucometer, test 1-2 times a day 10/13/20   Haider Morrow PA-C   Alcohol Swabs PADS Please Dispense alcohol swabs 10/13/20   Haider Morrow PA-C   hydrOXYzine (ATARAX) 50 MG tablet take 1 tablet by mouth once daily 10/2/20   Haider Morrow PA-C   oxyCODONE HCl (OXY-IR) 10 MG immediate release tablet take 1 tablet by mouth every 6 hours if needed for pain 10/2/20 11/1/20  Gonzalez Obando MD   traZODone (DESYREL) 300 MG tablet take 1 tablet by mouth nightly 9/3/20   Haider Morrow PA-C   ipratropium-albuterol (DUONEB) 0.5-2.5 (3) MG/3ML SOLN nebulizer solution Inhale 3 mLs into the lungs every 4 hours 8/27/20   Corrinne Macadamia, MD   gabapentin (NEURONTIN) 100 MG capsule Take 1 capsule by mouth 3 times daily for 30 days.  8/27/20 10/9/20  Corrinne Macadamia, MD   lactulose (CHRONULAC) 10 GM/15ML solution Take 30 mLs by mouth 3 times daily 8/27/20   Isabella Evans MD   potassium chloride (KLOR-CON M) 20 MEQ extended release tablet take 1 tablet by mouth once daily 8/7/20   Gianna Lobato MD   VENTOLIN  (90 Base) MCG/ACT inhaler inhale 2 puffs by mouth and INTO THE LUNGS every 6 hours if needed for wheezing 6/29/20   Magaly Wheat PA-C   fluticasone-umeclidin-vilant (TRELEGY ELLIPTA) 794-65.9-72 MCG/INH AEPB Inhale 1 puff into the lungs daily 3/31/20   LIZA Gomes - CNP   lidocaine-prilocaine (EMLA) 2.5-2.5 % cream Apply topically as needed. Patient not taking: Reported on 9/25/2020 1/20/20   Gissel San MD   albuterol (PROVENTIL) (2.5 MG/3ML) 0.083% nebulizer solution Take 3 mLs by nebulization every 6 hours as needed for Wheezing 9/25/19   Marcella Childers MD   lidocaine viscous hcl (XYLOCAINE) 2 % SOLN solution Take 5-10 mLs by mouth as needed for Irritation 6/5/19   Isabell Dawson MD   Oxygen Concentrator     Historical Provider, MD   ibuprofen (ADVIL;MOTRIN) 600 MG tablet Take 1 tablet by mouth every 6 hours as needed for Pain 6/20/16 6/1/20  Castro James MD       ALLERGIES:   []  None    []   Information not available due to exam limitations documented above   Patient has no known allergies. PAST MEDICAL HISTORY: []  None   []   Information not available due to exam limitations documented above    has a past medical history of Anxiety and depression, Back pain, Bipolar disorder ([address] Bronchitis, Cancer ([address] Chronic obstructive pulmonary disease (COPD) ([address] Cirrhosis ([address] Cough, Current every day smoker, Fibromyalgia, Hepatitis, History of cervical cancer, History of chemotherapy, History of radiation therapy, Liver disease, Liver metastases ([address] Lung cancer ([address] Lung mass, Malignant neoplasm of upper lobe of right lung ([address] MVP (mitral valve prolapse), On supplemental oxygen therapy, Wears dentures, Wears glasses, and Wheezing.   has a past surgical history that includes Cervical disc surgery; Hysterectomy; Cholecystectomy; Breast surgery (Left); Carpal tunnel release (Bilateral); Knee arthroscopy (Right); bronchoscopy (04/15/2019); bronchoscopy (N/A, 4/15/2019); bronchoscopy (4/15/2019); bronchoscopy (4/15/2019); TUNNELED CENTRAL VENOUS CATHETER W/ SUBCUTANEOUS PORT (Left, 05/13/2019); thoracentesis (Right, 05/13/2019); and thoracentesis (Right, 08/19/2019). FAMILY HISTORY   []   Information not available due to exam limitations documented above    family history includes Cancer in her paternal grandmother; Emphysema in her mother; Heart Attack in her father; Pearlene Proffer in her paternal grandfather. SOCIAL HISTORY  []   Information not available due to exam limitations documented above     reports that she has been smoking cigarettes. She started smoking about 53 years ago. She has a 13.00 pack-year smoking history. She has never used smokeless tobacco.   reports no history of alcohol use. reports current drug use. Drug: Cocaine. PERTINENT SYSTEMIC REVIEW:    []   Information not available due to exam limitations documented above    Pertinent items are noted in HPI. PHYSICAL EXAMINATION:     GLASCOW COMA SCALE  NEUROMUSCULAR BLOCKADE PRIOR TO ARRIVAL     [x]No        []Yes      Variable  Score   Variable  Score  Eye opening [x]Spontaneous 4 Verbal  [x]Oriented  5     []To voice  3   []Confused  4    []To pain  2   []Inapp words  3    []None  1   []Incomp words 2       []None  1   Motor   [x]Obeys  6    []Localizes pain 5    []Withdraws(pain) 4    []Flexion(pain) 3  []Extension(pain) 2    []None  1     GCS Total = 15    PHYSICAL EXAMINATION    VITAL SIGNS:   Vitals:    10/15/20 1235   BP: 133/79   Pulse: 108   Resp: 18   Temp: 97.6 °F (36.4 °C)   SpO2: 98%       Physical Exam  Constitutional:       Appearance: She is cachectic. She is not ill-appearing or toxic-appearing. HENT:      Head: Normocephalic.       Comments: 2cm laceration on occiput     Right Ear: External ear normal.      Left Ear: External ear normal.      Mouth/Throat:      Mouth: Mucous membranes are moist.      Pharynx: Oropharynx is clear. Eyes:      Extraocular Movements: Extraocular movements intact. Pupils: Pupils are equal, round, and reactive to light. Neck:      Musculoskeletal: Normal range of motion and neck supple. No neck rigidity or muscular tenderness. Cardiovascular:      Rate and Rhythm: Normal rate and regular rhythm. Pulses: Normal pulses. Heart sounds: Normal heart sounds. Pulmonary:      Effort: Pulmonary effort is normal. No respiratory distress. Breath sounds: Normal breath sounds. Abdominal:      Palpations: Abdomen is soft. Tenderness: There is abdominal tenderness (diffuse, baseline). Musculoskeletal: Normal range of motion. Skin:     General: Skin is warm and dry. Coloration: Skin is not jaundiced. Neurological:      General: No focal deficit present. Mental Status: She is alert and oriented to person, place, and time. Cranial Nerves: No cranial nerve deficit. Psychiatric:         Mood and Affect: Mood normal.         Behavior: Behavior is cooperative. FOCUSED ABDOMINAL SONOGRAM FOR TRAUMA (FAST): A good  quality examination was performed by Dr. Navi Hooks and representative images were obtained. [] No free fluid in the abdomen   [x] Free fluid in RUQ   [x] Free fluid in LUQ  [x] Free fluid in Pelvis  [] Pericardial fluid  [] Other: Patient receives frequent paracentesis         RADIOLOGY  CT HEAD WO CONTRAST   Final Result   Mild-to-moderate left frontal and left anterior temporal subarachnoid   hemorrhage. Otherwise mild generalized involution changes and chronic small ischemic   disease. Moderate multilevel degenerate changes cervical spine. No acute fracture   traumatic malalignment         CT CERVICAL SPINE WO CONTRAST   Final Result   Mild-to-moderate left frontal and left anterior temporal subarachnoid   hemorrhage. Otherwise mild generalized involution changes and chronic small ischemic   disease. Moderate multilevel degenerate changes cervical spine. No acute fracture   traumatic malalignment               LABS    Labs Reviewed   CBC - Abnormal; Notable for the following components:       Result Value    RBC 3.60 (*)     Hemoglobin 10.8 (*)     Hematocrit 33.9 (*)     RDW 16.1 (*)     Platelets 121 (*)     All other components within normal limits   PROTIME-INR - Abnormal; Notable for the following components:    Protime 16.7 (*)     All other components within normal limits   APTT   COMPREHENSIVE METABOLIC PANEL         Ray Gama DO  10/15/20, 4:29 PM      Attending Note      I have reviewed the above GCS note(s) and I either performed the key elements of the medical history and physical exam or was present with the trauma resident when the key elements of the medical history and physical exam were performed. I have discussed the findings, established the care plan and recommendations with the trauma team.  Scans reviewed as well as extensive history, [address] [place] [address] status, home hospice. Patient desires no intervention and wants to go home. Feel this appropriate. Will sign off.     Eriberto Aburto MD  10/15/2020  7:06 PM

## 2020-10-15 NOTE — ED NOTES
Writer assisted pt onto bedside commode, pt able to stand and pivot. Pt voided moderate amount. Pt assisted back into bed, provided w/ warm blankets. Pt denies needs or complaints at this time. Will continue to monitor. NAD w/ rr even and unlabored, no change in mentation, aox4.      Naveed Reeder RN  10/15/20 0209

## 2020-10-15 NOTE — PROGRESS NOTES
Lorraine Rudolph is a 72 y.o. female evaluated via telephone on 10/15/2020. Consent:  She and/or health care decision maker is aware that that she may receive a bill for this telephone service, depending on her insurance coverage, and has provided verbal consent to proceed: NA - Consent obtained within past 12 months      Documentation:  I communicated with the patient and/or health care decision maker about Lung cancer, medication refill. Details of this discussion including any medical advice provided: Below      I affirm this is a Patient Initiated Episode with a Patient who has not had a related appointment within my department in the past 7 days or scheduled within the next 24 hours. Patient identification was verified at the start of the visit: Yes    Total Time: minutes: 11-20 minutes    Note: not billable if this call serves to triage the patient into an appointment for the relevant concern      Stew Miranda                 10/15/2020    TELEHEALTH EVALUATION -- Audio/Visual (During PKCCY-59 public health emergency)    Patient and physician are located in their individual homes    HPI:    Lorraine Rudolph (:  1950) has requested an audio only/video evaluation for the following concern(s):    Patient is here for virtual visit via telephone follow-up for lung cancer, medication refill. Patient states she is compliant with medications. Patient is seen by oncology for lung cancer, patient previous PET scan show possible metastases to the liver. Oncology has referred patient to hospice. Patient has developed abdominal ascites and will be going for paracentesis today. Patient is requesting refill for nausea medication, Zofran. Patient is reported 8 pound weight gain. Patient is a smoker, 1 to 2 cigarettes daily, \"has gone down\". Discussed tobacco cessation and risk in depth with patient, encourage patient to decrease and quit.     Patient is requesting medication refill for GERD, hyperglycemia, anxiety, patient denies any side effects of these medications. Labs reviewed. Health maintenance reviewed. Medications reviewed, refilled Celexa 10 mg, Metformin 500 mg, omeprazole 40 mg, Zofran 8 mg. HPI    Review of Systems   Constitutional: Negative for chills and fever. HENT: Negative for congestion. Respiratory: Negative for cough, shortness of breath and wheezing. Cardiovascular: Negative for chest pain. Gastrointestinal: Positive for nausea and vomiting. Negative for constipation and diarrhea. Genitourinary: Negative for dysuria, frequency and urgency. Musculoskeletal: Positive for myalgias. Negative for back pain and neck pain. Neurological: Negative for headaches. Prior to Visit Medications    Medication Sig Taking? Authorizing Provider   ondansetron (ZOFRAN) 8 MG tablet Take 1 tablet by mouth every 12 hours as needed for Nausea or Vomiting Yes Luis Plata PA-C   omeprazole (PRILOSEC) 40 MG delayed release capsule Take 1 capsule by mouth daily Yes Luis Plata PA-C   metFORMIN (GLUCOPHAGE) 500 MG tablet Take 1 tablet by mouth daily (with breakfast) Yes Luis Plata PA-C   citalopram (CELEXA) 10 MG tablet Take 1 tablet by mouth nightly Yes Luis Plata PA-C   glucose monitoring kit (FREESTYLE) monitoring kit 1 kit by Does not apply route daily Provide insurance covered glucometer compatible with test strips and lancets, check 1-2 times daily Yes Luis Plata PA-C   Lancets MISC Please Dispense appropriate insurance approved lancets for patients glucometer, check blood sugars 1-2 times daily.  Yes Luis Plata PA-C   blood glucose monitor strips Provide insurance covered test strips compatible with glucometer, test 1-2 times a day Yes Luis Plata PA-C   Alcohol Swabs PADS Please Dispense alcohol swabs Yes Luis Plata PA-C   hydrOXYzine (ATARAX) 50 MG tablet take 1 tablet by mouth once daily Yes Lb Hernandez ERIN Alcocer   oxyCODONE HCl (OXY-IR) 10 MG immediate release tablet take 1 tablet by mouth every 6 hours if needed for pain Yes Alondra Navarro MD   traZODone (DESYREL) 300 MG tablet take 1 tablet by mouth nightly Yes Vinnie Fam PA-C   ipratropium-albuterol (DUONEB) 0.5-2.5 (3) MG/3ML SOLN nebulizer solution Inhale 3 mLs into the lungs every 4 hours Yes Anna Mendez MD   lactulose (CHRONULAC) 10 GM/15ML solution Take 30 mLs by mouth 3 times daily Yes Anna Mendez MD   potassium chloride (KLOR-CON M) 20 MEQ extended release tablet take 1 tablet by mouth once daily Yes Kizzy Lobato MD   VENTOLIN  (90 Base) MCG/ACT inhaler inhale 2 puffs by mouth and INTO THE LUNGS every 6 hours if needed for wheezing Yes Vinnie Fam PA-C   fluticasone-umeclidin-vilant (TRELEGY ELLIPTA) 100-62.5-25 MCG/INH AEPB Inhale 1 puff into the lungs daily Yes LIZA Garcia - CNP   albuterol (PROVENTIL) (2.5 MG/3ML) 0.083% nebulizer solution Take 3 mLs by nebulization every 6 hours as needed for Wheezing Yes Apolonia Reyes MD   lidocaine viscous hcl (XYLOCAINE) 2 % SOLN solution Take 5-10 mLs by mouth as needed for Irritation Yes Petra Cagle MD   Oxygen Concentrator  Yes Historical Provider, MD   gabapentin (NEURONTIN) 100 MG capsule Take 1 capsule by mouth 3 times daily for 30 days. Anna Mendez MD   lidocaine-prilocaine (EMLA) 2.5-2.5 % cream Apply topically as needed.   Patient not taking: Reported on 9/25/2020  Cary Ghotra MD   ibuprofen (ADVIL;MOTRIN) 600 MG tablet Take 1 tablet by mouth every 6 hours as needed for Pain  Reed Caruso MD       Social History     Tobacco Use    Smoking status: Current Every Day Smoker     Packs/day: 0.25     Years: 52.00     Pack years: 13.00     Types: Cigarettes     Start date: 1/1/1967    Smokeless tobacco: Never Used    Tobacco comment: \"1 carton monthly, carton has 10 packs\", 1/3 pack daily as of 6/9/2020   Substance Use Topics    Alcohol use: No    Drug use: Yes     Types: Cocaine     Comment: 4/15/19 last use (Noted 3/24/20)        Past Medical History:   Diagnosis Date    Anxiety and depression     Back pain     Bipolar disorder (HCC)     Bronchitis     [facility]     Chronic obstructive pulmonary disease (COPD) ([facility]     [facility]     COPD (chronic obstructive pulmonary disease) (HCC)     Pulmonary per Dr. Alida Navarro outpatient     Cough     Current every day smoker     1 pack a day for the last 50 years    Fibromyalgia     Hepatitis     Hepatitis C per pt.  History of cervical cancer     is s/p hyst    History of chemotherapy     chemo every 2 weeks, last tx in April sometime    History of radiation therapy     last tx March 24th 2020    Liver disease     stage 4    [facility] ([facility]     Lung cancer ([facility] 03/2019    Dr. Leonila Recio [facility] 2019    Malignant neoplasm of upper lobe of right lung (HCC)     MVP (mitral valve prolapse)     On supplemental oxygen therapy     02/21 DME per Dr. Alida Navarro for Nasal Cannula at 2 lpm    Wears dentures     full- not in use today 3/10/20    Wears glasses     Wheezing               RECORD REVIEW: Previous medical records were reviewed at today's visit. PHYSICAL EXAMINATION:    Vital Signs: (As obtained by patient/caregiver at home)    Patient-Reported Vitals 6/9/2020   Patient-Reported Weight 129 lb   Patient-Reported Height 5 3          Physical Exam  Constitutional:       General: She is awake. Neurological:      Mental Status: She is alert and oriented to person, place, and time. Psychiatric:         Speech: Speech normal.         Behavior: Behavior is cooperative. Thought Content: Thought content normal.         Cognition and Memory: Cognition normal.         Judgment: Judgment normal.           Other pertinent observable physical exam findings:-     RECORD REVIEW: Previous medical records were reviewed at today's visit.      The past family, medical and social histories were reviewed and unchanged with the exceptions of what is mentioned in this note. Due to this being a TeleHealth encounter, evaluation of the following organ systems is limited: Vitals/Constitutional/EENT/Resp/CV/GI//MS/Neuro/Skin/Heme-Lymph-Imm. ASSESSMENT/PLAN:  Javier Rios was seen today for follow-up and discuss medications. Diagnoses and all orders for this visit:    Malignant neoplasm of upper lobe of right lung (HCC)  -     ondansetron (ZOFRAN) 8 MG tablet; Take 1 tablet by mouth every 12 hours as needed for Nausea or Vomiting    Intractable vomiting with nausea, unspecified vomiting type  -     ondansetron (ZOFRAN) 8 MG tablet; Take 1 tablet by mouth every 12 hours as needed for Nausea or Vomiting    Light cigarette smoker (1-9 cigarettes per day)    Tobacco abuse counseling    Medication refill  -     omeprazole (PRILOSEC) 40 MG delayed release capsule; Take 1 capsule by mouth daily  -     metFORMIN (GLUCOPHAGE) 500 MG tablet; Take 1 tablet by mouth daily (with breakfast)  -     citalopram (CELEXA) 10 MG tablet; Take 1 tablet by mouth nightly    Gastroesophageal reflux disease, unspecified whether esophagitis present  -     omeprazole (PRILOSEC) 40 MG delayed release capsule; Take 1 capsule by mouth daily    Hyperglycemia  -     metFORMIN (GLUCOPHAGE) 500 MG tablet; Take 1 tablet by mouth daily (with breakfast)    Anxiety  -     citalopram (CELEXA) 10 MG tablet; Take 1 tablet by mouth nightly        Return in about 2 months (around 12/15/2020) for Cancer. An  electronic signature was used to authenticate this note. --Espinoza Hanson PA-C on 10/22/2020 at 11:43 AM    This note is created with the assistance of a speech-recognition program. While intending to generate a document that actually reflects the content of the visit, the document can still have some mistakes which may not have been identified and corrected by editing. 9    Pursuant to the emergency declaration under the [facility] waiver authority and the [employer] Act, this Virtual  Visit was conducted, with patient's consent, to reduce the patient's risk of exposure to COVID-19 and provide continuity of care for an established patient. Services were provided through a video synchronous discussion virtually to substitute for in-person clinic visit.

## 2020-10-15 NOTE — PROGRESS NOTES
Discussed with ER physician patient want to go home patient refused any surgical intervention patient aware of possibility of complication including death and she said if that happened she want The nature to take its course patient said that she had strong support system at home patient want to go home I discussed with the ER physician most likely plan for discharge home

## 2020-10-15 NOTE — PROGRESS NOTES
Patient here for ultrasound paracentesis. Consent signed. Hanny POOLE in room. Ultrasound done to abdomen. Right side of abdomen prepped, draped and numbed with lidocaine. Needle in without difficulty. 3.7L of yellow fluid drained. Vernestine Guild out, post scan done and dressing on. Patient discharged to home. Patient tolerated well.

## 2020-10-15 NOTE — CONSULTS
Department of Neurosurgery                                       Resident Consult Note      Reason for Consult:  1 Sarkis Manzo  Requesting Physician:  Steff Andrew  Neurosurgeon:   Dr. Ladonna Roldan    History Obtained From:  patient, electronic medical record    CHIEF COMPLAINT:         Fall    HISTORY OF PRESENT ILLNESS:       The patient is a 72 y.o. female who presents with left frontal and left anterior temporal SAH without shift after fall. Pt has swuamous cell carcinoma of right lung, not currently undergoing chemo or radiation. Right main stem bronchus invasion. Also with liver disease and hepatitis C. Patient was being transported to home after paracentesis today when she accidentally slipped from sling during transport. She hit the back of her head on the corner of concrete steps. Per patient, LOC for approximately 5 seconds. No seizure like activity. No n/v. No extremity weakness, numbness, tingling. She states she is having a mild posterior headache since the fall. She was brought to the ED after fall and found to have a small left frontal and left anterior temporal SAH without shift. She does not take anticoagulation medication. PAST MEDICAL HISTORY :       Past Medical History:        Diagnosis Date    Anxiety and depression     Back pain     Bipolar disorder ([address]     Bronchitis     Cancer ([address]     Chronic obstructive pulmonary disease (COPD) (HCC)     Cirrhosis (HCC)     Cough     Current every day smoker     1 pack a day for the last 50 years    Fibromyalgia     Hepatitis     Hepatitis C per pt.     History of cervical cancer     is s/p hyst    History of chemotherapy     chemo every 2 weeks, last tx in April sometime    History of radiation therapy     last tx March 24th 2020    Liver disease     stage 4    Liver metastases ([address]     Lung cancer ([address] 03/2019    Dr. Collins Scott Lung mass 2019    Malignant neoplasm of upper lobe of right lung (HCC)     MVP (mitral valve prolapse)     On supplemental oxygen therapy     Wears dentures     full- not in use today 3/10/20    Wears glasses     Wheezing        Past Surgical History:        Procedure Laterality Date    BREAST SURGERY Left     benign lumpectomy, multiple    BRONCHOSCOPY  04/15/2019    biopsies and washings    BRONCHOSCOPY N/A 4/15/2019    BRONCHOSCOPY BRUSHINGS performed by Jene Simmonds, MD at [address] [facility]  4/15/2019    BRONCHOSCOPY BIOPSY BRONCHUS performed by Jene Simmonds, MD at [address] [facility]  4/15/2019    BRONCHOSCOPY DIAGNOSTIC OR CELL [address] ONLY performed by Jene Simmonds, MD at [address] [facility], [address] Bilateral    [address]      X 2    CHOLECYSTECTOMY      HYSTERECTOMY      complete    KNEE ARTHROSCOPY Right     THORACENTESIS Right 05/13/2019    THORACENTESIS Right 08/19/2019    TUNNELED CENTRAL VENOUS CATHETER W/ SUBCUTANEOUS PORT Left 05/13/2019       Social History:   Social History     Socioeconomic History    Marital status:      Spouse name: Not on file    Number of children: Not on file    Years of education: Not on file    Highest education level: Not on file   Occupational History    Not on file   Social Needs    Financial resource strain: Not on file    Food insecurity     Worry: Not on file     Inability: Not on file    Transportation needs     Medical: Not on file     Non-medical: Not on file   Tobacco Use    Smoking status: Current Every Day Smoker     Packs/day: 0.25     Years: 52.00     Pack years: 13.00     Types: Cigarettes     Start date: 1/1/1967    Smokeless tobacco: Never Used    Tobacco comment: \"1 carton monthly, carton has 10 packs\", 1/3 pack daily as of 6/9/2020   Substance and Sexual Activity    Alcohol use: No    Drug use: Yes     Types: Cocaine     Comment: 4/15/19 last use (Noted 3/24/20)    Sexual activity: Not on file   Lifestyle    Physical activity     Days per week: Not on file     Minutes per session: Not on file    Stress: Not on file   Relationships    Social connections     Talks on phone: Not on file     Gets together: Not on file     Attends Congregational service: Not on file     Active member of club or organization: Not on file     Attends meetings of clubs or organizations: Not on file     Relationship status: Not on file    Intimate partner violence     Fear of current or ex partner: Not on file     Emotionally abused: Not on file     Physically abused: Not on file     Forced sexual activity: Not on file   Other Topics Concern    Not on file   Social History Narrative    Not on file       Family History:       Problem Relation Age of Onset    Heart Attack Father     Cancer Paternal Grandmother     Lung Cancer Paternal Grandfather     Emphysema Mother        Allergies:  Patient has no known allergies. Home Medications:  Prior to Admission medications    Medication Sig Start Date End Date Taking? Authorizing Provider   ondansetron (ZOFRAN) 8 MG tablet Take 1 tablet by mouth every 12 hours as needed for Nausea or Vomiting 10/15/20   Edilma Gao PA-C   omeprazole (PRILOSEC) 40 MG delayed release capsule Take 1 capsule by mouth daily 10/15/20   Edilma Gao PA-C   metFORMIN (GLUCOPHAGE) 500 MG tablet Take 1 tablet by mouth daily (with breakfast) 10/15/20   Edilma Gao PA-C   citalopram (CELEXA) 10 MG tablet Take 1 tablet by mouth nightly 10/15/20   Edilma Gao PA-C   glucose monitoring kit (FREESTYLE) monitoring kit 1 kit by Does not apply route daily Provide insurance covered glucometer compatible with test strips and lancets, check 1-2 times daily 10/13/20   Edilma Gao PA-C   Lancets MISC Please Dispense appropriate insurance approved lancets for patients glucometer, check blood sugars 1-2 times daily.  10/13/20   Edilma Gao PA-C   blood glucose monitor strips Provide insurance covered test strips compatible with glucometer, test 1-2 times a day 10/13/20   Wolfgang Ty for this encounter. REVIEW OF SYSTEMS:       CONSTITUTIONAL: negative for fatigue and malaise   EYES: negative for double vision and photophobia    HEENT: negative for tinnitus and sore throat   RESPIRATORY: negative for cough, shortness of breath   CARDIOVASCULAR: negative for chest pain, palpitations   GASTROINTESTINAL: negative for nausea, vomiting   GENITOURINARY: negative for incontinence   MUSCULOSKELETAL: negative for neck or back pain   NEUROLOGICAL: negative for seizures   PSYCHIATRIC: negative for agitated     Review of systems otherwise negative. PHYSICAL EXAM:       /79   Pulse 108   Temp 97.6 °F (36.4 °C) (Oral)   Resp 18   Ht 5' 3\" (1.6 m)   Wt 105 lb (47.6 kg)   SpO2 98%   BMI 18.60 kg/m²       CONSTITUTIONAL:  Well developed, cachectic, alert and oriented x 3, in no acute distress. GCS 15, nontoxic. Mild dysarthria, no aphasia. EOMI.     HEAD:  normocephalic, atraumatic    EYES:  PERRLA, EOMI.   ENT:  moist mucous membranes   NECK:  supple, symmetric, no midline tenderness to palpation    BACK:  without midline tenderness, step-offs or deformities    LUNGS:  Equal air entry bilaterally, port in place to left chest wall   CARDIOVASCULAR:  normal s1 / s2   ABDOMEN:  Soft, no rigidity   NEUROLOGIC:  EYE OPENING     Spontaneous - 4 [x]       To voice - 3 []       To pain - 2 []       None - 1 []    VERBAL RESPONSE     Appropriate, oriented - 5 [x]       Dazed or confused - 4 []       Syllables, expletives - 3 []       Grunts - 2 []       None - 1 []    MOTOR RESPONSE     Spontaneous, command - 6 [x]       Localizes pain - 5 []       Withdraws pain - 4 []       Abnormal flexion - 3 []       Abnormal extension - 2 []       None - 1 []            Total GCS: 15    Mental Status:  A & O x3, awake             Cranial Nerves:    cranial nerves II-XII are grossly intact    Motor Exam:    Drift:  absent  Tone:  normal    Motor exam is symmetrical 5 out of 5 all extremities bilaterally    Sensory:    Touch:    Right Upper Extremity:  normal  Left Upper Extremity:  normal  Right Lower Extremity:  normal  Left Lower Extremity:  normal      Plantar Response:    Right:  downgoing  Left:  downgoing    Clonus:  absent    Coordination/Dysmetria:  Finger to Nose:   Right:  normal  Left:  normal   Dysdiadochokinesia:  absent     SKIN:  no rash      LABS AND IMAGING:     CBC with Differential:    Lab Results   Component Value Date    WBC 10.1 10/15/2020    RBC 3.60 10/15/2020    HGB 10.8 10/15/2020    HCT 33.9 10/15/2020     10/15/2020    MCV 94.2 10/15/2020    MCH 30.0 10/15/2020    MCHC 31.9 10/15/2020    RDW 16.1 10/15/2020    NRBC 1 06/28/2019    LYMPHOPCT 8 10/09/2020    MONOPCT 8 10/09/2020    BASOPCT 1 10/09/2020    MONOSABS 0.54 10/09/2020    LYMPHSABS 0.54 10/09/2020    EOSABS 0.07 10/09/2020    BASOSABS 0.07 10/09/2020    DIFFTYPE NOT REPORTED 10/09/2020     BMP:    Lab Results   Component Value Date     10/09/2020    K 4.1 10/09/2020    CL 99 10/09/2020    CO2 24 10/09/2020    BUN 11 10/09/2020    LABALBU 2.3 10/09/2020    CREATININE 0.65 10/09/2020    CALCIUM 8.2 10/09/2020    GFRAA >60 10/09/2020    LABGLOM >60 10/09/2020    GLUCOSE 109 10/09/2020       Radiology Review:    CT HEAD WO CONTRAST   Final Result   Mild-to-moderate left frontal and left anterior temporal subarachnoid   hemorrhage. Otherwise mild generalized involution changes and chronic small ischemic   disease. Moderate multilevel degenerate changes cervical spine. No acute fracture   traumatic malalignment         CT CERVICAL SPINE WO CONTRAST   Final Result   Mild-to-moderate left frontal and left anterior temporal subarachnoid   hemorrhage. Otherwise mild generalized involution changes and chronic small ischemic   disease. Moderate multilevel degenerate changes cervical spine.   No acute fracture   traumatic malalignment               ASSESSMENT AND PLAN:       Patient Active Problem List   Diagnosis    CL (cirrhosis of liver) (HCC)    Chronic hepatitis C without hepatic coma (HCC)    Thrombocytopenia (HCC)    Chronic obstructive pulmonary disease (HCC)    Anxiety    Malignant neoplasm of upper lobe of right lung (HCC)    Substance abuse ([facility]    Diverticulitis of large intestine with perforation and abscess without bleeding    Malignant neoplasm of liver (HCC)    Chronic hepatitis C with hepatic coma (HCC)    MVP (mitral valve prolapse)    History of radiation therapy    Fibromyalgia    History of chemotherapy    Bipolar disorder ([facility]    Current every day smoker    Pleural effusion on right    Depression    Transaminitis    Respiratory alkalosis    Diabetes mellitus type 2 in nonobese ([facility]    [place] coma scale total score 13-15    Somnolence         A/P:  This is a 72 y.o. female with acute left frontal and left anterior temporal SAH after accidental slip during transport today after paracentesis. Active squamous cell lung cancer of right lung with right main stem bronchus invasion. Hepatitis C and liver cirrhosis. Neuro intact. Not on any anticoagulation. Patient care will be discussed with attending, will reevaluate patient along with attending     - No neurosurgical interventions planned for now  - Initial recommendation for admission, BP management, neuro checks, and repeat CT head on 10/16 AM. However, patient is [facility] and would refuse surgery. Therefore, [facility] from [facility] perspective at this time as long as patient understands risks. Discussion between ED medical staff and patient and . Thank you for your consult.        Nora Keyes DO    10/15/2020  4:19 PM    Neurosurgery attending:  Patient seen 10/15/2020    I have reviewed the chart, studied the images, and examined the patient personally and agree with the NATHANAEL/Resident except with following addendum:    The patient is a 72 y.o. female who presents with after closed head injury. Exam: GCS 15, alert, conversant. No dysphasia    Repeat CT showed increased in cerebral contusion. A/P  Traumatic SAH and cerebral contusion, neuro intact, baseline end stage CA and cachetic    Repeat CT ordered to ensure stability  No surgical indications   Patient and family counseled.    Almita Obrien DO  Neurosurgeon

## 2020-10-15 NOTE — CONSULTS
Department of Endovascular Neurosurgery                                         Resident Consult Note    Reason for Consult:  [facility]  Requesting Physician:  Elli . Juan M Green DO  Endovascular Neurosurgeon:   [x]Dr. Mercy Cabot  []Dr. Wendy Lovell    History Obtained From:  patient, electronic medical record    CHIEF COMPLAINT:       Fall    HISTORY OF PRESENT ILLNESS:       The patient is a 72 y.o. female with PMH of Right Lung Ca, Liver Ca, COPD, MVP, Cirrhosis who presents to [facility] after fall. Patient refer that this morning patient came to [facility] for paracentesis and then was taken to her house on ambulance. patient refer that the EMS personnel dropped her to concrete in her house. In other notes it says accidentally slipped from sling during transport. Report some LOC that could lasted some seconds but unsure. Patient was brought to ED for evaluation. Patient was found to have small cortical SAH. Patient refer she is a smoker and alcoholic. Refer she discontinue cocaine about 5-6 years ago. Refer she uses a cane     [address]: Afternoon 10/15/2020  NIHSS: 0  PAST MEDICAL HISTORY :       Past Medical History:        Diagnosis Date    Anxiety and depression     Back pain     Bipolar disorder ([address]     Bronchitis     Cancer ([address]     Chronic obstructive pulmonary disease (COPD) (HCC)     Cirrhosis (HCC)     Cough     Current every day smoker     1 pack a day for the last 50 years    Fibromyalgia     Hepatitis     Hepatitis C per pt.     History of cervical cancer     is s/p hyst    History of chemotherapy     chemo every 2 weeks, last tx in April sometime    History of radiation therapy     last tx March 24th 2020    Liver disease     stage 4    Liver metastases ([address]     Lung cancer ([facility] 03/2019    Dr. Neha Vu Lung mass 2019    Malignant neoplasm of upper lobe of right lung (HCC)     MVP (mitral valve prolapse)     On supplemental oxygen therapy     Wears dentures     full- not in use today 3/10/20    Wears glasses     Wheezing        Past Surgical History:        Procedure Laterality Date    BREAST SURGERY Left     benign lumpectomy, multiple    BRONCHOSCOPY  04/15/2019    biopsies and washings    BRONCHOSCOPY N/A 4/15/2019    BRONCHOSCOPY BRUSHINGS performed by Pepe Campos MD at [address] [facility]  4/15/2019    BRONCHOSCOPY BIOPSY BRONCHUS performed [facility]pos [facility] [address] [facility]  4/15/2019    BRONCHOSCOPY DIAGNOSTIC OR CELL [address] ONLY performed by Pepe Campos MD at [address] [facility], [address] Bilateral    [address]      X 2    CHOLECYSTECTOMY      HYSTERECTOMY      complete    KNEE ARTHROSCOPY Right     THORACENTESIS Right 05/13/2019    THORACENTESIS Right 08/19/2019    TUNNELED CENTRAL VENOUS CATHETER W/ SUBCUTANEOUS PORT Left 05/13/2019       Social History:   Social History     Socioeconomic History    Marital status:      Spouse name: Not on file    Number of children: Not on file    Years of education: Not on file    Highest education level: Not on file   Occupational History    Not on file   Social Needs    Financial resource strain: Not on file    Food insecurity     Worry: Not on file     Inability: Not on file    Transportation needs     Medical: Not on file     Non-medical: Not on file   Tobacco Use    Smoking status: Current Every Day Smoker     Packs/day: 0.25     Years: 52.00     Pack years: 13.00     Types: Cigarettes     Start date: 1/1/1967    Smokeless tobacco: Never Used    Tobacco comment: \"1 carton monthly, carton has 10 packs\", 1/3 pack daily as of 6/9/2020   Substance and Sexual Activity    Alcohol use: No    Drug use: Yes     Types: Cocaine     Comment: 4/15/19 last use (Noted 3/24/20)    Sexual activity: Not on file   Lifestyle    Physical activity     Days per week: Not on file     Minutes per session: Not on file    Stress: Not on file Relationships    Social connections     Talks on phone: Not on file     Gets together: Not on file     Attends Mosque service: Not on file     Active member of club or organization: Not on file     Attends meetings of clubs or organizations: Not on file     Relationship status: Not on file    Intimate partner violence     Fear of current or ex partner: Not on file     Emotionally abused: Not on file     Physically abused: Not on file     Forced sexual activity: Not on file   Other Topics Concern    Not on file   Social History Narrative    Not on file       Family History:       Problem Relation Age of Onset    Heart Attack Father     Cancer Paternal Grandmother     Lung Cancer Paternal Grandfather     Emphysema Mother        Allergies:  Patient has no known allergies. Home Medications:  Prior to Admission medications    Medication Sig Start Date End Date Taking? Authorizing Provider   ondansetron (ZOFRAN) 8 MG tablet Take 1 tablet by mouth every 12 hours as needed for Nausea or Vomiting 10/15/20   Mem[place] Lis, PA-C   omeprazole (PRILOSEC) 40 MG delayed release capsule Take 1 capsule by mouth daily 10/15/20   Leon Lis, PA-C   metFORMIN (GLUCOPHAGE) 500 MG tablet Take 1 tablet by mouth daily (with breakfast) 10/15/20   Memphis Lis, PA-C   citalopram (CELEXA) 10 MG tablet Take 1 tablet by mouth nightly 10/15/20   Leon Lis, PA-C   glucose monitoring kit (FREESTYLE) monitoring kit 1 kit by Does not apply route daily Provide insurance covered glucometer compatible with test strips and lancets, check 1-2 times daily 10/13/20   Leon Lis, PA-C   Lancets MISC Please Dispense appropriate insurance approved lancets for patients glucometer, check blood sugars 1-2 times daily.  10/13/20   Mem[place] Lis, PA-C   blood glucose monitor strips Provide insurance covered test strips compatible with glucometer, test 1-2 times a day 10/13/20   Mem[place] Lis, PA-C   Alcohol Swabs PADS Please Dispense alcohol swabs 10/13/20   Selvin Banuelos PA-C   hydrOXYzine (ATARAX) 50 MG tablet take 1 tablet by mouth once daily 10/2/20   Selvin Banuelos PA-C   oxyCODONE HCl (OXY-IR) 10 MG immediate release tablet take 1 tablet by mouth every 6 hours if needed for pain 10/2/20 11/1/20  Maria Esther Rowan MD   traZODone (DESYREL) 300 MG tablet take 1 tablet by mouth nightly 9/3/20   Selvin Banuelos PA-C   ipratropium-albuterol (DUONEB) 0.5-2.5 (3) MG/3ML SOLN nebulizer solution Inhale 3 mLs into the lungs every 4 hours 8/27/20   Michael Santamaria MD   gabapentin (NEURONTIN) 100 MG capsule Take 1 capsule by mouth 3 times daily for 30 days. 8/27/20 10/9/20  Michael Santamaria MD   lactulose [facility]) 10 GM/15ML solution Take 30 mLs by mouth 3 times daily 8/27/20   Michael Santamaria MD   potassium chloride (KLOR-CON M) 20 MEQ extended release tablet take 1 tablet by mouth once daily 8/7/20   Zach Lobato MD   VENTOLIN  (90 Base) MCG/ACT inhaler inhale 2 puffs by mouth and INTO THE LUNGS every 6 hours if needed for wheezing 6/29/20   Selvin Banuelos PA-C   fluticasone-umeclidin-vilant (TRELEGY ELLIPTA) 682-09.1-58 MCG/INH AEPB Inhale 1 puff into the lungs daily 3/31/20   LIZA Maninng - BARRON   lidocaine-prilocaine (EMLA) 2.5-2.5 % cream Apply topically as needed.   Patient not taking: Reported on 9/25/2020 1/20/20   Alli Zhu MD   albuterol (PROVENTIL) (2.5 MG/3ML) 0.083% nebulizer solution Take 3 mLs by nebulization every 6 hours as needed for Wheezing 9/25/19   Antoni Iyer MD   lidocaine viscous hcl (XYLOCAINE) 2 % SOLN solution Take 5-10 mLs by mouth as needed for Irritation 6/5/19   Shashank Lyle MD   Oxygen Concentrator     Historical Provider, MD   ibuprofen (ADVIL;MOTRIN) 600 MG tablet Take 1 tablet by mouth every 6 hours as needed for Pain 6/20/16 6/1/20  Marisol Miranda MD       REVIEW OF SYSTEMS:       CONSTITUTIONAL: negative for fatigue and malaise   EYES: negative for double vision and photophobia    HEENT: negative for tinnitus and sore throat   RESPIRATORY: negative for cough, shortness of breath   CARDIOVASCULAR: negative for chest pain, palpitations   GASTROINTESTINAL: negative for nausea, vomiting   GENITOURINARY: negative for incontinence   MUSCULOSKELETAL: negative for neck or back pain   NEUROLOGICAL: negative for seizures  Positive for headaches   PSYCHIATRIC: negative for fatigue     PHYSICAL EXAM:       /79   Pulse 108   Temp 97.6 °F (36.4 °C) (Oral)   Resp 18   Ht 5' 3\" (1.6 m)   Wt 105 lb (47.6 kg)   SpO2 98%   BMI 18.60 kg/m²     CONSTITUTIONAL:  Well developed, well nourished, alert and oriented x 3, in no acute distress. GCS 15, nontoxic. No dysarthria, no aphasia. HEAD:  SAH   EYES:  PERRLA, EOMI.   ENT:  moist mucous membranes   NECK:  supple, symmetric, no midline tenderness to palpation    BACK:  without midline tenderness, step-offs or deformities    LUNGS:  Equal air entry bilaterally   CARDIOVASCULAR:  normal s1 / s2   ABDOMEN:  Soft, no rigidity   NEUROLOGIC:  Mental Status:  A & O x3,awake             Cranial Nerves:    cranial nerves II-XII are grossly intact    Motor Exam:    Drift:  absent  Tone:  normal    Motor exam is symmetrical 4+ out of 5 all extremities bilaterally    Sensory:    Touch:    Right Upper Extremity:  normal  Left Upper Extremity:  normal  Right Lower Extremity:  normal  Left Lower Extremity:  normal    Deep Tendon Reflexes:    Right Bicep:  2+  Left Bicep:  2+  Right Knee:  2+  Left Knee:  2+    Plantar Response:  Right:  downgoing  Left:  downgoing    Clonus:  absent  Segura's:  absent    Coordination/Dysmetria:  Heel to Shin:  Right:  normal  Left:  normal  Finger to Nose:   Right:  normal  Left:  normal     Gait:  Deferred    INITIAL NIH STROKE SCALE:    Time Performed:  4:30 PM     1a. Level of consciousness:  0 - alert; keenly responsive  1b.   Level of consciousness questions:  0 - answers both questions correctly  1c. Level of consciousness questions:  0 - performs both tasks correctly  2. Best Gaze:  0 - normal  3. Visual:  0 - no visual loss  4. Facial Palsy:  0 - normal symmetric movement  5a. Motor left arm:  0 - no drift, limb holds 90 (or 45) degrees for full 10 seconds  5b. Motor right arm:  0 - no drift, limb holds 90 (or 45) degrees for full 10 seconds  6a. Motor left le - no drift; leg holds 30 degree position for full 5 seconds  6b. Motor right le - no drift; leg holds 30 degree position for full 5 seconds  7. Limb Ataxia:  0 - absent  8. Sensory:  0 - normal; no sensory loss  9. Best Language:  0 - no aphasia, normal  10. Dysarthria:  0 - normal  11.   Extinction and Inattention:  0 - no abnormality    TOTAL:  0     SKIN:  no rash      Modified Maegan Score Scale:     [] Zero: No symptoms at all   [] 1: No significant disability despite symptoms; able to carry out all usual duties and activities   [x] 2: Slight disability; unable to carry out all previous activities, but able to look after own affairs without assistance   [] 3:Moderate disability; requiring some help, but able to walk without assistance   [] 4: Moderately severe disability; unable to walk and attend to bodily needs without assistance   [] 5:Severe disability; bedridden, incontinent and requiring constant nursing care and attention    LABS AND IMAGING:     CBC with Differential:    Lab Results   Component Value Date    WBC 10.1 10/15/2020    RBC 3.60 10/15/2020    HGB 10.8 10/15/2020    HCT 33.9 10/15/2020     10/15/2020    MCV 94.2 10/15/2020    MCH 30.0 10/15/2020    MCHC 31.9 10/15/2020    RDW 16.1 10/15/2020    NRBC 1 2019    LYMPHOPCT 8 10/09/2020    MONOPCT 8 10/09/2020    BASOPCT 1 10/09/2020    MONOSABS 0.54 10/09/2020    LYMPHSABS 0.54 10/09/2020    EOSABS 0.07 10/09/2020    BASOSABS 0.07 10/09/2020    DIFFTYPE NOT REPORTED 10/09/2020     BMP:    Lab Results   Component Value Date     10/15/2020    K 4.2 10/15/2020    CL 98 10/15/2020    CO2 23 10/15/2020    BUN 10 10/15/2020    LABALBU 2.0 10/15/2020    CREATININE 0.55 10/15/2020    CALCIUM 8.0 10/15/2020    GFRAA >60 10/15/2020    LABGLOM >60 10/15/2020    GLUCOSE 92 10/15/2020       Radiology Review:    1.) CT Head without contrast:   Impression    Mild-to-moderate left frontal and left anterior temporal subarachnoid    hemorrhage.         Otherwise mild generalized involution changes and chronic small ischemic    disease.         Moderate multilevel degenerate changes cervical spine.  No acute fracture    traumatic malalignment           ASSESSMENT AND PLAN:       Assessment                 72 y.o. female who presents with fall and traumatic SAH    // Traumatic SAH //   Patient with [address] that most likely is due to fall and trauma. NO Need of CTA H/N since is due to trauma. Will recommend to consult Neurosurgery and to follow their recommendations. 1. Last Known Well (date and time): Today after fall    2. Candidate for IV tPA therapy     Yes []     No  [x] due to the following exclusion criteria: SAH     Contraindications for IV tPA:   ? Symptom onset is unknown, > 4.5 hours, or if patient awoke with stroke   ? Acute or previous intracranial hemorrhage   ? Imaging showing extensive regions of irreversible injury (hypoattenuation)   ? Prior ischemic stroke, severe head trauma, or intracranial/intraspinal surgery within 3 months   ? Symptoms of subarachnoid hemorrhage   ? GI malignancy or GI bleed within 21 days   ? Coagulopathy: (Platelets < 706, 634/ROSENDA³, INR > 1.7, aPTT > 40 s, PT > 15 s)   ? Treatment dose of low molecular weight heparin within 24 hours (does not apply to prophylactic doses to prevent VTE)   ? Use of anticoagulant drugs (thrombin inhibitors and factor Xa inhibitors) unless labs are normal or when patient has not taken for >48 hours with normal renal function   ?  Use of antiplatelet that inhibits glycoprotein IIb/IIIa receptors (except in clinical trials)   ? Infective endocarditis due to increased risk of intracranial hemorrhage   ? Aortic arch dissection   ? Intra-axial (inside the brain tissue) intracranial neoplasm   ? Persistent elevated blood pressure (systolic > 403 mm Hg or diastolic > 190 mm Hg)   ? Active internal bleeding      3. Candidate for Thrombectomy    Yes []      No [x] due to the following exclusion criteria: NIHSS 0    - Discussed with Dr. Georgette Nageotte     Recommendations:      - Santi Pace   - Will recommend Neurosurgery evaluation and will defer management to them   - Patient with most likely traumatic SAH due to fall. Please contact [facility] with any changes in patients neurologic status. Thank you for your consult.        Mike De Jesus MD  Adult General Neurology Resident PGY-4  10/15/2020 at 4:49 PM

## 2020-10-15 NOTE — ED PROVIDER NOTES
[facility]  ED  Emergency Department Encounter  EmergencyMedicine Resident     Pt Fortunato Coto  MRN: [id number]  [place] 1950  Date of evaluation: 10/15/20  PCP:  Norberto Julien PA-C    CHIEF COMPLAINT       Chief Complaint   Patient presents with    Head Laceration     Pt arrives via squad, c/o head lac, squad had been transporting pt home from here after a procedure, carrying pt in a \"sling\" pt slipped out landing on back of head, denies LOC       HISTORY OF PRESENT ILLNESS  (Location/Symptom, Timing/Onset, Context/Setting, Quality, Duration, Modifying Factors, Severity.)      Tin Morris is a 72 y.o. female who presents with head laceration. Patient was being transported home after procedure. Was being carried in a sling patient slipped out landing on the back of her head, no loss of consciousness, according to EMS patient has a small laceration to the back of her head, no active bleeding upon arrival emergency department. Patient has history of cirrhosis, lung cancer is on chemo. No history of blood thinners. Patient complained of headache. Patient has history of squamous cell carcinoma, history of smoking cigarettes and cocaine, COPD wears oxygen. Hepatocellular carcinoma had radio embolization on 3/24/2020    PAST MEDICAL / SURGICAL / SOCIAL / FAMILY HISTORY      has a past medical history of Anxiety and depression, Back pain, Bipolar disorder ([address] Bronchitis, Cancer ([address] Chronic obstructive pulmonary disease (COPD) ([address] Cirrhosis ([address] Cough, Current every day smoker, Fibromyalgia, Hepatitis, History of cervical cancer, History of chemotherapy, History of radiation therapy, Liver disease, Liver metastases ([address] Lung cancer ([address] Lung mass, Malignant neoplasm of upper lobe of right lung ([address] MVP (mitral valve prolapse), On supplemental oxygen therapy, Wears dentures, Wears glasses, and Wheezing.      has a past surgical history that includes Cervical disc surgery; Hysterectomy; Cholecystectomy; Breast surgery (Left); Carpal tunnel release (Bilateral); Knee arthroscopy (Right); bronchoscopy (04/15/2019); bronchoscopy (N/A, 4/15/2019); bronchoscopy (4/15/2019); bronchoscopy (4/15/2019); TUNNELED CENTRAL VENOUS CATHETER W/ SUBCUTANEOUS PORT (Left, 05/13/2019); thoracentesis (Right, 05/13/2019); and thoracentesis (Right, 08/19/2019).     Social History     Socioeconomic History    Marital status:      Spouse name: Not on file    Number of children: Not on file    Years of education: Not on file    Highest education level: Not on file   Occupational History    Not on file   Social Needs    Financial resource strain: Not on file    Food insecurity     Worry: Not on file     Inability: Not on file    Transportation needs     Medical: Not on file     Non-medical: Not on file   Tobacco Use    Smoking status: Current Every Day Smoker     Packs/day: 0.25     Years: 52.00     Pack years: 13.00     Types: Cigarettes     Start date: 1/1/1967    Smokeless tobacco: Never Used    Tobacco comment: \"1 carton monthly, carton has 10 packs\", 1/3 pack daily as of 6/9/2020   Substance and Sexual Activity    Alcohol use: No    Drug use: Yes     Types: Cocaine     Comment: 4/15/19 last use (Noted 3/24/20)    Sexual activity: Not on file   Lifestyle    Physical activity     Days per week: Not on file     Minutes per session: Not on file    Stress: Not on file   Relationships    Social connections     Talks on phone: Not on file     Gets together: Not on file     Attends Congregational service: Not on file     Active member of club or organization: Not on file     Attends meetings of clubs or organizations: Not on file     Relationship status: Not on file    Intimate partner violence     Fear of current or ex partner: Not on file     Emotionally abused: Not on file     Physically abused: Not on file     Forced sexual activity: Not on file   Other Topics Concern    Not on file   Social History Narrative    Not on file       Family History   Problem Relation Age of Onset    Heart Attack Father     Cancer Paternal Grandmother     Lung Cancer Paternal Grandfather     Emphysema Mother        Allergies:  Patient has no known allergies. Home Medications:  Prior to Admission medications    Medication Sig Start Date End Date Taking? Authorizing Provider   ondansetron (ZOFRAN) 8 MG tablet Take 1 tablet by mouth every 12 hours as needed for Nausea or Vomiting 10/15/20   Wm Guajardo PA-C   omeprazole (PRILOSEC) 40 MG delayed release capsule Take 1 capsule by mouth daily 10/15/20   Wm Guajardo PA-C   metFORMIN (GLUCOPHAGE) 500 MG tablet Take 1 tablet by mouth daily (with breakfast) 10/15/20   Wm Guajardo PA-C   citalopram (CELEXA) 10 MG tablet Take 1 tablet by mouth nightly 10/15/20   Wm Guajardo PA-C   glucose monitoring kit (FREESTYLE) monitoring kit 1 kit by Does not apply route daily Provide insurance covered glucometer compatible with test strips and lancets, check 1-2 times daily 10/13/20   Wm Guajardo PA-C   Lancets MISC Please Dispense appropriate insurance approved lancets for patients glucometer, check blood sugars 1-2 times daily.  10/13/20   Wm Guajardo PA-C   blood glucose monitor strips Provide insurance covered test strips compatible with glucometer, test 1-2 times a day 10/13/20   Wm Guajardo PA-C   Alcohol Swabs PADS Please Dispense alcohol swabs 10/13/20   Wm Guajardo PA-C   hydrOXYzine (ATARAX) 50 MG tablet take 1 tablet by mouth once daily 10/2/20   Wm Guajardo PA-C   oxyCODONE HCl (OXY-IR) 10 MG immediate release tablet take 1 tablet by mouth every 6 hours if needed for pain 10/2/20 11/1/20  Ashleigh Rosales MD   traZODone (DESYREL) 300 MG tablet take 1 tablet by mouth nightly 9/3/20   Wm Guajardo PA-C   ipratropium-albuterol (DUONEB) 0.5-2.5 (3) MG/3ML SOLN nebulizer solution Inhale 3 mLs into problems and confusion. The patient is not nervous/anxious. PHYSICAL EXAM   (up to 7 for level 4, 8 or more for level 5)      INITIAL VITALS:   /79   Pulse 108   Temp 97.6 °F (36.4 °C) (Oral)   Resp 18   Ht 5' 3\" (1.6 m)   Wt 105 lb (47.6 kg)   SpO2 98%   BMI 18.60 kg/m²     Physical Exam  Constitutional:       Appearance: Normal appearance. Comments: Patient is cachectic, is alert and oriented x3, is not in acute distress   HENT:      Head: Normocephalic. Comments: Patient has a superficial 3 mm cut to her occiput, no active bleeding tenderness to palpation     Mouth/Throat:      Pharynx: No oropharyngeal exudate or posterior oropharyngeal erythema. Eyes:      Extraocular Movements: Extraocular movements intact. Pupils: Pupils are equal, round, and reactive to light. Cardiovascular:      Rate and Rhythm: Tachycardia present. Pulmonary:      Effort: Pulmonary effort is normal.      Breath sounds: Normal breath sounds. Musculoskeletal:         General: No swelling, tenderness, deformity or signs of injury. Right lower leg: No edema. Left lower leg: No edema. Neurological:      General: No focal deficit present. Mental Status: She is alert and oriented to person, place, and time. Cranial Nerves: No cranial nerve deficit. Sensory: No sensory deficit. Motor: No weakness. Psychiatric:         Mood and Affect: Mood normal.         Behavior: Behavior normal.         Thought Content:  Thought content normal.         Judgment: Judgment normal.         DIFFERENTIAL  DIAGNOSIS     PLAN (LABS / IMAGING / EKG):  Orders Placed This Encounter   Procedures    CT HEAD WO CONTRAST    CT CERVICAL SPINE WO CONTRAST    CBC    Comprehensive Metabolic Panel    Protime-INR    APTT    Inpatient consult to Trauma Surgery    Inpatient consult to Neurosurgery    Inpatient consult to Hospitalist       MEDICATIONS ORDERED:  Orders Placed This Encounter Medications    Tetanus-Diphth-Acell Pertussis (BOOSTRIX) injection 0.5 mL    morphine injection 4 mg       DDX: Subdural, subarachnoid, cervical fracture    DIAGNOSTIC RESULTS / EMERGENCY DEPARTMENT COURSE / MDM   :  Results for orders placed or performed during the hospital encounter of 10/15/20   CBC   Result Value Ref Range    WBC 10.1 3.5 - 11.3 k/uL    RBC 3.60 (L) 3.95 - 5.11 m/uL    Hemoglobin 10.8 (L) 11.9 - 15.1 g/dL    Hematocrit 33.9 (L) 36.3 - 47.1 %    MCV 94.2 82.6 - 102.9 fL    MCH 30.0 25.2 - 33.5 pg    MCHC 31.9 28.4 - 34.8 g/dL    RDW 16.1 (H) 11.8 - 14.4 %    Platelets 182 (L) 797 - 453 k/uL    MPV 8.2 8.1 - 13.5 fL    NRBC Automated 0.0 0.0 per 100 WBC           RADIOLOGY:  EXAMINATION:    CT OF THE HEAD WITHOUT CONTRAST; CT OF THE CERVICAL SPINE WITHOUT CONTRAST    10/15/2020 1:52 pm         TECHNIQUE:    CT of the head was performed without the administration of intravenous    contrast. Dose modulation, iterative reconstruction, and/or weight based    adjustment of the mA/kV was utilized to reduce the radiation dose to as low    as reasonably achievable.; CT of the cervical spine was performed without the    administration of intravenous contrast. Multiplanar reformatted images are    provided for review.  Dose modulation, iterative reconstruction, and/or weight    based adjustment of the mA/kV was utilized to reduce the radiation dose to as    low as reasonably achievable.         COMPARISON:    CT head and CT angiogram of the chest 08/24/2020         HISTORY:    ORDERING SYSTEM PROVIDED HISTORY: Fall    TECHNOLOGIST PROVIDED HISTORY:    Fall    ; ORDERING SYSTEM PROVIDED HISTORY: fall    TECHNOLOGIST PROVIDED HISTORY:    fall         FINDINGS:    BRAIN/VENTRICLES: There is mild-to-moderate subarachnoid hemorrhage    identified involving the left anterior inferior frontal lobe.  Minimal    subarachnoid blood products left anterior temporal lobe.         Mild generalized involutional changes brain parenchyma identified with    prominence of the ventricles and sulci.  Mild periventricular and subcortical    white matter hypoattenuation suggestive chronic small vessel ischemic    disease.  No shift of midline structures.  Basal cisterns are patent. Intracranial vascular calcification.         ORBITS: The visualized portion of the orbits demonstrate no acute abnormality.         SINUSES: Minimal ethmoid sinus mucosal thickening.  Mastoids are clear.         SOFT TISSUES/SKULL:  No acute abnormality of the visualized skull or soft    tissues.         CT cervical spine:         Mild to moderate dextrocurvature.  The cervical vertebral heights are    maintained.  Prior interbody fusion C5-6 and C6-C7.  Otherwise    mild-to-moderate multilevel degenerate changes with moderate severe    multilevel degenerate facet arthropathy. Moderate stenosis multiple levels    due to small disc bulges.  These most pronounced C4-C5 and C2-C3 and C6-7    C7-T1.         Within the upper lungs there is a patulous appearance of the proximal    esophagus with air-fluid level.  Loculated collection right lung apex appears    similar to previous CT chest..  Bilateral emphysematous changes.  Parenchymal    opacity involving the right upper lobe similar prior CT chest.              Impression    Mild-to-moderate left frontal and left anterior temporal subarachnoid    hemorrhage.         Otherwise mild generalized involution changes and chronic small ischemic    disease.         Moderate multilevel degenerate changes cervical spine.  No acute fracture    traumatic malalignment          EKG  None    All EKG's are interpreted by the Emergency Department Physician who either signs or Co-signs this chart in the absence of a cardiologist.    EMERGENCY DEPARTMENT COURSE/IMPRESSION: 36-year-old female with significant past medical history of cirrhosis, cancer on chemo, recently had paracentesis performed today, was being taken home by EMS, patient fell out of sling, head and head, no loss of consciousness, complaining of headache. Is brought emerged part for evaluation. Patient neurovascular intact. No active bleeding from occiput wound which does not require closure, patient was given tetanus shot, morphine for pain CT head and neck were obtained which showed subarachnoid hemorrhage. Consulted neurosurgery and trauma surgery for admission, both of whom declined admission state the patient needs to be admitted and repeat CT head in the a.m. however recommend medicine team admit due to patient's chronic medical issues. Patient signed out to Dr. Wilner Story awaiting mission      PROCEDURES:  None    CONSULTS:  IP CONSULT TO TRAUMA SURGERY  IP CONSULT TO NEUROSURGERY  IP CONSULT TO HOSPITALIST    CRITICAL CARE:  None    FINAL IMPRESSION      1. Subarachnoid hemorrhage ([facility]          DISPOSITION / PLAN     DISPOSITION Decision To Admit 10/15/2020 03:01:15 PM      PATIENT REFERRED TO:  No follow-up provider specified.     DISCHARGE MEDICATIONS:  New Prescriptions    No medications on file       Doris Draper DO  Emergency Medicine Resident    (Please note that portions of thisnote were completed with a voice recognition program.  Efforts were made to edit the dictations but occasionally words are mis-transcribed.)     Doris Draper DO  Resident  10/15/20 [facility], DO  Resident  10/16/20 [id number]

## 2020-10-15 NOTE — ED PROVIDER NOTES
[facility]     Emergency Department     Faculty Attestation    I performed a history and physical examination of the patient and discussed management with the resident. I reviewed the resident´s note and agree with the documented findings and plan of care. Any areas of disagreement are noted on the chart. I was personally present for the key portions of any procedures. I have documented in the chart those procedures where I was not present during the key portions. I have reviewed the emergency nurses triage note. I agree with the chief complaint, past medical history, past surgical history, allergies, medications, social and family history as documented unless otherwise noted below. For Physician Assistant/ Nurse Practitioner cases/documentation I have personally evaluated this patient and have completed at least one if not all key elements of the E/M (history, physical exam, and MDM). Additional findings are as noted. Small superficial scalp laceration posterior scalp, no active bleeding.      Jerome Toney MD  10/15/20 2390
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
## 2020-10-16 PROBLEM — I61.9 INTRAPARENCHYMAL HEMORRHAGE OF BRAIN (HCC): Status: ACTIVE | Noted: 2020-01-01

## 2020-10-16 NOTE — PROGRESS NOTES
Plan of Care     CT imagine reviewed, patient family is concerned and wanted to talk with someone from neurosurgery team  [facility] showed worsening intracerebral hemorrhage     Family updated regarding imaging, we will obtain another CT head now to evaluation head for continue worsening of bleed.    Family updated  Patient denies taking any anticoagulation medications     Electronically signed by LIZA Denny NP on 10/16/2020 at 5:36 PM

## 2020-10-16 NOTE — PROGRESS NOTES
Physical Therapy  DATE: 10/16/2020    NAME: Susana Jo  MRN: [id number]   : 1950    Patient not seen this date for Physical Therapy due to:  [] Blood transfusion in progress  [] Hemodialysis  []  Patient Declined  [] Spine Precautions   [] Strict Bedrest  [] Surgery/ Procedure  [x] Testing- transport arriving for CT. PT will check back as time allows. [] Other        [] PT being discontinued at this time. Patient independent. No further needs. [] PT being discontinued at this time as the patient has been transferred to palliative care. No further needs.     Vikki Bartlett, PT

## 2020-10-16 NOTE — FLOWSHEET NOTE
Both, attending, Dr. Aram Collazo., and neurosurgery resident, notified of CT scan results availablility showing a slight increase in [facility] size, and additionally, some new hypodense foci.

## 2020-10-16 NOTE — FLOWSHEET NOTE
[facility]     Emergency/Trauma Note    PATIENT NAME: Jesús Rojas    Shift date: 10.15.2020  Shift day: Thursday   Shift # 2    Room # 8983/2165-85   Name: Jesús Rojas            Age: 72 y.o. Gender: female          Latter day: [address] [employer]: unknown    Trauma/Incident type: Adult Trauma Consult  Admit Date & Time: 10/15/2020 12:38 PM  TRAUMA NAME: None        PATIENT/EVENT DESCRIPTION:  Jesús Rojas is a 72 y.o. female who arrived as a TRAUMA CONSULT due to a fall with a head injury. Pt to be admitted to 0248/0248-01. SPIRITUAL ASSESSMENT/INTERVENTION:  Patient and  appears to be calm and coping. States that patient fell while being carried by paramedics.  provided space for patient and  to express feelings, thoughts, and concerns. PATIENT BELONGINGS:  No belongings noted    ANY BELONGINGS OF SIGNIFICANT VALUE NOTED:  None    REGISTRATION STAFF NOTIFIED? Yes      WHAT IS YOUR SPIRITUAL CARE PLAN FOR THIS PATIENT?:   Chaplains will remain available to offer spiritual and emotional support as needed. Electronically signed by Mae Swanson on 10/15/2020 at 10:14 PM.  [facility]  [phone]       10/15/20 1630   Encounter Summary   Services provided to: Patient and family together   Referral/Consult From: Multi-disciplinary team   Support System Spouse   Continue Visiting   ([phone])   Complexity of Encounter Moderate   Length of Encounter 15 minutes   Spiritual Assessment Completed Yes   Crisis   Type Trauma  (Consult)   Assessment Calm; Approachable;Coping   Intervention Active listening;Explored feelings, thoughts, concerns;Explored coping resources; Discussed illness/injury and it's impact   Outcome Expressed gratitude;Engaged in conversation;Expressed feelings/needs/concerns     Electronically signed by Paulo Andino on 10/15/2020 at 10:14 PM

## 2020-10-16 NOTE — CARE COORDINATION
Writer called to bedside around 2000 with concerns with chart discrepancies. Patient with FULL CODE orders in computer but provider notes from earlier in the day support  [address] status. Discussed with both patient and her , Rene Members. Clarified patient is to remain a [address] and plan is to resume home tomorrow with Hospice care after over night observation.     Updated RN

## 2020-10-16 NOTE — CARE COORDINATION
Case Management Initial Discharge Plan  Jose James,             Met with:patient to discuss discharge plans. Information verified: address, contacts, phone number, , insurance Yes    Emergency Contact/Next of Kin name & number: Ruth Thurston    PCP: Ashley Thompson PA-C  Date of last visit: this week virtual [facility] Provider: Medicare     Discharge Planning    Living Arrangements:  Alone  Pt states she also has help from her neighbor Ry Packer [address]:  Family Members    Home has 2 stories   stairs to climb to get into front door, 15stairs to climb to reach second floor  Location of bedroom/bathroom in home bedroom and bath upstairs pt uses a BSC    Patient able to perform ADL's:Assisted can feed self assisted with adls    Current [place] & in home[place]  DME equipment: BSC Cane RW Nebulizer oxygen 2l nc  DME provider: Robbie Blankenship    Receiving oral anticoagulation therapy? No    If indicated:   Physician managing anticoagulation treatment: na  Where does patient obtain lab work for ATC treatment? Potential Assistance Needed:  N/A    Patient agreeable to home care: Yes  Freedom of choice provided:  yes  HOSPICE    Prior SNF/Rehab Placement and Facility: none  Agreeable to SNF/Rehab: No  [place] of choice provided: n/a     Evaluation: yes    Expected Discharge date:  10/16/20    Patient expects to be discharged to:  home  Follow Up Appointment: Best Day/ Time: Wednesday AM    Transportation provider: Robert taylor/ Hospice  Transportation arrangements needed for discharge: Yes    Readmission Risk              Risk of Unplanned Readmission:        0             Does patient have a readmission risk score greater than 14?: No  If yes, follow-up appointment must be made within 7 days of discharge.      Goals of Care: comfort      Discharge Plan: Back home with [facility] [phone] spoke with Doris ochoa  Informed her pt was in hospital under observation status. Will update her with plans. Called [address] spoke with Liliana Cross [phone] they opened this case Oct 12th. She will attempt to contact pts;  to verify they are ok to use [place] [employer] when pt ready. I attempted to contact Manisha Atkins spouse at [phone] no answer and unable to leave voicemail. Will continue to get a hold of spouse. Vikash Howard from [address] here spouse concerned about not being able to care for her at home. Luis checked into inpt bed with hospice. They do no have nay beds currently. Plan to convert to Hospice bed here. Notified Erika Kennedy to convert bed back to Hospice bed.          Electronically signed by Dinorah Nahs RN on 10/16/20 at 11:48 AM LUIT

## 2020-10-16 NOTE — PROGRESS NOTES
Trauma Tertiary Survey    Admit Date: 10/15/2020  Hospital day 0    [facility]       Past Medical History:   Diagnosis Date    Anxiety and depression     Back pain     Bipolar disorder (HCC)     Bronchitis     [facility]     Chronic obstructive pulmonary disease (COPD) (HCC)     Cirrhosis (HCC)     Cough     Current every day smoker     1 pack a day for the last 50 years    Fibromyalgia     Hepatitis     Hepatitis C per pt.  History of cervical cancer     is s/p hyst    History of chemotherapy     chemo every 2 weeks, last tx in April sometime    History of radiation therapy     last tx March 24th 2020    Liver disease     stage 4    Liver metastases ([facility]     Lung cancer ([facility] 03/2019    Dr. Demarcus Easton Nsour    Lung mass 2019    Malignant neoplasm of upper lobe of right lung (HCC)     MVP (mitral valve prolapse)     On supplemental oxygen therapy     Wears dentures     full- not in use today 3/10/20    Wears glasses     Wheezing        Scheduled Meds:   citalopram  10 mg Oral Nightly    fluticasone-umeclidin-vilant  1 puff Inhalation Daily    gabapentin  100 mg Oral TID    ipratropium-albuterol  3 mL Inhalation Q4H    lactulose  20 g Oral TID    traZODone  300 mg Oral Nightly    sodium chloride flush  10 mL Intravenous 2 times per day    [START ON 10/16/2020] influenza virus vaccine  0.5 mL Intramuscular Once     Continuous Infusions:  PRN Meds:hydrOXYzine, oxyCODONE HCl, sodium chloride flush, acetaminophen **OR** acetaminophen, polyethylene glycol, promethazine **OR** ondansetron, nicotine    Subjective:     Patient has complaints of headache. .  Pain is mild, worsens with movement, and some relief by rest.  There is not associated numbness, tingling, weakness, dizziness, photophobia, nausea, vomiting.     Objective:     Patient Vitals for the past 8 hrs:   BP Temp Temp src Pulse Resp SpO2 Height Weight   10/15/20 1802 (!) 141/97 97.7 °F (36.5 °C) Oral 114 16 95 % -- --   10/15/20 1235 133/79 97.6 °F (36.4 °C) Oral 108 18 98 % 5' 3\" (1.6 m) 105 lb (47.6 kg)       No intake/output data recorded. No intake/output data recorded. Radiology:  EXAMINATION:    CT OF THE HEAD WITHOUT CONTRAST; CT OF THE CERVICAL SPINE WITHOUT CONTRAST    10/15/2020 1:52 pm         TECHNIQUE:    CT of the head was performed without the administration of intravenous    contrast. Dose modulation, iterative reconstruction, and/or weight based    adjustment of the mA/kV was utilized to reduce the radiation dose to as low    as reasonably achievable.; CT of the cervical spine was performed without the    administration of intravenous contrast. Multiplanar reformatted images are    provided for review. Dose modulation, iterative reconstruction, and/or weight    based adjustment of the mA/kV was utilized to reduce the radiation dose to as    low as reasonably achievable.         COMPARISON:    CT head and CT angiogram of the chest 08/24/2020         HISTORY:    ORDERING SYSTEM PROVIDED HISTORY: Fall    TECHNOLOGIST PROVIDED HISTORY:    Fall    ; ORDERING SYSTEM PROVIDED HISTORY: fall    TECHNOLOGIST PROVIDED HISTORY:    fall         FINDINGS:    BRAIN/VENTRICLES: There is mild-to-moderate subarachnoid hemorrhage    identified involving the left anterior inferior frontal lobe.  Minimal    subarachnoid blood products left anterior temporal lobe.         Mild generalized involutional changes brain parenchyma identified with    prominence of the ventricles and sulci.  Mild periventricular and subcortical    white matter hypoattenuation suggestive chronic small vessel ischemic    disease.  No shift of midline structures.  Basal cisterns are patent.     Intracranial vascular calcification.         ORBITS: The visualized portion of the orbits demonstrate no acute abnormality.         SINUSES: Minimal ethmoid sinus mucosal thickening.  Mastoids are clear.         SOFT TISSUES/SKULL:  No acute abnormality of the visualized skull or soft    tissues.         CT cervical spine:         Mild to moderate dextrocurvature.  The cervical vertebral heights are    maintained.  Prior interbody fusion C5-6 and C6-C7.  Otherwise    mild-to-moderate multilevel degenerate changes with moderate severe    multilevel degenerate facet arthropathy. Moderate stenosis multiple levels    due to small disc bulges. These most pronounced C4-C5 and C2-C3 and C6-7    C7-T1.         Within the upper lungs there is a patulous appearance of the proximal    esophagus with air-fluid level.  Loculated collection right lung apex appears    similar to previous CT chest..  Bilateral emphysematous changes.  Parenchymal    opacity involving the right upper lobe similar prior CT chest.              Impression    Mild-to-moderate left frontal and left anterior temporal subarachnoid    hemorrhage.         Otherwise mild generalized involution changes and chronic small ischemic    disease.         Moderate multilevel degenerate changes cervical spine.  No acute fracture    traumatic malalignment      EXAMINATION:    CT OF THE HEAD WITHOUT CONTRAST; CT OF THE CERVICAL SPINE WITHOUT CONTRAST    10/15/2020 1:52 pm         TECHNIQUE:    CT of the head was performed without the administration of intravenous    contrast. Dose modulation, iterative reconstruction, and/or weight based    adjustment of the mA/kV was utilized to reduce the radiation dose to as low    as reasonably achievable.; CT of the cervical spine was performed without the    administration of intravenous contrast. Multiplanar reformatted images are    provided for review.  Dose modulation, iterative reconstruction, and/or weight    based adjustment of the mA/kV was utilized to reduce the radiation dose to as    low as reasonably achievable.         COMPARISON:    CT head and CT angiogram of the chest 08/24/2020         HISTORY:    ORDERING SYSTEM PROVIDED HISTORY: Fall    TECHNOLOGIST PROVIDED HISTORY:    Fall    ; ORDERING SYSTEM PROVIDED HISTORY: fall    TECHNOLOGIST PROVIDED HISTORY:    fall         FINDINGS:    BRAIN/VENTRICLES: There is mild-to-moderate subarachnoid hemorrhage    identified involving the left anterior inferior frontal lobe.  Minimal    subarachnoid blood products left anterior temporal lobe.         Mild generalized involutional changes brain parenchyma identified with    prominence of the ventricles and sulci.  Mild periventricular and subcortical    white matter hypoattenuation suggestive chronic small vessel ischemic    disease.  No shift of midline structures.  Basal cisterns are patent. Intracranial vascular calcification.         ORBITS: The visualized portion of the orbits demonstrate no acute abnormality.         SINUSES: Minimal ethmoid sinus mucosal thickening.  Mastoids are clear.         SOFT TISSUES/SKULL:  No acute abnormality of the visualized skull or soft    tissues.         CT cervical spine:         Mild to moderate dextrocurvature.  The cervical vertebral heights are    maintained.  Prior interbody fusion C5-6 and C6-C7.  Otherwise    mild-to-moderate multilevel degenerate changes with moderate severe    multilevel degenerate facet arthropathy. Moderate stenosis multiple levels    due to small disc bulges.  These most pronounced C4-C5 and C2-C3 and C6-7    C7-T1.         Within the upper lungs there is a patulous appearance of the proximal    esophagus with air-fluid level.  Loculated collection right lung apex appears    similar to previous CT chest..  Bilateral emphysematous changes.  Parenchymal    opacity involving the right upper lobe similar prior CT chest.              Impression    Mild-to-moderate left frontal and left anterior temporal subarachnoid    hemorrhage.         Otherwise mild generalized involution changes and chronic small ischemic    disease.         Moderate multilevel degenerate changes cervical spine.  No acute fracture    traumatic malalignment             PHYSICAL EXAM:   GCS:  4 - Opens eyes on own   6 - Follows simple motor commands  5 - Alert and oriented    Pupil size:  Left 3 mm Right 3 mm  Pupil reaction: Yes  Wiggles fingers: Left Yes Right Yes  Hand grasp:   Left normal   Right normal  Wiggles toes: Left Yes    Right Yes  Plantar flexion: Left normal  Right normal    General appearance: alert, appears stated age and cooperative, cachectic   Head: Normocephalic  Eyes: PERRL, EOMI  Nose: Nares normal. Septum midline. Mucosa normal. No drainage or sinus tenderness. Throat: lips, mucosa, and tongue normal; teeth and gums normal  Neck: supple, symmetrical, trachea midline  Back: symmetric, no curvature. ROM normal. No CVA tenderness. Lungs: Normal effort, no respiratory distress  Heart: regular rate and rhythm  Abdomen: diffusely tender  Extremities: extremities normal, atraumatic, no cyanosis or edema  Pulses: 2+ and symmetric    Spine:     Spine Tenderness ROM   Cervical 0 /10 Normal   Thoracic 0 /10 Normal   Lumbar 0 /10 Normal     Musculoskeletal    Joint Tenderness Swelling ROM   Right shoulder absent absent normal   Left shoulder absent absent normal   Right elbow absent absent normal   Left elbow absent absent normal   Right wrist absent absent normal   Left wrist absent absent normal   Right hand grasp absent absent normal   Left hand grasp absent absent normal   Right hip absent absent normal   Left hip absent absent normal   Right knee absent absent normal   Left knee absent absent normal   Right ankle absent absent normal   Left ankle absent absent normal   Right foot absent absent normal   Left foot absent absent normal       CONSULTS: Internal Medicine, Neurosurgery    INJURIES:    SAH, otherwise no acute traumatic injuries      Assessment/Plan:   No other traumatic injuries identified, no further imaging indicated. Trauma service will sign off at this time. Thank you for the consult. Please call/page us if you have any questions/concerns.   We appreciate being involved in the care of your patient.

## 2020-10-16 NOTE — CARE COORDINATION
SBIRT- Completed see below          Alcohol Screening and Brief Intervention        No results for input(s): ALC in the last 72 hours. Alcohol Pre-screening  (MEN ONLY) How many times in the past year have you had 5 or more drinks in a day?: None       Alcohol Screening Audit       Drug Pre-Screening   How many times in the past year have you used a recreational drug or used a prescription medication for nonmedical reasons?: None    Drug Screening DAST       Mood Pre-Screening (PHQ-2)  During the past two weeks, have you been bothered by little interest or pleasure in doing things?: No  During the past two weeks, have you been bothered by feeling down, depressed, or hopeless?: No    Mood Pre-Screening (PHQ-9)         I have interviewed Marne Dancer, [id number] regarding  Her alcohol consumption/drug use and risk for excessive use. Screenings were negative. Patient  N/A intervention at this time.      Deferred []    Completed on: 10/16/2020   SULLY PERERA

## 2020-10-16 NOTE — PROGRESS NOTES
[facility]  Office: Kim Suggs, DO, Kobe Causey, DO, Lucho Mathur, DO, Jeannette Barahona, DO, Russel Montero MD, Pham Carias MD, Dyan Ward MD, Moni Carter MD, Tk Jarrett MD, Beryle Marseille, MD, Ciro Pallas, MD, Felipe Levy MD, Sangeeta Lacy MD, Stevie Ferrari, DO, Leah Bae MD, Tashia Kingsley MD, Chloe Gonzalez DO, Jermaine Claudio MD,  Enriqueta Robertson DO, Gurmeet Duff MD, Nury Farley MD, Bridgett Michelle, [facility], [facility], [facility], Yobani Pantoja, CNP, Leoncio Renee, CNS, Jaguar Shelton, CNP, Marilyn Farley, CNP, Rebecca Wyman, CNP, Belia Gagnon, CNP, Joseph Norman, CNP, Chucho Alamo PA-C, Mortimer Guardian, [facility], Chris Grajeda, CNP, Lavonne Frankel, CNP, Theodora Guy, CNP, Cindy Mancuso, CNP, Pilar Roblero, [facility]   [address] [facility]    Progress Note    10/16/2020    11:06 AM    Name:   Km Hopkins  MRN:     [id number]     Acct:      [de-identified]   Room:   [address] [place] [address] Day:  0  Admit Date:  10/15/2020 12:38 PM    PCP:   Tatyana Oh PA-C  Code Status:  DNR-CC    Subjective:     C/C:   Chief Complaint   Patient presents with    Head Laceration     Pt arrives via squad, c/o head lac, squad had been transporting pt home from here after a procedure, carrying pt in a \"sling\" pt slipped out landing on back of head, denies LOC     Interval History Status: not changed. Pt seen and examined this morning. Admitted yesterday after sustaining a fall during transfer from ambulance to home residence. Found to have subarachnoid hemorrhage. CT scan with interval increase in subarachnoid hemorrhage with intraparenchymal components today. Patient is enrolled in hospice due to metastatic lung cancer. She is stating that she does not want any further treatments. She is somewhat confused at times throughout encounter. Blood pressure stable this morning.   Heart rate is increased to 114 bpm.  She Cirrhosis ([address] Cough, Current every day smoker, Fibromyalgia, Hepatitis, History of cervical cancer, History of chemotherapy, History of radiation therapy, Liver disease, Liver metastases ([address] Lung cancer ([address] Lung mass, Malignant neoplasm of upper lobe of right lung ([address] MVP (mitral valve prolapse), On supplemental oxygen therapy, Wears dentures, Wears glasses, and Wheezing. Social History:   reports that she has been smoking cigarettes. She started smoking about 53 years ago. She has a 13.00 pack-year smoking history. She has never used smokeless tobacco. She reports current drug use. Drug: Cocaine. She reports that she does not drink alcohol. Family History:   Family History   Problem Relation Age of Onset    Heart Attack Father     Cancer Paternal Grandmother     Lung Cancer Paternal Grandfather     Emphysema Mother        Vitals:  /77   Pulse 114   Temp 97.7 °F (36.5 °C) (Axillary)   Resp 24   Ht 5' 3\" (1.6 m)   Wt 105 lb (47.6 kg)   SpO2 99%   BMI 18.60 kg/m²   Temp (24hrs), Av.6 °F (36.4 °C), Min:97.5 °F (36.4 °C), Max:97.7 °F (36.5 °C)    No results for input(s): POCGLU in the last 72 hours. I/O (24Hr):   No intake or output data in the 24 hours ending 10/16/20 1106    Labs:  Hematology:  Recent Labs     10/15/20  1605 10/16/20  0523   WBC 10.1 14.8*   RBC 3.60* 3.93*   HGB 10.8* 11.8*   HCT 33.9* 40.2   MCV 94.2 102.3   MCH 30.0 30.0   MCHC 31.9 29.4   RDW 16.1* 16.4*   * 106*   MPV 8.2 9.2   INR 1.6 1.7     Chemistry:  Recent Labs     10/15/20  1605 10/16/20  0836   * 132*   K 4.2 4.1   CL 98 98   CO2 23 23   GLUCOSE 92 123*   BUN 10 11   CREATININE 0.55 0.56   ANIONGAP 9 11   LABGLOM >60 >60   GFRAA >60 >60   CALCIUM 8.0* 7.9*     Recent Labs     10/15/20  1605 10/16/20  0836   PROT 6.7 6.6   LABALBU 2.0* 2.1*   AST 36* 44*   ALT 20 22   ALKPHOS 104 107*   BILITOT 1.68* 1.82*     ABG:  Lab Results   Component Value Date    POCPH 7.502 2020 POCPCO2 27.8 08/25/2020    POCPO2 87.5 08/25/2020    POCHCO3 21.8 08/25/2020    NBEA 1 08/25/2020    PBEA NOT REPORTED 08/25/2020    JMF7CDL 23 08/25/2020    XYCK7TNZ 98 08/25/2020    FIO2 NOT REPORTED 08/25/2020     Lab Results   Component Value Date/Time    SPECIAL NOT REPORTED 08/25/2020 03:25 AM     Lab Results   Component Value Date/Time    CULTURE NO GROWTH 6 DAYS 05/22/2020 02:02 PM       Radiology:  Ct Head Wo Contrast    Result Date: 10/16/2020  Left anterolateral frontal lobe subarachnoid hemorrhage appears to have increased slightly since prior study. 2 rounded hyperdense foci are new compared to prior study compatible with small intraparenchymal hemorrhages or cortical contusion. Anterior temporal subarachnoid hemorrhage appears unchanged. No significant mass effect or midline shift. The findings were sent to the Radiology Results [address] at 10:17 am on 10/16/2020to be communicated to a licensed caregiver. Ct Head Wo Contrast    Result Date: 10/15/2020  Mild-to-moderate left frontal and left anterior temporal subarachnoid hemorrhage. Otherwise mild generalized involution changes and chronic small ischemic disease. Moderate multilevel degenerate changes cervical spine. No acute fracture traumatic malalignment     Ct Cervical Spine Wo Contrast    Result Date: 10/15/2020  Mild-to-moderate left frontal and left anterior temporal subarachnoid hemorrhage. Otherwise mild generalized involution changes and chronic small ischemic disease. Moderate multilevel degenerate changes cervical spine. No acute fracture traumatic malalignment     Xr Chest Portable    Result Date: 10/15/2020  No acute cardiopulmonary abnormality. Chronic findings in the right hemithorax as above. Us Guided Paracentesis    Result Date: 10/15/2020  Successful ultrasound guided paracentesis.        Physical Examination:        General appearance:  alert, cooperative and no distress, frail  female sitting up in bed  Mental Status:  oriented to person, place and time and normal affect  Lungs:  clear to auscultation bilaterally, normal effort  Heart: Tachycardia with regular rhythm, no murmur  Abdomen:  soft, nontender, nondistended, normal bowel sounds, no masses, hepatomegaly, splenomegaly  Extremities:  no edema, redness, tenderness in the calves, atrophy of the bilateral arms and legs  Skin:  no gross lesions, rashes, induration    Assessment:        Hospital Problems           Last Modified POA    * (Principal) Fall at home, initial encounter 10/16/2020 Yes    SAH (subarachnoid hemorrhage) ([address] 10/16/2020 Yes    Intraparenchymal hemorrhage of brain ([address] 10/16/2020 Yes    CL (cirrhosis of liver) (HCC) (Chronic) 10/16/2020 Yes    Chronic obstructive pulmonary disease ([address] (Chronic) 10/16/2020 Yes    Malignant neoplasm of upper lobe of right lung ([address] (Chronic) 10/16/2020 Yes    Malignant neoplasm of liver ([address] 10/16/2020 Yes    Chronic hepatitis C with hepatic coma ([address] 10/16/2020 Yes    Diabetes mellitus type 2 in nonobese ([address] 10/16/2020 Yes          Plan:        1. Appreciate neurosurgery input  2. Maintain SBP less than 140  3. Ultimately, patient is in hospice care  4. She does not wish to have any further procedures performed  5. Discussed with neurosurgery - patient is not a surgical candidate at this time. 6. Hospice evaluation for possible inpatient hospice  7. Discussed with patient's significant other who was at bedside this afternoon. He states that he will have to go home to discuss with family members as to whether or not they would be able to take care of the patient at home. 8. Discussed with patient's significant other for 25 minutes today    ADDENDUM: patient's family has elected for inpatient hospice. Hospice orders placed.     33 Berenice Mcahuca DO  10/16/2020  11:06 AM

## 2020-10-17 PROBLEM — S06.5XAA SUBDURAL HEMATOMA: Status: ACTIVE | Noted: 2020-01-01

## 2020-10-17 NOTE — FLOWSHEET NOTE
Assessment: Pilar Hill is a 72 y.o. female who has an a head laceration. Pt is uncomfortable and worried about head condition. Intervention:  was rounding on floor. Pt was open to spiritual care.  offered words fo courage and hope.  provided space for feelings. Outcome; Gratitude was expressed.  are available 24/7 via [employer].       10/17/20 0836   Encounter Summary   Services provided to: Patient   [facility]   Support System Spouse   Continue Visiting   (10/16/20)   Complexity of Encounter Moderate   Length of Encounter 15 minutes   Spiritual Assessment Completed Yes   Spiritual/Uatsdin   Type Spiritual support   Assessment Approachable   Intervention Nurtured hope   Outcome Comfort

## 2020-10-17 NOTE — PROGRESS NOTES
Department of Neurosurgery                                       Resident Progress Note      Subjective:    Feeling well today, non focal exam. Complaining of a chronic HA in the R parietoccipital region 3/10, typical for her previous HA. Partner at bedside asking for medical records stating that he would like to send him to his . Brief History:     The patient is a 72 y.o. female who presents with left frontal and left anterior temporal SAH without shift after fall. Pt has swuamous cell carcinoma of right lung, not currently undergoing chemo or radiation. Right main stem bronchus invasion. Also with liver disease and hepatitis C. Patient was being transported to home after paracentesis today when she accidentally slipped from sling during transport. She hit the back of her head on the corner of concrete steps. Per patient, LOC for approximately 5 seconds. No seizure like activity. No n/v. No extremity weakness, numbness, tingling. She states she is having a mild posterior headache since the fall. She was brought to the ED after fall and found to have a small left frontal and left anterior temporal SAH without shift. She does not take anticoagulation medication. PHYSICAL EXAM:       /75   Pulse 101   Temp 97.7 °F (36.5 °C)   Resp 18   SpO2 100%       CONSTITUTIONAL:  Well developed, cachectic, alert and oriented x 3, in no acute distress. GCS 15, nontoxic. Mild dysarthria, no aphasia. EOMI.     HEAD:  normocephalic, atraumatic    EYES:  PERRLA, EOMI.   ENT:  moist mucous membranes   NECK:  supple, symmetric, no midline tenderness to palpation    BACK:  without midline tenderness, step-offs or deformities    LUNGS:  Equal air entry bilaterally, port in place to left chest wall   CARDIOVASCULAR:  normal s1 / s2   ABDOMEN:  Soft, no rigidity   NEUROLOGIC:  EYE OPENING     Spontaneous - 4 [x]       To voice - 3 []       To pain - 2 []       None - 1 []    VERBAL RESPONSE Appropriate, oriented - 5 [x]       Dazed or confused - 4 []       Syllables, expletives - 3 []       Grunts - 2 []       None - 1 []    MOTOR RESPONSE     Spontaneous, command - 6 [x]       Localizes pain - 5 []       Withdraws pain - 4 []       Abnormal flexion - 3 []       Abnormal extension - 2 []       None - 1 []            Total GCS: 15    Mental Status:  A & O x3, awake             Cranial Nerves:    cranial nerves II-XII are grossly intact    Motor Exam:    Drift:  absent  Tone:  normal    Motor exam is symmetrical 5 out of 5 all extremities bilaterally    Sensory:    Touch:    Right Upper Extremity:  normal  Left Upper Extremity:  normal  Right Lower Extremity:  normal  Left Lower Extremity:  normal      Plantar Response:    Right:  downgoing  Left:  downgoing    Clonus:  absent    Coordination/Dysmetria:  Finger to Nose:   Right:  normal  Left:  normal   Dysdiadochokinesia:  absent     SKIN:  no rash      LABS AND IMAGING:     CBC with Differential:    Lab Results   Component Value Date    WBC 14.8 10/16/2020    RBC 3.93 10/16/2020    HGB 11.8 10/16/2020    HCT 40.2 10/16/2020     10/16/2020    .3 10/16/2020    MCH 30.0 10/16/2020    MCHC 29.4 10/16/2020    RDW 16.4 10/16/2020    NRBC 1 06/28/2019    LYMPHOPCT 8 10/09/2020    MONOPCT 8 10/09/2020    BASOPCT 1 10/09/2020    MONOSABS 0.54 10/09/2020    LYMPHSABS 0.54 10/09/2020    EOSABS 0.07 10/09/2020    BASOSABS 0.07 10/09/2020    DIFFTYPE NOT REPORTED 10/09/2020     BMP:    Lab Results   Component Value Date     10/16/2020    K 4.1 10/16/2020    CL 98 10/16/2020    CO2 23 10/16/2020    BUN 11 10/16/2020    LABALBU 2.1 10/16/2020    CREATININE 0.56 10/16/2020    CALCIUM 7.9 10/16/2020    GFRAA >60 10/16/2020    LABGLOM >60 10/16/2020    GLUCOSE 123 10/16/2020       Radiology Review:    No orders to display         ASSESSMENT AND PLAN:       Patient Active Problem List   Diagnosis    CL (cirrhosis of liver) (HCC)    Chronic hepatitis C without hepatic coma (HCC)    Thrombocytopenia (HCC)    Chronic obstructive pulmonary disease (HCC)    Anxiety    Malignant neoplasm of upper lobe of right lung (HCC)    Substance abuse ([address]    Diverticulitis of large intestine with perforation and abscess without bleeding    Malignant neoplasm of liver (HCC)    Chronic hepatitis C with hepatic coma (HCC)    MVP (mitral valve prolapse)    History of radiation therapy    Fibromyalgia    History of chemotherapy    Bipolar disorder (HCC)    Current every day smoker    Pleural effusion on right    Depression    Transaminitis    Respiratory alkalosis    Diabetes mellitus type 2 in nonobese ([address]    Holland coma scale total score 13-15    Somnolence    SAH (subarachnoid hemorrhage) (HCC)    Intraparenchymal hemorrhage of brain ([address]    Fall at home, initial encounter    Subdural hematoma ([address]         A/P:  This is a 72 y.o. female with acute left frontal and left anterior temporal SAH after accidental slip during transport today after paracentesis. Active squamous cell lung cancer of right lung with right main stem bronchus invasion. Hepatitis C and liver cirrhosis. Neuro intact. Not on any anticoagulation. Patient care will be discussed with attending, will reevaluate patient along with attending     - No neurosurgical interventions planned for now  - Initial recommendation for admission, BP management, neuro checks, and repeat CT head on 10/16 AM. However, patient is [facility] and would refuse surgery. Therefore, [facility] to [facility] also ok from [facility] perspective at this time as long as patient understands risks. Discussion between ED medical staff and patient and .  -Neurosurgery will sign off    Thank you for your consult.        Chavo Mccray MD, [facility]  Neurosurgery Service  10/17/2020 at 2:39 PM         This note is created with the assistance of a speech recognition program.  While intending to generate a document that actually

## 2020-10-17 NOTE — DISCHARGE SUMMARY
[address] on 10/15/2020 after sustaining a fall at home. Patient has recently enrolled in hospice care secondary to metastatic lung cancer. She has a history of cirrhosis and underwent paracentesis yesterday afternoon. After returning home from [facility], patient sustained a fall when being transferred from [facility] to home environment. She presented to the hospital and was discovered to have a subarachnoid hemorrhage. Neurosurgery evaluated the patient and repeat imaging this morning revealed slight worsening of the hemorrhage. After discussions with the patient's significant other, the decision has been made to enroll her into inpatient hospice care, as she does not want any further major surgical interventions. She will be converted to inpatient hospice status. Significant therapeutic interventions:   - Neurosurgical evaluation  - IV morphine for pain control    Significant Diagnostic Studies:   Labs / Micro:  CBC:   Lab Results   Component Value Date    WBC 14.8 10/16/2020    RBC 3.93 10/16/2020    HGB 11.8 10/16/2020    HCT 40.2 10/16/2020    .3 10/16/2020    MCH 30.0 10/16/2020    MCHC 29.4 10/16/2020    RDW 16.4 10/16/2020     10/16/2020     HFP:    Lab Results   Component Value Date    PROT 6.6 10/16/2020        Radiology:  Ct Head Wo Contrast    Result Date: 10/16/2020  Left anterolateral frontal lobe subarachnoid hemorrhage appears to have increased slightly since prior study. 2 rounded hyperdense foci are new compared to prior study compatible with small intraparenchymal hemorrhages or cortical contusion. Anterior temporal subarachnoid hemorrhage appears unchanged. No significant mass effect or midline shift. The findings were sent to the Radiology Results [address] at 10:17 am on 10/16/2020to be communicated to a licensed caregiver. Ct Head Wo Contrast    Result Date: 10/15/2020  Mild-to-moderate left frontal and left anterior temporal subarachnoid hemorrhage.  Otherwise known as:  CELEXA  Take 1 tablet by mouth nightly     gabapentin 100 MG capsule  Commonly known as:  NEURONTIN  Take 1 capsule by mouth 3 times daily for 30 days. glucose monitoring kit monitoring kit  1 kit by Does not apply route daily Provide insurance covered glucometer compatible with test strips and lancets, check 1-2 times daily     hydrOXYzine 50 MG tablet  Commonly known as:  ATARAX  take 1 tablet by mouth once daily     ibuprofen 600 MG tablet  Commonly known as:  ADVIL;MOTRIN  Take 1 tablet by mouth every 6 hours as needed for Pain     ipratropium-albuterol 0.5-2.5 (3) MG/3ML Soln nebulizer solution  Commonly known as:  DUONEB  Inhale 3 mLs into the lungs every 4 hours     lactulose 10 GM/15ML solution  Commonly known as:  CHRONULAC  Take 30 mLs by mouth 3 times daily     Lancets Misc  Please Dispense appropriate insurance approved lancets for patients glucometer, check blood sugars 1-2 times daily. lidocaine viscous hcl 2 % Soln solution  Commonly known as:  XYLOCAINE  Take 5-10 mLs by mouth as needed for Irritation     lidocaine-prilocaine 2.5-2.5 % cream  Commonly known as:  EMLA  Apply topically as needed.      metFORMIN 500 MG tablet  Commonly known as:  GLUCOPHAGE  Take 1 tablet by mouth daily (with breakfast)     omeprazole 40 MG delayed release capsule  Commonly known as:  PRILOSEC  Take 1 capsule by mouth daily     ondansetron 8 MG tablet  Commonly known as:  Zofran  Take 1 tablet by mouth every 12 hours as needed for Nausea or Vomiting     oxyCODONE HCl 10 MG immediate release tablet  Commonly known as:  OXY-IR  take 1 tablet by mouth every 6 hours if needed for pain     Oxygen Concentrator     potassium chloride 20 MEQ extended release tablet  Commonly known as:  KLOR-CON M  take 1 tablet by mouth once daily     traZODone 300 MG tablet  Commonly known as:  DESYREL  take 1 tablet by mouth nightly     Trelegy Ellipta 100-62.5-25 MCG/INH Aepb  Generic drug:

## 2020-10-17 NOTE — ED NOTES
Writer accessed chart to complete safecare due to injury acquired with medical staff via [employer]. , name, MRN and severity of injury needed to be included in safecare.       Derick Barreto RN  10/17/20 0020

## 2020-10-17 NOTE — H&P
[facility]  Office: [address], DO, Areli Miguelland, DO, Jonah Kan, DO, Marissa Pottsuty Blood, DO, Halle Jeong MD, Ani Roldan MD, Zack Polanco MD, Justin Garcia MD, Willis Monique MD, Marely Riddle MD, Rhonda Uriostegui MD, Moses Johansen MD, Mbonu Cassandria Favre, MD, Jessica Johnson DO, Elin Bradford MD, Carmen Mccoy MD, Ammon Cowan DO, Akiko Quinonez MD,  Saintclair Lank, DO, Venice Sparrow MD, Payton Larry MD, Daryle Springer, [facility], [facility], CNP, Meliton Cortés, CNP, Sherman Peguero, CNS, Saima Ellison, CNP, Darren Hartman, CNP, Amaya Rush, CNP, Ezequiel Christianson, CNP, Mick Guevara, CNP, Raven Cole PA-C, Jt Meléndez, [facility], Moni Cronin, CNP, Nahun Rivera, CNP, Cary Reynoso, CNP, Sharonda Humphrey, CNP, Maryann Linn, CNP         [facility]    HISTORY AND PHYSICAL EXAMINATION            Date:   10/17/2020  Patient name:  Jen Segovia  Date of admission:  10/16/2020  6:57 PM  MRN:   [id number]  Account:  [de-identified]  YOB: 1950  PCP:    Vera Romberg, PA-C  Room:   73/9670-80  Code Status:    DNR-CC    Chief Complaint:     Headache, fall    History Obtained From:     patient, spouse, electronic medical record    History of Present Illness:     Jen Segovia is a 72 y.o. female who was admitted to [facility] on 10/15/2020 after sustaining a fall at home. Patient has recently enrolled in hospice care secondary to metastatic lung cancer. She has a history of cirrhosis and underwent paracentesis yesterday afternoon. After returning home from [facility], patient sustained a fall when being transferred from [facility] to home environment. She presented to the hospital and was discovered to have a subarachnoid hemorrhage. Neurosurgery evaluated the patient and repeat imaging this morning revealed slight worsening of the hemorrhage.  After discussions with the patient's significant other, the decision has been made to enroll her into inpatient hospice care, as she does not want any further major surgical interventions. She has been made into an inpatient hospice status. Discussed with patient's spouse today regarding no surgery. Discussed with neurosurgery. Patient appears restless. She is awake and alert. She states that she remembers the fall, but does not understand fully what is going on. Tachycardic on exam. Left-sided facial droop noted. She did have an episode of nausea and vomiting this morning. Past Medical History:     Past Medical History:   Diagnosis Date    Anxiety and depression     Back pain     Bipolar disorder (HCC)     Bronchitis     [facility]     Chronic obstructive pulmonary disease (COPD) (HCC)     Cirrhosis (HCC)     Cough     Current every day smoker     1 pack a day for the last 50 years    Fibromyalgia     Hepatitis     Hepatitis C per pt.     History of cervical cancer     is s/p hyst    History of chemotherapy     chemo every 2 weeks, last tx in April sometime    History of radiation therapy     last tx March 24th 2020    Liver disease     stage 4    Liver metastases [facility]     [facility] 03/2019    Dr. Merritt Pap mass 2019    Malignant neoplasm of upper lobe of right lung (HCC)     MVP (mitral valve prolapse)     On supplemental oxygen therapy     Wears dentures     full- not in use today 3/10/20    Wears glasses     Wheezing         Past Surgical History:     Past Surgical History:   Procedure Laterality Date    BREAST SURGERY Left     benign lumpectomy, multiple    BRONCHOSCOPY  04/15/2019    biopsies and washings    BRONCHOSCOPY N/A 4/15/2019    BRONCHOSCOPY BRUSHINGS performed by Lasha Toth MD at [address]  4/15/2019    BRONCHOSCOPY BIOPSY BRONCHUS performed by Lasha Toth MD at [address]  4/15/2019    BRONCHOSCOPY DIAGNOSTIC OR CELL [address] ONLY performed by Lasha Toth MD at [facility]    CARPAL TUNNEL RELEASE Bilateral     CERVICAL DISC SURGERY      X 2    CHOLECYSTECTOMY      HYSTERECTOMY      complete    KNEE ARTHROSCOPY Right     THORACENTESIS Right 05/13/2019    THORACENTESIS Right 08/19/2019    TUNNELED CENTRAL VENOUS CATHETER W/ SUBCUTANEOUS PORT Left 05/13/2019        Medications Prior to Admission:     Prior to Admission medications    Medication Sig Start Date End Date Taking? Authorizing Provider   ondansetron (ZOFRAN) 8 MG tablet Take 1 tablet by mouth every 12 hours as needed for Nausea or Vomiting 10/15/20   Ashley Perdomo PA-C   omeprazole (PRILOSEC) 40 MG delayed release capsule Take 1 capsule by mouth daily 10/15/20   Ashley Perdomo PA-C   metFORMIN (GLUCOPHAGE) 500 MG tablet Take 1 tablet by mouth daily (with breakfast) 10/15/20   Ashley Perdomo PA-C   citalopram (CELEXA) 10 MG tablet Take 1 tablet by mouth nightly 10/15/20   Ashley Perdomo PA-C   glucose monitoring kit (FREESTYLE) monitoring kit 1 kit by Does not apply route daily Provide insurance covered glucometer compatible with test strips and lancets, check 1-2 times daily 10/13/20   Ashley Perdomo PA-C   Lancets MISC Please Dispense appropriate insurance approved lancets for patients glucometer, check blood sugars 1-2 times daily.  10/13/20   Ashley Perdomo PA-C   blood glucose monitor strips Provide insurance covered test strips compatible with glucometer, test 1-2 times a day 10/13/20   Ashley Perdomo PA-C   Alcohol Swabs PADS Please Dispense alcohol swabs 10/13/20   Ashley Perdomo PA-C   hydrOXYzine (ATARAX) 50 MG tablet take 1 tablet by mouth once daily 10/2/20   Ashley Perdomo PA-C   oxyCODONE HCl (OXY-IR) 10 MG immediate release tablet take 1 tablet by mouth every 6 hours if needed for pain 10/2/20 11/1/20  Jenny Lazo MD   traZODone (DESYREL) 300 MG tablet take 1 tablet by mouth nightly 9/3/20   Ashley Perdomo PA-C   ipratropium-albuterol (DUONEB) 0.5-2.5 (3) MG/3ML SOLN nebulizer solution Inhale 3 mLs into the lungs every 4 hours 8/27/20   Emiliano Carbajal MD   gabapentin (NEURONTIN) 100 MG capsule Take 1 capsule by mouth 3 times daily for 30 days. 8/27/20 10/9/20  Emiliano Carbajal MD   lactulose [facility]) 10 GM/15ML solution Take 30 mLs by mouth 3 times daily 8/27/20   Emiliano Carbajal MD   potassium chloride (KLOR-CON M) 20 MEQ extended release tablet take 1 tablet by mouth once daily 8/7/20   Zach Lobato MD   VENTOLIN  (90 Base) MCG/ACT inhaler inhale 2 puffs by mouth and INTO THE LUNGS every 6 hours if needed for wheezing 6/29/20   Selvin Banuelos PA-C   fluticasone-umeclidin-vilant (TRELEGY ELLIPTA) 772-68.4-34 MCG/INH AEPB Inhale 1 puff into the lungs daily 3/31/20   LIZA Manning - BARRON   lidocaine-prilocaine (EMLA) 2.5-2.5 % cream Apply topically as needed. Patient not taking: Reported on 9/25/2020 1/20/20   Alli Zhu MD   albuterol (PROVENTIL) (2.5 MG/3ML) 0.083% nebulizer solution Take 3 mLs by nebulization every 6 hours as needed for Wheezing 9/25/19   Antoni Iyer MD   lidocaine viscous hcl (XYLOCAINE) 2 % SOLN solution Take 5-10 mLs by mouth as needed for Irritation 6/5/19   Shashank Lyle MD   Oxygen Concentrator     Historical Provider, MD   ibuprofen (ADVIL;MOTRIN) 600 MG tablet Take 1 tablet by mouth every 6 hours as needed for Pain 6/20/16 6/1/20  Marisol Miranda MD        Allergies:     Patient has no known allergies. Social History:     Tobacco:    reports that she has been smoking cigarettes. She started smoking about 53 years ago. She has a 13.00 pack-year smoking history. She has never used smokeless tobacco.  Alcohol:      reports no history of alcohol use. Drug Use:  reports current drug use. Drug: Cocaine.     Family History:     Family History   Problem Relation Age of Onset    Heart Attack Father     Cancer Paternal Grandmother     Lung Cancer Paternal Grandfather     Emphysema Mother        Review of air entry, clear to ausculation, no wheezing, rales or rhonchi, normal effort  Cardiovascular: normal rate, regular rhythm, no murmur, gallop, rub  Abdomen: Soft, nontender, nondistended, normal bowel sounds, no hepatomegaly or splenomegaly  Neurologic: There are no new focal motor or sensory deficits, normal muscle tone and bulk, no abnormal sensation, normal speech, cranial nerves II through XII grossly intact  Skin: No gross lesions, rashes, bruising or bleeding on exposed skin area  Extremities: peripheral pulses palpable, no pedal edema or calf pain with palpation  Psych: normal affect    Investigations:      Laboratory Testing:  No results found for this or any previous visit (from the past 24 hour(s)). Imaging/Diagnostics:  Ct Head Wo Contrast    Result Date: 10/16/2020  1. Mild interval increase in size of small focus of acute subdural hemorrhage involving the right posterior falx measuring up to 5 mm in diameter. 2. Stable appearance of mid left frontal cortical and subcortical small areas of multifocal acute intraparenchymal hemorrhage with surrounding vasogenic edema consistent with brain contusion. 3. Unchanged extent in appearance of left frontotemporal multifocal acute subarachnoid hemorrhage. Ct Head Wo Contrast    Result Date: 10/16/2020  Left anterolateral frontal lobe subarachnoid hemorrhage appears to have increased slightly since prior study. 2 rounded hyperdense foci are new compared to prior study compatible with small intraparenchymal hemorrhages or cortical contusion. Anterior temporal subarachnoid hemorrhage appears unchanged. No significant mass effect or midline shift. The findings were sent to the Radiology Results [address] at 10:17 am on 10/16/2020to be communicated to a licensed caregiver. Ct Head Wo Contrast    Result Date: 10/15/2020  Mild-to-moderate left frontal and left anterior temporal subarachnoid hemorrhage.  Otherwise mild generalized involution changes and chronic small ischemic disease. Moderate multilevel degenerate changes cervical spine. No acute fracture traumatic malalignment     Ct Cervical Spine Wo Contrast    Result Date: 10/15/2020  Mild-to-moderate left frontal and left anterior temporal subarachnoid hemorrhage. Otherwise mild generalized involution changes and chronic small ischemic disease. Moderate multilevel degenerate changes cervical spine. No acute fracture traumatic malalignment     Xr Chest Portable    Result Date: 10/15/2020  No acute cardiopulmonary abnormality. Chronic findings in the right hemithorax as above. Us Guided Paracentesis    Result Date: 10/15/2020  Successful ultrasound guided paracentesis. Assessment :      Hospital Problems           Last Modified POA    * (Principal) SAH (subarachnoid hemorrhage) ([address] 10/17/2020 Yes    Intraparenchymal hemorrhage of brain ([address] 10/17/2020 Yes    Fall at home, initial encounter 10/17/2020 Yes    Subdural hematoma ([address] 10/17/2020 Yes    CL (cirrhosis of liver) (HCC) (Chronic) 10/17/2020 Yes    Chronic obstructive pulmonary disease ([address] (Chronic) 10/17/2020 Yes    Malignant neoplasm of upper lobe of right lung ([address] (Chronic) 10/17/2020 Yes    Malignant neoplasm of liver ([address] 10/17/2020 Yes    Chronic hepatitis C with hepatic coma ([address] 10/17/2020 Yes    Diabetes mellitus type 2 in nonobese ([address] 10/17/2020 Yes          Plan:     Patient status inpatient in the med-surg unit    1. Admit to [facility]  2. Hospice on board  3. Morphine PRN for air hunger and pain  4. Ativan for restlessness  5. Zofran as anti-emetic  6. O2 prn  7.  Discussed with neurosurgery and the patient's spouse this am    Consultations:   IP CONSULT TO HOSPICE  IP CONSULT TO NEUROSURGERY    Patient is admitted as inpatient status because of co-morbidities listed above, severity of signs and symptoms as outlined, requirement for current medical therapies and most importantly because of direct risk to patient if care not provided in a hospital setting. Expected length of stay > 48 hours.     Sri Vanegas DO  10/17/2020  12:38 PM    Copy sent to Dr. Denzel Holter, PA-C

## 2020-10-18 NOTE — PROGRESS NOTES
[facility]  Office: [address], DO, Petrona Sherman, DO, Josefina Andersen, DO, Mauro Linaresers Blood, DO, Rich Kemp MD, Jeff Sotomayor MD, Mac Michelle MD, Pat Tobar MD, Shanthi Staley MD, Ana Timmons MD, Melissa Francis MD, Mercedes Price MD, Mbonu Dusty Babinski, MD, Yoko Gallegos DO, Ron Kohler MD, Areli Umanzor MD, Ashli Viveros DO, Bill Garza MD,  Dimitri Chan DO, Phan Orantes MD, Rocio Dawson MD, Christophe Casillas, CNP, [facility], CNP, Renuka Galvez, CNP, Pedro Law, CNS, Angi Soto, CNP, Yomi Cid, CNP, Tiana Berman, CNP, Talat James, CNP, Archie Davis, CNP, Faustino Hernandes PA-C, Joan Kidd, [facility], Kain Carrillo, CNP, Sandeep Sheriff, CNP, Domi Salazar, CNP, Sachin Gomez, CNP, Adelaida Gregory, CNP         [address]    Progress Note    10/18/2020    11:32 AM    Name:   Abdulaziz Guevara  MRN:     [id number]     Acct:      [de-identified]   Room:   [address] [place] [address] Day:  2  Admit Date:  10/16/2020  6:57 PM    PCP:   Edilma Gao PA-C  Code Status:  DNR-CC    Subjective:     C/C: headache, fall    Interval History Status: not changed. Patient seen and examined this morning. No acute events overnight. Becoming more tachycardic. Not tolerating much in regards to p.o. intake. She does not complain of a headache this morning. She does complain of right-sided abdominal pain. No family at bedside. She is awake and alert. Intermittently answering questions appropriately. She does not appear to be suffering. Brief History:     Ciara Reed a 72 y.o. female who was admitted to [address] on 10/15/2020 after sustaining a fall at home. Patient has recently enrolled in hospice care secondary to metastatic lung cancer. She has a history of cirrhosis and underwent paracentesis yesterday afternoon.  After returning home from [facility], patient sustained a fall when being transferred from [facility] to [facility]. She presented to the hospital and was discovered to have a subarachnoid hemorrhage. Neurosurgery evaluated the patient and repeat imaging this morning revealed slight worsening of the hemorrhage. After discussions with the patient's significant other, the decision has been made to enroll her into inpatient hospice care, as she does not want any further major surgical interventions. She has been made into an inpatient hospice status.     Discussed with patient's spouse today regarding no surgery. Discussed with neurosurgery.     Patient appears restless. She is awake and alert. She states that she remembers the fall, but does not understand fully what is going on. Tachycardic on exam. Left-sided facial droop noted. She did have an episode of nausea and vomiting this morning.    -Awaiting placement in inpatient hospice facility    Review of Systems:     Constitutional:  negative for chills, fevers, sweats  Respiratory:  negative for cough, dyspnea on exertion, shortness of breath, wheezing  Cardiovascular:  negative for chest pain, chest pressure/discomfort, lower extremity edema, palpitations  Gastrointestinal: Reports right-sided abdominal pain; negative for  constipation, diarrhea, nausea, vomiting  Neurological:  negative for dizziness, headache    Medications:      Allergies:  No Known Allergies    Current Meds:   Scheduled Meds:    scopolamine  1 patch Transdermal Q72H     Continuous Infusions:    sodium chloride 75 mL/hr at 10/17/20 1931     PRN Meds: metoprolol, ondansetron, acetaminophen **OR** acetaminophen, ipratropium-albuterol, LORazepam, morphine    Data:     Past Medical History:   has a past medical history of Anxiety and depression, Back pain, Bipolar disorder ([facility] Bronchitis, Cancer ([facility] [address] [facility] Chronic obstructive pulmonary disease (COPD) ([facility] [address] Cirrhosis ([facility] [address] [facility] Cough, Current every day smoker, Fibromyalgia, Hepatitis, History of cervical cancer, History of chemotherapy, History of radiation therapy, Liver disease, Liver metastases ([address] Lung cancer ([facility] [address] [facility] Lung mass, Malignant neoplasm of upper lobe of right lung ([address] MVP (mitral valve prolapse), On supplemental oxygen therapy, Wears dentures, Wears glasses, and Wheezing. Social History:   reports that she has been smoking cigarettes. She started smoking about 53 years ago. She has a 13.00 pack-year smoking history. She has never used smokeless tobacco. She reports current drug use. Drug: Cocaine. She reports that she does not drink alcohol. Family History:   Family History   Problem Relation Age of Onset    Heart Attack Father     Cancer Paternal Grandmother     Lung Cancer Paternal Grandfather     Emphysema Mother        Vitals:  BP 93/76   Pulse 117   Temp 98 °F (36.7 °C) (Oral)   Resp 14   SpO2 97%   Temp (24hrs), Av °F (36.7 °C), Min:98 °F (36.7 °C), Max:98 °F (36.7 °C)    No results for input(s): POCGLU in the last 72 hours. I/O (24Hr):     Intake/Output Summary (Last 24 hours) at 10/18/2020 1132  Last data filed at 10/18/2020 0333  Gross per 24 hour   Intake --   Output 300 ml   Net -300 ml       Labs:  Hematology:  Recent Labs     10/15/20  1605 10/16/20  0523   WBC 10.1 14.8*   RBC 3.60* 3.93*   HGB 10.8* 11.8*   HCT 33.9* 40.2   MCV 94.2 102.3   MCH 30.0 30.0   MCHC 31.9 29.4   RDW 16.1* 16.4*   * 106*   MPV 8.2 9.2   INR 1.6 1.7     Chemistry:  Recent Labs     10/15/20  1605 10/16/20  0836   * 132*   K 4.2 4.1   CL 98 98   CO2 23 23   GLUCOSE 92 123*   BUN 10 11   CREATININE 0.55 0.56   ANIONGAP 9 11   LABGLOM >60 >60   GFRAA >60 >60   CALCIUM 8.0* 7.9*     Recent Labs     10/15/20  1605 10/16/20  0836   PROT 6.7 6.6   LABALBU 2.0* 2.1*   AST 36* 44*   ALT 20 22   ALKPHOS 104 107*   BILITOT 1.68* 1.82*     ABG:  Lab Results   Component Value Date    POCPH 7.502 2020    POCPCO2 27.8 2020    POCPO2 87.5 2020    POCHCO3 21.8 2020    NBEA 1 08/25/2020    PBEA NOT REPORTED 08/25/2020    CAO8QKI 23 08/25/2020    WFGB2YZM 98 08/25/2020    FIO2 NOT REPORTED 08/25/2020     Lab Results   Component Value Date/Time    SPECIAL NOT REPORTED 08/25/2020 03:25 AM     Lab Results   Component Value Date/Time    CULTURE NO GROWTH 6 DAYS 05/22/2020 02:02 PM       Radiology:  Ct Head Wo Contrast    Result Date: 10/16/2020  1. Mild interval increase in size of small focus of acute subdural hemorrhage involving the right posterior falx measuring up to 5 mm in diameter. 2. Stable appearance of mid left frontal cortical and subcortical small areas of multifocal acute intraparenchymal hemorrhage with surrounding vasogenic edema consistent with brain contusion. 3. Unchanged extent in appearance of left frontotemporal multifocal acute subarachnoid hemorrhage. Ct Head Wo Contrast    Result Date: 10/16/2020  Left anterolateral frontal lobe subarachnoid hemorrhage appears to have increased slightly since prior study. 2 rounded hyperdense foci are new compared to prior study compatible with small intraparenchymal hemorrhages or cortical contusion. Anterior temporal subarachnoid hemorrhage appears unchanged. No significant mass effect or midline shift. The findings were sent to the Radiology Results [address] at 10:17 am on 10/16/2020to be communicated to a licensed caregiver. Ct Head Wo Contrast    Result Date: 10/15/2020  Mild-to-moderate left frontal and left anterior temporal subarachnoid hemorrhage. Otherwise mild generalized involution changes and chronic small ischemic disease. Moderate multilevel degenerate changes cervical spine. No acute fracture traumatic malalignment     Ct Cervical Spine Wo Contrast    Result Date: 10/15/2020  Mild-to-moderate left frontal and left anterior temporal subarachnoid hemorrhage. Otherwise mild generalized involution changes and chronic small ischemic disease. Moderate multilevel degenerate changes cervical spine.   No oxygen as needed  8. Scopolamine patch for secretions  9. Disposition.   Await for transfer to inpatient hospice facility    [facility],   10/18/2020  11:32 AM

## 2020-10-18 NOTE — PLAN OF CARE
Problem: Pain:  Goal: Pain level will decrease  Description: Pain level will decrease  10/18/2020 0143 by Donna Linda RN  Outcome: Ongoing  10/17/2020 1553 by Jamar Panchal RN  Outcome: Ongoing  Goal: Control of acute pain  Description: Control of acute pain  10/18/2020 0143 by Donna Linda RN  Outcome: Ongoing  10/17/2020 1553 by Jamar Panchal RN  Outcome: Ongoing  Goal: Control of chronic pain  Description: Control of chronic pain  10/18/2020 0143 by Donna Linda RN  Outcome: Ongoing  10/17/2020 1553 by Jamar Panchal RN  Outcome: Ongoing     Problem: Falls - Risk of:  Goal: Will remain free from falls  Description: Will remain free from falls  10/18/2020 0143 by Donna Linda RN  Outcome: Ongoing  10/17/2020 1553 by Jamar Panchal RN  Outcome: Ongoing  Goal: Absence of physical injury  Description: Absence of physical injury  10/18/2020 0143 by Donna Linda RN  Outcome: Ongoing  10/17/2020 1553 by Jamar Panchal RN  Outcome: Ongoing     Problem: Skin Integrity:  Goal: Will show no infection signs and symptoms  Description: Will show no infection signs and symptoms  10/18/2020 0143 by Donna Linda RN  Outcome: Ongoing  10/17/2020 1553 by Jamar Panchal RN  Outcome: Ongoing  Goal: Absence of new skin breakdown  Description: Absence of new skin breakdown  10/18/2020 0143 by Donna Linda RN  Outcome: Ongoing  10/17/2020 1553 by Jamar Panchal RN  Outcome: Ongoing

## 2020-10-19 NOTE — PLAN OF CARE
Problem: Pain:  Goal: Pain level will decrease  Description: Pain level will decrease  10/19/2020 0019 by Steve Vegas RN  Outcome: Ongoing  10/18/2020 1721 by Jazzy Jason RN  Outcome: Ongoing  Goal: Control of acute pain  Description: Control of acute pain  10/19/2020 0019 by Steev Vegas RN  Outcome: Ongoing  10/18/2020 1721 by Jazzy Jason RN  Outcome: Ongoing  Goal: Control of chronic pain  Description: Control of chronic pain  10/19/2020 0019 by Steve Vegas RN  Outcome: Ongoing  10/18/2020 1721 by Jazzy Jason RN  Outcome: Ongoing     Problem: Falls - Risk of:  Goal: Will remain free from falls  Description: Will remain free from falls  10/19/2020 0019 by Steve Vegas RN  Outcome: Ongoing  10/18/2020 1721 by Jazzy Jason RN  Outcome: Ongoing  Goal: Absence of physical injury  Description: Absence of physical injury  10/19/2020 0019 by Steve Vegas RN  Outcome: Ongoing  10/18/2020 1721 by Jazzy Jason RN  Outcome: Ongoing     Problem: Skin Integrity:  Goal: Will show no infection signs and symptoms  Description: Will show no infection signs and symptoms  10/19/2020 0019 by Steve Vegas RN  Outcome: Ongoing  10/18/2020 1721 by Jazzy Jason RN  Outcome: Ongoing  Goal: Absence of new skin breakdown  Description: Absence of new skin breakdown  10/19/2020 0019 by Steve Vegas RN  Outcome: Ongoing  10/18/2020 1721 by Jazzy Jason RN  Outcome: Ongoing

## 2020-10-19 NOTE — PROGRESS NOTES
Patient  here off and on throughout the day. Offered to let him stay overnight. Also offered to let any family come up as an exception to the rule as she is comfort care. He declined. Went to check on pt. And  had left. Patient is breathing apneic. I tried to call husbands cell phone to see if he was still here or wanted to come back. I got his vm and Lm for him to call the floor.

## 2020-10-19 NOTE — PROGRESS NOTES
have a subarachnoid hemorrhage. Neurosurgery evaluated the patient and repeat imaging this morning revealed slight worsening of the hemorrhage. After discussions with the patient's significant other, the decision has been made to enroll her into inpatient hospice care, as she does not want any further major surgical interventions. She has been made into an inpatient hospice status.     Discussed with patient's spouse today regarding no surgery. Discussed with neurosurgery.     Patient appears restless. She is awake and alert. She states that she remembers the fall, but does not understand fully what is going on. Tachycardic on exam. Left-sided facial droop noted. She did have an episode of nausea and vomiting this morning.    -Awaiting placement in inpatient hospice facility    Review of Systems:     Unable to obtain as patient is obtunded    Medications: Allergies:  No Known Allergies    Current Meds:   Scheduled Meds:    scopolamine  1 patch Transdermal Q72H     Continuous Infusions:     PRN Meds: metoprolol, ondansetron, acetaminophen **OR** acetaminophen, ipratropium-albuterol, LORazepam, morphine    Data:     Past Medical History:   has a past medical history of Anxiety and depression, Back pain, Bipolar disorder ([address] Bronchitis, Cancer ([address] Chronic obstructive pulmonary disease (COPD) ([address] Cirrhosis ([address] Cough, Current every day smoker, Fibromyalgia, Hepatitis, History of cervical cancer, History of chemotherapy, History of radiation therapy, Liver disease, Liver metastases ([address] Lung cancer ([address] Lung mass, Malignant neoplasm of upper lobe of right lung ([address] MVP (mitral valve prolapse), On supplemental oxygen therapy, Wears dentures, Wears glasses, and Wheezing. Social History:   reports that she has been smoking cigarettes. She started smoking about 53 years ago. She has a 13.00 pack-year smoking history. She has never used smokeless tobacco. She reports current drug use. Drug: Cocaine. She reports that she does not drink alcohol. Family History:   Family History   Problem Relation Age of Onset    Heart Attack Father     Cancer Paternal Grandmother     Lung Cancer Paternal Grandfather     Emphysema Mother        Vitals:  BP 97/63   Pulse 121   Temp 98 °F (36.7 °C) (Axillary)   Resp 16   Ht 5' 3\" (1.6 m)   Wt 102 lb (46.3 kg)   SpO2 93%   BMI 18.07 kg/m²   Temp (24hrs), Av.1 °F (36.7 °C), Min:98 °F (36.7 °C), Max:98.1 °F (36.7 °C)    No results for input(s): POCGLU in the last 72 hours. I/O (24Hr): Intake/Output Summary (Last 24 hours) at 10/19/2020 1519  Last data filed at 10/19/2020 0824  Gross per 24 hour   Intake 900 ml   Output --   Net 900 ml       Labs:  Hematology:  No results for input(s): WBC, RBC, HGB, HCT, MCV, MCH, MCHC, RDW, PLT, MPV, SEDRATE, CRP, INR, DDIMER, TD1FJRPA, LABABSO in the last 72 hours. Invalid input(s): PT  Chemistry:  No results for input(s): NA, K, CL, CO2, GLUCOSE, BUN, CREATININE, MG, ANIONGAP, LABGLOM, GFRAA, CALCIUM, CAION, PHOS, PSA, PROBNP, TROPHS, CKTOTAL, CKMB, CKMBINDEX, MYOGLOBIN, DIGOXIN, LACTACIDWB in the last 72 hours. No results for input(s): PROT, LABALBU, LABA1C, L0IZJWY, I2JVZUX, FT4, TSH, AST, ALT, LDH, GGT, ALKPHOS, LABGGT, BILITOT, BILIDIR, AMMONIA, AMYLASE, LIPASE, LACTATE, CHOL, HDL, LDLCHOLESTEROL, CHOLHDLRATIO, TRIG, VLDL, DCK48OO, PHENYTOIN, PHENYF, URICACID, POCGLU in the last 72 hours. ABG:  Lab Results   Component Value Date    POCPH 7.502 2020    POCPCO2 27.8 2020    POCPO2 87.5 2020    POCHCO3 21.8 2020    NBEA 1 2020    PBEA NOT REPORTED 2020    ZOJ2BJT 23 2020    FMZV7LLZ 98 2020    FIO2 NOT REPORTED 2020     Lab Results   Component Value Date/Time    SPECIAL NOT REPORTED 2020 03:25 AM     Lab Results   Component Value Date/Time    CULTURE NO GROWTH 6 DAYS 2020 02:02 PM       Radiology:  Ct Head Wo Contrast    Result Date: 10/16/2020  1.  Mild interval increase in size of small focus of acute subdural hemorrhage involving the right posterior falx measuring up to 5 mm in diameter. 2. Stable appearance of mid left frontal cortical and subcortical small areas of multifocal acute intraparenchymal hemorrhage with surrounding vasogenic edema consistent with brain contusion. 3. Unchanged extent in appearance of left frontotemporal multifocal acute subarachnoid hemorrhage. Ct Head Wo Contrast    Result Date: 10/16/2020  Left anterolateral frontal lobe subarachnoid hemorrhage appears to have increased slightly since prior study. 2 rounded hyperdense foci are new compared to prior study compatible with small intraparenchymal hemorrhages or cortical contusion. Anterior temporal subarachnoid hemorrhage appears unchanged. No significant mass effect or midline shift. The findings were sent to the Radiology Results [address] at 10:17 am on 10/16/2020to be communicated to a licensed caregiver. Ct Head Wo Contrast    Result Date: 10/15/2020  Mild-to-moderate left frontal and left anterior temporal subarachnoid hemorrhage. Otherwise mild generalized involution changes and chronic small ischemic disease. Moderate multilevel degenerate changes cervical spine. No acute fracture traumatic malalignment     Ct Cervical Spine Wo Contrast    Result Date: 10/15/2020  Mild-to-moderate left frontal and left anterior temporal subarachnoid hemorrhage. Otherwise mild generalized involution changes and chronic small ischemic disease. Moderate multilevel degenerate changes cervical spine. No acute fracture traumatic malalignment     Xr Chest Portable    Result Date: 10/15/2020  No acute cardiopulmonary abnormality. Chronic findings in the right hemithorax as above. Us Guided Paracentesis    Result Date: 10/15/2020  Successful ultrasound guided paracentesis.        Physical Examination:        General appearance: obtunded, non-responsive, moaning, chronically ill-appearing  female  Mental Status:  Unable to obtain  Lungs:  Clear to auscultation bilaterally, normal effort  Heart: Tachycardia with regular rhythm, no murmur  Abdomen:  soft, nontender, abdominal distention present, normal bowel sounds, no masses, hepatomegaly, splenomegaly  Extremities:  no edema, redness, tenderness in the calves  Skin:  no gross lesions, rashes, induration    Assessment:        Hospital Problems           Last Modified POA    * (Principal) SAH (subarachnoid hemorrhage) ([address] 10/17/2020 Yes    Intraparenchymal hemorrhage of brain ([address] 10/17/2020 Yes    Fall at home, initial encounter 10/17/2020 Yes    Subdural hematoma ([address] 10/17/2020 Yes    CL (cirrhosis of liver) (HCC) (Chronic) 10/17/2020 Yes    Chronic obstructive pulmonary disease ([address] (Chronic) 10/17/2020 Yes    Malignant neoplasm of upper lobe of right lung ([address] (Chronic) 10/17/2020 Yes    Malignant neoplasm of liver ([address] 10/17/2020 Yes    Chronic hepatitis C with hepatic coma ([address] 10/17/2020 Yes    Diabetes mellitus type 2 in nonobese ([address] 10/17/2020 Yes          Plan:        1. Patient obtunded. Likely transitioning  2. Attempted to get a hold of patient's significant other, voicemail reached, phone appears to be off. 3. HL IVF  4. Okay to give as needed Lopressor for tachycardia, as this is symptom management in a hospice patient. Please only give if heart rate greater than 150.  5. Continue morphine for pain/air hunger  6. Continue Ativan for restlessness  7. Tylenol for fever  8. Supplemental oxygen as needed  9. Scopolamine patch for secretions  10. Disposition: Patient likely to transition over the next several hours.     [address] Berenice Machuca DO  10/19/2020  3:19 PM

## 2020-10-20 NOTE — DISCHARGE SUMMARY
[facility]  Office: [address], , Areli Miguelland, DO, Jonah Kan DO, Marissa Pottsuty Blood, DO, Halle Jeong MD, Ani Roldan MD, Zack Polanco MD, Justin Garcia MD, Willis Monique MD, Marely Riddle MD, Rhonda Uriostegui MD, Moses Johansen MD, Mbonu Cassandria Favre, MD, Jessica Johnson DO, Elin Bradford MD, Carmen Mccoy MD, Ammon Cowan DO, Akiko Quinonez MD,  Saintclair Lank, DO, Venice Sparrow MD, Payton Larry MD, Daryle Springer, [facility], [facility] Ximena, CNP, Meliton Cortés, CNP, Sherman Peguero, CNS, Saima Ellsion, CNP, Darren Hartman, CNP, Amaya Rush, CNP, Ezequiel Christianson, CNP, Mick Guevara, CNP, Raven Cole PA-C, Jt Meléndez, [facility], Moni Cronin, CNP, Nahun Rivera, CNP, Cary Reynoso, CNP, Sharonda Humphrey, CNP, Maryann Linn, CNP         [facility]   [address] [facility]    Discharge Summary     Patient ID: Jen Segovia  :  1950   MRN: [id number]     ACCOUNT:  [de-identified]   Patient's PCP: Vera Romberg, PA-C  Admit Date: 10/16/2020   Discharge Date: 10/19/2020     Length of Stay: 3  Code Status:  DNR-CC  Admitting Physician: No admitting provider for patient encounter. Discharge Physician: Margarita Steele DO     Active Discharge Diagnoses:     Hospital Problem Lists:  Principal Problem:    SAH (subarachnoid hemorrhage) ([facility]  Active Problems:    Intraparenchymal hemorrhage of brain ([address]    Fall at home, initial encounter    Subdural hematoma (HCC)    CL (cirrhosis of liver) ([address]    Chronic obstructive pulmonary disease ([address]    Malignant neoplasm of upper lobe of right lung ([address]    Malignant neoplasm of liver (HCC)    Chronic hepatitis C with hepatic coma ([address]    Diabetes mellitus type 2 in nonobese [facility])  Resolved Problems:    * No resolved hospital problems.  *      Admission Condition:  critical     Discharged Condition:     Hospital Stay:     Hospital Course:    Tadeo Tang a 72 y.o. female who was shift. The findings were sent to the Radiology Results [address] at 10:17 am on 10/16/2020to be communicated to a licensed caregiver. Ct Head Wo Contrast    Result Date: 10/15/2020  Mild-to-moderate left frontal and left anterior temporal subarachnoid hemorrhage. Otherwise mild generalized involution changes and chronic small ischemic disease. Moderate multilevel degenerate changes cervical spine. No acute fracture traumatic malalignment     Ct Cervical Spine Wo Contrast    Result Date: 10/15/2020  Mild-to-moderate left frontal and left anterior temporal subarachnoid hemorrhage. Otherwise mild generalized involution changes and chronic small ischemic disease. Moderate multilevel degenerate changes cervical spine. No acute fracture traumatic malalignment     Xr Chest Portable    Result Date: 10/15/2020  No acute cardiopulmonary abnormality. Chronic findings in the right hemithorax as above. Us Guided Paracentesis    Result Date: 10/15/2020  Successful ultrasound guided paracentesis. Consultations:    Consults:     Final Specialist Recommendations/Findings:   IP CONSULT TO HOSPICE  IP CONSULT TO NEUROSURGERY      The patient was seen and examined on day of discharge and this discharge summary is in conjunction with any daily progress note from day of discharge. Discharge plan:     Disposition:     No discharge procedures on file. Time Spent on discharge is  20 mins in patient examination, evaluation, counseling as well as medication reconciliation, prescriptions for required medications, discharge plan and follow up. Electronically signed by   Salvador Adams DO  10/19/2020  9:41 PM      Thank you Dr. Selvin Banuelos PA-C for the opportunity to be involved in this patient's care.

## 2020-10-20 NOTE — PROGRESS NOTES
Pt transferred to [facility] with all belonging.

## 2020-10-20 NOTE — FLOWSHEET NOTE
[address] [facility] [address]   Patient Death Note  DEATH   Shift date: 10/19/20    Shift day: Monday  Shift #: 3                 Room # 9009/4771-68   Name: Cholo Gama            Age: 72 y.o. Gender: female          Yazdanism: Tenriism        Admit Date & Time: 10/16/2020  6:57 PM     Referral: Nurse   Actual date of death: 10/19/20   TOD: 19:14       SITUATION AT DEATH:  The patient had end stage Liver Failure. The patient was on hospice at home. The patient was being brought to the hospital for a procedure for pain management. The EMS dropped her on her head when they were carrying her out of the house. She had several brain bleeds from the fall. The patient was offered to have surgery to fix the brain bleeds but she chose not to have them operated on. IS THIS A 'S CASE? Yes    SPIRITUAL/EMOTIONAL INTERVENTION:  The  was a ministerial presence with the patient's , Wendy Feliciano. ([phone]). The  Luis Navarro listened as the patient's  shared about their marriage. The patient's   shared about how hard the last six months were especially the last 60 days. They have been  for 25 years. The  talked to the patient's  about  home. The patient's  said he was told by hospice a few days ago that the [facility] would Cremate her for free.  gave the patient's  a Spiritual Care busniss card and told him to call after he talked to hospice.  also told the  about [facility], just incase.  talked to Aggie GERARDO From [facility] [address] [place] [address]. Fernando Yi said that the  did say that [facility] will pay for cremations, but they still need to fill paperwork after the patient is at a  home. The  is going to suggest for the patient to use [facility] ([address] [place] [address]) [phone].   She said, Kit Manzo would probably take on the

## 2020-10-22 ASSESSMENT — ENCOUNTER SYMPTOMS
DIARRHEA: 0
BACK PAIN: 0
WHEEZING: 0
CONSTIPATION: 0
COUGH: 0
SHORTNESS OF BREATH: 0

## 2020-10-27 PROBLEM — I60.9 SUBARACHNOID BLEED (HCC): Status: ACTIVE | Noted: 2020-10-27

## 2021-01-13 NOTE — CARE COORDINATION
Message left for APS  Cooper Webb notifying her that pt was discharged to [address] [facility] 8/28. [facility] GILDARDO

## 2022-07-27 NOTE — PROGRESS NOTES
Comprehensive Nutrition Assessment    Type and Reason for Visit:  Reassess    Nutrition Recommendations/Plan:   - Continue current Carb Control, Dental Soft diet - encourage/monitor PO intakes as tolerated. - Continue to provide Glucerna oral supplements with all meals. Nutrition Assessment:  Pt planned for discharge today. Pt has been taking some of her meals but has been drinking oral supplements well. Observed lunch tray which pt had bites of pasta, 50% chocolate ice cream, and 100% of a chocolate Glucerna. Malnutrition Assessment:  Malnutrition Status:  Severe malnutrition    Context:  Chronic Illness     Findings of the 6 clinical characteristics of malnutrition:  Energy Intake:  7 - 75% or less estimated energy requirements for 1 month or longer  Weight Loss:  7 - Greater than 7.5% over 3 months(24%)     Body Fat Loss:  (moderate body fat loss) Triceps, Orbital   Muscle Mass Loss:  (moderate muscle mass loss) Temples (temporalis), Clavicles (pectoralis & deltoids), Thigh (quadraceps)  Fluid Accumulation:  No significant fluid accumulation     Strength:  Not Performed    Estimated Daily Nutrient Needs:  Energy (kcal):  3671-2798 kcal/day; Weight Used for Energy Requirements:  Current     Protein (g):  65-90 g pro/day; Weight Used for Protein Requirements:  Current(1.5-2)          Nutrition Related Findings:  Labs/Meds reviewed. Last BM 8/28.       Wounds:  None       Current Nutrition Therapies:    DIET CARB CONTROL; Carb Control: 4 carb choices (60 gms)/meal; Dental Soft  Dietary Nutrition Supplements: Diabetic Oral Supplement    Anthropometric Measures:  · Height: 5' 3\" (160 cm)  · Current Body Weight: 94 lb 5.7 oz (42.8 kg)   · Usual Body Weight: 124 lb (56.2 kg)(bedscale weight of 5/22/20)     · Ideal Body Weight: 115 lbs; % Ideal Body Weight 76%  · BMI: 16.7  · BMI Categories: Underweight (BMI less than 22) age over 72       Nutrition Diagnosis:   · Inadequate oral intake related to Pt. Took out contacts et placed them in sterile cups w/ white lid (2).      Kay Alvarez RN  07/27/22 [id number]

## (undated) DEVICE — GLOVE ORANGE PI 7 1/2   MSG9075

## (undated) DEVICE — FORCEPS BX L100CM DIA1.8MM WRK CHN 2MM PULM S STL RAD JAW 4

## (undated) DEVICE — TUBING, SUCTION, 9/32" X 20', STRAIGHT: Brand: MEDLINE INDUSTRIES, INC.

## (undated) DEVICE — GARMENT,MEDLINE,DVT,INT,CALF,MED, GEN2: Brand: MEDLINE

## (undated) DEVICE — BRUSH CYTO GI W/ 3 RNG HNDL 1.8MMX120MM